# Patient Record
Sex: FEMALE | Race: WHITE | NOT HISPANIC OR LATINO | Employment: FULL TIME | URBAN - METROPOLITAN AREA
[De-identification: names, ages, dates, MRNs, and addresses within clinical notes are randomized per-mention and may not be internally consistent; named-entity substitution may affect disease eponyms.]

---

## 2018-01-15 NOTE — RESULT NOTES
Verified Results  MAMMO SCREENING BILATERAL W 3D & CAD [id number] 02:06PM Caitlyn Mcdaniels Order Number: [id number]     Test Name Result Flag Reference   MAMMO SCREENING BILATERAL W 3D & CAD (Report)     Patient History:   Family history of breast cancer in mother at age 71  Patient has never smoked  Patient's BMI is 38 3  Reason for exam: screening (asymptomatic)  Mammo Screening Bilateral W DBT and CAD: May 13, 2016 - Check In    #: [de-identified]   2D/3D Procedure   3D Bilateral CC and MLO view(s) were taken  2D Bilateral CC and MLO view(s) were taken  Technologist: Kayy Nielsen, RT(R)(M)   Prior study comparison: 2015, mammo diagnostic right, performed    at [facility]  2015, mammo screening bilateral, performed    at [facility]      There are scattered fibroglandular densities  A combination of mediolateral oblique 3D tomographic slices as    well as standard two-dimensional orthogonal images were obtained  The parenchymal pattern appears stable  No dominant soft tissue    mass or suspicious calcifications are noted  A radioopaque post    core biopsy clip is present in the right breast The skin and    nipple contours are within normal limits  No mammographic evidence of malignancy  No    significant changes when compared with prior studies  ASSESSMENT: BiRad:1 - Negative     Recommendation:   Routine screening mammogram in 1 year  A reminder letter will be   scheduled  8-10% of cancers will be missed on mammography  Management of a    palpable abnormality must be based on clinical grounds  Patients    will be notified of their results via letter from our facility  Accredited by [employer] of [employer] and FDA       Transcription Location: [facility] 98: [id number]     Risk Value(s):   Tyrer-Cuzick 10 Year: 5 598%, Tyrer-Cuzick Lifetime: 24 783%,    Myriad Table: 1 5%, SHANTANU 5 Year: 1 8%, NCI Lifetime: 17 3%   Signed by:   Cassandra Bates MD   5/17/16

## 2019-02-17 NOTE — PROGRESS NOTES
Assessment/Plan:     Calcium 1200-1500mg + 600-1000 IU Vit D daily  Pap with HR HPV q 3 years-done  Annual mammogram; req given for diagnostic mammo    Colonoscopy-due and scheduled for 3/11/19  Monthly BSE  Exercise 150 minutes per week minimum  Kegels 20 times twice daily  Ibuprofen 600 mg by mouth with onset of bleeding or cramping, whichever is first  Take second dose of ibuprofen  400 mg by mouth with food and repeat every 6 hours x 3 days  Negative depression screen  Diagnoses and all orders for this visit:    Encntr for gyn exam (general) (routine) w abnormal findings  -     Liquid-based pap, screening    Breast nodule  -     Mammo diagnostic bilateral w 3d & cad; Future    Vulvovaginal candidiasis  -     fluconazole (DIFLUCAN) 150 mg tablet; Take 1 tablet (150 mg total) by mouth once for 1 dose  -     nystatin-triamcinolone (MYCOLOG-II) ointment; Apply pea sized amount to affected area  Twice daily x 7 days    Other orders  -     Cancel: Mammo screening bilateral w 3d & cad; Future  -     Discontinue: Dapagliflozin-Metformin HCl ER (XIGDUO XR)  MG TB24; Take by mouth  -     Discontinue: Dulaglutide (TRULICITY) 6 08 HR/3 0ST SOPN; Inject under the skin  -     losartan (COZAAR) 100 MG tablet; Take by mouth  -     Omeprazole-Sodium Bicarbonate  MG CAPS; Take by mouth  -     Multiple Vitamins-Minerals (MULTIVITAMIN ADULT PO); Take by mouth  -     EQL EVENING PRIMROSE  MG CAPS; Take by mouth  -     BLACK COHOSH EXTRACT PO; Take by mouth  -     Liraglutide (VICTOZA SC); Inject under the skin              Subjective:      Patient ID: Conchita Aguirre is a 48 y o  female  Conchita Aguirre is a 48 y o  female who is here today for her annual visit accompanied by her mother  No health concerns  Monthly menses x 3 days with heavy flow x 1 days then light flow  Day one of menses is "horrible with headaches, cramps and back pain " Takes OTC meds and rests to relieve pain  Rates cramps 8/10  Menses is not acceptable  No menses yet this month  Jose E Wise is not sexually active and not currently dating  Normal pap with negative HR HPV 3/24/15  Normal mammo 5/16  She considers her job as work  She works at the [employer] at [employer]  She does walk for exercise in good weather months  The following portions of the patient's history were reviewed and updated as appropriate: allergies, current medications, past family history, past medical history, past social history, past surgical history and problem list     Review of Systems   Constitutional: Negative  Negative for activity change, appetite change, chills, diaphoresis, fatigue, fever and unexpected weight change  HENT: Negative for congestion, dental problem, sneezing, sore throat and trouble swallowing  Eyes: Negative for visual disturbance  Respiratory: Negative for chest tightness and shortness of breath  Cardiovascular: Negative for chest pain and leg swelling  Gastrointestinal: Negative for abdominal pain, constipation, diarrhea, nausea and vomiting  Genitourinary: Positive for menstrual problem  Negative for difficulty urinating, dyspareunia, dysuria, frequency, hematuria, pelvic pain, urgency, vaginal bleeding, vaginal discharge and vaginal pain  Musculoskeletal: Negative for back pain and neck pain  Skin: Negative  Allergic/Immunologic: Negative  Neurological: Negative for weakness and headaches  Hematological: Negative for adenopathy  Psychiatric/Behavioral: Negative  Objective:      /72 (BP Location: Left arm, Patient Position: Sitting)   Pulse 65   Ht 5' 10" (1 778 m)   Wt 120 kg (265 lb)   LMP 01/10/2019   BMI 38 02 kg/m²          Physical Exam   Constitutional: She is oriented to person, place, and time  She appears well-developed and well-nourished  HENT:   Head: Normocephalic and atraumatic  Eyes: Right eye exhibits no discharge  Left eye exhibits no discharge     Neck: Normal range of motion  Neck supple  Cardiovascular: Normal rate, regular rhythm, normal heart sounds and intact distal pulses  Pulmonary/Chest: Effort normal and breath sounds normal  Right breast exhibits no mass  Left breast exhibits mass (left breast nodule)  Left breast exhibits no nipple discharge, no skin change and no tenderness  No breast tenderness, discharge or bleeding  Breasts are symmetrical    Left breast nodule 3 cm FN at 8:00   Abdominal: Soft  Genitourinary: Uterus normal  Rectal exam shows no external hemorrhoid  No breast tenderness, discharge or bleeding  Pelvic exam was performed with patient prone  No labial fusion  There is tenderness on the right labia  There is no rash, lesion or injury on the right labia  There is tenderness on the left labia  There is no rash, lesion or injury on the left labia  Cervix exhibits no motion tenderness, no discharge and no friability  Right adnexum displays no mass, no tenderness and no fullness  Left adnexum displays no mass, no tenderness and no fullness  There is bleeding in the vagina  No erythema or tenderness in the vagina  No foreign body in the vagina  No signs of injury around the vagina  No vaginal discharge found  Genitourinary Comments: Labia major and vestibule excortication, satellite lesions noted on skin  Menses currently so unable to perform culture or wet mount   Musculoskeletal: Normal range of motion  Lymphadenopathy:     She has no cervical adenopathy  No inguinal adenopathy noted on the right or left side  Right: No inguinal adenopathy present  Left: No inguinal adenopathy present  Neurological: She is alert and oriented to person, place, and time  Skin: Skin is warm and dry  Psychiatric: She has a normal mood and affect  Nursing note and vitals reviewed

## 2019-02-18 ENCOUNTER — ANNUAL EXAM (OUTPATIENT)
Dept: GYNECOLOGY | Facility: CLINIC | Age: 54
End: 2019-02-18
Payer: COMMERCIAL

## 2019-02-18 VITALS
BODY MASS INDEX: 37.94 KG/M2 | DIASTOLIC BLOOD PRESSURE: 72 MMHG | HEIGHT: 70 IN | HEART RATE: 65 BPM | SYSTOLIC BLOOD PRESSURE: 126 MMHG | WEIGHT: 265 LBS

## 2019-02-18 DIAGNOSIS — N63.0 BREAST NODULE: ICD-10-CM

## 2019-02-18 DIAGNOSIS — B37.3 VULVOVAGINAL CANDIDIASIS: ICD-10-CM

## 2019-02-18 DIAGNOSIS — Z01.411 ENCNTR FOR GYN EXAM (GENERAL) (ROUTINE) W ABNORMAL FINDINGS: Primary | ICD-10-CM

## 2019-02-18 PROBLEM — B37.31 VULVOVAGINAL CANDIDIASIS: Status: ACTIVE | Noted: 2019-02-18

## 2019-02-18 PROCEDURE — G0124 SCREEN C/V THIN LAYER BY MD: HCPCS | Performed by: PATHOLOGY

## 2019-02-18 PROCEDURE — S0612 ANNUAL GYNECOLOGICAL EXAMINA: HCPCS | Performed by: NURSE PRACTITIONER

## 2019-02-18 PROCEDURE — 87624 HPV HI-RISK TYP POOLED RSLT: CPT | Performed by: NURSE PRACTITIONER

## 2019-02-18 PROCEDURE — G0145 SCR C/V CYTO,THINLAYER,RESCR: HCPCS | Performed by: PATHOLOGY

## 2019-02-18 RX ORDER — FLUCONAZOLE 150 MG/1
150 TABLET ORAL ONCE
Qty: 1 TABLET | Refills: 0 | Status: SHIPPED | OUTPATIENT
Start: 2019-02-18 | End: 2019-02-18

## 2019-02-18 RX ORDER — MAGNESIUM OXIDE 250 MG
TABLET ORAL
COMMUNITY

## 2019-02-18 RX ORDER — NYSTATIN AND TRIAMCINOLONE ACETONIDE 100000; 1 [USP'U]/G; MG/G
OINTMENT TOPICAL
Qty: 30 G | Refills: 0 | Status: SHIPPED | OUTPATIENT
Start: 2019-02-18 | End: 2021-07-19

## 2019-02-18 RX ORDER — OMEPRAZOLE/SODIUM BICARBONATE 20MG-1.1G
CAPSULE ORAL
COMMUNITY
End: 2020-01-28 | Stop reason: SDUPTHER

## 2019-02-18 RX ORDER — LOSARTAN POTASSIUM 100 MG/1
100 TABLET ORAL DAILY
COMMUNITY
End: 2020-10-08

## 2019-02-18 NOTE — PATIENT INSTRUCTIONS
Calcium 1200-1500mg + 600-1000 IU Vit D daily  Pap with HR HPV q 3 years-done  Annual mammogram; req given for diagnostic mammo    Colonoscopy-due and scheduled for 3/11/19  Monthly BSE  Exercise 150 minutes per week minimum  Kegels 20 times twice daily  Ibuprofen 600 mg by mouth with onset of bleeding or cramping, whichever is first  Take second dose of ibuprofen  400 mg by mouth with food and repeat every 6 hours x 3 days

## 2019-02-20 LAB
HPV HR 12 DNA CVX QL NAA+PROBE: NEGATIVE
HPV16 DNA CVX QL NAA+PROBE: NEGATIVE
HPV18 DNA CVX QL NAA+PROBE: NEGATIVE

## 2019-02-22 LAB
LAB AP GYN PRIMARY INTERPRETATION: ABNORMAL
Lab: ABNORMAL
PATH INTERP SPEC-IMP: ABNORMAL

## 2019-06-18 ENCOUNTER — HOSPITAL ENCOUNTER (EMERGENCY)
Facility: HOSPITAL | Age: 54
Discharge: HOME | End: 2019-06-18
Attending: EMERGENCY MEDICINE
Payer: COMMERCIAL

## 2019-06-18 ENCOUNTER — APPOINTMENT (EMERGENCY)
Dept: RADIOLOGY | Facility: HOSPITAL | Age: 54
End: 2019-06-18
Payer: COMMERCIAL

## 2019-06-18 VITALS
WEIGHT: 270.5 LBS | BODY MASS INDEX: 38.81 KG/M2 | DIASTOLIC BLOOD PRESSURE: 79 MMHG | TEMPERATURE: 98.7 F | HEART RATE: 100 BPM | OXYGEN SATURATION: 99 % | SYSTOLIC BLOOD PRESSURE: 123 MMHG | RESPIRATION RATE: 18 BRPM

## 2019-06-18 DIAGNOSIS — M25.462 KNEE EFFUSION, LEFT: ICD-10-CM

## 2019-06-18 DIAGNOSIS — M70.42 PREPATELLAR BURSITIS OF LEFT KNEE: Primary | ICD-10-CM

## 2019-06-18 PROCEDURE — 73564 X-RAY EXAM KNEE 4 OR MORE: CPT

## 2019-06-18 PROCEDURE — 99283 EMERGENCY DEPT VISIT LOW MDM: CPT | Performed by: PHYSICIAN ASSISTANT

## 2019-06-18 PROCEDURE — 99283 EMERGENCY DEPT VISIT LOW MDM: CPT

## 2019-06-18 RX ORDER — NAPROXEN 250 MG/1
500 TABLET ORAL ONCE
Status: COMPLETED | OUTPATIENT
Start: 2019-06-18 | End: 2019-06-18

## 2019-06-18 RX ORDER — CEPHALEXIN 500 MG/1
500 CAPSULE ORAL 4 TIMES DAILY
Qty: 28 CAPSULE | Refills: 0 | Status: SHIPPED | OUTPATIENT
Start: 2019-06-18 | End: 2019-06-25

## 2019-06-18 RX ORDER — CEPHALEXIN 250 MG/1
500 CAPSULE ORAL ONCE
Status: COMPLETED | OUTPATIENT
Start: 2019-06-18 | End: 2019-06-18

## 2019-06-18 RX ORDER — NAPROXEN 500 MG/1
500 TABLET ORAL 2 TIMES DAILY WITH MEALS
Qty: 14 TABLET | Refills: 0 | Status: SHIPPED | OUTPATIENT
Start: 2019-06-18 | End: 2020-02-24

## 2019-06-18 RX ORDER — SULFAMETHOXAZOLE AND TRIMETHOPRIM 800; 160 MG/1; MG/1
1 TABLET ORAL 2 TIMES DAILY
Qty: 14 TABLET | Refills: 0 | Status: SHIPPED | OUTPATIENT
Start: 2019-06-18 | End: 2019-06-25

## 2019-06-18 RX ORDER — SULFAMETHOXAZOLE AND TRIMETHOPRIM 800; 160 MG/1; MG/1
1 TABLET ORAL ONCE
Status: COMPLETED | OUTPATIENT
Start: 2019-06-18 | End: 2019-06-18

## 2019-06-18 RX ADMIN — NAPROXEN 500 MG: 250 TABLET ORAL at 16:51

## 2019-06-18 RX ADMIN — CEPHALEXIN 500 MG: 250 CAPSULE ORAL at 16:50

## 2019-06-18 RX ADMIN — SULFAMETHOXAZOLE AND TRIMETHOPRIM 1 TABLET: 800; 160 TABLET ORAL at 16:49

## 2020-01-27 DIAGNOSIS — R30.0 DYSURIA: Primary | ICD-10-CM

## 2020-01-28 ENCOUNTER — OFFICE VISIT (OUTPATIENT)
Dept: OBGYN CLINIC | Facility: CLINIC | Age: 55
End: 2020-01-28
Payer: COMMERCIAL

## 2020-01-28 ENCOUNTER — APPOINTMENT (OUTPATIENT)
Dept: LAB | Facility: CLINIC | Age: 55
End: 2020-01-28
Payer: COMMERCIAL

## 2020-01-28 VITALS
SYSTOLIC BLOOD PRESSURE: 130 MMHG | HEART RATE: 92 BPM | DIASTOLIC BLOOD PRESSURE: 82 MMHG | WEIGHT: 262.2 LBS | BODY MASS INDEX: 37.62 KG/M2

## 2020-01-28 DIAGNOSIS — R30.0 DYSURIA: ICD-10-CM

## 2020-01-28 DIAGNOSIS — B37.3 VULVOVAGINAL CANDIDIASIS: Primary | ICD-10-CM

## 2020-01-28 LAB
BACTERIA UR QL AUTO: ABNORMAL /HPF
BILIRUB UR QL STRIP: NEGATIVE
BV WHIFF TEST VAG QL: ABNORMAL
CLARITY UR: ABNORMAL
CLUE CELLS SPEC QL WET PREP: ABNORMAL
COLOR UR: YELLOW
GLUCOSE UR STRIP-MCNC: ABNORMAL MG/DL
HGB UR QL STRIP.AUTO: ABNORMAL
KETONES UR STRIP-MCNC: NEGATIVE MG/DL
LEUKOCYTE ESTERASE UR QL STRIP: ABNORMAL
NITRITE UR QL STRIP: NEGATIVE
NON-SQ EPI CELLS URNS QL MICRO: ABNORMAL /HPF
PH SMN: NORMAL [PH]
PH UR STRIP.AUTO: 6 [PH]
PROT UR STRIP-MCNC: NEGATIVE MG/DL
RBC #/AREA URNS AUTO: ABNORMAL /HPF
SL AMB POCT WET MOUNT: ABNORMAL
SP GR UR STRIP.AUTO: 1.02 (ref 1–1.03)
T VAGINALIS VAG QL WET PREP: ABNORMAL
UROBILINOGEN UR QL STRIP.AUTO: 0.2 E.U./DL
WBC #/AREA URNS AUTO: ABNORMAL /HPF
YEAST VAG QL WET PREP: ABNORMAL

## 2020-01-28 PROCEDURE — 87086 URINE CULTURE/COLONY COUNT: CPT

## 2020-01-28 PROCEDURE — 99214 OFFICE O/P EST MOD 30 MIN: CPT | Performed by: NURSE PRACTITIONER

## 2020-01-28 PROCEDURE — 87210 SMEAR WET MOUNT SALINE/INK: CPT | Performed by: NURSE PRACTITIONER

## 2020-01-28 PROCEDURE — 81001 URINALYSIS AUTO W/SCOPE: CPT

## 2020-01-28 RX ORDER — EMPAGLIFLOZIN AND METFORMIN HYDROCHLORIDE 5; 1000 MG/1; MG/1
1 TABLET ORAL 2 TIMES DAILY
COMMUNITY
Start: 2020-01-06 | End: 2021-05-12

## 2020-01-28 RX ORDER — FLUCONAZOLE 150 MG/1
TABLET ORAL
Qty: 2 TABLET | Refills: 0 | Status: SHIPPED | OUTPATIENT
Start: 2020-01-28 | End: 2020-01-31

## 2020-01-28 RX ORDER — PROPRANOLOL/HYDROCHLOROTHIAZID 40 MG-25MG
TABLET ORAL
COMMUNITY
End: 2021-07-19

## 2020-01-28 RX ORDER — OMEPRAZOLE 20 MG/1
20 CAPSULE, DELAYED RELEASE ORAL DAILY
COMMUNITY
Start: 2020-01-03 | End: 2020-04-05

## 2020-01-28 NOTE — PATIENT INSTRUCTIONS
Take diflucan as directed    Use triamcinolone +nystatin ointment (has already) twice daily x 7 days as directed  Normalized blood sugars  Abnormal urinalysis; will call urine culture results

## 2020-01-28 NOTE — PROGRESS NOTES
Assessment/Plan:     Take diflucan as directed  Use triamcinolone +nystatin ointment (has already) twice daily x 7 days as directed  Normalized blood sugars  Abnormal urinalysis; will call urine culture results      Diagnoses and all orders for this visit:    Vulvovaginal candidiasis  -     fluconazole (DIFLUCAN) 150 mg tablet; Take one tablet po today and repeat in 3 days  -     POCT wet mount    Other orders  -     SYNJARDY 5-1000 MG TABS; Take 1 tablet by mouth 2 (two) times a day  -     omeprazole (PriLOSEC) 20 mg delayed release capsule; Take 20 mg by mouth daily  -     CINNAMON PO; Take 2,000 mg by mouth  -     Turmeric 500 MG CAPS; Take by mouth              Subjective:      Patient ID: Ceil Riding is a 48 y o  female  Ceil Riding is a 48 y o  female who is here today for a problem visit  C/o vaginal irritation x one week "described as poking " Denies pelvic pain, fever  Slight yellow vaginal discharge  Vaginal irritation has lessened as she could barely sit last week  She was treated with an antibiotic for UTI with urgent care around 12/21/19 with complete resolution of symptoms  Monthly menses x 3 days with heavy flow x 1 day then light flow  Bloods sugars have been slightly elevated each AM   Ceil Riding is not currently sexually active  The following portions of the patient's history were reviewed and updated as appropriate: allergies, current medications, past family history, past medical history, past social history, past surgical history and problem list     Review of Systems   Constitutional: Negative  Gastrointestinal: Negative for abdominal pain, constipation, diarrhea, nausea and vomiting  Genitourinary: Positive for vaginal discharge  Negative for decreased urine volume, dyspareunia, dysuria, genital sores, menstrual problem, pelvic pain, urgency, vaginal bleeding and vaginal pain  Musculoskeletal: Negative for arthralgias and myalgias  Skin: Negative  Hematological: Negative for adenopathy  Psychiatric/Behavioral: Negative  All other systems reviewed and are negative  Objective:      /82 (BP Location: Left arm, Patient Position: Sitting, Cuff Size: Standard)   Pulse 92   Wt 119 kg (262 lb 3 2 oz)   LMP 01/04/2020   BMI 37 62 kg/m²          Physical Exam   Constitutional: She is oriented to person, place, and time  She appears well-developed and well-nourished  Genitourinary: Uterus normal  Rectal exam shows no external hemorrhoid  Pelvic exam was performed with patient supine  No labial fusion  There is rash and tenderness on the right labia  There is no lesion or injury on the right labia  There is rash and tenderness on the left labia  There is no lesion or injury on the left labia  Cervix exhibits no motion tenderness, no discharge and no friability  Right adnexum displays no mass, no tenderness and no fullness  Left adnexum displays no mass, no tenderness and no fullness  No erythema, tenderness or bleeding in the vagina  No foreign body in the vagina  No signs of injury around the vagina  Vaginal discharge found  Genitourinary Comments: Vulvar erythema with satellite lesions  White caking discharge noted on vulva  Thick white vaginal discharge   Musculoskeletal: Normal range of motion  Lymphadenopathy: No inguinal adenopathy noted on the right or left side  Right: No inguinal adenopathy present  Left: No inguinal adenopathy present  Neurological: She is alert and oriented to person, place, and time  Skin: Skin is warm and dry  Psychiatric: She has a normal mood and affect  Nursing note and vitals reviewed

## 2020-01-29 LAB — BACTERIA UR CULT: NORMAL

## 2020-01-30 ENCOUNTER — TRANSCRIBE ORDERS (OUTPATIENT)
Dept: ADMINISTRATIVE | Facility: HOSPITAL | Age: 55
End: 2020-01-30

## 2020-01-30 DIAGNOSIS — Z12.31 ENCOUNTER FOR SCREENING MAMMOGRAM FOR MALIGNANT NEOPLASM OF BREAST: ICD-10-CM

## 2020-01-30 DIAGNOSIS — E11.9 DIABETES MELLITUS WITHOUT COMPLICATION (HCC): Primary | ICD-10-CM

## 2020-02-10 ENCOUNTER — APPOINTMENT (OUTPATIENT)
Dept: LAB | Facility: CLINIC | Age: 55
End: 2020-02-10
Payer: COMMERCIAL

## 2020-02-10 DIAGNOSIS — E11.9 DIABETES MELLITUS WITHOUT COMPLICATION (HCC): ICD-10-CM

## 2020-02-10 LAB
ALBUMIN SERPL BCP-MCNC: 3.6 G/DL (ref 3.5–5)
ALP SERPL-CCNC: 85 U/L (ref 46–116)
ALT SERPL W P-5'-P-CCNC: 18 U/L (ref 12–78)
ANION GAP SERPL CALCULATED.3IONS-SCNC: 10 MMOL/L (ref 4–13)
AST SERPL W P-5'-P-CCNC: 13 U/L (ref 5–45)
BILIRUB SERPL-MCNC: 0.3 MG/DL (ref 0.2–1)
BUN SERPL-MCNC: 9 MG/DL (ref 5–25)
CALCIUM SERPL-MCNC: 8.4 MG/DL (ref 8.3–10.1)
CHLORIDE SERPL-SCNC: 103 MMOL/L (ref 100–108)
CHOLEST SERPL-MCNC: 139 MG/DL (ref 50–200)
CO2 SERPL-SCNC: 25 MMOL/L (ref 21–32)
CREAT SERPL-MCNC: 0.58 MG/DL (ref 0.6–1.3)
CREAT UR-MCNC: 46.7 MG/DL
EST. AVERAGE GLUCOSE BLD GHB EST-MCNC: 177 MG/DL
GFR SERPL CREATININE-BSD FRML MDRD: 106 ML/MIN/1.73SQ M
GLUCOSE P FAST SERPL-MCNC: 158 MG/DL (ref 65–99)
HBA1C MFR BLD: 7.8 % (ref 4.2–6.3)
HDLC SERPL-MCNC: 51 MG/DL
LDLC SERPL CALC-MCNC: 72 MG/DL (ref 0–100)
MICROALBUMIN UR-MCNC: 18.4 MG/L (ref 0–20)
MICROALBUMIN/CREAT 24H UR: 39 MG/G CREATININE (ref 0–30)
NONHDLC SERPL-MCNC: 88 MG/DL
POTASSIUM SERPL-SCNC: 4 MMOL/L (ref 3.5–5.3)
PROT SERPL-MCNC: 7.5 G/DL (ref 6.4–8.2)
SODIUM SERPL-SCNC: 138 MMOL/L (ref 136–145)
TRIGL SERPL-MCNC: 78 MG/DL

## 2020-02-10 PROCEDURE — 36415 COLL VENOUS BLD VENIPUNCTURE: CPT

## 2020-02-10 PROCEDURE — 80061 LIPID PANEL: CPT

## 2020-02-10 PROCEDURE — 83036 HEMOGLOBIN GLYCOSYLATED A1C: CPT

## 2020-02-10 PROCEDURE — 82043 UR ALBUMIN QUANTITATIVE: CPT | Performed by: FAMILY MEDICINE

## 2020-02-10 PROCEDURE — 82570 ASSAY OF URINE CREATININE: CPT | Performed by: FAMILY MEDICINE

## 2020-02-10 PROCEDURE — 80053 COMPREHEN METABOLIC PANEL: CPT

## 2020-02-24 ENCOUNTER — ANNUAL EXAM (OUTPATIENT)
Dept: GYNECOLOGY | Facility: CLINIC | Age: 55
End: 2020-02-24
Payer: COMMERCIAL

## 2020-02-24 VITALS
HEIGHT: 70 IN | BODY MASS INDEX: 38.11 KG/M2 | DIASTOLIC BLOOD PRESSURE: 78 MMHG | SYSTOLIC BLOOD PRESSURE: 124 MMHG | WEIGHT: 266.2 LBS

## 2020-02-24 DIAGNOSIS — R87.610 ATYPICAL SQUAMOUS CELL CHANGES OF UNDETERMINED SIGNIFICANCE (ASCUS) ON CERVICAL CYTOLOGY WITH NEGATIVE HIGH RISK HUMAN PAPILLOMA VIRUS (HPV) TEST RESULT: ICD-10-CM

## 2020-02-24 DIAGNOSIS — Z01.419 ENCNTR FOR GYN EXAM (GENERAL) (ROUTINE) W/O ABN FINDINGS: Primary | ICD-10-CM

## 2020-02-24 DIAGNOSIS — Z12.31 ENCOUNTER FOR SCREENING MAMMOGRAM FOR BREAST CANCER: ICD-10-CM

## 2020-02-24 PROCEDURE — S0612 ANNUAL GYNECOLOGICAL EXAMINA: HCPCS | Performed by: NURSE PRACTITIONER

## 2020-02-24 PROCEDURE — 87624 HPV HI-RISK TYP POOLED RSLT: CPT | Performed by: NURSE PRACTITIONER

## 2020-02-24 PROCEDURE — G0145 SCR C/V CYTO,THINLAYER,RESCR: HCPCS | Performed by: NURSE PRACTITIONER

## 2020-02-24 NOTE — PATIENT INSTRUCTIONS
Calcium 1200-1500mg + 600-1000 IU Vit D daily  Exercise 150 minutes per week minimum including weight bearing exercises  Pap with high risk HPV Q 5 years-done  Annual mammogram and monthly breast self exam recommended  Colonoscopy-Up to date  Kegels 20 times twice daily  Silicone based lubricant with sex  Vaginal moisturizers twice weekly as needed

## 2020-02-24 NOTE — PROGRESS NOTES
Assessment/Plan:     Calcium 1200-1500mg + 600-1000 IU Vit D daily  Exercise 150 minutes per week minimum including weight bearing exercises  Pap with high risk HPV Q 5 years-done  Annual mammogram and monthly breast self exam recommended  Colonoscopy-Up to date  Kegels 20 times twice daily  Silicone based lubricant with sex  Vaginal moisturizers twice weekly as needed  Negative depression screen  Diagnoses and all orders for this visit:    Encntr for gyn exam (general) (routine) w/o abn findings  -     Liquid-based pap, screening    Encounter for screening mammogram for breast cancer  -     Mammo diagnostic bilateral w 3d & cad; Future    Atypical squamous cell changes of undetermined significance (ASCUS) on cervical cytology with negative high risk human papilloma virus (HPV) test result  -     Liquid-based pap, screening    BMI 38 0-38 9,adult              Subjective:      Patient ID: Ton Bunn is a 48 y o  female  Ton Bunn is a 48 y o  female who is here today for her annual visit  No health concerns  Symptoms of vaginal infection have resolved  LMP 2/18/2020  Monthly menses x 3-4 days with x 1 heavy flow and then light  Menses is acceptable  Ton Bunn is not sexually active  Last coitus was 5 years ago  ASCUS pap with negative HR HPV 2/19  Works FT in the [employer] department at [employer]  Kyle Deleon 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, walking and unloading deliveries  When it is warmer she also tries to walk 2 miles 2-3 times a week but has not done this lately  The following portions of the patient's history were reviewed and updated as appropriate: allergies, current medications, past family history, past medical history, past social history, past surgical history and problem list     Review of Systems   Constitutional: Negative  Negative for activity change, appetite change, chills, diaphoresis, fatigue, fever and unexpected weight change     HENT: Negative for congestion, dental problem, sneezing, sore throat and trouble swallowing  Eyes: Negative for visual disturbance  Respiratory: Negative for chest tightness and shortness of breath  Cardiovascular: Negative for chest pain and leg swelling  Gastrointestinal: Negative for abdominal pain, constipation, diarrhea, nausea and vomiting  Genitourinary: Negative for difficulty urinating, dysuria, frequency, hematuria, menstrual problem, pelvic pain, urgency, vaginal bleeding, vaginal discharge and vaginal pain  Musculoskeletal: Negative for back pain and neck pain  Skin: Negative  Allergic/Immunologic: Negative  Neurological: Negative for weakness and headaches  Hematological: Negative for adenopathy  Psychiatric/Behavioral: Negative  Objective:      /78 (BP Location: Left arm, Patient Position: Sitting, Cuff Size: Large)   Ht 5' 10" (1 778 m)   Wt 121 kg (266 lb 3 2 oz)   LMP 02/18/2020   BMI 38 20 kg/m²          Physical Exam   Constitutional: She is oriented to person, place, and time  Vital signs are normal  She appears well-developed and well-nourished  Physical exam limited by habitus   HENT:   Head: Normocephalic and atraumatic  Eyes: Right eye exhibits no discharge  Left eye exhibits no discharge  Neck: Trachea normal and normal range of motion  Neck supple  No thyromegaly present  Cardiovascular: Normal rate, regular rhythm, normal heart sounds and intact distal pulses  Pulmonary/Chest: Effort normal and breath sounds normal  Right breast exhibits no inverted nipple, no mass, no nipple discharge, no skin change and no tenderness  Left breast exhibits no inverted nipple, no mass, no nipple discharge, no skin change and no tenderness  No breast tenderness, discharge or bleeding  Breasts are symmetrical    Abdominal: Soft  Normal appearance  Genitourinary: Vagina normal and uterus normal  Rectal exam shows no external hemorrhoid  No breast tenderness, discharge or bleeding  Pelvic exam was performed with patient supine  No labial fusion  There is no rash, tenderness, lesion or injury on the right labia  There is no rash, tenderness, lesion or injury on the left labia  Cervix exhibits no motion tenderness, no discharge and no friability  Right adnexum displays no mass, no tenderness and no fullness  Left adnexum displays no mass, no tenderness and no fullness  No erythema, tenderness or bleeding in the vagina  No foreign body in the vagina  No signs of injury around the vagina  No vaginal discharge found  Musculoskeletal: Normal range of motion  Lymphadenopathy:        Head (right side): No submental, no submandibular and no tonsillar adenopathy present  Head (left side): No submental, no submandibular and no tonsillar adenopathy present  She has no cervical adenopathy  She has no axillary adenopathy  No inguinal adenopathy noted on the right or left side  Right: No inguinal adenopathy present  Left: No inguinal adenopathy present  Neurological: She is alert and oriented to person, place, and time  Skin: Skin is warm and dry  Psychiatric: She has a normal mood and affect  Nursing note and vitals reviewed

## 2020-02-27 LAB
LAB AP GYN PRIMARY INTERPRETATION: NORMAL
Lab: NORMAL

## 2020-04-04 DIAGNOSIS — K21.9 GASTROESOPHAGEAL REFLUX DISEASE WITHOUT ESOPHAGITIS: Primary | ICD-10-CM

## 2020-04-05 RX ORDER — OMEPRAZOLE 20 MG/1
CAPSULE, DELAYED RELEASE ORAL
Qty: 90 CAPSULE | Refills: 1 | Status: SHIPPED | OUTPATIENT
Start: 2020-04-05 | End: 2020-10-06

## 2020-06-11 ENCOUNTER — TELEPHONE (OUTPATIENT)
Dept: FAMILY MEDICINE CLINIC | Facility: CLINIC | Age: 55
End: 2020-06-11

## 2020-06-11 DIAGNOSIS — E11.9 TYPE 2 DIABETES MELLITUS WITHOUT COMPLICATION, WITHOUT LONG-TERM CURRENT USE OF INSULIN (HCC): Primary | ICD-10-CM

## 2020-06-11 NOTE — TELEPHONE ENCOUNTER
Patient is schedule for next week is aware all appointments will be rescheduled, wants to know if rutine lab order may be completed for next OV if completed call patient to make aware, thanks   ND

## 2020-06-11 NOTE — TELEPHONE ENCOUNTER
ADOLFO telling patient that her lab slips are up front for her  Told patient she can call us if she wants the lab slips emailed or faxed to her

## 2020-07-09 ENCOUNTER — APPOINTMENT (OUTPATIENT)
Dept: LAB | Facility: CLINIC | Age: 55
End: 2020-07-09
Payer: COMMERCIAL

## 2020-07-09 LAB
ALBUMIN SERPL BCP-MCNC: 4.1 G/DL (ref 3.5–5)
ALP SERPL-CCNC: 97 U/L (ref 46–116)
ALT SERPL W P-5'-P-CCNC: 16 U/L (ref 12–78)
ANION GAP SERPL CALCULATED.3IONS-SCNC: 9 MMOL/L (ref 4–13)
AST SERPL W P-5'-P-CCNC: 20 U/L (ref 5–45)
BILIRUB SERPL-MCNC: 0.44 MG/DL (ref 0.2–1)
BUN SERPL-MCNC: 15 MG/DL (ref 5–25)
CALCIUM SERPL-MCNC: 9 MG/DL (ref 8.3–10.1)
CHLORIDE SERPL-SCNC: 101 MMOL/L (ref 100–108)
CHOLEST SERPL-MCNC: 138 MG/DL (ref 50–200)
CO2 SERPL-SCNC: 26 MMOL/L (ref 21–32)
CREAT SERPL-MCNC: 0.54 MG/DL (ref 0.6–1.3)
CREAT UR-MCNC: 137 MG/DL
EST. AVERAGE GLUCOSE BLD GHB EST-MCNC: 180 MG/DL
GFR SERPL CREATININE-BSD FRML MDRD: 109 ML/MIN/1.73SQ M
GLUCOSE P FAST SERPL-MCNC: 181 MG/DL (ref 65–99)
HBA1C MFR BLD: 7.9 %
HDLC SERPL-MCNC: 56 MG/DL
LDLC SERPL CALC-MCNC: 70 MG/DL (ref 0–100)
MICROALBUMIN UR-MCNC: 59.7 MG/L (ref 0–20)
MICROALBUMIN/CREAT 24H UR: 44 MG/G CREATININE (ref 0–30)
NONHDLC SERPL-MCNC: 82 MG/DL
POTASSIUM SERPL-SCNC: 4.3 MMOL/L (ref 3.5–5.3)
PROT SERPL-MCNC: 8.2 G/DL (ref 6.4–8.2)
SODIUM SERPL-SCNC: 136 MMOL/L (ref 136–145)
TRIGL SERPL-MCNC: 58 MG/DL

## 2020-07-09 PROCEDURE — 80061 LIPID PANEL: CPT

## 2020-07-09 PROCEDURE — 36415 COLL VENOUS BLD VENIPUNCTURE: CPT

## 2020-07-09 PROCEDURE — 3051F HG A1C>EQUAL 7.0%<8.0%: CPT | Performed by: FAMILY MEDICINE

## 2020-07-09 PROCEDURE — 83036 HEMOGLOBIN GLYCOSYLATED A1C: CPT

## 2020-07-09 PROCEDURE — 3060F POS MICROALBUMINURIA REV: CPT | Performed by: FAMILY MEDICINE

## 2020-07-09 PROCEDURE — 82043 UR ALBUMIN QUANTITATIVE: CPT

## 2020-07-09 PROCEDURE — 80053 COMPREHEN METABOLIC PANEL: CPT

## 2020-07-09 PROCEDURE — 82570 ASSAY OF URINE CREATININE: CPT

## 2020-07-13 ENCOUNTER — OFFICE VISIT (OUTPATIENT)
Dept: FAMILY MEDICINE CLINIC | Facility: CLINIC | Age: 55
End: 2020-07-13
Payer: COMMERCIAL

## 2020-07-13 VITALS
OXYGEN SATURATION: 97 % | DIASTOLIC BLOOD PRESSURE: 80 MMHG | HEIGHT: 70 IN | HEART RATE: 80 BPM | SYSTOLIC BLOOD PRESSURE: 138 MMHG | RESPIRATION RATE: 16 BRPM | BODY MASS INDEX: 37.65 KG/M2 | TEMPERATURE: 97 F | WEIGHT: 263 LBS

## 2020-07-13 DIAGNOSIS — K21.9 GASTROESOPHAGEAL REFLUX DISEASE WITHOUT ESOPHAGITIS: ICD-10-CM

## 2020-07-13 DIAGNOSIS — E11.9 TYPE 2 DIABETES MELLITUS WITHOUT COMPLICATION, WITHOUT LONG-TERM CURRENT USE OF INSULIN (HCC): ICD-10-CM

## 2020-07-13 DIAGNOSIS — I10 ESSENTIAL HYPERTENSION: ICD-10-CM

## 2020-07-13 DIAGNOSIS — E11.9 TYPE 2 DIABETES MELLITUS NOT AT GOAL (HCC): Primary | ICD-10-CM

## 2020-07-13 PROBLEM — Z86.19 HISTORY OF HEPATITIS C: Status: ACTIVE | Noted: 2020-07-13

## 2020-07-13 PROCEDURE — 3079F DIAST BP 80-89 MM HG: CPT | Performed by: FAMILY MEDICINE

## 2020-07-13 PROCEDURE — 1036F TOBACCO NON-USER: CPT | Performed by: FAMILY MEDICINE

## 2020-07-13 PROCEDURE — 99214 OFFICE O/P EST MOD 30 MIN: CPT | Performed by: FAMILY MEDICINE

## 2020-07-13 PROCEDURE — 3060F POS MICROALBUMINURIA REV: CPT | Performed by: FAMILY MEDICINE

## 2020-07-13 PROCEDURE — 3051F HG A1C>EQUAL 7.0%<8.0%: CPT | Performed by: FAMILY MEDICINE

## 2020-07-13 PROCEDURE — 3075F SYST BP GE 130 - 139MM HG: CPT | Performed by: FAMILY MEDICINE

## 2020-07-13 PROCEDURE — 3008F BODY MASS INDEX DOCD: CPT | Performed by: FAMILY MEDICINE

## 2020-07-13 NOTE — ASSESSMENT & PLAN NOTE
Lab Results   Component Value Date    HGBA1C 7 9 (H) 07/09/2020   pt has been skipping her meds  Now will be compliant

## 2020-07-13 NOTE — PROGRESS NOTES
Assessment/Plan:         Problem List Items Addressed This Visit        Digestive    Gastroesophageal reflux disease without esophagitis     Omeprazole              Endocrine    Type 2 diabetes mellitus without complication, without long-term current use of insulin ([facility])       Lab Results   Component Value Date    HGBA1C 7 9 (H) 07/09/2020   pt has been skipping her meds  Now will be compliant            Cardiovascular and Mediastinum    Essential hypertension     Well controlled           Other Visit Diagnoses     Type 2 diabetes mellitus not at goal [facility])    -  Primary    Relevant Orders    Comprehensive metabolic panel    Hemoglobin A1C    Lipid panel    Microalbumin / creatinine urine ratio            Subjective:  Pt here for interval visit HTn DM GERD  Med review lab review and HM  review     Patient ID: Harjindermaxine Bingham is a 48 y o  female  HPI    The following portions of the patient's history were reviewed and updated as appropriate:   She has a past medical history of Diabetes ([address] ), Hepatitis C, and Hypertension  ,  does not have any pertinent problems on file  ,   has a past surgical history that includes Tubal ligation; Ankle surgery; Mammo (historical) (02/2019); and Colonoscopy (03/2019)  ,  family history includes Arthritis in her paternal grandmother; Breast cancer in her mother; Dementia in her father; Diabetes in her father, maternal grandmother, and mother; Heart attack in her maternal grandfather; Heart disease in her father; Heart failure in her father; Hyperlipidemia in her maternal grandmother; Hypertension in her father and mother; Liver cancer in her paternal grandfather; No Known Problems in her brother, brother, brother, daughter, sister, and son; Stroke in her maternal grandfather  ,   reports that she has never smoked  She has never used smokeless tobacco  She reports that she drinks about 1 0 standard drinks of alcohol per week  She reports that she does not use drugs  ,  is allergic to lisinopril     Current Outpatient Medications   Medication Sig Dispense Refill    BLACK COHOSH EXTRACT PO Take by mouth      EQL EVENING PRIMROSE  MG CAPS Take by mouth      Liraglutide (VICTOZA SC) Inject under the skin      losartan (COZAAR) 100 MG tablet Take 100 mg by mouth daily       Multiple Vitamins-Minerals (MULTIVITAMIN ADULT PO) Take by mouth      nystatin-triamcinolone (MYCOLOG-II) ointment Apply pea sized amount to affected area  Twice daily x 7 days 30 g 0    omeprazole (PriLOSEC) 20 mg delayed release capsule TAKE ONE CAPSULE BY MOUTH EVERY DAY 90 capsule 1    SYNJARDY 5-1000 MG TABS Take 1 tablet by mouth 2 (two) times a day      Turmeric 500 MG CAPS Take by mouth       No current facility-administered medications for this visit  Review of Systems   Constitutional: Negative for appetite change, chills, fatigue and fever  Respiratory: Negative for cough, chest tightness and shortness of breath  Cardiovascular: Negative for chest pain, palpitations and leg swelling  Gastrointestinal: Negative for abdominal pain, constipation, diarrhea, nausea and vomiting  Genitourinary: Negative for difficulty urinating and frequency  Musculoskeletal: Negative for arthralgias, back pain and neck pain  Skin: Negative for rash  Neurological: Negative for dizziness, weakness, light-headedness, numbness and headaches  Hematological: Does not bruise/bleed easily  Psychiatric/Behavioral: Negative for dysphoric mood and sleep disturbance  The patient is not nervous/anxious  Objective:  Vitals:    07/13/20 0943   BP: 138/80   BP Location: Left arm   Patient Position: Sitting   Pulse: 80   Resp: 16   Temp: (!) 97 °F (36 1 °C)   SpO2: 97%   Weight: 119 kg (263 lb)   Height: 5' 10" (1 778 m)     Body mass index is 37 74 kg/m²  Physical Exam   Constitutional: She is oriented to person, place, and time  She appears well-developed  No distress     HENT:   Mouth/Throat: Oropharynx is clear and moist    Eyes: Pupils are equal, round, and reactive to light  Conjunctivae and EOM are normal    Neck: Normal range of motion  Neck supple  Carotid bruit is not present  No thyromegaly present  Cardiovascular: Normal rate, regular rhythm, normal heart sounds and intact distal pulses  No murmur heard  Pulses:       Dorsalis pedis pulses are 1+ on the right side, and 1+ on the left side  Posterior tibial pulses are 1+ on the right side, and 1+ on the left side  Pulmonary/Chest: Effort normal and breath sounds normal  No respiratory distress  She exhibits no tenderness  Abdominal: Soft  Bowel sounds are normal  She exhibits no distension  There is no tenderness  Feet:   Right Foot:   Skin Integrity: Positive for dry skin  Negative for ulcer, skin breakdown, erythema, warmth or callus  Left Foot:   Skin Integrity: Positive for dry skin  Negative for ulcer, skin breakdown, erythema, warmth or callus  Lymphadenopathy:     She has no cervical adenopathy  Neurological: She is alert and oriented to person, place, and time  She displays normal reflexes  No cranial nerve deficit  Skin: Skin is warm and dry  Psychiatric: She has a normal mood and affect  Her behavior is normal  Thought content normal    Vitals reviewed  Patient's shoes and socks removed  Right Foot/Ankle   Right Foot Inspection  Skin Exam: skin normal, skin intact and dry skin no warmth, no callus, no erythema, no maceration, no abnormal color, no pre-ulcer, no ulcer and no callus                          Toe Exam: no swelling, no tenderness, erythema and  no right toe deformity  Sensory   Vibration: diminished and intact  Proprioception: intact   Monofilament testing: intact  Vascular    The right DP pulse is 1+  The right PT pulse is 1+     Right Toe  - Comprehensive Exam  Ecchymosis: none  Swelling: none   Tenderness: none         Left Foot/Ankle  Left Foot Inspection  Skin Exam: skin normal, skin intact and dry skinno warmth, no erythema, no maceration, normal color, no pre-ulcer, no ulcer and no callus                                         Sensory   Vibration: intact  Proprioception: intact  Monofilament: intact  Vascular    The left DP pulse is 1+  The left PT pulse is 1+  Left Toe  - Comprehensive Exam  Ecchymosis: none  Swelling: none   Tenderness: none       Assign Risk Category:  No deformity present; No loss of protective sensation;        Risk: 0  BMI Counseling: Body mass index is 37 74 kg/m²  The BMI is above normal  Nutrition recommendations include reducing portion sizes, consuming healthier snacks and decreasing soda and/or juice intake  Exercise recommendations include exercising 3-5 times per week

## 2020-09-10 ENCOUNTER — OFFICE VISIT (OUTPATIENT)
Dept: GYNECOLOGY | Facility: CLINIC | Age: 55
End: 2020-09-10
Payer: COMMERCIAL

## 2020-09-10 VITALS
SYSTOLIC BLOOD PRESSURE: 120 MMHG | BODY MASS INDEX: 36.57 KG/M2 | DIASTOLIC BLOOD PRESSURE: 70 MMHG | HEIGHT: 71 IN | TEMPERATURE: 98.1 F | WEIGHT: 261.2 LBS

## 2020-09-10 DIAGNOSIS — N76.6 VULVAR ULCERATION: Primary | ICD-10-CM

## 2020-09-10 DIAGNOSIS — R10.2 VULVAR PAIN: ICD-10-CM

## 2020-09-10 PROCEDURE — 1036F TOBACCO NON-USER: CPT | Performed by: OBSTETRICS & GYNECOLOGY

## 2020-09-10 PROCEDURE — 99213 OFFICE O/P EST LOW 20 MIN: CPT | Performed by: OBSTETRICS & GYNECOLOGY

## 2020-09-10 PROCEDURE — 3074F SYST BP LT 130 MM HG: CPT | Performed by: OBSTETRICS & GYNECOLOGY

## 2020-09-10 PROCEDURE — 3078F DIAST BP <80 MM HG: CPT | Performed by: OBSTETRICS & GYNECOLOGY

## 2020-09-10 PROCEDURE — 87255 GENET VIRUS ISOLATE HSV: CPT | Performed by: OBSTETRICS & GYNECOLOGY

## 2020-09-10 RX ORDER — LIDOCAINE HYDROCHLORIDE 20 MG/ML
JELLY TOPICAL
Qty: 30 ML | Refills: 0 | Status: SHIPPED | OUTPATIENT
Start: 2020-09-10 | End: 2022-01-17 | Stop reason: SDUPTHER

## 2020-09-10 NOTE — PROGRESS NOTES
Assessment/Plan:  The vulvar ulceration compatible with herpes  Bilateral but 1st episode  HSV was discussed  Valtrex  com was suggested for further information  Fact that she has never experienced this before and she was not sexually active within the last 5 years suggests that if this is herpes she has been asymptomatic carrier until now  Having said this she is less likely to have frequent recurrences  If the culture comes back negative we will perform serologic testing  A false negative may be due to Vaseline which she used this morning or old lesions  Prescription was placed for 2% lidocaine gel  Instructions were given for Valtrex use if the culture or blood work comes back positive  Today's visit lasted 17 minutes with more than 50% spent on counseling  Diagnoses and all orders for this visit:    Vulvar ulceration  -     lidocaine (XYLOCAINE) 2 % topical gel; Apply to affected area up to 4 times daily p r n  pain    Vulvar pain  -     lidocaine (XYLOCAINE) 2 % topical gel; Apply to affected area up to 4 times daily p r n  pain              Subjective:        Patient ID: Destiny Barton is a 48 y o  female  Velna Hodgkins called this morning for an urgent visit  She noticed pimples on the outside of the vagina  These began approximately 2 weeks ago but have become increasingly uncomfortable and burn when urine hits the area  She denies any fever, chills, frequency, back pain, or abdominal pain  She began having an unexpected menses 4 days ago which ended yesterday  First day was heavy  She is wearing pads and tampons, the usual brands  Her last normal period was in April  She has been having hot flashes and night sweats for some time and has been using black cohosh and oil of Primrose  She denies any change in soaps or detergents  She was last sexually active 5 years ago  That was a 5 year relationship    She has never had an episode like this before      The following portions of the patient's history were reviewed and updated as appropriate: She  has a past medical history of Diabetes ([facility] [address] ), Hepatitis C, and Hypertension  Patient Active Problem List    Diagnosis Date Noted    Type 2 diabetes mellitus without complication, without long-term current use of insulin ([facility] ) 07/13/2020    Essential hypertension 07/13/2020    Gastroesophageal reflux disease without esophagitis 07/13/2020    History of hepatitis C 07/13/2020    Atypical squamous cell changes of undetermined significance (ASCUS) on cervical cytology with negative high risk human papilloma virus (HPV) test result 02/24/2020    Encounter for screening mammogram for breast cancer 02/24/2020    Encntr for gyn exam (general) (routine) w/o abn findings 02/24/2020    BMI 38 0-38 9,adult 02/24/2020    Encntr for gyn exam (general) (routine) w abnormal findings 02/18/2019    Breast nodule 02/18/2019    Vulvovaginal candidiasis 02/18/2019     She  has a past surgical history that includes Tubal ligation; Ankle surgery; Mammo (historical) (02/2019); and Colonoscopy (03/2019)  Her family history includes Arthritis in her paternal grandmother; Breast cancer in her mother; Dementia in her father; Diabetes in her father, maternal grandmother, and mother; Heart attack in her maternal grandfather; Heart disease in her father; Heart failure in her father; Hyperlipidemia in her maternal grandmother; Hypertension in her father and mother; Liver cancer in her paternal grandfather; No Known Problems in her brother, brother, brother, daughter, sister, and son; Stroke in her maternal grandfather  She  reports that she has never smoked  She has never used smokeless tobacco  She reports current alcohol use of about 1 0 standard drinks of alcohol per week  She reports that she does not use drugs    Current Outpatient Medications   Medication Sig Dispense Refill    BLACK COHOSH EXTRACT PO Take by mouth      EQL EVENING PRIMROSE  MG CAPS Take by mouth      Liraglutide (VICTOZA SC) Inject under the skin      losartan (COZAAR) 100 MG tablet Take 100 mg by mouth daily       Multiple Vitamins-Minerals (MULTIVITAMIN ADULT PO) Take by mouth      nystatin-triamcinolone (MYCOLOG-II) ointment Apply pea sized amount to affected area  Twice daily x 7 days 30 g 0    omeprazole (PriLOSEC) 20 mg delayed release capsule TAKE ONE CAPSULE BY MOUTH EVERY DAY 90 capsule 1    SYNJARDY 5-1000 MG TABS Take 1 tablet by mouth 2 (two) times a day      Turmeric 500 MG CAPS Take by mouth      lidocaine (XYLOCAINE) 2 % topical gel Apply to affected area up to 4 times daily p r n  pain 30 mL 0     No current facility-administered medications for this visit  Current Outpatient Medications on File Prior to Visit   Medication Sig    BLACK COHOSH EXTRACT PO Take by mouth    EQL EVENING PRIMROSE  MG CAPS Take by mouth    Liraglutide (VICTOZA SC) Inject under the skin    losartan (COZAAR) 100 MG tablet Take 100 mg by mouth daily     Multiple Vitamins-Minerals (MULTIVITAMIN ADULT PO) Take by mouth    nystatin-triamcinolone (MYCOLOG-II) ointment Apply pea sized amount to affected area  Twice daily x 7 days    omeprazole (PriLOSEC) 20 mg delayed release capsule TAKE ONE CAPSULE BY MOUTH EVERY DAY    SYNJARDY 5-1000 MG TABS Take 1 tablet by mouth 2 (two) times a day    Turmeric 500 MG CAPS Take by mouth     No current facility-administered medications on file prior to visit  She is allergic to lisinopril       Review of Systems   Constitutional: Negative for activity change, appetite change, chills, fatigue, fever and unexpected weight change  Respiratory: Negative for shortness of breath  Cardiovascular: Negative for chest pain  Gastrointestinal: Negative for abdominal pain, nausea and vomiting  Genitourinary: Positive for genital sores, vaginal bleeding and vaginal pain   Negative for decreased urine volume, difficulty urinating, dysuria, flank pain, frequency, hematuria, menstrual problem, pelvic pain, urgency and vaginal discharge  Skin: Negative for rash  Hematological: Negative for adenopathy  Does not bruise/bleed easily  Objective:    Vitals:    09/10/20 0907   BP: 120/70   BP Location: Left arm   Patient Position: Sitting   Cuff Size: Standard   Temp: 98 1 °F (36 7 °C)   Weight: 118 kg (261 lb 3 2 oz)   Height: 5' 11" (1 803 m)            Physical Exam  Vitals signs and nursing note reviewed  Exam conducted with a chaperone present  Constitutional:       Appearance: Normal appearance  She is obese  HENT:      Head: Normocephalic and atraumatic  Eyes:      General: No scleral icterus  Right eye: No discharge  Left eye: No discharge  Extraocular Movements: Extraocular movements intact  Neck:      Musculoskeletal: Normal range of motion  Pulmonary:      Effort: Pulmonary effort is normal  No respiratory distress  Abdominal:      General: There is no distension  Palpations: There is no mass  Tenderness: There is no abdominal tenderness  There is no right CVA tenderness, left CVA tenderness, guarding or rebound  Hernia: No hernia is present  Genitourinary:     Labia:         Right: Tenderness and lesion present  No rash  Left: Tenderness and lesion present  No rash  Urethra: No urethral lesion  Vagina: Normal       Cervix: Normal       Comments: Bilateral shallow ulcers involving the labia minora medially and a few areas in the labia majora  Skin:     General: Skin is warm and dry  Neurological:      Mental Status: She is alert and oriented to person, place, and time  Psychiatric:         Mood and Affect: Mood normal          Behavior: Behavior normal          Thought Content:  Thought content normal          Judgment: Judgment normal

## 2020-09-15 LAB — HSV SPEC CULT: NORMAL

## 2020-09-18 DIAGNOSIS — N76.6 VULVAR ULCERATION: Primary | ICD-10-CM

## 2020-10-06 DIAGNOSIS — K21.9 GASTROESOPHAGEAL REFLUX DISEASE WITHOUT ESOPHAGITIS: ICD-10-CM

## 2020-10-06 RX ORDER — OMEPRAZOLE 20 MG/1
CAPSULE, DELAYED RELEASE ORAL
Qty: 90 CAPSULE | Refills: 1 | Status: SHIPPED | OUTPATIENT
Start: 2020-10-06 | End: 2021-04-01

## 2020-10-08 DIAGNOSIS — I10 ESSENTIAL HYPERTENSION: Primary | ICD-10-CM

## 2020-10-08 PROCEDURE — 4010F ACE/ARB THERAPY RXD/TAKEN: CPT | Performed by: OBSTETRICS & GYNECOLOGY

## 2020-10-08 RX ORDER — LOSARTAN POTASSIUM 100 MG/1
TABLET ORAL
Qty: 30 TABLET | Refills: 6 | Status: SHIPPED | OUTPATIENT
Start: 2020-10-08 | End: 2021-05-12

## 2020-11-12 ENCOUNTER — LAB (OUTPATIENT)
Dept: LAB | Facility: CLINIC | Age: 55
End: 2020-11-12
Payer: COMMERCIAL

## 2020-11-12 DIAGNOSIS — E11.9 TYPE 2 DIABETES MELLITUS NOT AT GOAL (HCC): ICD-10-CM

## 2020-11-12 LAB
ALBUMIN SERPL BCP-MCNC: 3.6 G/DL (ref 3.5–5)
ALP SERPL-CCNC: 93 U/L (ref 46–116)
ALT SERPL W P-5'-P-CCNC: 14 U/L (ref 12–78)
ANION GAP SERPL CALCULATED.3IONS-SCNC: 12 MMOL/L (ref 4–13)
AST SERPL W P-5'-P-CCNC: 20 U/L (ref 5–45)
BILIRUB SERPL-MCNC: 0.34 MG/DL (ref 0.2–1)
BUN SERPL-MCNC: 12 MG/DL (ref 5–25)
CALCIUM SERPL-MCNC: 8.4 MG/DL (ref 8.3–10.1)
CHLORIDE SERPL-SCNC: 102 MMOL/L (ref 100–108)
CHOLEST SERPL-MCNC: 148 MG/DL (ref 50–200)
CO2 SERPL-SCNC: 24 MMOL/L (ref 21–32)
CREAT SERPL-MCNC: 0.55 MG/DL (ref 0.6–1.3)
CREAT UR-MCNC: 40.4 MG/DL
EST. AVERAGE GLUCOSE BLD GHB EST-MCNC: 169 MG/DL
GFR SERPL CREATININE-BSD FRML MDRD: 107 ML/MIN/1.73SQ M
GLUCOSE P FAST SERPL-MCNC: 154 MG/DL (ref 65–99)
HBA1C MFR BLD: 7.5 %
HDLC SERPL-MCNC: 57 MG/DL
LDLC SERPL CALC-MCNC: 80 MG/DL (ref 0–100)
MICROALBUMIN UR-MCNC: 19.1 MG/L (ref 0–20)
MICROALBUMIN/CREAT 24H UR: 47 MG/G CREATININE (ref 0–30)
NONHDLC SERPL-MCNC: 91 MG/DL
POTASSIUM SERPL-SCNC: 3.9 MMOL/L (ref 3.5–5.3)
PROT SERPL-MCNC: 7.6 G/DL (ref 6.4–8.2)
SODIUM SERPL-SCNC: 138 MMOL/L (ref 136–145)
TRIGL SERPL-MCNC: 56 MG/DL

## 2020-11-12 PROCEDURE — 80053 COMPREHEN METABOLIC PANEL: CPT

## 2020-11-12 PROCEDURE — 3051F HG A1C>EQUAL 7.0%<8.0%: CPT | Performed by: FAMILY MEDICINE

## 2020-11-12 PROCEDURE — 82570 ASSAY OF URINE CREATININE: CPT

## 2020-11-12 PROCEDURE — 3061F NEG MICROALBUMINURIA REV: CPT | Performed by: FAMILY MEDICINE

## 2020-11-12 PROCEDURE — 82043 UR ALBUMIN QUANTITATIVE: CPT

## 2020-11-12 PROCEDURE — 83036 HEMOGLOBIN GLYCOSYLATED A1C: CPT

## 2020-11-12 PROCEDURE — 36415 COLL VENOUS BLD VENIPUNCTURE: CPT

## 2020-11-12 PROCEDURE — 80061 LIPID PANEL: CPT

## 2020-11-16 ENCOUNTER — OFFICE VISIT (OUTPATIENT)
Dept: FAMILY MEDICINE CLINIC | Facility: CLINIC | Age: 55
End: 2020-11-16
Payer: COMMERCIAL

## 2020-11-16 VITALS
BODY MASS INDEX: 36.96 KG/M2 | HEART RATE: 72 BPM | SYSTOLIC BLOOD PRESSURE: 130 MMHG | OXYGEN SATURATION: 96 % | TEMPERATURE: 97.6 F | WEIGHT: 264 LBS | HEIGHT: 71 IN | DIASTOLIC BLOOD PRESSURE: 70 MMHG | RESPIRATION RATE: 16 BRPM

## 2020-11-16 DIAGNOSIS — I10 ESSENTIAL HYPERTENSION: ICD-10-CM

## 2020-11-16 DIAGNOSIS — K21.9 GASTROESOPHAGEAL REFLUX DISEASE WITHOUT ESOPHAGITIS: ICD-10-CM

## 2020-11-16 DIAGNOSIS — E11.9 TYPE 2 DIABETES MELLITUS WITHOUT COMPLICATION, WITHOUT LONG-TERM CURRENT USE OF INSULIN (HCC): Primary | ICD-10-CM

## 2020-11-16 DIAGNOSIS — M79.671 FOOT PAIN, RIGHT: ICD-10-CM

## 2020-11-16 PROCEDURE — 3075F SYST BP GE 130 - 139MM HG: CPT | Performed by: FAMILY MEDICINE

## 2020-11-16 PROCEDURE — 3078F DIAST BP <80 MM HG: CPT | Performed by: FAMILY MEDICINE

## 2020-11-16 PROCEDURE — 99214 OFFICE O/P EST MOD 30 MIN: CPT | Performed by: FAMILY MEDICINE

## 2020-11-16 PROCEDURE — 1036F TOBACCO NON-USER: CPT | Performed by: FAMILY MEDICINE

## 2020-11-16 PROCEDURE — 3725F SCREEN DEPRESSION PERFORMED: CPT | Performed by: FAMILY MEDICINE

## 2020-11-16 PROCEDURE — 3008F BODY MASS INDEX DOCD: CPT | Performed by: FAMILY MEDICINE

## 2020-11-19 DIAGNOSIS — E11.9 TYPE 2 DIABETES MELLITUS WITHOUT COMPLICATION, WITHOUT LONG-TERM CURRENT USE OF INSULIN (HCC): Primary | ICD-10-CM

## 2020-11-19 RX ORDER — LIRAGLUTIDE 6 MG/ML
INJECTION SUBCUTANEOUS
Qty: 5 PEN | Refills: 3 | Status: SHIPPED | OUTPATIENT
Start: 2020-11-19 | End: 2021-11-15

## 2020-11-25 DIAGNOSIS — E11.9 TYPE 2 DIABETES MELLITUS WITHOUT COMPLICATION, WITHOUT LONG-TERM CURRENT USE OF INSULIN (HCC): Primary | ICD-10-CM

## 2021-02-09 ENCOUNTER — TELEPHONE (OUTPATIENT)
Dept: FAMILY MEDICINE CLINIC | Facility: CLINIC | Age: 56
End: 2021-02-09

## 2021-02-09 NOTE — TELEPHONE ENCOUNTER
Franklyn Mccord called and said that her visit on 7/13/20 was not covered for $130    Surjit Bermudez yet when I looked, her next 4 month visit in November was covered  She called billing and they told her to contact the office  Her number is [phone]  Hope you can help her   TO

## 2021-03-01 ENCOUNTER — TELEPHONE (OUTPATIENT)
Dept: FAMILY MEDICINE CLINIC | Facility: CLINIC | Age: 56
End: 2021-03-01

## 2021-03-01 ENCOUNTER — APPOINTMENT (OUTPATIENT)
Dept: LAB | Facility: CLINIC | Age: 56
End: 2021-03-01
Payer: COMMERCIAL

## 2021-03-01 DIAGNOSIS — E11.9 TYPE 2 DIABETES MELLITUS WITHOUT COMPLICATION, WITHOUT LONG-TERM CURRENT USE OF INSULIN (HCC): ICD-10-CM

## 2021-03-01 LAB
ANION GAP SERPL CALCULATED.3IONS-SCNC: 11 MMOL/L (ref 4–13)
BUN SERPL-MCNC: 14 MG/DL (ref 5–25)
CALCIUM SERPL-MCNC: 8.8 MG/DL (ref 8.3–10.1)
CHLORIDE SERPL-SCNC: 100 MMOL/L (ref 100–108)
CO2 SERPL-SCNC: 26 MMOL/L (ref 21–32)
CREAT SERPL-MCNC: 0.68 MG/DL (ref 0.6–1.3)
EST. AVERAGE GLUCOSE BLD GHB EST-MCNC: 154 MG/DL
GFR SERPL CREATININE-BSD FRML MDRD: 100 ML/MIN/1.73SQ M
GLUCOSE P FAST SERPL-MCNC: 161 MG/DL (ref 65–99)
HBA1C MFR BLD: 7 %
POTASSIUM SERPL-SCNC: 4.5 MMOL/L (ref 3.5–5.3)
SODIUM SERPL-SCNC: 137 MMOL/L (ref 136–145)

## 2021-03-01 PROCEDURE — 80048 BASIC METABOLIC PNL TOTAL CA: CPT

## 2021-03-01 PROCEDURE — 36415 COLL VENOUS BLD VENIPUNCTURE: CPT

## 2021-03-01 PROCEDURE — 83036 HEMOGLOBIN GLYCOSYLATED A1C: CPT

## 2021-03-01 PROCEDURE — 3051F HG A1C>EQUAL 7.0%<8.0%: CPT | Performed by: FAMILY MEDICINE

## 2021-03-01 NOTE — TELEPHONE ENCOUNTER
Leela Gonzalez is at the lab doing bloodwork and she was wondering about the urine  She said you normally always have her do a urine specimen?     Patient ph # [phone]

## 2021-03-06 NOTE — PROGRESS NOTES
Assessment/Plan:         Problem List Items Addressed This Visit        Digestive    Gastroesophageal reflux disease without esophagitis - Primary     Pt on omeprazole              Endocrine    Type 2 diabetes mellitus without complication, without long-term current use of insulin ([facility])    Relevant Orders    Comprehensive metabolic panel    Lipid panel    Microalbumin / creatinine urine ratio    Hemoglobin A1C       Cardiovascular and Mediastinum    Essential hypertension     Well  controlled            Other    Foul smelling urine     Trace leukocytes  And glucosuria pt on jardiance  Will treat 3 days macrobiud         Relevant Medications    nitrofurantoin (MACROBID) 100 mg capsule    Other Relevant Orders    POCT urine dip (Completed)            Subjective:   Pt here for interval visit multiple medical problems review of meds labs and HM due for colonoscopy next year  Has had some foul smelling urine over past few days no back pain no abd pain  No fever no chills     Patient ID: Elaina Squires is a 50 y o  female  HPI    The following portions of the patient's history were reviewed and updated as appropriate:   Past Medical History:  She has a past medical history of Diabetes ([address] ), Hepatitis C, and Hypertension  ,  _______________________________________________________________________  Medical Problems:  does not have any pertinent problems on file ,  _______________________________________________________________________  Past Surgical History:   has a past surgical history that includes Tubal ligation; Ankle surgery; Mammo (historical) (02/2019); and Colonoscopy (03/2019)  ,  _______________________________________________________________________  Family History:  family history includes Arthritis in her paternal grandmother; Breast cancer in her mother; Dementia in her father; Diabetes in her father, maternal grandmother, and mother; Heart attack in her maternal grandfather; Heart disease in her father; Heart failure in her father; Hyperlipidemia in her maternal grandmother; Hypertension in her father and mother; Liver cancer in her paternal grandfather; No Known Problems in her brother, brother, brother, daughter, sister, and son; Stroke in her maternal grandfather ,  _______________________________________________________________________  Social History:   reports that she has never smoked  She has never used smokeless tobacco  She reports current alcohol use of about 1 0 standard drinks of alcohol per week  She reports that she does not use drugs  ,  _______________________________________________________________________  Allergies:  is allergic to lisinopril     _______________________________________________________________________  Current Outpatient Medications   Medication Sig Dispense Refill    BLACK COHOSH EXTRACT PO Take by mouth      EQL EVENING PRIMROSE  MG CAPS Take by mouth      Insulin Pen Needle 31G X 5 MM MISC Use daily 100 each 0    lidocaine (XYLOCAINE) 2 % topical gel Apply to affected area up to 4 times daily p r n  pain 30 mL 0    losartan (COZAAR) 100 MG tablet TAKE ONE TABLET BY MOUTH EVERY DAY 30 tablet 6    Multiple Vitamins-Minerals (MULTIVITAMIN ADULT PO) Take by mouth      omeprazole (PriLOSEC) 20 mg delayed release capsule TAKE ONE CAPSULE EVERY DAY 90 capsule 1    SYNJARDY 5-1000 MG TABS Take 1 tablet by mouth 2 (two) times a day      Victoza injection INJECT 1 8MG UNDER THE SKIN EVERY DAY 5 pen 3    Liraglutide (VICTOZA SC) Inject under the skin      nitrofurantoin (MACROBID) 100 mg capsule Take 1 capsule (100 mg total) by mouth 2 (two) times a day for 3 days 6 capsule 0    nystatin-triamcinolone (MYCOLOG-II) ointment Apply pea sized amount to affected area  Twice daily x 7 days (Patient not taking: Reported on 3/8/2021) 30 g 0    Turmeric 500 MG CAPS Take by mouth       No current facility-administered medications for this visit  _______________________________________________________________________  Review of Systems   Constitutional: Negative for appetite change, chills, fatigue and fever  Respiratory: Negative for cough, chest tightness and shortness of breath  Cardiovascular: Negative for chest pain, palpitations and leg swelling  Gastrointestinal: Negative for abdominal pain, constipation, diarrhea, nausea and vomiting  Genitourinary: Negative for difficulty urinating and frequency  Foul smelling urine   Musculoskeletal: Negative for arthralgias, back pain and neck pain  Skin: Negative for rash  Neurological: Negative for dizziness, weakness, light-headedness, numbness and headaches  Hematological: Does not bruise/bleed easily  Psychiatric/Behavioral: Negative for dysphoric mood and sleep disturbance  The patient is not nervous/anxious  Objective:  Vitals:    03/08/21 0936   BP: 130/80   Pulse: 80   Temp: (!) 96 8 °F (36 °C)   SpO2: 98%   Weight: 122 kg (268 lb)   Height: 5' 11" (1 803 m)     Body mass index is 37 38 kg/m²  Physical Exam  Vitals signs reviewed  Constitutional:       General: She is not in acute distress  Appearance: Normal appearance  She is well-developed  She is not ill-appearing  HENT:      Mouth/Throat:      Mouth: Mucous membranes are moist    Eyes:      Extraocular Movements: Extraocular movements intact  Conjunctiva/sclera: Conjunctivae normal       Pupils: Pupils are equal, round, and reactive to light  Neck:      Musculoskeletal: Normal range of motion and neck supple  Thyroid: No thyromegaly  Vascular: No carotid bruit  Cardiovascular:      Rate and Rhythm: Normal rate and regular rhythm  Pulses: Normal pulses  Heart sounds: Normal heart sounds  No murmur  Pulmonary:      Effort: Pulmonary effort is normal  No respiratory distress  Breath sounds: Normal breath sounds  Chest:      Chest wall: No tenderness     Abdominal: General: Bowel sounds are normal  There is no distension  Palpations: Abdomen is soft  Tenderness: There is no abdominal tenderness  Lymphadenopathy:      Cervical: No cervical adenopathy  Skin:     General: Skin is warm and dry  Neurological:      General: No focal deficit present  Mental Status: She is alert and oriented to person, place, and time  Mental status is at baseline  Cranial Nerves: No cranial nerve deficit  Deep Tendon Reflexes: Reflexes normal    Psychiatric:         Mood and Affect: Mood normal          Behavior: Behavior normal        BMI Counseling: Body mass index is 37 38 kg/m²  The BMI is above normal  Nutrition recommendations include 3-5 servings of fruits/vegetables daily, reducing fast food intake, consuming healthier snacks, decreasing soda and/or juice intake, moderation in carbohydrate intake and increasing intake of lean protein  Exercise recommendations include exercising 3-5 times per week

## 2021-03-08 ENCOUNTER — OFFICE VISIT (OUTPATIENT)
Dept: FAMILY MEDICINE CLINIC | Facility: CLINIC | Age: 56
End: 2021-03-08
Payer: COMMERCIAL

## 2021-03-08 VITALS
HEART RATE: 80 BPM | DIASTOLIC BLOOD PRESSURE: 80 MMHG | OXYGEN SATURATION: 98 % | SYSTOLIC BLOOD PRESSURE: 130 MMHG | TEMPERATURE: 96.8 F | WEIGHT: 268 LBS | BODY MASS INDEX: 37.52 KG/M2 | HEIGHT: 71 IN

## 2021-03-08 DIAGNOSIS — K21.9 GASTROESOPHAGEAL REFLUX DISEASE WITHOUT ESOPHAGITIS: Primary | ICD-10-CM

## 2021-03-08 DIAGNOSIS — R82.90 FOUL SMELLING URINE: ICD-10-CM

## 2021-03-08 DIAGNOSIS — I10 ESSENTIAL HYPERTENSION: ICD-10-CM

## 2021-03-08 DIAGNOSIS — E11.9 TYPE 2 DIABETES MELLITUS WITHOUT COMPLICATION, WITHOUT LONG-TERM CURRENT USE OF INSULIN (HCC): ICD-10-CM

## 2021-03-08 LAB
SL AMB  POCT GLUCOSE, UA: ABNORMAL
SL AMB LEUKOCYTE ESTERASE,UA: ABNORMAL
SL AMB POCT BILIRUBIN,UA: ABNORMAL
SL AMB POCT BLOOD,UA: ABNORMAL
SL AMB POCT CLARITY,UA: CLEAR
SL AMB POCT COLOR,UA: YELLOW
SL AMB POCT KETONES,UA: ABNORMAL
SL AMB POCT NITRITE,UA: ABNORMAL
SL AMB POCT PH,UA: 5
SL AMB POCT SPECIFIC GRAVITY,UA: 1.01
SL AMB POCT URINE PROTEIN: ABNORMAL
SL AMB POCT UROBILINOGEN: ABNORMAL

## 2021-03-08 PROCEDURE — 1036F TOBACCO NON-USER: CPT | Performed by: FAMILY MEDICINE

## 2021-03-08 PROCEDURE — 99214 OFFICE O/P EST MOD 30 MIN: CPT | Performed by: FAMILY MEDICINE

## 2021-03-08 PROCEDURE — 3725F SCREEN DEPRESSION PERFORMED: CPT | Performed by: FAMILY MEDICINE

## 2021-03-08 PROCEDURE — 81002 URINALYSIS NONAUTO W/O SCOPE: CPT | Performed by: FAMILY MEDICINE

## 2021-03-08 PROCEDURE — 3061F NEG MICROALBUMINURIA REV: CPT | Performed by: FAMILY MEDICINE

## 2021-03-08 RX ORDER — NITROFURANTOIN 25; 75 MG/1; MG/1
100 CAPSULE ORAL 2 TIMES DAILY
Qty: 6 CAPSULE | Refills: 0 | Status: SHIPPED | OUTPATIENT
Start: 2021-03-08 | End: 2021-03-11

## 2021-03-14 NOTE — PROGRESS NOTES
Assessment/Plan:    Calcium 1200-1500mg + 600-1000 IU Vit D daily  Exercise 150 minutes per week minimum including weight bearing exercises  Weight loss encouraged  Pap with high risk HPV Q 5 years-due 2025  Annual mammogram (ordered) and monthly breast self exam recommended  Colonoscopy-  Due next year  Kegels 20 times twice daily  Silicone based lubricant with sex  Vaginal moisturizers twice weekly as needed  Monitor blood sugars as instructed  Maintain euglycemia  Follow up with PCP for elevated blood sugars  Complete HSV serologic testing  Take diflucan and use monistat as directed  Return to office in one month  Diagnoses and all orders for this visit:    Encntr for gyn exam (general) (routine) w abnormal findings    Vulvar irritation  -     fluconazole (DIFLUCAN) 150 mg tablet; Take 1 tablet (150 mg total) by mouth once for 1 dose  -     miconazole (MONISTAT-7) 2 % vaginal cream; Insert 1 applicator into the vagina daily at bedtime for 7 days    Vulvar ulcer  -     fluconazole (DIFLUCAN) 150 mg tablet; Take 1 tablet (150 mg total) by mouth once for 1 dose  -     miconazole (MONISTAT-7) 2 % vaginal cream; Insert 1 applicator into the vagina daily at bedtime for 7 days    Encounter for screening mammogram for breast cancer  -     Mammo screening bilateral w 3d & cad; Future    Type 2 diabetes mellitus without complication, without long-term current use of insulin ([facility])    BMI 35 0-35 9,adult          Subjective:      Patient ID: Alex Menjivar is a 50 y o  female  Alex Menjivar is a 50 y o  female who is here today for her annual visit  Menses Q x 3 days with heavy flow x 1 days then light flow  Menses is acceptable  Alex Menjivar is not sexually active  Last coitus was 5 years ago  She is concerned about a persistent vulvar ulcer  It was evaluated around 9/20 with Dr Jan Ramsey  He thought is was consistent with herpes  She does not believe she's had an exposure and her HSV culture was negative  She did not complete the serologic testing  Her PCP told her it may be caused by her synjardy  She also was recently treated for a UTI with macrobid  She is not compliant with monitoring her blood sugars  Her FBS is around 130's  Last Hgb A1C stated to be around 7  ASCUS pap with negative HR HPV 2/19  Normal pap with negative HR HPV 2/20  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[employer] 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, walking and unloading deliveries  When it is warmer she also tries to walk 2 miles 2-3 times a week but has not done this lately  Normal mammogram 5/16  Order in chart by PCP  The following portions of the patient's history were reviewed and updated as appropriate: allergies, current medications, past family history, past medical history, past social history, past surgical history and problem list     Review of Systems   Constitutional: Negative  Negative for activity change, appetite change, chills, diaphoresis, fatigue, fever and unexpected weight change  HENT: Negative for congestion, dental problem, sneezing, sore throat and trouble swallowing  Eyes: Negative for visual disturbance  Respiratory: Negative for chest tightness and shortness of breath  Cardiovascular: Negative for chest pain and leg swelling  Gastrointestinal: Negative for abdominal pain, constipation, diarrhea, nausea and vomiting  Genitourinary: Positive for genital sores  Negative for difficulty urinating, dyspareunia, dysuria, frequency, hematuria, menstrual problem, pelvic pain, urgency, vaginal bleeding, vaginal discharge and vaginal pain  Musculoskeletal: Negative for back pain and neck pain  Skin: Negative  Allergic/Immunologic: Negative  Neurological: Negative for weakness and headaches  Hematological: Negative for adenopathy  Psychiatric/Behavioral: Negative            Objective:      /90 (BP Location: Left arm, Patient Position: Sitting, Cuff Size: Large)   Pulse 88 Ht 6' 0 25" (1 835 m)   Wt 120 kg (264 lb 9 6 oz)   BMI 35 64 kg/m²          Physical Exam  Vitals signs and nursing note reviewed  Constitutional:       Appearance: Normal appearance  She is well-developed  She is obese  HENT:      Head: Normocephalic and atraumatic  Eyes:      General:         Right eye: No discharge  Left eye: No discharge  Neck:      Musculoskeletal: Normal range of motion and neck supple  Thyroid: No thyromegaly  Trachea: Trachea normal    Cardiovascular:      Rate and Rhythm: Normal rate and regular rhythm  Heart sounds: Normal heart sounds  Pulmonary:      Effort: Pulmonary effort is normal       Breath sounds: Normal breath sounds  Chest:      Breasts: Breasts are symmetrical          Right: Normal  No inverted nipple, mass, nipple discharge, skin change or tenderness  Left: Normal  No inverted nipple, mass, nipple discharge, skin change or tenderness  Abdominal:      Palpations: Abdomen is soft  Genitourinary:     Exam position: Lithotomy position  Labia:         Right: Tenderness present  No rash, lesion or injury  Left: Tenderness present  No rash, lesion or injury  Urethra: No prolapse, urethral pain, urethral swelling or urethral lesion  Vagina: Normal  No signs of injury and foreign body  No vaginal discharge, erythema, tenderness or bleeding  Cervix: No cervical motion tenderness, discharge or friability  Uterus: Normal        Adnexa:         Right: No mass, tenderness or fullness  Left: No mass, tenderness or fullness  Rectum: No external hemorrhoid  Comments: Small ulcer noted in blue on diagram  Vulvar irritation noted in red on diagram; erythema with a think white patchy coating; satellite lesions noted  Physical exam limited by habitus  Musculoskeletal: Normal range of motion     Lymphadenopathy:      Head:      Right side of head: No submental, submandibular or tonsillar adenopathy  Left side of head: No submental, submandibular or tonsillar adenopathy  Cervical: No cervical adenopathy  Upper Body:      Right upper body: No supraclavicular or axillary adenopathy  Left upper body: No supraclavicular or axillary adenopathy  Lower Body: No right inguinal adenopathy  No left inguinal adenopathy  Skin:     General: Skin is warm and dry  Neurological:      Mental Status: She is alert and oriented to person, place, and time

## 2021-03-15 ENCOUNTER — ANNUAL EXAM (OUTPATIENT)
Dept: OBGYN CLINIC | Facility: CLINIC | Age: 56
End: 2021-03-15
Payer: COMMERCIAL

## 2021-03-15 VITALS
DIASTOLIC BLOOD PRESSURE: 90 MMHG | WEIGHT: 264.6 LBS | HEART RATE: 88 BPM | HEIGHT: 72 IN | SYSTOLIC BLOOD PRESSURE: 132 MMHG | BODY MASS INDEX: 35.84 KG/M2

## 2021-03-15 DIAGNOSIS — Z12.31 ENCOUNTER FOR SCREENING MAMMOGRAM FOR BREAST CANCER: ICD-10-CM

## 2021-03-15 DIAGNOSIS — N90.89 VULVAR IRRITATION: ICD-10-CM

## 2021-03-15 DIAGNOSIS — Z01.411 ENCNTR FOR GYN EXAM (GENERAL) (ROUTINE) W ABNORMAL FINDINGS: Primary | ICD-10-CM

## 2021-03-15 DIAGNOSIS — E11.9 TYPE 2 DIABETES MELLITUS WITHOUT COMPLICATION, WITHOUT LONG-TERM CURRENT USE OF INSULIN (HCC): ICD-10-CM

## 2021-03-15 DIAGNOSIS — N76.6 VULVAR ULCER: ICD-10-CM

## 2021-03-15 PROCEDURE — S0612 ANNUAL GYNECOLOGICAL EXAMINA: HCPCS | Performed by: NURSE PRACTITIONER

## 2021-03-15 PROCEDURE — 3008F BODY MASS INDEX DOCD: CPT | Performed by: FAMILY MEDICINE

## 2021-03-15 RX ORDER — FLUCONAZOLE 150 MG/1
150 TABLET ORAL ONCE
Qty: 1 TABLET | Refills: 0 | Status: SHIPPED | OUTPATIENT
Start: 2021-03-15 | End: 2021-03-15

## 2021-03-15 NOTE — PATIENT INSTRUCTIONS
Calcium 1200-1500mg + 600-1000 IU Vit D daily  Exercise 150 minutes per week minimum including weight bearing exercises  Weight loss encouraged  Pap with high risk HPV Q 5 years-due 2025  Annual mammogram (ordered) and monthly breast self exam recommended  Colonoscopy-  Due next year  Kegels 20 times twice daily  Silicone based lubricant with sex  Vaginal moisturizers twice weekly as needed  Monitor blood sugars as instructed  Maintain euglycemia  Follow up with PCP for elevated blood sugars  Complete HSV serologic testing  Take diflucan and use monistat as directed  Return to office in one month

## 2021-03-16 ENCOUNTER — LAB (OUTPATIENT)
Dept: LAB | Facility: CLINIC | Age: 56
End: 2021-03-16
Payer: COMMERCIAL

## 2021-03-16 ENCOUNTER — TRANSCRIBE ORDERS (OUTPATIENT)
Dept: FAMILY MEDICINE CLINIC | Facility: HOSPITAL | Age: 56
End: 2021-03-16

## 2021-03-16 DIAGNOSIS — N76.6 VULVAR ULCERATION: ICD-10-CM

## 2021-03-16 PROCEDURE — 86695 HERPES SIMPLEX TYPE 1 TEST: CPT

## 2021-03-16 PROCEDURE — 86696 HERPES SIMPLEX TYPE 2 TEST: CPT

## 2021-03-17 ENCOUNTER — TELEPHONE (OUTPATIENT)
Dept: OBGYN CLINIC | Facility: CLINIC | Age: 56
End: 2021-03-17

## 2021-03-17 DIAGNOSIS — A60.04 HERPES SIMPLEX VULVOVAGINITIS: Primary | ICD-10-CM

## 2021-03-17 LAB
HSV1 IGG SER IA-ACNC: 15.1 INDEX (ref 0–0.9)
HSV2 IGG SER IA-ACNC: 11.3 INDEX (ref 0–0.9)

## 2021-03-17 RX ORDER — VALACYCLOVIR HYDROCHLORIDE 500 MG/1
500 TABLET, FILM COATED ORAL 2 TIMES DAILY
Qty: 6 TABLET | Refills: 6 | Status: SHIPPED | OUTPATIENT
Start: 2021-03-17 | End: 2022-01-17 | Stop reason: SDUPTHER

## 2021-03-17 NOTE — TELEPHONE ENCOUNTER
Spoke to Adela and reviewed Dr Cydney Valero note with her  She was confused why the msg was coming from him and not [facility]  She also said on MyChart it is showing the HSV tests are negative so confused about that as well  Pt said she took the diflucan and plans on using the cream ordered also  I advised pt that she should try the valtrex that was put in for her just to see if that helps  She wanted me to msg [facility] back about all this         ----- Message from Eddie Epps MD sent at 3/17/2021  9:32 AM EDT -----  Please let the patient know that I have seen by her recent visit to [address] that she has another vulvar ulcer  The test for both herpes simplex 1 and 2 are positive so she has had a past exposure  Unfortunately recently she has experienced episodes  I placed an order for Valtrex which she should take within 48 hours of any symptoms such as itching or numbness before a lesion erupts  If she has more than 6 episodes a year we can go to suppressive therapy where she would take the medication daily  Web MD is a good so resource for information on the subject

## 2021-03-17 NOTE — RESULT ENCOUNTER NOTE
Please let the patient know that I have seen by her recent visit to [facility] that she has another vulvar ulcer  The test for both herpes simplex 1 and 2 are positive so she has had a past exposure  Unfortunately recently she has experienced episodes  I placed an order for Valtrex which she should take within 48 hours of any symptoms such as itching or numbness before a lesion erupts  If she has more than 6 episodes a year we can go to suppressive therapy where she would take the medication daily  Web MD is a good so resource for information on the subject

## 2021-03-17 NOTE — TELEPHONE ENCOUNTER
Pt's daughter is calling to see how she can help her mom understand recent call and confusion on test results  There is no communication forms on her chart - Is there a way we can get permission from pt to call daughter?

## 2021-03-17 NOTE — TELEPHONE ENCOUNTER
Lm for Angelique to cb  Old comm consent had daughter listed as ok to speak to but it is   Just wanted to ask her if ok to speak to her re: results  I TT Dr Tiffani Roy and let him know pt was very confused also  Arnold Severe called back and let me know ok to speak to Frandy Carranza  Spoke to daughter and reviewed Dr Giacomo Up again and she said her mom is a TYPE II diabetic who does not check her sugars liek she should and just recently started watching her diet  She wants to know if this medication will interfere with anything or if because of her diabetes is she going to be more prone to Kiswahili and outbreaks

## 2021-03-18 ENCOUNTER — TELEPHONE (OUTPATIENT)
Dept: OBGYN CLINIC | Facility: CLINIC | Age: 56
End: 2021-03-18

## 2021-03-18 NOTE — TELEPHONE ENCOUNTER
Spoke with Shelley Zamora this morning when I saw her test results and her mothers  They always have their visits together  See telephone call in each of there charts  All questions answered  Issues resolved

## 2021-03-18 NOTE — TELEPHONE ENCOUNTER
Dr Sophia Coburn reply "Live Bustos  For some reason I can answer you directly through the message you sent me   At any rate I tried to call the patient and received a voicemail   I left a message which stated that I saw the labs because I originally ordered them and that I supervised Remy Ray was trying to be helpful by having her contacted and I apologize if she was somewhat confused   The 2 blood tests are abnormal   She has both herpes simplex 1 and herpes simplex 2   Since she sees [facility] for her yearly evaluations it may be better that she directly communicates with [facility]            If you can copy this notation and paste it to a communication you will be working on so that it becomes part of the medical record I would appreciate that       Sincerely,      Dr Zeny Yoo "

## 2021-03-18 NOTE — TELEPHONE ENCOUNTER
Spoke with Frantz Magana to verify that she understood the plan of care between her recent diagnosis of HSV 1 and 2, the need to treat her vulvar yeast and improve her diet to stabilize her blood sugars  She is currently taking valtrex and using yeast treatment as discussed  She has altered her diet and had a glucose 2 hour pp of 107  Call with any additional questions

## 2021-03-31 DIAGNOSIS — K21.9 GASTROESOPHAGEAL REFLUX DISEASE WITHOUT ESOPHAGITIS: ICD-10-CM

## 2021-04-01 RX ORDER — OMEPRAZOLE 20 MG/1
CAPSULE, DELAYED RELEASE ORAL
Qty: 90 CAPSULE | Refills: 1 | Status: SHIPPED | OUTPATIENT
Start: 2021-04-01 | End: 2021-10-06

## 2021-05-12 DIAGNOSIS — I10 ESSENTIAL HYPERTENSION: ICD-10-CM

## 2021-05-12 DIAGNOSIS — E11.9 TYPE 2 DIABETES MELLITUS WITHOUT COMPLICATION, WITHOUT LONG-TERM CURRENT USE OF INSULIN (HCC): Primary | ICD-10-CM

## 2021-05-12 RX ORDER — LOSARTAN POTASSIUM 100 MG/1
TABLET ORAL
Qty: 30 TABLET | Refills: 6 | Status: SHIPPED | OUTPATIENT
Start: 2021-05-12 | End: 2021-12-09

## 2021-05-12 RX ORDER — EMPAGLIFLOZIN AND METFORMIN HYDROCHLORIDE 5; 1000 MG/1; MG/1
TABLET ORAL
Qty: 60 TABLET | Refills: 6 | Status: SHIPPED | OUTPATIENT
Start: 2021-05-12 | End: 2021-12-06 | Stop reason: SDUPTHER

## 2021-07-11 PROBLEM — R82.90 FOUL SMELLING URINE: Status: RESOLVED | Noted: 2021-03-08 | Resolved: 2021-07-11

## 2021-07-11 PROBLEM — E66.09 CLASS 2 OBESITY DUE TO EXCESS CALORIES WITHOUT SERIOUS COMORBIDITY WITH BODY MASS INDEX (BMI) OF 35.0 TO 35.9 IN ADULT: Status: ACTIVE | Noted: 2021-07-11

## 2021-07-11 PROBLEM — E66.812 CLASS 2 OBESITY DUE TO EXCESS CALORIES WITHOUT SERIOUS COMORBIDITY WITH BODY MASS INDEX (BMI) OF 35.0 TO 35.9 IN ADULT: Status: ACTIVE | Noted: 2021-07-11

## 2021-07-11 NOTE — PROGRESS NOTES
Assessment/Plan:         Problem List Items Addressed This Visit        Digestive    Gastroesophageal reflux disease without esophagitis - Primary     ok on meds            Endocrine    Type 2 diabetes mellitus without complication, without long-term current use of insulin (HCC)    Relevant Orders    Lipid panel       Cardiovascular and Mediastinum    Essential hypertension     Well controlled         Relevant Orders    Comprehensive metabolic panel       Other    BMI 38 0-38 9,adult    Class 2 obesity due to excess calories without serious comorbidity with body mass index (BMI) of 35 0 to 35 9 in adult     discussion diet and exercise         Annual physical exam     Order routine labs         Relevant Orders    CBC and differential    Urinalysis with microscopic    Chronic pain of both ankles     Needs xrays          Relevant Orders    XR ankle 3+ vw left    XR ankle 3+ vw right            Subjective:  Pt here for interval visit HTN DM GERD pt has had pain in both ankles left worse than right for many years now worsening     Patient ID: Megan Camejo is a 50 y o  female  HPI    The following portions of the patient's history were reviewed and updated as appropriate:   Past Medical History:  She has a past medical history of Diabetes ([address] ), Hepatitis C, and Hypertension  ,  _______________________________________________________________________  Medical Problems:  does not have any pertinent problems on file ,  _______________________________________________________________________  Past Surgical History:   has a past surgical history that includes Tubal ligation; Ankle surgery; Mammo (historical) (02/2019); and Colonoscopy (03/2019)  ,  _______________________________________________________________________  Family History:  family history includes Arthritis in her paternal grandmother; Breast cancer in her mother; Dementia in her father; Diabetes in her father, maternal grandmother, and mother; Heart attack in her maternal grandfather; Heart disease in her father; Heart failure in her father; Hyperlipidemia in her maternal grandmother; Hypertension in her father and mother; Liver cancer in her paternal grandfather; No Known Problems in her brother, brother, brother, daughter, sister, and son; Stroke in her maternal grandfather ,  _______________________________________________________________________  Social History:   reports that she has never smoked  She has never used smokeless tobacco  She reports current alcohol use of about 1 0 standard drinks of alcohol per week  She reports that she does not use drugs  ,  _______________________________________________________________________  Allergies:  is allergic to lisinopril     _______________________________________________________________________  Current Outpatient Medications   Medication Sig Dispense Refill    BLACK COHOSH EXTRACT PO Take by mouth      EQL EVENING PRIMROSE  MG CAPS Take by mouth      Insulin Pen Needle 31G X 5 MM MISC Use daily 100 each 0    lidocaine (XYLOCAINE) 2 % topical gel Apply to affected area up to 4 times daily p r n  pain 30 mL 0    Liraglutide (VICTOZA SC) Inject under the skin      losartan (COZAAR) 100 MG tablet TAKE ONE TABLET BY MOUTH EVERY DAY 30 tablet 6    omeprazole (PriLOSEC) 20 mg delayed release capsule TAKE ONE CAPSULE EVERY DAY 90 capsule 1    Synjardy 5-1000 MG TABS TAKE ONE TABLET TWICE A DAY 60 tablet 6    Victoza injection INJECT 1 8MG UNDER THE SKIN EVERY DAY 5 pen 3    valACYclovir (VALTREX) 500 mg tablet Take 1 tablet (500 mg total) by mouth 2 (two) times a day for 3 days 6 tablet 6     No current facility-administered medications for this visit      _______________________________________________________________________  Review of Systems   Constitutional: Negative for appetite change, chills, fatigue and fever  Respiratory: Negative for cough, chest tightness and shortness of breath  Cardiovascular: Negative for chest pain, palpitations and leg swelling  Gastrointestinal: Negative for abdominal pain, constipation, diarrhea, nausea and vomiting  Genitourinary: Negative for difficulty urinating and frequency  Musculoskeletal: Positive for arthralgias (both ankles)  Negative for back pain and neck pain  Skin: Negative for rash  Neurological: Negative for dizziness, weakness, light-headedness, numbness and headaches  Hematological: Does not bruise/bleed easily  Psychiatric/Behavioral: Negative for dysphoric mood and sleep disturbance  The patient is not nervous/anxious  Objective:  Vitals:    07/19/21 0922   BP: 120/80   BP Location: Left arm   Patient Position: Sitting   Cuff Size: Adult   Pulse: 84   Resp: 16   Temp: (!) 97 °F (36 1 °C)   TempSrc: Temporal   SpO2: 99%   Weight: 118 kg (261 lb)   Height: 5' 10" (1 778 m)     Body mass index is 37 45 kg/m²  Physical Exam  Vitals reviewed  Constitutional:       General: She is not in acute distress  Appearance: Normal appearance  She is well-developed  She is not ill-appearing  HENT:      Head: Normocephalic  Right Ear: Tympanic membrane, ear canal and external ear normal       Left Ear: Tympanic membrane, ear canal and external ear normal       Nose: Nose normal       Mouth/Throat:      Mouth: Mucous membranes are moist       Pharynx: No oropharyngeal exudate  Eyes:      General: Lids are normal  No scleral icterus  Extraocular Movements: Extraocular movements intact  Conjunctiva/sclera: Conjunctivae normal       Pupils: Pupils are equal, round, and reactive to light  Neck:      Thyroid: No thyromegaly  Vascular: No carotid bruit  Cardiovascular:      Rate and Rhythm: Normal rate and regular rhythm  Pulses: Normal pulses  Heart sounds: Normal heart sounds  No murmur heard  No friction rub  Pulmonary:      Effort: Pulmonary effort is normal  No respiratory distress  Breath sounds: Normal breath sounds  No wheezing, rhonchi or rales  Chest:      Chest wall: No tenderness  Abdominal:      General: Bowel sounds are normal  There is no distension  Palpations: Abdomen is soft  There is no mass  Tenderness: There is no abdominal tenderness  There is no guarding  Hernia: No hernia is present  Musculoskeletal:         General: Normal range of motion  Cervical back: Normal range of motion and neck supple  Feet:      Right foot:      Skin integrity: Dry skin present  No ulcer, skin breakdown, erythema, warmth or callus  Left foot:      Skin integrity: Dry skin present  No ulcer, skin breakdown, erythema, warmth or callus  Lymphadenopathy:      Cervical: No cervical adenopathy  Skin:     General: Skin is warm and dry  Findings: No rash  Comments: No abnormal appearing moles   Neurological:      General: No focal deficit present  Mental Status: She is alert and oriented to person, place, and time  Mental status is at baseline  Cranial Nerves: No cranial nerve deficit  Sensory: No sensory deficit  Motor: No weakness, tremor or abnormal muscle tone  Coordination: Coordination normal       Gait: Gait normal       Deep Tendon Reflexes: Reflexes normal    Psychiatric:         Mood and Affect: Mood normal          Speech: Speech normal          Behavior: Behavior normal        Patient's shoes and socks removed  Right Foot/Ankle   Right Foot Inspection  Skin Exam: skin normal, skin intact and dry skin no warmth, no callus, no erythema, no maceration, no abnormal color, no pre-ulcer, no ulcer and no callus                          Toe Exam: no swelling, no tenderness, erythema and  no right toe deformity  Sensory   Vibration: intact  Proprioception: intact   Monofilament testing: intact    Right Toe  - Comprehensive Exam  Ecchymosis: none  Swelling: none   Tenderness: none         Left Foot/Ankle  Left Foot Inspection  Skin Exam: skin normal, skin intact and dry skinno warmth, no erythema, no maceration, normal color, no pre-ulcer, no ulcer and no callus                                         Sensory   Vibration: intact  Proprioception: intact  Monofilament: intact    Left Toe  - Comprehensive Exam  Ecchymosis: none  Swelling: none   Tenderness: none       Assign Risk Category:  No deformity present;  No loss of protective sensation;        Risk: 0

## 2021-07-13 ENCOUNTER — RA CDI HCC (OUTPATIENT)
Dept: OTHER | Facility: HOSPITAL | Age: 56
End: 2021-07-13

## 2021-07-13 NOTE — PROGRESS NOTES
[facility]  coding opportunities          Chart reviewed, no opportunity found: CHART REVIEWED, NO OPPORTUNITY FOUND                     Patients insurance company: [employer] (Medicare Advantage and Commercial) Statement Selected

## 2021-07-15 ENCOUNTER — APPOINTMENT (OUTPATIENT)
Dept: LAB | Facility: CLINIC | Age: 56
End: 2021-07-15
Payer: COMMERCIAL

## 2021-07-15 LAB
ALBUMIN SERPL BCP-MCNC: 3.8 G/DL (ref 3.5–5)
ALP SERPL-CCNC: 112 U/L (ref 46–116)
ALT SERPL W P-5'-P-CCNC: 20 U/L (ref 12–78)
ANION GAP SERPL CALCULATED.3IONS-SCNC: 7 MMOL/L (ref 4–13)
AST SERPL W P-5'-P-CCNC: 18 U/L (ref 5–45)
BILIRUB SERPL-MCNC: 0.32 MG/DL (ref 0.2–1)
BUN SERPL-MCNC: 13 MG/DL (ref 5–25)
CALCIUM SERPL-MCNC: 8.8 MG/DL (ref 8.3–10.1)
CHLORIDE SERPL-SCNC: 103 MMOL/L (ref 100–108)
CHOLEST SERPL-MCNC: 156 MG/DL (ref 50–200)
CO2 SERPL-SCNC: 29 MMOL/L (ref 21–32)
CREAT SERPL-MCNC: 0.6 MG/DL (ref 0.6–1.3)
CREAT UR-MCNC: 30.9 MG/DL
EST. AVERAGE GLUCOSE BLD GHB EST-MCNC: 160 MG/DL
GFR SERPL CREATININE-BSD FRML MDRD: 104 ML/MIN/1.73SQ M
GLUCOSE P FAST SERPL-MCNC: 167 MG/DL (ref 65–99)
HBA1C MFR BLD: 7.2 %
HDLC SERPL-MCNC: 59 MG/DL
LDLC SERPL CALC-MCNC: 87 MG/DL (ref 0–100)
MICROALBUMIN UR-MCNC: 7.5 MG/L (ref 0–20)
MICROALBUMIN/CREAT 24H UR: 24 MG/G CREATININE (ref 0–30)
NONHDLC SERPL-MCNC: 97 MG/DL
POTASSIUM SERPL-SCNC: 5.1 MMOL/L (ref 3.5–5.3)
PROT SERPL-MCNC: 8 G/DL (ref 6.4–8.2)
SODIUM SERPL-SCNC: 139 MMOL/L (ref 136–145)
TRIGL SERPL-MCNC: 50 MG/DL

## 2021-07-15 PROCEDURE — 36415 COLL VENOUS BLD VENIPUNCTURE: CPT | Performed by: FAMILY MEDICINE

## 2021-07-15 PROCEDURE — 80053 COMPREHEN METABOLIC PANEL: CPT | Performed by: FAMILY MEDICINE

## 2021-07-15 PROCEDURE — 82043 UR ALBUMIN QUANTITATIVE: CPT | Performed by: FAMILY MEDICINE

## 2021-07-15 PROCEDURE — 82570 ASSAY OF URINE CREATININE: CPT | Performed by: FAMILY MEDICINE

## 2021-07-15 PROCEDURE — 83036 HEMOGLOBIN GLYCOSYLATED A1C: CPT | Performed by: FAMILY MEDICINE

## 2021-07-15 PROCEDURE — 80061 LIPID PANEL: CPT | Performed by: FAMILY MEDICINE

## 2021-07-19 ENCOUNTER — OFFICE VISIT (OUTPATIENT)
Dept: FAMILY MEDICINE CLINIC | Facility: CLINIC | Age: 56
End: 2021-07-19
Payer: COMMERCIAL

## 2021-07-19 VITALS
OXYGEN SATURATION: 99 % | WEIGHT: 261 LBS | DIASTOLIC BLOOD PRESSURE: 80 MMHG | HEIGHT: 70 IN | SYSTOLIC BLOOD PRESSURE: 120 MMHG | RESPIRATION RATE: 16 BRPM | HEART RATE: 84 BPM | BODY MASS INDEX: 37.37 KG/M2 | TEMPERATURE: 97 F

## 2021-07-19 DIAGNOSIS — Z00.00 ANNUAL PHYSICAL EXAM: ICD-10-CM

## 2021-07-19 DIAGNOSIS — K21.9 GASTROESOPHAGEAL REFLUX DISEASE WITHOUT ESOPHAGITIS: Primary | ICD-10-CM

## 2021-07-19 DIAGNOSIS — E66.09 CLASS 2 OBESITY DUE TO EXCESS CALORIES WITHOUT SERIOUS COMORBIDITY WITH BODY MASS INDEX (BMI) OF 35.0 TO 35.9 IN ADULT: ICD-10-CM

## 2021-07-19 DIAGNOSIS — M25.571 CHRONIC PAIN OF BOTH ANKLES: ICD-10-CM

## 2021-07-19 DIAGNOSIS — I10 ESSENTIAL HYPERTENSION: ICD-10-CM

## 2021-07-19 DIAGNOSIS — M25.572 CHRONIC PAIN OF BOTH ANKLES: ICD-10-CM

## 2021-07-19 DIAGNOSIS — E11.9 TYPE 2 DIABETES MELLITUS WITHOUT COMPLICATION, WITHOUT LONG-TERM CURRENT USE OF INSULIN (HCC): ICD-10-CM

## 2021-07-19 DIAGNOSIS — G89.29 CHRONIC PAIN OF BOTH ANKLES: ICD-10-CM

## 2021-07-19 PROCEDURE — 1036F TOBACCO NON-USER: CPT | Performed by: FAMILY MEDICINE

## 2021-07-19 PROCEDURE — 3725F SCREEN DEPRESSION PERFORMED: CPT | Performed by: FAMILY MEDICINE

## 2021-07-19 PROCEDURE — 3008F BODY MASS INDEX DOCD: CPT | Performed by: FAMILY MEDICINE

## 2021-07-19 PROCEDURE — 3079F DIAST BP 80-89 MM HG: CPT | Performed by: FAMILY MEDICINE

## 2021-07-19 PROCEDURE — 3074F SYST BP LT 130 MM HG: CPT | Performed by: FAMILY MEDICINE

## 2021-07-19 PROCEDURE — 99214 OFFICE O/P EST MOD 30 MIN: CPT | Performed by: FAMILY MEDICINE

## 2021-07-20 ENCOUNTER — HOSPITAL ENCOUNTER (OUTPATIENT)
Dept: RADIOLOGY | Facility: HOSPITAL | Age: 56
Discharge: HOME | End: 2021-07-20
Attending: FAMILY MEDICINE
Payer: COMMERCIAL

## 2021-07-20 DIAGNOSIS — G89.29 CHRONIC PAIN OF BOTH ANKLES: ICD-10-CM

## 2021-07-20 DIAGNOSIS — M25.572 CHRONIC PAIN OF BOTH ANKLES: ICD-10-CM

## 2021-07-20 DIAGNOSIS — M25.571 CHRONIC PAIN OF BOTH ANKLES: ICD-10-CM

## 2021-07-20 PROCEDURE — 73610 X-RAY EXAM OF ANKLE: CPT

## 2021-08-23 ENCOUNTER — TELEPHONE (OUTPATIENT)
Dept: OBGYN CLINIC | Facility: CLINIC | Age: 56
End: 2021-08-23

## 2021-08-23 DIAGNOSIS — B37.3 YEAST VAGINITIS: Primary | ICD-10-CM

## 2021-08-23 RX ORDER — FLUCONAZOLE 150 MG/1
150 TABLET ORAL ONCE
Qty: 2 TABLET | Refills: 0 | Status: CANCELLED | OUTPATIENT
Start: 2021-08-23 | End: 2021-08-23

## 2021-08-23 RX ORDER — FLUCONAZOLE 150 MG/1
TABLET ORAL
Qty: 2 TABLET | Refills: 0 | Status: SHIPPED | OUTPATIENT
Start: 2021-08-23 | End: 2021-08-26

## 2021-08-23 NOTE — TELEPHONE ENCOUNTER
Pt called saying she has a yeast infection and she would like to know if she can get a script for that infection  Please advise!

## 2021-08-23 NOTE — TELEPHONE ENCOUNTER
spoke to pt and 6mo ago was her last German    Onset of sx were 8/18  Tried monistat 1 day tx on 8/22 still itching and d/c and requesting diflucan  Will pend meds per [facility] protocol to sign

## 2021-08-23 NOTE — TELEPHONE ENCOUNTER
Deandre Gates spoke to pt and sent to [facility] - she is not here  Pt had last Y/I 6 mos ago  Began again with sx 8/18  Tried Monistat 1 on 8/22  Still has itch and dsc  Pt requests diflucan - entered with [facility] protocol   Thank you

## 2021-08-25 ENCOUNTER — VBI (OUTPATIENT)
Dept: ADMINISTRATIVE | Facility: OTHER | Age: 56
End: 2021-08-25

## 2021-09-02 ENCOUNTER — VBI (OUTPATIENT)
Dept: ADMINISTRATIVE | Facility: OTHER | Age: 56
End: 2021-09-02

## 2021-10-06 DIAGNOSIS — K21.9 GASTROESOPHAGEAL REFLUX DISEASE WITHOUT ESOPHAGITIS: ICD-10-CM

## 2021-10-06 RX ORDER — OMEPRAZOLE 20 MG/1
CAPSULE, DELAYED RELEASE ORAL
Qty: 90 CAPSULE | Refills: 1 | Status: SHIPPED | OUTPATIENT
Start: 2021-10-06 | End: 2022-04-11

## 2021-11-04 ENCOUNTER — VBI (OUTPATIENT)
Dept: ADMINISTRATIVE | Facility: OTHER | Age: 56
End: 2021-11-04

## 2021-11-08 ENCOUNTER — RA CDI HCC (OUTPATIENT)
Dept: OTHER | Facility: HOSPITAL | Age: 56
End: 2021-11-08

## 2021-11-09 ENCOUNTER — APPOINTMENT (OUTPATIENT)
Dept: LAB | Facility: CLINIC | Age: 56
End: 2021-11-09
Payer: COMMERCIAL

## 2021-11-09 DIAGNOSIS — Z00.00 ANNUAL PHYSICAL EXAM: ICD-10-CM

## 2021-11-09 DIAGNOSIS — E11.9 TYPE 2 DIABETES MELLITUS WITHOUT COMPLICATION, WITHOUT LONG-TERM CURRENT USE OF INSULIN (HCC): ICD-10-CM

## 2021-11-09 DIAGNOSIS — I10 ESSENTIAL HYPERTENSION: ICD-10-CM

## 2021-11-09 LAB
ALBUMIN SERPL BCP-MCNC: 3.9 G/DL (ref 3.5–5)
ALP SERPL-CCNC: 90 U/L (ref 46–116)
ALT SERPL W P-5'-P-CCNC: 18 U/L (ref 12–78)
ANION GAP SERPL CALCULATED.3IONS-SCNC: 10 MMOL/L (ref 4–13)
AST SERPL W P-5'-P-CCNC: 20 U/L (ref 5–45)
BACTERIA UR QL AUTO: ABNORMAL /HPF
BASOPHILS # BLD AUTO: 0.03 THOUSANDS/ΜL (ref 0–0.1)
BASOPHILS NFR BLD AUTO: 1 % (ref 0–1)
BILIRUB SERPL-MCNC: 0.36 MG/DL (ref 0.2–1)
BILIRUB UR QL STRIP: NEGATIVE
BUN SERPL-MCNC: 10 MG/DL (ref 5–25)
CALCIUM SERPL-MCNC: 8.9 MG/DL (ref 8.3–10.1)
CHLORIDE SERPL-SCNC: 102 MMOL/L (ref 100–108)
CHOLEST SERPL-MCNC: 156 MG/DL (ref 50–200)
CLARITY UR: CLEAR
CO2 SERPL-SCNC: 24 MMOL/L (ref 21–32)
COLOR UR: ABNORMAL
CREAT SERPL-MCNC: 0.59 MG/DL (ref 0.6–1.3)
EOSINOPHIL # BLD AUTO: 0.12 THOUSAND/ΜL (ref 0–0.61)
EOSINOPHIL NFR BLD AUTO: 3 % (ref 0–6)
ERYTHROCYTE [DISTWIDTH] IN BLOOD BY AUTOMATED COUNT: 18.1 % (ref 11.6–15.1)
GFR SERPL CREATININE-BSD FRML MDRD: 104 ML/MIN/1.73SQ M
GLUCOSE P FAST SERPL-MCNC: 158 MG/DL (ref 65–99)
GLUCOSE UR STRIP-MCNC: ABNORMAL MG/DL
HCT VFR BLD AUTO: 37.1 % (ref 34.8–46.1)
HDLC SERPL-MCNC: 56 MG/DL
HGB BLD-MCNC: 10.4 G/DL (ref 11.5–15.4)
HGB UR QL STRIP.AUTO: ABNORMAL
IMM GRANULOCYTES # BLD AUTO: 0.01 THOUSAND/UL (ref 0–0.2)
IMM GRANULOCYTES NFR BLD AUTO: 0 % (ref 0–2)
KETONES UR STRIP-MCNC: NEGATIVE MG/DL
LDLC SERPL CALC-MCNC: 86 MG/DL (ref 0–100)
LEUKOCYTE ESTERASE UR QL STRIP: ABNORMAL
LYMPHOCYTES # BLD AUTO: 1.12 THOUSANDS/ΜL (ref 0.6–4.47)
LYMPHOCYTES NFR BLD AUTO: 24 % (ref 14–44)
MCH RBC QN AUTO: 21.6 PG (ref 26.8–34.3)
MCHC RBC AUTO-ENTMCNC: 28 G/DL (ref 31.4–37.4)
MCV RBC AUTO: 77 FL (ref 82–98)
MONOCYTES # BLD AUTO: 0.36 THOUSAND/ΜL (ref 0.17–1.22)
MONOCYTES NFR BLD AUTO: 8 % (ref 4–12)
NEUTROPHILS # BLD AUTO: 3.08 THOUSANDS/ΜL (ref 1.85–7.62)
NEUTS SEG NFR BLD AUTO: 64 % (ref 43–75)
NITRITE UR QL STRIP: NEGATIVE
NON-SQ EPI CELLS URNS QL MICRO: ABNORMAL /HPF
NONHDLC SERPL-MCNC: 100 MG/DL
NRBC BLD AUTO-RTO: 0 /100 WBCS
PH UR STRIP.AUTO: 6.5 [PH]
PLATELET # BLD AUTO: 107 THOUSANDS/UL (ref 149–390)
POTASSIUM SERPL-SCNC: 4.1 MMOL/L (ref 3.5–5.3)
PROT SERPL-MCNC: 7.7 G/DL (ref 6.4–8.2)
PROT UR STRIP-MCNC: NEGATIVE MG/DL
RBC # BLD AUTO: 4.82 MILLION/UL (ref 3.81–5.12)
RBC #/AREA URNS AUTO: ABNORMAL /HPF
SODIUM SERPL-SCNC: 136 MMOL/L (ref 136–145)
SP GR UR STRIP.AUTO: 1.01 (ref 1–1.03)
TRIGL SERPL-MCNC: 70 MG/DL
UROBILINOGEN UR QL STRIP.AUTO: 0.2 E.U./DL
WBC # BLD AUTO: 4.72 THOUSAND/UL (ref 4.31–10.16)
WBC #/AREA URNS AUTO: ABNORMAL /HPF

## 2021-11-09 PROCEDURE — 80061 LIPID PANEL: CPT

## 2021-11-09 PROCEDURE — 36415 COLL VENOUS BLD VENIPUNCTURE: CPT

## 2021-11-09 PROCEDURE — 80053 COMPREHEN METABOLIC PANEL: CPT

## 2021-11-09 PROCEDURE — 85025 COMPLETE CBC W/AUTO DIFF WBC: CPT

## 2021-11-09 PROCEDURE — 81001 URINALYSIS AUTO W/SCOPE: CPT | Performed by: FAMILY MEDICINE

## 2021-11-12 PROBLEM — D69.6 THROMBOCYTOPENIA (HCC): Status: ACTIVE | Noted: 2021-11-12

## 2021-11-12 PROBLEM — D50.9 IRON DEFICIENCY ANEMIA: Status: ACTIVE | Noted: 2021-11-12

## 2021-11-15 ENCOUNTER — OFFICE VISIT (OUTPATIENT)
Dept: FAMILY MEDICINE CLINIC | Facility: CLINIC | Age: 56
End: 2021-11-15
Payer: COMMERCIAL

## 2021-11-15 VITALS
TEMPERATURE: 96.9 F | HEIGHT: 70 IN | OXYGEN SATURATION: 98 % | DIASTOLIC BLOOD PRESSURE: 90 MMHG | RESPIRATION RATE: 16 BRPM | BODY MASS INDEX: 38.08 KG/M2 | WEIGHT: 266 LBS | SYSTOLIC BLOOD PRESSURE: 144 MMHG | HEART RATE: 72 BPM

## 2021-11-15 DIAGNOSIS — Z11.4 SCREENING FOR HIV (HUMAN IMMUNODEFICIENCY VIRUS): ICD-10-CM

## 2021-11-15 DIAGNOSIS — E11.9 TYPE 2 DIABETES MELLITUS WITHOUT COMPLICATION, WITHOUT LONG-TERM CURRENT USE OF INSULIN (HCC): ICD-10-CM

## 2021-11-15 DIAGNOSIS — K21.9 GASTROESOPHAGEAL REFLUX DISEASE WITHOUT ESOPHAGITIS: Primary | ICD-10-CM

## 2021-11-15 DIAGNOSIS — D50.9 IRON DEFICIENCY ANEMIA, UNSPECIFIED IRON DEFICIENCY ANEMIA TYPE: ICD-10-CM

## 2021-11-15 DIAGNOSIS — D69.6 THROMBOCYTOPENIA (HCC): ICD-10-CM

## 2021-11-15 DIAGNOSIS — E66.09 CLASS 2 OBESITY DUE TO EXCESS CALORIES WITHOUT SERIOUS COMORBIDITY WITH BODY MASS INDEX (BMI) OF 38.0 TO 38.9 IN ADULT: ICD-10-CM

## 2021-11-15 DIAGNOSIS — I10 ESSENTIAL HYPERTENSION: ICD-10-CM

## 2021-11-15 LAB — SL AMB POCT HEMOGLOBIN AIC: 7.1 (ref ?–6.5)

## 2021-11-15 PROCEDURE — 83036 HEMOGLOBIN GLYCOSYLATED A1C: CPT | Performed by: FAMILY MEDICINE

## 2021-11-15 PROCEDURE — 3725F SCREEN DEPRESSION PERFORMED: CPT | Performed by: FAMILY MEDICINE

## 2021-11-15 PROCEDURE — 1036F TOBACCO NON-USER: CPT | Performed by: FAMILY MEDICINE

## 2021-11-15 PROCEDURE — 3008F BODY MASS INDEX DOCD: CPT | Performed by: FAMILY MEDICINE

## 2021-11-15 PROCEDURE — 3077F SYST BP >= 140 MM HG: CPT | Performed by: FAMILY MEDICINE

## 2021-11-15 PROCEDURE — 3051F HG A1C>EQUAL 7.0%<8.0%: CPT | Performed by: FAMILY MEDICINE

## 2021-11-15 PROCEDURE — 99214 OFFICE O/P EST MOD 30 MIN: CPT | Performed by: FAMILY MEDICINE

## 2021-11-15 PROCEDURE — 3080F DIAST BP >= 90 MM HG: CPT | Performed by: FAMILY MEDICINE

## 2021-11-15 RX ORDER — SEMAGLUTIDE 1.34 MG/ML
0.5 INJECTION, SOLUTION SUBCUTANEOUS WEEKLY
Qty: 6 ML | Refills: 6 | Status: SHIPPED | OUTPATIENT
Start: 2021-11-15

## 2021-11-16 ENCOUNTER — TELEPHONE (OUTPATIENT)
Dept: HEMATOLOGY ONCOLOGY | Facility: CLINIC | Age: 56
End: 2021-11-16

## 2021-11-17 ENCOUNTER — VBI (OUTPATIENT)
Dept: ADMINISTRATIVE | Facility: OTHER | Age: 56
End: 2021-11-17

## 2021-12-06 ENCOUNTER — TELEPHONE (OUTPATIENT)
Dept: FAMILY MEDICINE CLINIC | Facility: CLINIC | Age: 56
End: 2021-12-06

## 2021-12-06 DIAGNOSIS — E11.9 TYPE 2 DIABETES MELLITUS WITHOUT COMPLICATION, WITHOUT LONG-TERM CURRENT USE OF INSULIN (HCC): ICD-10-CM

## 2021-12-06 RX ORDER — EMPAGLIFLOZIN AND METFORMIN HYDROCHLORIDE 5; 1000 MG/1; MG/1
1 TABLET ORAL 2 TIMES DAILY
Qty: 60 TABLET | Refills: 6 | Status: SHIPPED | OUTPATIENT
Start: 2021-12-06 | End: 2022-01-31

## 2021-12-07 ENCOUNTER — TELEPHONE (OUTPATIENT)
Dept: FAMILY MEDICINE CLINIC | Facility: CLINIC | Age: 56
End: 2021-12-07

## 2021-12-08 DIAGNOSIS — I10 ESSENTIAL HYPERTENSION: ICD-10-CM

## 2021-12-09 PROCEDURE — 4010F ACE/ARB THERAPY RXD/TAKEN: CPT | Performed by: FAMILY MEDICINE

## 2021-12-09 RX ORDER — LOSARTAN POTASSIUM 100 MG/1
TABLET ORAL
Qty: 30 TABLET | Refills: 6 | Status: SHIPPED | OUTPATIENT
Start: 2021-12-09 | End: 2022-01-17

## 2022-01-04 ENCOUNTER — OFFICE VISIT (OUTPATIENT)
Dept: GASTROENTEROLOGY | Facility: CLINIC | Age: 57
End: 2022-01-04
Payer: COMMERCIAL

## 2022-01-04 ENCOUNTER — APPOINTMENT (OUTPATIENT)
Dept: LAB | Facility: CLINIC | Age: 57
End: 2022-01-04
Payer: COMMERCIAL

## 2022-01-04 VITALS
HEART RATE: 98 BPM | HEIGHT: 70 IN | SYSTOLIC BLOOD PRESSURE: 147 MMHG | WEIGHT: 266 LBS | DIASTOLIC BLOOD PRESSURE: 92 MMHG | BODY MASS INDEX: 38.08 KG/M2

## 2022-01-04 DIAGNOSIS — D50.9 IRON DEFICIENCY ANEMIA, UNSPECIFIED IRON DEFICIENCY ANEMIA TYPE: ICD-10-CM

## 2022-01-04 DIAGNOSIS — D50.9 IRON DEFICIENCY ANEMIA, UNSPECIFIED IRON DEFICIENCY ANEMIA TYPE: Primary | ICD-10-CM

## 2022-01-04 DIAGNOSIS — Z86.19 HISTORY OF HEPATITIS C: ICD-10-CM

## 2022-01-04 DIAGNOSIS — K21.9 GASTROESOPHAGEAL REFLUX DISEASE WITHOUT ESOPHAGITIS: ICD-10-CM

## 2022-01-04 DIAGNOSIS — R19.8 CHANGE IN BOWEL FUNCTION: ICD-10-CM

## 2022-01-04 DIAGNOSIS — K57.90 DIVERTICULAR DISEASE: ICD-10-CM

## 2022-01-04 DIAGNOSIS — D12.2 ADENOMA OF ASCENDING COLON: ICD-10-CM

## 2022-01-04 LAB
BASOPHILS # BLD AUTO: 0.05 THOUSANDS/ΜL (ref 0–0.1)
BASOPHILS NFR BLD AUTO: 1 % (ref 0–1)
EOSINOPHIL # BLD AUTO: 0.11 THOUSAND/ΜL (ref 0–0.61)
EOSINOPHIL NFR BLD AUTO: 2 % (ref 0–6)
ERYTHROCYTE [DISTWIDTH] IN BLOOD BY AUTOMATED COUNT: 18.7 % (ref 11.6–15.1)
HCT VFR BLD AUTO: 39 % (ref 34.8–46.1)
HGB BLD-MCNC: 11.3 G/DL (ref 11.5–15.4)
IMM GRANULOCYTES # BLD AUTO: 0.02 THOUSAND/UL (ref 0–0.2)
IMM GRANULOCYTES NFR BLD AUTO: 0 % (ref 0–2)
LYMPHOCYTES # BLD AUTO: 1.2 THOUSANDS/ΜL (ref 0.6–4.47)
LYMPHOCYTES NFR BLD AUTO: 19 % (ref 14–44)
MCH RBC QN AUTO: 22.2 PG (ref 26.8–34.3)
MCHC RBC AUTO-ENTMCNC: 29 G/DL (ref 31.4–37.4)
MCV RBC AUTO: 77 FL (ref 82–98)
MONOCYTES # BLD AUTO: 0.45 THOUSAND/ΜL (ref 0.17–1.22)
MONOCYTES NFR BLD AUTO: 7 % (ref 4–12)
NEUTROPHILS # BLD AUTO: 4.56 THOUSANDS/ΜL (ref 1.85–7.62)
NEUTS SEG NFR BLD AUTO: 71 % (ref 43–75)
NRBC BLD AUTO-RTO: 0 /100 WBCS
PLATELET # BLD AUTO: 114 THOUSANDS/UL (ref 149–390)
RBC # BLD AUTO: 5.09 MILLION/UL (ref 3.81–5.12)
WBC # BLD AUTO: 6.39 THOUSAND/UL (ref 4.31–10.16)

## 2022-01-04 PROCEDURE — 36415 COLL VENOUS BLD VENIPUNCTURE: CPT

## 2022-01-04 PROCEDURE — 3077F SYST BP >= 140 MM HG: CPT | Performed by: INTERNAL MEDICINE

## 2022-01-04 PROCEDURE — 3080F DIAST BP >= 90 MM HG: CPT | Performed by: INTERNAL MEDICINE

## 2022-01-04 PROCEDURE — 99204 OFFICE O/P NEW MOD 45 MIN: CPT | Performed by: INTERNAL MEDICINE

## 2022-01-04 PROCEDURE — 85025 COMPLETE CBC W/AUTO DIFF WBC: CPT

## 2022-01-04 RX ORDER — POLYETHYLENE GLYCOL 3350, SODIUM SULFATE ANHYDROUS, SODIUM BICARBONATE, SODIUM CHLORIDE, POTASSIUM CHLORIDE 236; 22.74; 6.74; 5.86; 2.97 G/4L; G/4L; G/4L; G/4L; G/4L
4 POWDER, FOR SOLUTION ORAL ONCE
Qty: 4000 ML | Refills: 0 | Status: SHIPPED | OUTPATIENT
Start: 2022-01-04 | End: 2022-02-10

## 2022-01-04 NOTE — PATIENT INSTRUCTIONS
Scheduled date of EGD/colonoscopy (as of today): 2/14/2022  Physician performing EGD/colonoscopy:  austen  Location of EGD/colonoscopy: [facility]  Desired bowel prep reviewed with patient:  Lulu Jack  Instructions reviewed with patient by:  [facility]  Clearances: N/A

## 2022-01-04 NOTE — PROGRESS NOTES
[address] [facility] [address] Consultation  Angelita Mixon 49 y o  female MRN: [id number]  Encounter: [id number]          ASSESSMENT AND PLAN:      1  Iron deficiency anemia, unspecified iron deficiency anemia type  -     Ambulatory referral to Gastroenterology    2  Gastroesophageal reflux disease without esophagitis    3  Adenoma of ascending colon    4  Change in bowel function    5  Diverticular disease    6  History of hepatitis C      Patient has anemia hemoglobin 10 4 low MCV microcytosis, in constipation more recently change in bowel habits, history of adenoma polyp of the ascending colon more than 5 years ago and last colonoscopy about 3 years ago  Platelets somewhat low as well  Unclear etiology of anemia, check stool for blood, repeat CBC and check iron studies as well  Will schedule EGD colonoscopy for above as well  History of GERD, anti-reflux measures reviewed diet discussed  Remote history of chronic hep C status post treatment and sustained viral response/cure  Low platelets, get an ultrasound check liver and spleen      ______________________________________________________________________    HPI:        Thank you for ask me see [facility] for evaluation of her iron deficiency anemia, she denies any apparent blood in stools melena hematochezia, she has not had her menstrual cycles for more than 6 months  Not a vegetarian eating balanced diet, has gained some weight  Denies any dysphagia coughing choking spells does have some heartburn acid reflux indigestion, denies nocturnal symptoms  Denies any chest pain shortness of breath, does have some fatigue  Appetite is fair weight stable bowel sometimes constipated change in bowel habits, taking MiraLax with relief  Denies any history of CVA seizures CAD stents pacemakers, diabetes is fairly well controlled  Diet medications more than 10 pertinent systems some of the prior records from 3 years ago reviewed      REVIEW OF SYSTEMS:    CONSTITUTIONAL: Denies any fever, chills, rigors, and weight loss  HEENT: No earache or tinnitus  CARDIOVASCULAR: No chest pain or palpitations  RESPIRATORY: Denies any cough, hemoptysis, shortness of breath or dyspnea on exertion  GASTROINTESTINAL: As noted in the History of Present Illness  GENITOURINARY: Denies any hematuria or dysuria  NEUROLOGIC: No dizziness or vertigo  MUSCULOSKELETAL: Denies any joint swellings  SKIN: Denies skin rashes or itching  ENDOCRINE: Denies excessive thirst  Denies intolerance to heat or cold  PSYCHOSOCIAL: Denies depression or anxiety  Denies any recent memory loss         Historical Information   Past Medical History:   Diagnosis Date    Diabetes ([facility] [address] )     Hepatitis C     Hypertension      Past Surgical History:   Procedure Laterality Date    ANKLE SURGERY      COLONOSCOPY  03/2019    MAMMO (HISTORICAL)  02/2019    TUBAL LIGATION       Social History   Social History     Substance and Sexual Activity   Alcohol Use Yes    Alcohol/week: 1 0 standard drink    Types: 1 Glasses of wine per week     Social History     Substance and Sexual Activity   Drug Use Never     Social History     Tobacco Use   Smoking Status Never Smoker   Smokeless Tobacco Never Used     Family History   Problem Relation Age of Onset    Breast cancer Mother     Hypertension Mother     Diabetes Mother     Hypertension Father     Diabetes Father     Heart failure Father     Dementia Father     Heart disease Father     No Known Problems Sister     No Known Problems Brother     No Known Problems Daughter     No Known Problems Son     Hyperlipidemia Maternal Grandmother     Diabetes Maternal Grandmother     Heart attack Maternal Grandfather     Stroke Maternal Grandfather     Arthritis Paternal Grandmother     Liver cancer Paternal Grandfather     No Known Problems Brother     No Known Problems Brother        Meds/Allergies       Current Outpatient Medications:   BLACK COHOSH EXTRACT PO    Empagliflozin-metFORMIN HCl (Synjardy) 5-1000 MG TABS    EQL EVENING PRIMROSE  MG CAPS    Insulin Pen Needle 31G X 5 MM MISC    lidocaine (XYLOCAINE) 2 % topical gel    losartan (COZAAR) 100 MG tablet    omeprazole (PriLOSEC) 20 mg delayed release capsule    Semaglutide,0 25 or 0 5MG/DOS, (Ozempic, 0 25 or 0 5 MG/DOSE,) 2 MG/1 5ML SOPN    valACYclovir (VALTREX) 500 mg tablet    Allergies   Allergen Reactions    Lisinopril      Coughing            Objective     Blood pressure 147/92, pulse 98, height 5' 10" (1 778 m), weight 121 kg (266 lb)  Body mass index is 38 17 kg/m²  PHYSICAL EXAM:      General Appearance:   Alert, cooperative, no distress   HEENT:   Normocephalic, atraumatic, anicteric  Neck:  Supple, symmetrical, trachea midline   Lungs:   Clear to auscultation bilaterally; no rales, rhonchi or wheezing; respirations unlabored    Heart[de-identified]   Regular rate and rhythm; no murmur  Abdomen:   Soft, non-tender, non-distended; normal bowel sounds; no masses, no organomegaly    Genitalia:   Deferred    Rectal:   Deferred    Extremities:  No cyanosis, clubbing or edema    Skin:  No jaundice, rashes, or lesions    Lymph nodes:  No palpable cervical lymphadenopathy        Lab Results:   No visits with results within 1 Day(s) from this visit  Latest known visit with results is:   Orders Only on 12/21/2021   Component Date Value    Right Eye Diabetic Retin* 12/21/2021 None     Left Eye Diabetic Retino* 12/21/2021 None          Radiology Results:   No results found

## 2022-01-17 ENCOUNTER — OFFICE VISIT (OUTPATIENT)
Dept: FAMILY MEDICINE CLINIC | Facility: CLINIC | Age: 57
End: 2022-01-17
Payer: COMMERCIAL

## 2022-01-17 VITALS
WEIGHT: 268 LBS | OXYGEN SATURATION: 98 % | HEIGHT: 70 IN | SYSTOLIC BLOOD PRESSURE: 148 MMHG | BODY MASS INDEX: 38.37 KG/M2 | HEART RATE: 74 BPM | RESPIRATION RATE: 16 BRPM | DIASTOLIC BLOOD PRESSURE: 92 MMHG | TEMPERATURE: 97.4 F

## 2022-01-17 DIAGNOSIS — E11.9 TYPE 2 DIABETES MELLITUS WITHOUT COMPLICATION, WITHOUT LONG-TERM CURRENT USE OF INSULIN (HCC): ICD-10-CM

## 2022-01-17 DIAGNOSIS — D69.6 THROMBOCYTOPENIA (HCC): ICD-10-CM

## 2022-01-17 DIAGNOSIS — Z86.19 HISTORY OF HEPATITIS C: ICD-10-CM

## 2022-01-17 DIAGNOSIS — D50.9 IRON DEFICIENCY ANEMIA, UNSPECIFIED IRON DEFICIENCY ANEMIA TYPE: ICD-10-CM

## 2022-01-17 DIAGNOSIS — I10 ESSENTIAL HYPERTENSION: Primary | ICD-10-CM

## 2022-01-17 DIAGNOSIS — K21.9 GASTROESOPHAGEAL REFLUX DISEASE WITHOUT ESOPHAGITIS: ICD-10-CM

## 2022-01-17 PROCEDURE — 3725F SCREEN DEPRESSION PERFORMED: CPT | Performed by: FAMILY MEDICINE

## 2022-01-17 PROCEDURE — 4010F ACE/ARB THERAPY RXD/TAKEN: CPT | Performed by: FAMILY MEDICINE

## 2022-01-17 PROCEDURE — 1036F TOBACCO NON-USER: CPT | Performed by: FAMILY MEDICINE

## 2022-01-17 PROCEDURE — 99214 OFFICE O/P EST MOD 30 MIN: CPT | Performed by: FAMILY MEDICINE

## 2022-01-17 PROCEDURE — 3008F BODY MASS INDEX DOCD: CPT | Performed by: FAMILY MEDICINE

## 2022-01-17 RX ORDER — VALSARTAN 160 MG/1
160 TABLET ORAL DAILY
Qty: 30 TABLET | Refills: 6 | Status: SHIPPED | OUTPATIENT
Start: 2022-01-17 | End: 2022-06-20

## 2022-01-17 NOTE — PROGRESS NOTES
Assessment/Plan:         Problem List Items Addressed This Visit        Digestive    Gastroesophageal reflux disease without esophagitis     Endoscopy scheduled with colonoscopy 2/14         History of hepatitis C     GI ordered US scheduled for tomorrow            Endocrine    Type 2 diabetes mellitus without complication, without long-term current use of insulin (HCC)       Lab Results   Component Value Date    HGBA1C 7 1 (A) 11/15/2021   will order labs for 3/22         Relevant Orders    Comprehensive metabolic panel    Hemoglobin A1c (w/out EAG) ([facility] ONLY)    Microalbumin, Random Urine (W/Creatinine) ([facility] ONLY)       Cardiovascular and Mediastinum    Essential hypertension - Primary     Still high will switch to valsartan 160mg po qd and follow up 1 mo if still high increase dose         Relevant Medications    valsartan (DIOVAN) 160 mg tablet       Other    Iron deficiency anemia     Pt had lab draw but no results ever posted will get iron studies         Relevant Orders    Iron Panel (Includes Ferritin, Iron Sat%, Iron, and TIBC)    Thrombocytopenia (HCC)     Chronic stable condition  For this pt father had similar low chronic platelets                 Subjective: pt  Here for interval visit  HTN anemia pt just saw GI specialist will have endoscopy/colonoscopy 2/14/22 reviewed labs pt still need iron test and her platelets are chronically low       Patient ID: Catalina Grider is a 49 y o  female  HPI    The following portions of the patient's history were reviewed and updated as appropriate:   Past Medical History:  She has a past medical history of Diabetes ([address] ), Hepatitis C, and Hypertension  ,  _______________________________________________________________________  Medical Problems:  does not have any pertinent problems on file ,  _______________________________________________________________________  Past Surgical History:   has a past surgical history that includes Tubal ligation;  Ankle surgery; Mammo (historical) (02/2019); and Colonoscopy (03/2019)  ,  _______________________________________________________________________  Family History:  family history includes Arthritis in her paternal grandmother; Breast cancer in her mother; Dementia in her father; Diabetes in her father, maternal grandmother, and mother; Heart attack in her maternal grandfather; Heart disease in her father; Heart failure in her father; Hyperlipidemia in her maternal grandmother; Hypertension in her father and mother; Liver cancer in her paternal grandfather; No Known Problems in her brother, brother, brother, daughter, sister, and son; Stroke in her maternal grandfather ,  _______________________________________________________________________  Social History:   reports that she has never smoked  She has never used smokeless tobacco  She reports current alcohol use of about 1 0 standard drink of alcohol per week  She reports that she does not use drugs  ,  _______________________________________________________________________  Allergies:  is allergic to lisinopril     _______________________________________________________________________  Current Outpatient Medications   Medication Sig Dispense Refill    BLACK COHOSH EXTRACT PO Take by mouth      Empagliflozin-metFORMIN HCl (Synjardy) 5-1000 MG TABS Take 1 tablet by mouth 2 (two) times a day 60 tablet 6    EQL EVENING PRIMROSE  MG CAPS Take by mouth      Insulin Pen Needle 31G X 5 MM MISC Use daily 100 each 0    omeprazole (PriLOSEC) 20 mg delayed release capsule TAKE ONE CAPSULE BY MOUTH EVERY DAY 90 capsule 1    Semaglutide,0 25 or 0 5MG/DOS, (Ozempic, 0 25 or 0 5 MG/DOSE,) 2 MG/1 5ML SOPN Inject 0 5 mg under the skin once a week 6 mL 6    polyethylene glycol (Golytely) 4000 mL solution Take 4,000 mL by mouth once for 1 dose Take according to instructions given by the office for colonoscopy bowel prep   (Patient not taking: Reported on 1/17/2022 ) 4000 mL 0  valsartan (DIOVAN) 160 mg tablet Take 1 tablet (160 mg total) by mouth daily 30 tablet 6     No current facility-administered medications for this visit      _______________________________________________________________________  Review of Systems   Respiratory: Negative for cough, chest tightness and shortness of breath  Cardiovascular: Negative for chest pain, palpitations and leg swelling  Neurological: Negative for dizziness, weakness, light-headedness and headaches  Psychiatric/Behavioral: Negative for dysphoric mood  The patient is not nervous/anxious  Objective:  Vitals:    01/17/22 0848 01/17/22 0903   BP: 160/100 148/92   BP Location: Left arm    Patient Position: Sitting    Cuff Size: Large    Pulse: 74    Resp: 16    Temp: (!) 97 4 °F (36 3 °C)    TempSrc: Temporal    SpO2: 98%    Weight: 122 kg (268 lb)    Height: 5' 10" (1 778 m)      Body mass index is 38 45 kg/m²  Physical Exam  Constitutional:       General: She is not in acute distress  Appearance: Normal appearance  She is well-developed  She is obese  She is not ill-appearing  Eyes:      Extraocular Movements: Extraocular movements intact  Pupils: Pupils are equal, round, and reactive to light  Cardiovascular:      Rate and Rhythm: Normal rate and regular rhythm  Pulses: Normal pulses  Heart sounds: Normal heart sounds  No murmur heard  Pulmonary:      Effort: Pulmonary effort is normal       Breath sounds: Normal breath sounds  Musculoskeletal:      Right lower leg: No edema  Left lower leg: No edema  Neurological:      General: No focal deficit present  Mental Status: She is alert and oriented to person, place, and time  Mental status is at baseline  Psychiatric:         Mood and Affect: Mood normal          Behavior: Behavior normal          Thought Content:  Thought content normal

## 2022-01-18 ENCOUNTER — APPOINTMENT (OUTPATIENT)
Dept: LAB | Facility: CLINIC | Age: 57
End: 2022-01-18
Payer: COMMERCIAL

## 2022-01-18 ENCOUNTER — HOSPITAL ENCOUNTER (OUTPATIENT)
Dept: ULTRASOUND IMAGING | Facility: HOSPITAL | Age: 57
Discharge: HOME | End: 2022-01-18
Attending: INTERNAL MEDICINE
Payer: COMMERCIAL

## 2022-01-18 DIAGNOSIS — Z86.19 HISTORY OF HEPATITIS C: ICD-10-CM

## 2022-01-18 DIAGNOSIS — D50.9 IRON DEFICIENCY ANEMIA, UNSPECIFIED IRON DEFICIENCY ANEMIA TYPE: ICD-10-CM

## 2022-01-18 LAB
FERRITIN SERPL-MCNC: 3 NG/ML (ref 8–388)
IRON SATN MFR SERPL: 5 % (ref 15–50)
IRON SERPL-MCNC: 24 UG/DL (ref 50–170)
TIBC SERPL-MCNC: 493 UG/DL (ref 250–450)

## 2022-01-18 PROCEDURE — 82728 ASSAY OF FERRITIN: CPT

## 2022-01-18 PROCEDURE — 36415 COLL VENOUS BLD VENIPUNCTURE: CPT

## 2022-01-18 PROCEDURE — 76700 US EXAM ABDOM COMPLETE: CPT

## 2022-01-18 PROCEDURE — 83540 ASSAY OF IRON: CPT

## 2022-01-18 PROCEDURE — 83550 IRON BINDING TEST: CPT

## 2022-01-19 DIAGNOSIS — E61.1 LOW IRON: Primary | ICD-10-CM

## 2022-01-21 NOTE — RESULT ENCOUNTER NOTE
Please call the patient regarding her abnormal result  Follow up in my office as needed, ultrasound consistent with fatty liver    Will discuss in more details during her visit for procedures

## 2022-01-30 DIAGNOSIS — E11.9 TYPE 2 DIABETES MELLITUS WITHOUT COMPLICATION, WITHOUT LONG-TERM CURRENT USE OF INSULIN (HCC): ICD-10-CM

## 2022-01-31 RX ORDER — EMPAGLIFLOZIN AND METFORMIN HYDROCHLORIDE 5; 1000 MG/1; MG/1
TABLET ORAL
Qty: 60 TABLET | Refills: 6 | Status: SHIPPED | OUTPATIENT
Start: 2022-01-31 | End: 2022-03-21 | Stop reason: SDUPTHER

## 2022-02-02 ENCOUNTER — TELEPHONE (OUTPATIENT)
Dept: GASTROENTEROLOGY | Facility: HOSPITAL | Age: 57
End: 2022-02-02

## 2022-02-10 ENCOUNTER — OFFICE VISIT (OUTPATIENT)
Dept: FAMILY MEDICINE CLINIC | Facility: CLINIC | Age: 57
End: 2022-02-10
Payer: COMMERCIAL

## 2022-02-10 VITALS
TEMPERATURE: 97.7 F | HEART RATE: 90 BPM | HEIGHT: 70 IN | WEIGHT: 259 LBS | OXYGEN SATURATION: 98 % | RESPIRATION RATE: 18 BRPM | BODY MASS INDEX: 37.08 KG/M2 | SYSTOLIC BLOOD PRESSURE: 118 MMHG | DIASTOLIC BLOOD PRESSURE: 78 MMHG

## 2022-02-10 DIAGNOSIS — E11.621 TYPE 2 DIABETES MELLITUS WITH RIGHT DIABETIC FOOT ULCER (HCC): Primary | ICD-10-CM

## 2022-02-10 DIAGNOSIS — E11.9 TYPE 2 DIABETES MELLITUS WITHOUT COMPLICATION, WITHOUT LONG-TERM CURRENT USE OF INSULIN (HCC): ICD-10-CM

## 2022-02-10 DIAGNOSIS — L97.519 TYPE 2 DIABETES MELLITUS WITH RIGHT DIABETIC FOOT ULCER (HCC): Primary | ICD-10-CM

## 2022-02-10 PROCEDURE — 99214 OFFICE O/P EST MOD 30 MIN: CPT | Performed by: FAMILY MEDICINE

## 2022-02-10 PROCEDURE — 1036F TOBACCO NON-USER: CPT | Performed by: FAMILY MEDICINE

## 2022-02-10 PROCEDURE — 3078F DIAST BP <80 MM HG: CPT | Performed by: FAMILY MEDICINE

## 2022-02-10 PROCEDURE — 3008F BODY MASS INDEX DOCD: CPT | Performed by: FAMILY MEDICINE

## 2022-02-10 PROCEDURE — 3074F SYST BP LT 130 MM HG: CPT | Performed by: FAMILY MEDICINE

## 2022-02-10 RX ORDER — CEPHALEXIN 500 MG/1
500 CAPSULE ORAL EVERY 8 HOURS SCHEDULED
Qty: 21 CAPSULE | Refills: 0 | Status: SHIPPED | OUTPATIENT
Start: 2022-02-10 | End: 2022-02-17

## 2022-02-10 NOTE — PROGRESS NOTES
Subjective:      Patient ID: Jesse Gonzalez is a 49 y o  female  Went for a pedicure, had a blister on right foot on plantar surface of foot, she soaked the foot in epsom salt+water, applied neosporin and bandaid  diaebetes is with fair control, compliant with medications  Blister popped and it has been painful  Past Medical History:   Diagnosis Date    Diabetes ([address] )     Hepatitis C     Hypertension        Family History   Problem Relation Age of Onset   Rickie Shaun Breast cancer Mother     Hypertension Mother     Diabetes Mother     Hypertension Father     Diabetes Father     Heart failure Father     Dementia Father     Heart disease Father     No Known Problems Sister     No Known Problems Brother     No Known Problems Daughter     No Known Problems Son     Hyperlipidemia Maternal Grandmother     Diabetes Maternal Grandmother     Heart attack Maternal Grandfather     Stroke Maternal Grandfather     Arthritis Paternal Grandmother     Liver cancer Paternal Grandfather     No Known Problems Brother     No Known Problems Brother        Past Surgical History:   Procedure Laterality Date    ANKLE SURGERY      COLONOSCOPY  03/2019    MAMMO (HISTORICAL)  02/2019    TUBAL LIGATION          reports that she has never smoked  She has never used smokeless tobacco  She reports current alcohol use of about 1 0 standard drink of alcohol per week  She reports that she does not use drugs        Current Outpatient Medications:     BLACK COHOSH EXTRACT PO, Take by mouth, Disp: , Rfl:     EQL EVENING PRIMROSE  MG CAPS, Take by mouth, Disp: , Rfl:     Insulin Pen Needle 31G X 5 MM MISC, Use daily, Disp: 100 each, Rfl: 0    omeprazole (PriLOSEC) 20 mg delayed release capsule, TAKE ONE CAPSULE BY MOUTH EVERY DAY, Disp: 90 capsule, Rfl: 1    Semaglutide,0 25 or 0 5MG/DOS, (Ozempic, 0 25 or 0 5 MG/DOSE,) 2 MG/1 5ML SOPN, Inject 0 5 mg under the skin once a week, Disp: 6 mL, Rfl: 6    Synjardy 5-1000 MG TABS, TAKE ONE TABLET TWICE A DAY, Disp: 60 tablet, Rfl: 6    valsartan (DIOVAN) 160 mg tablet, Take 1 tablet (160 mg total) by mouth daily, Disp: 30 tablet, Rfl: 6    cephalexin (KEFLEX) 500 mg capsule, Take 1 capsule (500 mg total) by mouth every 8 (eight) hours for 7 days, Disp: 21 capsule, Rfl: 0    mupirocin (BACTROBAN) 2 % ointment, Apply topically 3 (three) times a day for 7 days, Disp: 22 g, Rfl: 0    The following portions of the patient's history were reviewed and updated as appropriate: allergies, current medications, past family history, past medical history, past social history, past surgical history and problem list     Review of Systems   Skin: Positive for wound  PHQ-2/9 Depression Screening               Objective:    /78 (BP Location: Left arm, Patient Position: Sitting, Cuff Size: Adult)   Pulse 90   Temp 97 7 °F (36 5 °C) (Temporal)   Resp 18   Ht 5' 10" (1 778 m)   Wt 117 kg (259 lb)   SpO2 98%   BMI 37 16 kg/m²      Physical Exam  Vitals and nursing note reviewed  Constitutional:       Appearance: Normal appearance  She is well-developed  She is obese  She is not ill-appearing  HENT:      Head: Normocephalic and atraumatic  Right Ear: External ear normal       Left Ear: External ear normal       Nose: Nose normal       Mouth/Throat:      Mouth: Mucous membranes are moist       Pharynx: No oropharyngeal exudate or posterior oropharyngeal erythema  Eyes:      General: No scleral icterus  Right eye: No discharge  Left eye: No discharge  Conjunctiva/sclera: Conjunctivae normal    Cardiovascular:      Rate and Rhythm: Normal rate  Heart sounds: No murmur heard  No gallop  Pulmonary:      Effort: Pulmonary effort is normal  No respiratory distress  Breath sounds: Normal breath sounds  No stridor  No wheezing, rhonchi or rales  Musculoskeletal:      Right lower leg: No edema  Left lower leg: No edema     Skin: Findings: No erythema or rash  Comments: Grade 2 ulcer- right foot between clear base and margins, mild erythema of the base of the ulcer   Neurological:      Mental Status: She is alert  Mental status is at baseline  Psychiatric:         Behavior: Behavior normal          Judgment: Judgment normal            Recent Results (from the past 2520 hour(s))   Urinalysis with microscopic    Collection Time: 11/09/21  9:44 AM   Result Value Ref Range    Clarity, UA Clear     Color, UA Light Yellow     Specific [place], UA 1 015 1 003 - 1 030    pH, UA 6 5 4 5, 5 0, 5 5, 6 0, 6 5, 7 0, 7 5, 8 0    Glucose, UA >=1000 (1%) (A) Negative mg/dl    Ketones, UA Negative Negative mg/dl    Blood, UA Trace-Intact (A) Negative    Protein, UA Negative Negative mg/dl    Nitrite, UA Negative Negative    Bilirubin, UA Negative Negative    Urobilinogen, UA 0 2 0 2, 1 0 E U /dl E U /dl    Leukocytes, UA (A) Negative     Elevated glucose may cause decreased leukocyte values   See urine microscopic for [facility] result/    WBC, UA None Seen None Seen, 2-4, 5-60 /hpf    RBC, UA None Seen None Seen, 2-4 /hpf    Bacteria, UA Occasional None Seen, Occasional /hpf    Epithelial Cells Occasional None Seen, Occasional /hpf   CBC and differential    Collection Time: 11/09/21  9:44 AM   Result Value Ref Range    WBC 4 72 4 31 - 10 16 Thousand/uL    RBC 4 82 3 81 - 5 12 Million/uL    Hemoglobin 10 4 (L) 11 5 - 15 4 g/dL    Hematocrit 37 1 34 8 - 46 1 %    MCV 77 (L) 82 - 98 fL    MCH 21 6 (L) 26 8 - 34 3 pg    MCHC 28 0 (L) 31 4 - 37 4 g/dL    RDW 18 1 (H) 11 6 - 15 1 %    Platelets 007 (L) 437 - 390 Thousands/uL    nRBC 0 /100 WBCs    Neutrophils Relative 64 43 - 75 %    Immat GRANS % 0 0 - 2 %    Lymphocytes Relative 24 14 - 44 %    Monocytes Relative 8 4 - 12 %    Eosinophils Relative 3 0 - 6 %    Basophils Relative 1 0 - 1 %    Neutrophils Absolute 3 08 1 85 - 7 62 Thousands/µL    Immature Grans Absolute 0 01 0 00 - 0 20 Thousand/uL    Lymphocytes Absolute 1 12 0 60 - 4 47 Thousands/µL    Monocytes Absolute 0 36 0 17 - 1 22 Thousand/µL    Eosinophils Absolute 0 12 0 00 - 0 61 Thousand/µL    Basophils Absolute 0 03 0 00 - 0 10 Thousands/µL   Comprehensive metabolic panel    Collection Time: 11/09/21  9:44 AM   Result Value Ref Range    Sodium 136 136 - 145 mmol/L    Potassium 4 1 3 5 - 5 3 mmol/L    Chloride 102 100 - 108 mmol/L    CO2 24 21 - 32 mmol/L    ANION GAP 10 4 - 13 mmol/L    BUN 10 5 - 25 mg/dL    Creatinine 0 59 (L) 0 60 - 1 30 mg/dL    Glucose, Fasting 158 (H) 65 - 99 mg/dL    Calcium 8 9 8 3 - 10 1 mg/dL    AST 20 5 - 45 U/L    ALT 18 12 - 78 U/L    Alkaline Phosphatase 90 46 - 116 U/L    Total Protein 7 7 6 4 - 8 2 g/dL    Albumin 3 9 3 5 - 5 0 g/dL    Total Bilirubin 0 36 0 20 - 1 00 mg/dL    eGFR 104 ml/min/1 73sq m   Lipid panel    Collection Time: 11/09/21  9:44 AM   Result Value Ref Range    Cholesterol 156 50 - 200 mg/dL    Triglycerides 70 <=150 mg/dL    HDL, Direct 56 >=40 mg/dL    LDL Calculated 86 0 - 100 mg/dL    Non-HDL-Chol (CHOL-HDL) 100 mg/dl   POCT hemoglobin A1c    Collection Time: 11/15/21 10:33 AM   Result Value Ref Range    Hemoglobin A1C 7 1 (A) 6 5   Hm Diabetes Eye Exam    Collection Time: 12/21/21  2:01 PM   Result Value Ref Range    Right Eye Diabetic Retinopathy None     Left Eye Diabetic Retinopathy None    CBC and differential    Collection Time: 01/04/22 11:31 AM   Result Value Ref Range    WBC 6 39 4 31 - 10 16 Thousand/uL    RBC 5 09 3 81 - 5 12 Million/uL    Hemoglobin 11 3 (L) 11 5 - 15 4 g/dL    Hematocrit 39 0 34 8 - 46 1 %    MCV 77 (L) 82 - 98 fL    MCH 22 2 (L) 26 8 - 34 3 pg    MCHC 29 0 (L) 31 4 - 37 4 g/dL    RDW 18 7 (H) 11 6 - 15 1 %    Platelets 958 (L) 295 - 390 Thousands/uL    nRBC 0 /100 WBCs    Neutrophils Relative 71 43 - 75 %    Immat GRANS % 0 0 - 2 %    Lymphocytes Relative 19 14 - 44 %    Monocytes Relative 7 4 - 12 %    Eosinophils Relative 2 0 - 6 %    Basophils Relative 1 0 - 1 % Neutrophils Absolute 4 56 1 85 - 7 62 Thousands/µL    Immature Grans Absolute 0 02 0 00 - 0 20 Thousand/uL    Lymphocytes Absolute 1 20 0 60 - 4 47 Thousands/µL    Monocytes Absolute 0 45 0 17 - 1 22 Thousand/µL    Eosinophils Absolute 0 11 0 00 - 0 61 Thousand/µL    Basophils Absolute 0 05 0 00 - 0 10 Thousands/µL   Iron Saturation %    Collection Time: 01/18/22  9:37 AM   Result Value Ref Range    Iron Saturation 5 (L) 15 - 50 %    TIBC 493 (H) 250 - 450 ug/dL    Iron 24 (L) 50 - 170 ug/dL   Ferritin    Collection Time: 01/18/22  9:37 AM   Result Value Ref Range    Ferritin 3 (L) 8 - 388 ng/mL       Laboratory Results: I have personally reviewed the pertinent laboratory results/reports     Radiology/Other Diagnostic Testing Results: I have personally reviewed pertinent reports  US abdomen complete    Result Date: 1/18/2022  ABDOMEN ULTRASOUND, COMPLETE INDICATION:   D50 9: Iron deficiency anemia, unspecified Z86 19: Personal history of other infectious and parasitic diseases  COMPARISON: None TECHNIQUE:   Real-time ultrasound of the abdomen was performed with a curvilinear transducer with both volumetric sweeps and still imaging techniques  FINDINGS: PANCREAS: Partial obscuration by bowel gas  Visualized portions of the pancreas are within normal limits  AORTA AND IVC: Partial obscuration  Visualized portions are normal for patient age  LIVER: Evaluation limited by bowel gas and body habitus  Size:  Borderline enlarged  The liver measures 16 1 cm in the midclavicular line  Contour:  Surface contour is smooth  Parenchyma:  Increased echogenicity, posterior acoustic attenuation, decreased periportal echogenicity  No evidence of suspicious mass  Limited imaging of the main portal vein shows it to be patent and hepatopetal  BILIARY: No gallbladder findings  No intrahepatic biliary dilatation  CBD measures 3 mm  No choledocholithiasis   Negative Tobin's sign KIDNEY: Right kidney measures 13 0 x 6 1 cm  0 9 cm parapelvic cyst Left kidney measures 13 3 x 5 8 cm  Within normal limits  SPLEEN: Measures 12 8 x 15 8 x 5 5 cm  Within normal limits  ASCITES:  None  Hepatosplenomegaly Hepatic moderate steatosis Workstation performed: [id number]        Assessment/Plan:       Diagnoses and all orders for this visit:    Type 2 diabetes mellitus with right diabetic foot ulcer ([address] )  -     cephalexin (KEFLEX) 500 mg capsule; Take 1 capsule (500 mg total) by mouth every 8 (eight) hours for 7 days  -     mupirocin (BACTROBAN) 2 % ointment; Apply topically 3 (three) times a day for 7 days    Type 2 diabetes mellitus without complication, without long-term current use of insulin ([address] )      Advised diabetic foot care, avoid pedicures and follow up with podiatry  Start Keflex for 7 days, topical mupirocin and apply dressing as directed  A1C-7 0 TO 7 2 range, tighter control will likely improve wound healing  F/U if it worsens  Read package inserts for all medications before starting a new medications, call me if you have any questions  Patient was given opportunity to ask questions and all questions were answered  Portions of the record may have been created with voice recognition software  Occasional wrong word or "sound a like" substitutions may have occurred due to the inherent limitations of voice recognition software  Read the chart carefully and recognize, using context, where substitutions have occurred

## 2022-02-11 ENCOUNTER — TELEPHONE (OUTPATIENT)
Dept: GASTROENTEROLOGY | Facility: HOSPITAL | Age: 57
End: 2022-02-11

## 2022-02-14 ENCOUNTER — ANESTHESIA EVENT (OUTPATIENT)
Dept: GASTROENTEROLOGY | Facility: HOSPITAL | Age: 57
End: 2022-02-14

## 2022-02-14 ENCOUNTER — ANESTHESIA (OUTPATIENT)
Dept: GASTROENTEROLOGY | Facility: HOSPITAL | Age: 57
End: 2022-02-14

## 2022-02-14 ENCOUNTER — HOSPITAL ENCOUNTER (OUTPATIENT)
Dept: GASTROENTEROLOGY | Facility: HOSPITAL | Age: 57
Setting detail: OUTPATIENT SURGERY
Discharge: HOME | End: 2022-02-14
Attending: INTERNAL MEDICINE
Payer: COMMERCIAL

## 2022-02-14 VITALS
OXYGEN SATURATION: 100 % | RESPIRATION RATE: 13 BRPM | DIASTOLIC BLOOD PRESSURE: 66 MMHG | TEMPERATURE: 96.9 F | BODY MASS INDEX: 36.69 KG/M2 | HEART RATE: 62 BPM | SYSTOLIC BLOOD PRESSURE: 128 MMHG | WEIGHT: 256.3 LBS | HEIGHT: 70 IN

## 2022-02-14 DIAGNOSIS — R19.8 CHANGE IN BOWEL FUNCTION: ICD-10-CM

## 2022-02-14 DIAGNOSIS — D12.2 ADENOMA OF ASCENDING COLON: ICD-10-CM

## 2022-02-14 DIAGNOSIS — D50.9 IRON DEFICIENCY ANEMIA, UNSPECIFIED IRON DEFICIENCY ANEMIA TYPE: ICD-10-CM

## 2022-02-14 DIAGNOSIS — K21.9 GASTROESOPHAGEAL REFLUX DISEASE WITHOUT ESOPHAGITIS: ICD-10-CM

## 2022-02-14 LAB
EXT PREGNANCY TEST URINE: NEGATIVE
EXT. CONTROL: NORMAL
GLUCOSE SERPL-MCNC: 138 MG/DL (ref 65–140)
GLUCOSE SERPL-MCNC: 181 MG/DL (ref 65–140)

## 2022-02-14 PROCEDURE — 81025 URINE PREGNANCY TEST: CPT | Performed by: INTERNAL MEDICINE

## 2022-02-14 PROCEDURE — 43239 EGD BIOPSY SINGLE/MULTIPLE: CPT | Performed by: INTERNAL MEDICINE

## 2022-02-14 PROCEDURE — 82948 REAGENT STRIP/BLOOD GLUCOSE: CPT

## 2022-02-14 PROCEDURE — 45378 DIAGNOSTIC COLONOSCOPY: CPT | Performed by: INTERNAL MEDICINE

## 2022-02-14 PROCEDURE — 43251 EGD REMOVE LESION SNARE: CPT | Performed by: INTERNAL MEDICINE

## 2022-02-14 PROCEDURE — 88305 TISSUE EXAM BY PATHOLOGIST: CPT | Performed by: PATHOLOGY

## 2022-02-14 RX ORDER — LIDOCAINE HYDROCHLORIDE 20 MG/ML
INJECTION, SOLUTION EPIDURAL; INFILTRATION; INTRACAUDAL; PERINEURAL AS NEEDED
Status: DISCONTINUED | OUTPATIENT
Start: 2022-02-14 | End: 2022-02-14

## 2022-02-14 RX ORDER — SODIUM CHLORIDE, SODIUM LACTATE, POTASSIUM CHLORIDE, CALCIUM CHLORIDE 600; 310; 30; 20 MG/100ML; MG/100ML; MG/100ML; MG/100ML
INJECTION, SOLUTION INTRAVENOUS CONTINUOUS PRN
Status: DISCONTINUED | OUTPATIENT
Start: 2022-02-14 | End: 2022-02-14

## 2022-02-14 RX ORDER — PROPOFOL 10 MG/ML
INJECTION, EMULSION INTRAVENOUS CONTINUOUS PRN
Status: DISCONTINUED | OUTPATIENT
Start: 2022-02-14 | End: 2022-02-14

## 2022-02-14 RX ORDER — PROPOFOL 10 MG/ML
INJECTION, EMULSION INTRAVENOUS AS NEEDED
Status: DISCONTINUED | OUTPATIENT
Start: 2022-02-14 | End: 2022-02-14

## 2022-02-14 RX ADMIN — PROPOFOL 120 MCG/KG/MIN: 10 INJECTION, EMULSION INTRAVENOUS at 09:52

## 2022-02-14 RX ADMIN — LIDOCAINE HYDROCHLORIDE 100 MG: 20 INJECTION, SOLUTION EPIDURAL; INFILTRATION; INTRACAUDAL; PERINEURAL at 09:38

## 2022-02-14 RX ADMIN — PROPOFOL 20 MG: 10 INJECTION, EMULSION INTRAVENOUS at 09:42

## 2022-02-14 RX ADMIN — PROPOFOL 20 MG: 10 INJECTION, EMULSION INTRAVENOUS at 10:01

## 2022-02-14 RX ADMIN — Medication 80 MG: at 09:55

## 2022-02-14 RX ADMIN — PROPOFOL 70 MG: 10 INJECTION, EMULSION INTRAVENOUS at 09:52

## 2022-02-14 RX ADMIN — PROPOFOL 100 MG: 10 INJECTION, EMULSION INTRAVENOUS at 09:38

## 2022-02-14 RX ADMIN — SODIUM CHLORIDE, SODIUM LACTATE, POTASSIUM CHLORIDE, AND CALCIUM CHLORIDE: .6; .31; .03; .02 INJECTION, SOLUTION INTRAVENOUS at 09:30

## 2022-02-14 NOTE — ANESTHESIA PREPROCEDURE EVALUATION
Procedure:  COLONOSCOPY  EGD    Relevant Problems   CARDIO   (+) Essential hypertension      ENDO   (+) Type 2 diabetes mellitus without complication, without long-term current use of insulin (HCC)      GI/HEPATIC   (+) Gastroesophageal reflux disease without esophagitis      HEMATOLOGY   (+) Iron deficiency anemia   (+) Thrombocytopenia (HCC)      NEURO/PSYCH   (+) History of hepatitis C        Physical Exam    Airway    Mallampati score: II  TM Distance: >3 FB  Neck ROM: full     Dental   No notable dental hx     Cardiovascular  Cardiovascular exam normal    Pulmonary  Pulmonary exam normal     Other Findings        Anesthesia Plan  ASA Score- 2     Anesthesia Type- IV sedation with anesthesia with ASA Monitors  Additional Monitors:   Airway Plan:           Plan Factors-Exercise tolerance (METS): >4 METS  Chart reviewed  Imaging results reviewed  Existing labs reviewed  Patient summary reviewed  Patient is not a current smoker  Obstructive sleep apnea risk education given perioperatively  Induction-     Postoperative Plan-     Informed Consent- Anesthetic plan and risks discussed with patient  I personally reviewed this patient with the CRNA  Discussed and agreed on the Anesthesia Plan with the CRNA  Shay Serrato

## 2022-02-14 NOTE — ANESTHESIA POSTPROCEDURE EVALUATION
Post-Op Assessment Note    CV Status:  Stable  Pain Score: 0    Pain management: adequate     Mental Status:  Alert and awake   Hydration Status:  Euvolemic and stable   PONV Controlled:  None   Airway Patency:  Patent      Post Op Vitals Reviewed: Yes      Staff: CRNA         No complications documented      /55 (02/14/22 1006)    Temp  97 3   Pulse (!) 54 (02/14/22 1006)   Resp 19 (02/14/22 1006)    SpO2 100 % (02/14/22 1006)

## 2022-02-14 NOTE — H&P
History and Physical -  [facility]  Jesse Gonzalez 49 y o  female MRN: [id number]                  HPI: Jesse Gonzalez is a 49y o  year old female who presents for history of GERD iron deficiency anemia      REVIEW OF SYSTEMS: Per the HPI, and otherwise unremarkable      Historical Information   Past Medical History:   Diagnosis Date    Diabetes ([address] )     Hepatitis C     Hypertension      Past Surgical History:   Procedure Laterality Date    ANKLE SURGERY      COLONOSCOPY  03/2019    MAMMO (HISTORICAL)  02/2019    TUBAL LIGATION       Social History   Social History     Substance and Sexual Activity   Alcohol Use Yes    Alcohol/week: 1 0 standard drink    Types: 1 Glasses of wine per week    Comment: socially     Social History     Substance and Sexual Activity   Drug Use Never     Social History     Tobacco Use   Smoking Status Never Smoker   Smokeless Tobacco Never Used     Family History   Problem Relation Age of Onset    Breast cancer Mother     Hypertension Mother     Diabetes Mother     Hypertension Father     Diabetes Father     Heart failure Father     Dementia Father     Heart disease Father     No Known Problems Sister     No Known Problems Brother     No Known Problems Daughter     No Known Problems Son     Hyperlipidemia Maternal Grandmother     Diabetes Maternal Grandmother     Heart attack Maternal Grandfather     Stroke Maternal Grandfather     Arthritis Paternal Grandmother     Liver cancer Paternal Grandfather     No Known Problems Brother     No Known Problems Brother        Meds/Allergies       Current Outpatient Medications:     BLACK COHOSH EXTRACT PO    cephalexin (KEFLEX) 500 mg capsule    Diclofenac Sodium (VOLTAREN) 1 %    EQL EVENING PRIMROSE  MG CAPS    Insulin Pen Needle 31G X 5 MM MISC    omeprazole (PriLOSEC) 20 mg delayed release capsule    Semaglutide,0 25 or 0 5MG/DOS, (Ozempic, 0 25 or 0 5 MG/DOSE,) 2 MG/1 5ML SOPN    Synjardy 5-1000 MG TABS    valsartan (DIOVAN) 160 mg tablet    mupirocin (BACTROBAN) 2 % ointment    Allergies   Allergen Reactions    Lisinopril      Coughing        Objective     /76   Pulse 75   Temp (!) 97 2 °F (36 2 °C) (Temporal)   Resp 17   Ht 5' 10" (1 778 m)   Wt 116 kg (256 lb 4 8 oz)   SpO2 99%   BMI 36 78 kg/m²       PHYSICAL EXAM    Gen: NAD  Head: NCAT  CV: RRR  CHEST: Clear  ABD: soft, NT/ND  EXT: no edema      ASSESSMENT/PLAN:  This is a 49y o  year old female here for EGD colonoscopy, and she is stable and optimized for her procedure

## 2022-02-14 NOTE — DISCHARGE INSTRUCTIONS
Upper Endoscopy   WHAT YOU NEED TO KNOW:   An upper endoscopy is also called an upper gastrointestinal (GI) endoscopy, or an esophagogastroduodenoscopy (EGD)  You may feel bloated, gassy, or have some abdominal discomfort after your procedure  Your throat may be sore for 24 to 36 hours  You may burp or pass gas from air that is still inside your body  DISCHARGE INSTRUCTIONS:   Call 911 for any of the following:   · You have sudden chest pain or trouble breathing  Seek care immediately if:   · You feel dizzy or faint  · You have trouble swallowing  · Your bowel movements are very dark or black  · Your abdomen is hard and firm and you have severe pain  · You vomit blood  Contact your healthcare provider if:   · You feel full or bloated and cannot burp or pass gas  · You have not had a bowel movement for 3 days after your procedure  · You have neck pain  · You have a fever or chills  · You have nausea or are vomiting  · You have a rash or hives  · You have questions or concerns about your endoscopy  Relieve a sore throat:  Suck on throat lozenges or crushed ice  Gargle with a small amount of warm salt water  Mix 1 teaspoon of salt and 1 cup of warm water to make salt water  Relieve gas and discomfort from bloating:  Lie on your right side with a heating pad on your abdomen  Take short walks to help pass gas  Eat small meals until bloating is relieved  Rest after your procedure: You have been given medicine to relax you  Do not  drive or make important decisions until the day after your procedure  Return to your normal activity as directed  You can usually return to work the day after your procedure  Follow up with your healthcare provider as directed:  Write down your questions so you remember to ask them during your visits  © 2017 [address] [employer] is for End User's use only and may not be sold, redistributed or otherwise used for commercial purposes  All illustrations and images included in CareNotes® are the copyrighted property of [employer]  or Malcolm Marie  The above information is an  only  It is not intended as medical advice for individual conditions or treatments  Talk to your doctor, nurse or pharmacist before following any medical regimen to see if it is safe and effective for you  Colonoscopy   WHAT YOU NEED TO KNOW:   A colonoscopy is a procedure to examine the inside of your colon (intestine) with a scope  Polyps or tissue growths may have been removed during your colonoscopy  It is normal to feel bloated and to have some abdominal discomfort  You should be passing gas  If you have hemorrhoids or you had polyps removed, you may have a small amount of bleeding  DISCHARGE INSTRUCTIONS:   Seek care immediately if:   · You have a large amount of bright red blood in your bowel movements  · Your abdomen is hard and firm and you have severe pain  · You have sudden trouble breathing  Contact your healthcare provider if:   · You develop a rash or hives  · You have a fever within 24 hours of your procedure       · You have not had a bowel movement for 3 days after your procedure  · You have questions or concerns about your condition or care  Activity:   · Do not lift, strain, or run  for 3 days after your procedure  · Rest after your procedure  You have been given medicine to relax you  Do not  drive or make important decisions until the day after your procedure  Return to your normal activity as directed  · Relieve gas and discomfort from bloating  by lying on your right side with a heating pad on your abdomen  You may need to take short walks to help the gas move out  Eat small meals until bloating is relieved  If you had polyps removed: For 7 days after your procedure:  · Do not  take aspirin  · Do not  go on long car rides    Follow up with your healthcare provider as directed:  Write down your questions so you remember to ask them during your visits  © 2017 [employer] is for End User's use only and may not be sold, redistributed or otherwise used for commercial purposes  All illustrations and images included in CareNotes® are the copyrighted property of A D [employer] M [employer]  or Malcolm Marie  The above information is an  only  It is not intended as medical advice for individual conditions or treatments  Talk to your doctor, nurse or pharmacist before following any medical regimen to see if it is safe and effective for you  Diverticulosis   WHAT YOU NEED TO KNOW:   Diverticulosis is a condition that causes small pockets called diverticula to form in your intestine  These pockets make it difficult for bowel movements to pass through your digestive system  DISCHARGE INSTRUCTIONS:   Seek care immediately if:   · You have severe pain on the left side of your lower abdomen  · Your bowel movements are bright or dark red  Call your doctor if:   · You have a fever and chills  · You feel dizzy or lightheaded  · You have nausea, or you are vomiting  · You have a change in your bowel movements  · You have questions or concerns about your condition or care  Medicines:   · Medicines  to soften your bowel movements may be given  You may also need medicines to treat symptoms such as bloating and pain  · Take your medicine as directed  Contact your healthcare provider if you think your medicine is not helping or if you have side effects  Tell him or her if you are allergic to any medicine  Keep a list of the medicines, vitamins, and herbs you take  Include the amounts, and when and why you take them  Bring the list or the pill bottles to follow-up visits  Carry your medicine list with you in case of an emergency  Self-care:   The goal of treatment is to manage any symptoms you have and prevent other problems such as diverticulitis  Diverticulitis is swelling or infection of the diverticula  Your healthcare provider may recommend any of the following:  · Eat a variety of high-fiber foods  High-fiber foods help you have regular bowel movements  High-fiber foods include cooked beans, fruits, vegetables, and some cereals  Most adults need 25 to 35 grams of fiber each day  Your healthcare provider may recommend that you have more  Ask your healthcare provider how much fiber you need  Increase fiber slowly  You may have abdominal discomfort, bloating, and gas if you add fiber to your diet too quickly  You may need to take a fiber supplement if you are not getting enough fiber from food  · Drink liquids as directed  You may need to drink 2 to 3 liters (8 to 12 cups) of liquids every day  Ask your healthcare provider how much liquid to drink each day and which liquids are best for you  · Apply heat  on your abdomen for 20 to 30 minutes every 2 hours for as many days as directed  Heat helps decrease pain and muscle spasms  Help prevent diverticulitis or other symptoms: The following may help decrease your risk for diverticulitis or symptoms, such as bleeding  Talk to your provider about these or other things you can do to prevent problems that may occur with diverticulosis  · Exercise regularly  Ask your healthcare provider about the best exercise plan for you  Exercise can help you have regular bowel movements  Get 30 minutes of exercise on most days of the week  · Maintain a healthy weight  Ask your healthcare provider what a healthy weight is for you  Ask him or her to help you create a weight loss plan if you are overweight  · Do not smoke  Nicotine and other chemicals in cigarettes increase your risk for diverticulitis  Ask your healthcare provider for information if you currently smoke and need help to quit  E-cigarettes or smokeless tobacco still contain nicotine   Talk to your healthcare provider before you use these products  · Ask your healthcare provider if it is safe to take NSAIDs  NSAIDs may increase your risk of diverticulitis  Follow up with your doctor as directed:  Write down your questions so you remember to ask them during your visits  © Copyright [employer] 2021 Information is for End User's use only and may not be sold, redistributed or otherwise used for commercial purposes  All illustrations and images included in CareNotes® are the copyrighted property of A D A M , Inc  or Cristopher Van  The above information is an  only  It is not intended as medical advice for individual conditions or treatments  Talk to your doctor, nurse or pharmacist before following any medical regimen to see if it is safe and effective for you  Hiatal Hernia   WHAT YOU NEED TO KNOW:   A hiatal hernia is a condition that causes part of your stomach to bulge through the hiatus (small opening) in your diaphragm  The part of the stomach may move up and down, or it may get trapped above the diaphragm  DISCHARGE INSTRUCTIONS:   Call your local emergency number (911 in the [address] [place] [address],[address]) if:   · You have severe chest pain and sudden trouble breathing  Seek care immediately if:   · You have severe abdominal pain  · You try to vomit but nothing comes out (retching)  · Your bowel movements are black or bloody  · Your vomit looks like coffee grounds or has blood in it  Call your doctor if:   · Your symptoms are getting worse  · You have nausea, and you are vomiting  · You are losing weight without trying  · You have questions or concerns about your condition or care  Medicines:   · Medicines  may be given to relieve heartburn symptoms  These medicines help to decrease or block stomach acid  You may also be given medicines that help to tighten the esophageal sphincter  · Take your medicine as directed    Contact your healthcare provider if you think your medicine is not helping or if you have side effects  Tell him or her if you are allergic to any medicine  Keep a list of the medicines, vitamins, and herbs you take  Include the amounts, and when and why you take them  Bring the list or the pill bottles to follow-up visits  Carry your medicine list with you in case of an emergency  Self care: The following nutrition and lifestyle changes may be recommended to relieve symptoms of heartburn:     · Avoid foods that make your symptoms worse  These may include spicy foods, fruit juices, alcohol, caffeine, chocolate, and mint  · Eat several small meals during the day  Small meals give your stomach less food to digest     · Avoid lying down and bending forward after you eat  Do not eat meals 2 to 3 hours before bedtime  This decreases your risk for reflux  · Maintain a healthy weight  If you are overweight, weight loss may help relieve your symptoms  · Sleep with your head elevated  at least 6 inches  · Do not smoke  Smoking can increase your symptoms of heartburn  Follow up with your doctor as directed:  Write down your questions so you remember to ask them during your visits  © Copyright [employer] 2021 Information is for End User's use only and may not be sold, redistributed or otherwise used for commercial purposes  All illustrations and images included in CareNotes® are the copyrighted property of A D A M , Inc  or [address] [place], [place] [address]   The above information is an  only  It is not intended as medical advice for individual conditions or treatments  Talk to your doctor, nurse or pharmacist before following any medical regimen to see if it is safe and effective for you  Gastric Polyps   WHAT YOU NEED TO KNOW:   Gastric polyps are growths that form in the lining of your stomach  They are not cancerous, but certain types of polyps can change into cancer  DISCHARGE INSTRUCTIONS:   Follow up with your healthcare provider as directed:   You may need more tests or procedures  Write down any questions so you remember to ask them at your visits  Medicines:   · Medicines may  be given if you have an infection caused by H  pylori bacteria  · Take your medicine as directed  Contact your healthcare provider if you think your medicine is not helping or if you have side effects  Tell him or her if you are allergic to any medicine  Keep a list of the medicines, vitamins, and herbs you take  Include the amounts, and when and why you take them  Bring the list or the pill bottles to follow-up visits  Carry your medicine list with you in case of an emergency  Seek care immediately if:   · You have blood in your vomit  · You have dark bowel movements  · You have severe pain in your abdomen that does not go away after you take medicine  Contact your healthcare provider if:   · You have indigestion that does not go away with treatment  · You vomit after meals  · You have questions or concerns about your condition or care  © Copyright [employer] 2021 Information is for End User's use only and may not be sold, redistributed or otherwise used for commercial purposes  All illustrations and images included in CareNotes® are the copyrighted property of A D A M , Inc  or [facility] Alexandra Tran   The above information is an  only  It is not intended as medical advice for individual conditions or treatments  Talk to your doctor, nurse or pharmacist before following any medical regimen to see if it is safe and effective for you

## 2022-02-21 NOTE — RESULT ENCOUNTER NOTE
Please call the patient regarding her abnormal result  Follow up in my office in 3-4 months  As far as follow-up colonoscopy is concerned, she is good for screening colonoscopy in 10 years

## 2022-02-28 ENCOUNTER — TELEPHONE (OUTPATIENT)
Dept: GASTROENTEROLOGY | Facility: CLINIC | Age: 57
End: 2022-02-28

## 2022-03-14 ENCOUNTER — APPOINTMENT (OUTPATIENT)
Dept: LAB | Facility: CLINIC | Age: 57
End: 2022-03-14
Payer: COMMERCIAL

## 2022-03-14 DIAGNOSIS — E61.1 LOW IRON: ICD-10-CM

## 2022-03-14 DIAGNOSIS — E11.9 TYPE 2 DIABETES MELLITUS WITHOUT COMPLICATION, WITHOUT LONG-TERM CURRENT USE OF INSULIN (HCC): ICD-10-CM

## 2022-03-14 LAB
ALBUMIN SERPL BCP-MCNC: 3.9 G/DL (ref 3.5–5)
ALP SERPL-CCNC: 112 U/L (ref 46–116)
ALT SERPL W P-5'-P-CCNC: 20 U/L (ref 12–78)
ANION GAP SERPL CALCULATED.3IONS-SCNC: 9 MMOL/L (ref 4–13)
AST SERPL W P-5'-P-CCNC: 20 U/L (ref 5–45)
BILIRUB SERPL-MCNC: 0.23 MG/DL (ref 0.2–1)
BUN SERPL-MCNC: 15 MG/DL (ref 5–25)
CALCIUM SERPL-MCNC: 9.4 MG/DL (ref 8.3–10.1)
CHLORIDE SERPL-SCNC: 101 MMOL/L (ref 100–108)
CO2 SERPL-SCNC: 28 MMOL/L (ref 21–32)
CREAT SERPL-MCNC: 0.55 MG/DL (ref 0.6–1.3)
CREAT UR-MCNC: 33.1 MG/DL
EST. AVERAGE GLUCOSE BLD GHB EST-MCNC: 157 MG/DL
FERRITIN SERPL-MCNC: 4 NG/ML (ref 8–388)
GFR SERPL CREATININE-BSD FRML MDRD: 106 ML/MIN/1.73SQ M
GLUCOSE P FAST SERPL-MCNC: 148 MG/DL (ref 65–99)
HBA1C MFR BLD: 7.1 %
IRON SATN MFR SERPL: 4 % (ref 15–50)
IRON SERPL-MCNC: 22 UG/DL (ref 50–170)
MICROALBUMIN UR-MCNC: 11.4 MG/L (ref 0–20)
MICROALBUMIN/CREAT 24H UR: 34 MG/G CREATININE (ref 0–30)
POTASSIUM SERPL-SCNC: 4.4 MMOL/L (ref 3.5–5.3)
PROT SERPL-MCNC: 8.2 G/DL (ref 6.4–8.2)
SODIUM SERPL-SCNC: 138 MMOL/L (ref 136–145)
TIBC SERPL-MCNC: 523 UG/DL (ref 250–450)

## 2022-03-14 PROCEDURE — 80053 COMPREHEN METABOLIC PANEL: CPT

## 2022-03-14 PROCEDURE — 83540 ASSAY OF IRON: CPT

## 2022-03-14 PROCEDURE — 82570 ASSAY OF URINE CREATININE: CPT

## 2022-03-14 PROCEDURE — 36415 COLL VENOUS BLD VENIPUNCTURE: CPT

## 2022-03-14 PROCEDURE — 83550 IRON BINDING TEST: CPT

## 2022-03-14 PROCEDURE — 82043 UR ALBUMIN QUANTITATIVE: CPT

## 2022-03-14 PROCEDURE — 83036 HEMOGLOBIN GLYCOSYLATED A1C: CPT

## 2022-03-14 PROCEDURE — 3061F NEG MICROALBUMINURIA REV: CPT | Performed by: FAMILY MEDICINE

## 2022-03-14 PROCEDURE — 3051F HG A1C>EQUAL 7.0%<8.0%: CPT | Performed by: FAMILY MEDICINE

## 2022-03-14 PROCEDURE — 82728 ASSAY OF FERRITIN: CPT

## 2022-03-16 DIAGNOSIS — E61.1 LOW IRON: Primary | ICD-10-CM

## 2022-03-21 ENCOUNTER — OFFICE VISIT (OUTPATIENT)
Dept: FAMILY MEDICINE CLINIC | Facility: CLINIC | Age: 57
End: 2022-03-21
Payer: COMMERCIAL

## 2022-03-21 ENCOUNTER — TELEPHONE (OUTPATIENT)
Dept: FAMILY MEDICINE CLINIC | Facility: CLINIC | Age: 57
End: 2022-03-21

## 2022-03-21 VITALS
WEIGHT: 265 LBS | SYSTOLIC BLOOD PRESSURE: 130 MMHG | HEIGHT: 70 IN | OXYGEN SATURATION: 98 % | BODY MASS INDEX: 37.94 KG/M2 | HEART RATE: 75 BPM | DIASTOLIC BLOOD PRESSURE: 84 MMHG | RESPIRATION RATE: 16 BRPM | TEMPERATURE: 97.2 F

## 2022-03-21 DIAGNOSIS — K21.9 GASTROESOPHAGEAL REFLUX DISEASE WITHOUT ESOPHAGITIS: ICD-10-CM

## 2022-03-21 DIAGNOSIS — E66.09 CLASS 2 OBESITY DUE TO EXCESS CALORIES WITHOUT SERIOUS COMORBIDITY WITH BODY MASS INDEX (BMI) OF 38.0 TO 38.9 IN ADULT: ICD-10-CM

## 2022-03-21 DIAGNOSIS — Z12.31 ENCOUNTER FOR SCREENING MAMMOGRAM FOR MALIGNANT NEOPLASM OF BREAST: ICD-10-CM

## 2022-03-21 DIAGNOSIS — I10 ESSENTIAL HYPERTENSION: ICD-10-CM

## 2022-03-21 DIAGNOSIS — D69.6 THROMBOCYTOPENIA (HCC): ICD-10-CM

## 2022-03-21 DIAGNOSIS — E11.9 TYPE 2 DIABETES MELLITUS WITHOUT COMPLICATION, WITHOUT LONG-TERM CURRENT USE OF INSULIN (HCC): ICD-10-CM

## 2022-03-21 DIAGNOSIS — Z00.00 ANNUAL PHYSICAL EXAM: Primary | ICD-10-CM

## 2022-03-21 PROCEDURE — 3008F BODY MASS INDEX DOCD: CPT | Performed by: FAMILY MEDICINE

## 2022-03-21 PROCEDURE — 3725F SCREEN DEPRESSION PERFORMED: CPT | Performed by: FAMILY MEDICINE

## 2022-03-21 PROCEDURE — 99396 PREV VISIT EST AGE 40-64: CPT | Performed by: FAMILY MEDICINE

## 2022-03-21 PROCEDURE — 1036F TOBACCO NON-USER: CPT | Performed by: FAMILY MEDICINE

## 2022-03-21 PROCEDURE — 3079F DIAST BP 80-89 MM HG: CPT | Performed by: FAMILY MEDICINE

## 2022-03-21 PROCEDURE — 3075F SYST BP GE 130 - 139MM HG: CPT | Performed by: FAMILY MEDICINE

## 2022-03-21 PROCEDURE — 99214 OFFICE O/P EST MOD 30 MIN: CPT | Performed by: FAMILY MEDICINE

## 2022-03-21 RX ORDER — EMPAGLIFLOZIN AND METFORMIN HYDROCHLORIDE 5; 1000 MG/1; MG/1
1 TABLET ORAL 2 TIMES DAILY
Qty: 60 TABLET | Refills: 6 | Status: SHIPPED | OUTPATIENT
Start: 2022-03-21 | End: 2022-03-22

## 2022-03-21 RX ORDER — DOXYCYCLINE HYCLATE 50 MG/1
324 CAPSULE, GELATIN COATED ORAL
COMMUNITY
End: 2022-06-20

## 2022-03-21 NOTE — PROGRESS NOTES
Assessment/Plan:         Problem List Items Addressed This Visit        Digestive    Gastroesophageal reflux disease without esophagitis     Ok on meds              Endocrine    Type 2 diabetes mellitus without complication, without long-term current use of insulin (HCC)       Lab Results   Component Value Date    HGBA1C 7 1 (H) 03/14/2022   labs ok           Relevant Medications    Empagliflozin-metFORMIN HCl (Synjardy) 5-1000 MG TABS    Other Relevant Orders    Hemoglobin A1C       Cardiovascular and Mediastinum    Essential hypertension     Well controlled on current therapy continue with current medications and will reassess next visit              Other    Class 2 obesity due to excess calories without serious comorbidity with body mass index (BMI) of 38 0 to 38 9 in adult     Discussion on diet and exercise guidelines for weight loss and  health reviewed with pt            Annual physical exam - Primary     Check routine labs           Relevant Orders    Lipid panel    Urinalysis with microscopic    CBC and differential    Basic metabolic panel    Thrombocytopenia (HCC)     chronic           Other Visit Diagnoses     Encounter for screening mammogram for malignant neoplasm of breast        Relevant Orders    Mammo screening bilateral w 3d & cad            Subjective: pt here for annual physical and interval visit     Patient ID: Lebron Nuñez is a 49 y o  female  Diabetes  Pertinent negatives for hypoglycemia include no dizziness, headaches or nervousness/anxiousness  Pertinent negatives for diabetes include no chest pain, no fatigue and no weakness  Hypertension  Pertinent negatives include no chest pain, headaches, neck pain, palpitations or shortness of breath         The following portions of the patient's history were reviewed and updated as appropriate:   Past Medical History:  She has a past medical history of Diabetes ([address] ), Hepatitis C, and Hypertension  ,  _______________________________________________________________________  Medical Problems:  does not have any pertinent problems on file ,  _______________________________________________________________________  Past Surgical History:   has a past surgical history that includes Tubal ligation; Ankle surgery; Mammo (historical) (02/2019); and Colonoscopy (03/2019)  ,  _______________________________________________________________________  Family History:  family history includes Arthritis in her paternal grandmother; Breast cancer in her mother; Dementia in her father; Diabetes in her father, maternal grandmother, and mother; Heart attack in her maternal grandfather; Heart disease in her father; Heart failure in her father; Hyperlipidemia in her maternal grandmother; Hypertension in her father and mother; Liver cancer in her paternal grandfather; No Known Problems in her brother, brother, brother, daughter, sister, and son; Stroke in her maternal grandfather ,  _______________________________________________________________________  Social History:   reports that she has never smoked  She has never used smokeless tobacco  She reports current alcohol use of about 1 0 standard drink of alcohol per week  She reports that she does not use drugs  ,  _______________________________________________________________________  Allergies:  is allergic to lisinopril     _______________________________________________________________________  Current Outpatient Medications   Medication Sig Dispense Refill    BLACK COHOSH EXTRACT PO Take by mouth      Diclofenac Sodium (VOLTAREN) 1 % Apply 2 g topically 4 (four) times a day as needed      Empagliflozin-metFORMIN HCl (Synjardy) 5-1000 MG TABS Take 1 tablet by mouth 2 (two) times a day 60 tablet 6    EQL EVENING PRIMROSE  MG CAPS Take by mouth      ferrous gluconate (FERGON) 324 mg tablet Take 324 mg by mouth daily with breakfast      omeprazole (PriLOSEC) 20 mg delayed release capsule TAKE ONE CAPSULE BY MOUTH EVERY DAY 90 capsule 1    Semaglutide,0 25 or 0 5MG/DOS, (Ozempic, 0 25 or 0 5 MG/DOSE,) 2 MG/1 5ML SOPN Inject 0 5 mg under the skin once a week 6 mL 6    valsartan (DIOVAN) 160 mg tablet Take 1 tablet (160 mg total) by mouth daily 30 tablet 6     No current facility-administered medications for this visit      _______________________________________________________________________  Review of Systems   Constitutional: Negative for appetite change, chills, fatigue and fever  Respiratory: Negative for cough, chest tightness and shortness of breath  Cardiovascular: Negative for chest pain, palpitations and leg swelling  Gastrointestinal: Negative for abdominal pain, constipation, diarrhea, nausea and vomiting  Genitourinary: Negative for difficulty urinating and frequency  Musculoskeletal: Negative for arthralgias, back pain and neck pain  Skin: Negative for rash  Neurological: Negative for dizziness, weakness, light-headedness, numbness and headaches  Hematological: Does not bruise/bleed easily  Psychiatric/Behavioral: Negative for dysphoric mood and sleep disturbance  The patient is not nervous/anxious  Objective:  Vitals:    03/21/22 0955   BP: 130/84   BP Location: Left arm   Patient Position: Sitting   Cuff Size: Large   Pulse: 75   Resp: 16   Temp: (!) 97 2 °F (36 2 °C)   TempSrc: Temporal   SpO2: 98%   Weight: 120 kg (265 lb)   Height: 5' 10" (1 778 m)     Body mass index is 38 02 kg/m²  Physical Exam  Vitals reviewed  Constitutional:       General: She is not in acute distress  Appearance: Normal appearance  She is well-developed  She is obese  HENT:      Head: Normocephalic  Right Ear: Tympanic membrane, ear canal and external ear normal       Left Ear: Tympanic membrane, ear canal and external ear normal       Nose: Nose normal       Mouth/Throat:      Pharynx: No oropharyngeal exudate     Eyes: General: Lids are normal       Extraocular Movements: Extraocular movements intact  Conjunctiva/sclera: Conjunctivae normal       Pupils: Pupils are equal, round, and reactive to light  Neck:      Thyroid: No thyromegaly  Vascular: No carotid bruit  Cardiovascular:      Rate and Rhythm: Normal rate and regular rhythm  Pulses: Normal pulses  Heart sounds: Normal heart sounds  No murmur heard  No friction rub  Pulmonary:      Effort: Pulmonary effort is normal  No respiratory distress  Breath sounds: Normal breath sounds  No stridor  No wheezing or rales  Chest:   Breasts: Breasts are symmetrical       Right: Normal  No swelling, bleeding, inverted nipple, mass, nipple discharge, skin change or tenderness  Left: Normal  No swelling, bleeding, inverted nipple, mass, nipple discharge, skin change or tenderness  Abdominal:      General: Bowel sounds are normal  There is no distension  Palpations: Abdomen is soft  There is no mass  Tenderness: There is no abdominal tenderness  There is no guarding  Hernia: No hernia is present  Musculoskeletal:         General: Normal range of motion  Cervical back: Full passive range of motion without pain, normal range of motion and neck supple  Lymphadenopathy:      Cervical: No cervical adenopathy  Skin:     General: Skin is warm and dry  Findings: No rash  Comments: Nl appearing moles   Neurological:      General: No focal deficit present  Mental Status: She is alert and oriented to person, place, and time  Mental status is at baseline  Cranial Nerves: No cranial nerve deficit  Sensory: No sensory deficit  Motor: No abnormal muscle tone  Coordination: Coordination normal       Gait: Gait normal       Deep Tendon Reflexes: Reflexes normal  Babinski sign absent on the right side     Psychiatric:         Mood and Affect: Mood normal          Speech: Speech normal          Behavior: Behavior normal          Thought Content: Thought content normal          Judgment: Judgment normal        BMI Counseling: Body mass index is 38 02 kg/m²  The BMI is above normal  Nutrition recommendations include 3-5 servings of fruits/vegetables daily, reducing fast food intake, consuming healthier snacks, decreasing soda and/or juice intake, moderation in carbohydrate intake and increasing intake of lean protein  Exercise recommendations include exercising 3-5 times per week and strength training exercises

## 2022-03-22 ENCOUNTER — HOSPITAL ENCOUNTER (OUTPATIENT)
Dept: MAMMOGRAPHY | Facility: HOSPITAL | Age: 57
Discharge: HOME | End: 2022-03-22
Attending: FAMILY MEDICINE
Payer: COMMERCIAL

## 2022-03-22 ENCOUNTER — HOSPITAL ENCOUNTER (OUTPATIENT)
Dept: RADIOLOGY | Facility: HOSPITAL | Age: 57
Discharge: HOME | End: 2022-03-22
Attending: INTERNAL MEDICINE
Payer: COMMERCIAL

## 2022-03-22 VITALS — WEIGHT: 264.55 LBS | BODY MASS INDEX: 37.87 KG/M2 | HEIGHT: 70 IN

## 2022-03-22 DIAGNOSIS — Z12.31 ENCOUNTER FOR SCREENING MAMMOGRAM FOR MALIGNANT NEOPLASM OF BREAST: ICD-10-CM

## 2022-03-22 DIAGNOSIS — D50.9 IRON DEFICIENCY ANEMIA, UNSPECIFIED IRON DEFICIENCY ANEMIA TYPE: ICD-10-CM

## 2022-03-22 DIAGNOSIS — E11.9 TYPE 2 DIABETES MELLITUS WITHOUT COMPLICATION, WITHOUT LONG-TERM CURRENT USE OF INSULIN (HCC): Primary | ICD-10-CM

## 2022-03-22 PROCEDURE — 77067 SCR MAMMO BI INCL CAD: CPT

## 2022-03-22 PROCEDURE — 74248 X-RAY SM INT F-THRU STD: CPT

## 2022-03-22 PROCEDURE — 77063 BREAST TOMOSYNTHESIS BI: CPT

## 2022-03-22 PROCEDURE — 74246 X-RAY XM UPR GI TRC 2CNTRST: CPT

## 2022-03-28 ENCOUNTER — TELEPHONE (OUTPATIENT)
Dept: OBGYN CLINIC | Facility: CLINIC | Age: 57
End: 2022-03-28

## 2022-03-28 DIAGNOSIS — R92.8 ABNORMAL MAMMOGRAM: Primary | ICD-10-CM

## 2022-03-30 ENCOUNTER — TELEPHONE (OUTPATIENT)
Dept: GASTROENTEROLOGY | Facility: CLINIC | Age: 57
End: 2022-03-30

## 2022-03-30 NOTE — TELEPHONE ENCOUNTER
----- Message from Anya Urena LPN sent at 8/10/8459  9:30 AM EDT -----  Patient aware  Educated  Patient will get stool study done  All bw is done  Please call patient to schedule f/u appt   Thank you

## 2022-03-31 ENCOUNTER — HOSPITAL ENCOUNTER (OUTPATIENT)
Dept: MAMMOGRAPHY | Facility: CLINIC | Age: 57
Discharge: HOME | End: 2022-03-31
Payer: COMMERCIAL

## 2022-03-31 VITALS — WEIGHT: 264 LBS | BODY MASS INDEX: 37.8 KG/M2 | HEIGHT: 70 IN

## 2022-03-31 DIAGNOSIS — R92.8 ABNORMAL MAMMOGRAM: ICD-10-CM

## 2022-03-31 PROCEDURE — 77065 DX MAMMO INCL CAD UNI: CPT

## 2022-03-31 NOTE — PROGRESS NOTES
Met with patient and Dr Fatmata Champion regarding recommendation for;      __x___ RIGHT ______LEFT      _____Ultrasound guided  __x____Stereotactic  Breast biopsy  __x___Verbalized understanding        Blood thinners:  _____yes __x___no    Date stopped: ___n/a________    Biopsy teaching sheet given and reviewed with patient verbalizing understanding and questions/concerns addressed at this time:  ___x____yes ______no         Med Hx: HTN; NIDDM; GERD

## 2022-04-05 ENCOUNTER — TELEPHONE (OUTPATIENT)
Dept: OBGYN CLINIC | Facility: CLINIC | Age: 57
End: 2022-04-05

## 2022-04-10 DIAGNOSIS — K21.9 GASTROESOPHAGEAL REFLUX DISEASE WITHOUT ESOPHAGITIS: ICD-10-CM

## 2022-04-11 ENCOUNTER — HOSPITAL ENCOUNTER (OUTPATIENT)
Dept: RADIOLOGY | Facility: HOSPITAL | Age: 57
Discharge: HOME | End: 2022-04-11

## 2022-04-11 ENCOUNTER — HOSPITAL ENCOUNTER (OUTPATIENT)
Dept: RADIOLOGY | Facility: HOSPITAL | Age: 57
Discharge: HOME | End: 2022-04-11
Admitting: RADIOLOGY
Payer: COMMERCIAL

## 2022-04-11 VITALS — DIASTOLIC BLOOD PRESSURE: 78 MMHG | SYSTOLIC BLOOD PRESSURE: 122 MMHG

## 2022-04-11 DIAGNOSIS — R92.8 ABNORMAL MAMMOGRAM: ICD-10-CM

## 2022-04-11 PROCEDURE — 88342 IMHCHEM/IMCYTCHM 1ST ANTB: CPT | Performed by: PATHOLOGY

## 2022-04-11 PROCEDURE — 88341 IMHCHEM/IMCYTCHM EA ADD ANTB: CPT | Performed by: PATHOLOGY

## 2022-04-11 PROCEDURE — 88305 TISSUE EXAM BY PATHOLOGIST: CPT | Performed by: PATHOLOGY

## 2022-04-11 PROCEDURE — 19081 BX BREAST 1ST LESION STRTCTC: CPT

## 2022-04-11 PROCEDURE — A4648 IMPLANTABLE TISSUE MARKER: HCPCS

## 2022-04-11 RX ORDER — LIDOCAINE HYDROCHLORIDE 10 MG/ML
5 INJECTION, SOLUTION EPIDURAL; INFILTRATION; INTRACAUDAL; PERINEURAL ONCE
Status: COMPLETED | OUTPATIENT
Start: 2022-04-11 | End: 2022-04-11

## 2022-04-11 RX ORDER — LIDOCAINE HYDROCHLORIDE AND EPINEPHRINE 10; 10 MG/ML; UG/ML
10 INJECTION, SOLUTION INFILTRATION; PERINEURAL ONCE
Status: COMPLETED | OUTPATIENT
Start: 2022-04-11 | End: 2022-04-11

## 2022-04-11 RX ORDER — OMEPRAZOLE 20 MG/1
CAPSULE, DELAYED RELEASE ORAL
Qty: 90 CAPSULE | Refills: 1 | Status: SHIPPED | OUTPATIENT
Start: 2022-04-11 | End: 2022-06-20 | Stop reason: SDUPTHER

## 2022-04-11 RX ADMIN — LIDOCAINE HYDROCHLORIDE 5 ML: 10 INJECTION, SOLUTION EPIDURAL; INFILTRATION; INTRACAUDAL; PERINEURAL at 11:48

## 2022-04-11 RX ADMIN — LIDOCAINE HYDROCHLORIDE AND EPINEPHRINE 10 ML: 10; 10 INJECTION, SOLUTION INFILTRATION; PERINEURAL at 11:50

## 2022-04-11 NOTE — PROGRESS NOTES
Patient arrived via:    __x___ ambulatory    _____ wheelchair    _____ stretcher      Domestic violence screen    ___x___ negative _____ positive    Breast Implants:    _____ yes ___x___ no

## 2022-04-11 NOTE — DISCHARGE INSTR - OTHER ORDERS
POST LARGE CORE BREAST BIOPSY PATIENT INFORMATION      Place an ice pack inside your bra over the top of the dressing every hour for 20 minutes (20 minutes on, 60 minutes off)  Do this until bedtime  Do not shower or bathe until the following morning  You may bathe your breast carefully with the steri-strips in place  Be careful    Not to loosen them  The steri-strips will fall off in 3-5 days  You may have mild discomfort, and you may have some bruising where the   Needle entered the skin  This should clear within 5-7 days  If you need medicine for discomfort, take acetaminophen products such as   Tylenol  You may also take Advil or Motrin products  Do not participate in strenuous activities such as-tennis, aerobics, skiing,  Weight lifting, etc  for 24 hours  Refrain from swimming/soaking for 72 hours  Wearing a bra for sleeping may be more comfortable for the first 24-48 hours  Watch for continued bleeding, pain or fever over 101  If any of these symptoms occur, please contact our    breast nurse navigator at the location where your biopsy was performed  During normal business hours (7:30 am-4:00 pm) please call the nurse navigator at the site where your   procedure was performed:    [facility] [address]: [phone] or   [address]: [phone] or [phone]  [address]: [address] [facility]: [address] [place] [address] Street: [phone]              After 4 PM - please call your physician or go to the nearest Emergency Department location  9          The final results of your biopsy are usually available within one week

## 2022-04-11 NOTE — PROGRESS NOTES
Procedure type:    _____ Ultrasound guided __x___ Stereotactic    Breast:    _____ Left breast biopsy __x___ Right breast biopsy    Time-out called @ 11:45am and procedure confirmed with patient Ethel Console, myself Rodney Amin RN, Jeanne Urena MD, and Opal Monsivais RT(R)(M)(DS)     Location: Right breast 10 o'clock position    Needle: 8G mammotome    # of chambers filled: 2    Calcifications present in: both chambers 1 & 2    Clip: U mammotome marker    Performed by: Jeanne Urena MD    Pressure held for 5 minutes by: Rodney Amin RN    Steri Strips:    __x___ yes _____ no    Band aid:    _____ yes___x__ no  Gauze and tape in place    Tolerated procedure:    __x___ yes _____ no

## 2022-04-11 NOTE — PROGRESS NOTES
Ice pack given:    __x___ yes _____ no    Discharge instructions signed by patient:    __x___ yes _____ no    Discharge instructions given to patient:    __x___ yes _____ no    Discharged via:    __x___ ambulatory    _____ wheelchair    _____ stretcher    Stable on discharge:    __x___ yes ____ no

## 2022-04-12 ENCOUNTER — TELEPHONE (OUTPATIENT)
Dept: RADIOLOGY | Facility: HOSPITAL | Age: 57
End: 2022-04-12

## 2022-04-12 NOTE — PROGRESS NOTES
Post procedure call completed: Tuesday 04/12/2022    Bleeding: _____yes __x__ no    Pain: _____yes ___x___ no    *Patient reports minimal to no pain  She reports using the ice pack Monday afternoon & evening on 04/11/2022 and it did help with pain control  Manus Profit is aware she may continue to use the ice pack as needed, and she can do so in combination with oral OTC pain control medications if needed (acetaminophen, motrin, ibuprofen all ok to use, avoid aspirin based products )     Redness/Swelling: ______ yes ___x___ no    Band aid removed: __x___ yes _____ no    *Patient reports she has removed the outer gauze dressing and tape this morning 04/12/2022  She reports no redness or concerns for infection at the biopsy site  Steri-Strips intact: ___x___ yes _____ no    *Patient instructed to leave steri-strips in place until at least Friday 04/15/2022 or Saturday 04/16/2022  If steri-strips do not fall off on their own at that time, it would be appropriate to remove  Patient with no additional questions at this time  I relayed to her that either myself or Dr Hortensia Catherine will call when her breast biopsy pathology results are available  Patient does have my name & office phone number if she has any questions or concerns in the interim of time until her pathology results are available

## 2022-04-14 ENCOUNTER — TELEPHONE (OUTPATIENT)
Dept: MAMMOGRAPHY | Facility: CLINIC | Age: 57
End: 2022-04-14

## 2022-04-14 ENCOUNTER — TELEPHONE (OUTPATIENT)
Dept: HEMATOLOGY ONCOLOGY | Facility: CLINIC | Age: 57
End: 2022-04-14

## 2022-04-14 NOTE — TELEPHONE ENCOUNTER
Call placed to patient, given biopsy results, questions answered, adv next step is to set patient up with surgeon, options discussed Dr Celia Gonzalez, Dr Americo Walker and Dr Irwin Holstein, pt would like to have appt made with first available surgeon but wants to stay at [facility] only, adv patient that  would call her back by tomorrow with appt, pt states understanding, pt with no questions at this time, has name/# for any further needs

## 2022-04-14 NOTE — TELEPHONE ENCOUNTER
Made attempt to schedule new pt appt, I spoke with the patient who stated that she was busy unable to schedule an appointment at this time

## 2022-04-14 NOTE — TELEPHONE ENCOUNTER
[facility] Ref[facility]    Diagnosis: Right Atypical Ductal Hyperplasia    Is this diagnosis cancer (Y/N): No    Biopsy Date: 04/11/22    Does the patient have another biopsy pending: No  If so, when:    Preferred provider: Dr Kishan Ibarra or Dr Viraj Pa    Preferred location: [facility]    Any requests for dates/times: first available    Any additional information: Does not want to see Nieves Szymanski at [facility] as she does not want to travel to [facility] for surgery    Please advise when appointment is made

## 2022-04-15 ENCOUNTER — TELEPHONE (OUTPATIENT)
Dept: HEMATOLOGY ONCOLOGY | Facility: CLINIC | Age: 57
End: 2022-04-15

## 2022-04-25 ENCOUNTER — HOSPITAL ENCOUNTER (OUTPATIENT)
Dept: RADIOLOGY | Facility: HOSPITAL | Age: 57
Discharge: HOME | End: 2022-04-25

## 2022-04-26 ENCOUNTER — TELEPHONE (OUTPATIENT)
Dept: HEMATOLOGY ONCOLOGY | Facility: CLINIC | Age: 57
End: 2022-04-26

## 2022-04-26 PROBLEM — Z01.419 ENCNTR FOR GYN EXAM (GENERAL) (ROUTINE) W/O ABN FINDINGS: Status: RESOLVED | Noted: 2020-02-24 | Resolved: 2022-04-26

## 2022-04-26 PROBLEM — N60.91 ATYPICAL DUCTAL HYPERPLASIA OF RIGHT BREAST: Status: ACTIVE | Noted: 2022-04-26

## 2022-04-26 PROBLEM — Z00.00 ANNUAL PHYSICAL EXAM: Status: RESOLVED | Noted: 2021-07-19 | Resolved: 2022-04-26

## 2022-04-26 PROBLEM — Z01.411 ENCNTR FOR GYN EXAM (GENERAL) (ROUTINE) W ABNORMAL FINDINGS: Status: RESOLVED | Noted: 2019-02-18 | Resolved: 2022-04-26

## 2022-04-26 PROBLEM — Z12.31 ENCOUNTER FOR SCREENING MAMMOGRAM FOR BREAST CANCER: Status: RESOLVED | Noted: 2020-02-24 | Resolved: 2022-04-26

## 2022-04-26 NOTE — TELEPHONE ENCOUNTER
Appointment Confirmation (to confirm pre existing appointments - ONLY)     Appointment with Dr Kenzie Gentile   Appointment date & time 8/1 1030am   Location [facility]   Patient verbilized Understanding Yes, patient would also like to be put on a cancellation list if possible, to be seen sooner than August

## 2022-04-27 ENCOUNTER — CONSULT (OUTPATIENT)
Dept: SURGICAL ONCOLOGY | Facility: CLINIC | Age: 57
End: 2022-04-27
Payer: COMMERCIAL

## 2022-04-27 VITALS
HEIGHT: 70 IN | BODY MASS INDEX: 38.37 KG/M2 | HEART RATE: 88 BPM | WEIGHT: 268 LBS | TEMPERATURE: 97 F | DIASTOLIC BLOOD PRESSURE: 90 MMHG | RESPIRATION RATE: 16 BRPM | OXYGEN SATURATION: 98 % | SYSTOLIC BLOOD PRESSURE: 136 MMHG

## 2022-04-27 DIAGNOSIS — Z80.3 FAMILY HISTORY OF BREAST CANCER: ICD-10-CM

## 2022-04-27 DIAGNOSIS — N60.91 ATYPICAL DUCTAL HYPERPLASIA OF RIGHT BREAST: Primary | ICD-10-CM

## 2022-04-27 DIAGNOSIS — Z01.818 PREOPERATIVE TESTING: ICD-10-CM

## 2022-04-27 PROCEDURE — 1036F TOBACCO NON-USER: CPT | Performed by: SURGERY

## 2022-04-27 PROCEDURE — 3008F BODY MASS INDEX DOCD: CPT | Performed by: SURGERY

## 2022-04-27 PROCEDURE — 3075F SYST BP GE 130 - 139MM HG: CPT | Performed by: SURGERY

## 2022-04-27 PROCEDURE — 99205 OFFICE O/P NEW HI 60 MIN: CPT | Performed by: SURGERY

## 2022-04-27 PROCEDURE — 3080F DIAST BP >= 90 MM HG: CPT | Performed by: SURGERY

## 2022-04-27 RX ORDER — OXYCODONE HYDROCHLORIDE AND ACETAMINOPHEN 5; 325 MG/1; MG/1
1 TABLET ORAL EVERY 6 HOURS PRN
Qty: 6 TABLET | Refills: 0 | Status: SHIPPED | OUTPATIENT
Start: 2022-04-27 | End: 2022-06-13

## 2022-04-27 NOTE — PROGRESS NOTES
Surgical Oncology Consult Note       [address]  [facility]  [address]   [facility] [place] [address]    Viviana Aldridge  1965  [id number]  [address] [facility]  [facility]  [address] [address]      Chief Complaint:     Chief Complaint   Patient presents with    Consult       Assessment and Plan:   Assessment/Plan   Patient presents with a new diagnosis of right breast atypical ductal hyperplasia  She has calcifications cover an area of approximately 4 cm  I have recommended a needle/PAYTON bracketed right breast lumpectomy  I would recommend the wire be placed at the clip and a PAYTON  clip be placed at the breast center at the more distal area of calcifications  Patient I went through the process of informed consent for this  Her insurance will  in the summer  We will try to move this up as soon as possible  Oncology History:     Oncology History    No history exists  History of Present Illness: This is a 30-year-old woman who has had a biopsy of the right breast approximately 5 years ago  Her pathology is not available in epic though she says it was a benign biopsy  She has a top-hat clip in place in the same region of her recent biopsy  Patient went for screening mammogram   She had not had 1 for at least 2 years  They saw calcifications that cover an area of approximately 4 cm  This is in the region but different from her prior biopsy (upper outer quadrant, 10:00 a m )  She had a biopsy which demonstrated atypical ductal hyperplasia  The radiologist have suggested a bracketed localization removed the calcifications  Patient presents with her mother who was diagnosed with breast cancer at the age of 79  Review of Systems:   Review of Systems   All other systems reviewed and are negative        Past Medical History:      Patient Active Problem List   Diagnosis    Breast nodule    Vulvovaginal candidiasis    Atypical squamous cell changes of undetermined significance (ASCUS) on cervical cytology with negative high risk human papilloma virus (HPV) test result    BMI 38 0-38 9,adult    Type 2 diabetes mellitus without complication, without long-term current use of insulin (HCC)    Essential hypertension    Gastroesophageal reflux disease without esophagitis    History of hepatitis C    Foot pain, right    Class 2 obesity due to excess calories without serious comorbidity with body mass index (BMI) of 38 0 to 38 9 in adult    Chronic pain of both ankles    Iron deficiency anemia    Thrombocytopenia (HCC)    Atypical ductal hyperplasia of right breast        Past Medical History:   Diagnosis Date    Diabetes ([facility] [address] )     Hepatitis C     Hypertension         Past Surgical History:   Procedure Laterality Date    ANKLE SURGERY      BREAST BIOPSY Right     core bx-benign    BREAST BIOPSY Right 04/11/2022    COLONOSCOPY  03/2019    MAMMO (HISTORICAL)  02/2019    MAMMO STEREOTACTIC BREAST BIOPSY RIGHT (ALL INC) Right 4/11/2022    TUBAL LIGATION          Family History   Problem Relation Age of Onset    Breast cancer Mother 79    Hypertension Mother     Diabetes Mother     Hypertension Father     Diabetes Father     Heart failure Father     Dementia Father     Heart disease Father     No Known Problems Sister     No Known Problems Brother     No Known Problems Daughter     No Known Problems Son     Hyperlipidemia Maternal Grandmother     Diabetes Maternal Grandmother     Heart attack Maternal Grandfather     Stroke Maternal Grandfather     Arthritis Paternal Grandmother     Liver cancer Paternal Grandfather         unknown age   Nery Benjamin No Known Problems Brother     No Known Problems Brother         Social History     Socioeconomic History    Marital status: Single     Spouse name: Not on file    Number of children: Not on file    Years of education: Not on file    Highest education level: Not on file   Occupational History    Not on file   Tobacco Use    Smoking status: Never Smoker    Smokeless tobacco: Never Used   Vaping Use    Vaping Use: Never used   Substance and Sexual Activity    Alcohol use:  Yes     Alcohol/week: 1 0 standard drink     Types: 1 Glasses of wine per week     Comment: socially    Drug use: Never    Sexual activity: Not Currently   Other Topics Concern    Not on file   Social History Narrative    [place]:    Most recent tobacco use screenin-    Alcohol intake: None     Social Determinants of Health     Financial Resource Strain: Not on file   Food Insecurity: Not on file   Transportation Needs: Not on file   Physical Activity: Not on file   Stress: Not on file   Social Connections: Not on file   Intimate Partner Violence: Not on file   Housing Stability: Not on file        Current Outpatient Medications:     BLACK COHOSH EXTRACT PO, Take by mouth, Disp: , Rfl:     Diclofenac Sodium (VOLTAREN) 1 %, Apply 2 g topically 4 (four) times a day as needed, Disp: , Rfl:     Empagliflozin (Jardiance) 10 MG TABS, Take 1 tablet (10 mg total) by mouth every morning, Disp: 30 tablet, Rfl: 2    EQL EVENING PRIMROSE  MG CAPS, Take by mouth, Disp: , Rfl:     ferrous gluconate (FERGON) 324 mg tablet, Take 324 mg by mouth daily with breakfast, Disp: , Rfl:     metFORMIN (GLUCOPHAGE) 1000 MG tablet, Take 1 tablet (1,000 mg total) by mouth 2 (two) times a day with meals, Disp: 60 tablet, Rfl: 2    omeprazole (PriLOSEC) 20 mg delayed release capsule, TAKE ONE CAPSULE BY MOUTH EVERY DAY, Disp: 90 capsule, Rfl: 1    Semaglutide,0 25 or 0 5MG/DOS, (Ozempic, 0 25 or 0 5 MG/DOSE,) 2 MG/1 5ML SOPN, Inject 0 5 mg under the skin once a week, Disp: 6 mL, Rfl: 6    valsartan (DIOVAN) 160 mg tablet, Take 1 tablet (160 mg total) by mouth daily, Disp: 30 tablet, Rfl: 6     Allergies   Allergen Reactions    Lisinopril Coughing        Physical Exam:     Vitals:    04/27/22 1414   BP: 136/90   Pulse: 88   Resp: 16   Temp: (!) 97 °F (36 1 °C)   SpO2: 98%     Physical Exam  Vitals reviewed  Constitutional:       Appearance: She is well-developed  HENT:      Head: Normocephalic and atraumatic  Eyes:      Pupils: Pupils are equal, round, and reactive to light  Neck:      Thyroid: No thyromegaly  Vascular: No JVD  Trachea: No tracheal deviation  Cardiovascular:      Rate and Rhythm: Normal rate and regular rhythm  Heart sounds: Normal heart sounds  No murmur heard  No friction rub  No gallop  Pulmonary:      Effort: Pulmonary effort is normal  No respiratory distress  Breath sounds: Normal breath sounds  No wheezing or rales  Chest:      Comments: Both breasts were examined in the sitting and supine position  There are no worrisome skin lesions, no nipple retraction and no nipple discharge  There are no dominant masses, axillary adenopathy or supraclavicular adenopathy on either side  Abdominal:      General: There is no distension  Palpations: Abdomen is soft  There is no hepatomegaly or mass  Tenderness: There is no abdominal tenderness  There is no guarding or rebound  Musculoskeletal:         General: No tenderness  Normal range of motion  Cervical back: Normal range of motion and neck supple  Lymphadenopathy:      Cervical: No cervical adenopathy  Skin:     General: Skin is warm and dry  Findings: No erythema or rash  Neurological:      Mental Status: She is alert and oriented to person, place, and time  Cranial Nerves: No cranial nerve deficit  Psychiatric:         Behavior: Behavior normal          Results:   Reviewed her mammogram   She has her current clip as well as the top-hat clip in the upper outer quadrant  The calcifications extend from this area      Discussion/Summary:   I recommended a bracketed approach to remove this area consisting of a PAYTON clip as well as a wire the day of surgery  To facilitate the wire placement I think the PAYTON would be placed at the most distal area of calcifications and reserve the wire to be placed around the clip  Patient is in agreement with this approach  I explained atypical ductal hyperplasia of the patient  I explained that also could potentially increase her overall risk of developing a malignancy in the future and we would review this at her postoperative visit but clarify that the goal of this operation was to remove this area and make sure there was no evidence of cancer at the current time  The patient and I underwent the process of consent for right breast PAYTON  directed lumpectomy with bracketed wire placement  The complications outlined on the consent including relatively minor problems (wound infection, wound healing problems, hematomas, scarring, chronic discomfort/pain), moderate problems (injury to nerves or blood vessels, allergic reactions) and major complications (extensive blood loss requiring transfusions or possible addtional surgeries, cardiac arrest, stroke and possible death)  We also reviewed specific complications as outlined on the consent form including possible need for additional treatments  All questions were answered to the patient's satisfaction  We will coordinate the surgery at our next mutual convience  Advance Care Planning/Advance Directives:  I discussed the disease status, treatment plans and follow-up with the patient

## 2022-04-29 NOTE — PROGRESS NOTES
Call placed to patient regarding recommendation for;    __X___ RIGHT ______LEFT      __X___SAVI  placement  Procedure explained to patient, additional questions answered at this time    __X___Verbalized understanding        Blood thinners:  _____yes __X___no    Date stopped: ___N/A____    All teaching points discussed during call, pt with no questions at this time, pt adv to arrive at 1030 for 1100 insertion    Pt given name/# for any further questions/needs

## 2022-06-09 ENCOUNTER — OFFICE VISIT (OUTPATIENT)
Dept: SURGICAL ONCOLOGY | Facility: CLINIC | Age: 57
End: 2022-06-09
Payer: COMMERCIAL

## 2022-06-09 ENCOUNTER — OFFICE VISIT (OUTPATIENT)
Dept: LAB | Facility: CLINIC | Age: 57
End: 2022-06-09
Payer: COMMERCIAL

## 2022-06-09 ENCOUNTER — HOSPITAL ENCOUNTER (OUTPATIENT)
Dept: MAMMOGRAPHY | Facility: CLINIC | Age: 57
Discharge: HOME | End: 2022-06-09
Admitting: RADIOLOGY
Payer: COMMERCIAL

## 2022-06-09 ENCOUNTER — APPOINTMENT (OUTPATIENT)
Dept: LAB | Facility: CLINIC | Age: 57
End: 2022-06-09
Payer: COMMERCIAL

## 2022-06-09 VITALS
DIASTOLIC BLOOD PRESSURE: 78 MMHG | WEIGHT: 268.96 LBS | HEIGHT: 70 IN | HEART RATE: 72 BPM | BODY MASS INDEX: 38.51 KG/M2 | SYSTOLIC BLOOD PRESSURE: 148 MMHG

## 2022-06-09 VITALS
HEART RATE: 84 BPM | WEIGHT: 269 LBS | SYSTOLIC BLOOD PRESSURE: 132 MMHG | RESPIRATION RATE: 16 BRPM | OXYGEN SATURATION: 98 % | HEIGHT: 70 IN | DIASTOLIC BLOOD PRESSURE: 90 MMHG | BODY MASS INDEX: 38.51 KG/M2 | TEMPERATURE: 97 F

## 2022-06-09 DIAGNOSIS — Z01.818 PREOP EXAMINATION: Primary | ICD-10-CM

## 2022-06-09 DIAGNOSIS — N60.91 ATYPICAL DUCTAL HYPERPLASIA OF RIGHT BREAST: ICD-10-CM

## 2022-06-09 DIAGNOSIS — Z01.818 PREOPERATIVE TESTING: ICD-10-CM

## 2022-06-09 LAB
ALBUMIN SERPL BCP-MCNC: 4.5 G/DL (ref 3.5–5)
ALP SERPL-CCNC: 89 U/L (ref 34–104)
ALT SERPL W P-5'-P-CCNC: 18 U/L (ref 7–52)
ANION GAP SERPL CALCULATED.3IONS-SCNC: 9 MMOL/L (ref 4–13)
AST SERPL W P-5'-P-CCNC: 27 U/L (ref 13–39)
ATRIAL RATE: 78 BPM
BASOPHILS # BLD AUTO: 0.05 THOUSANDS/ΜL (ref 0–0.1)
BASOPHILS NFR BLD AUTO: 1 % (ref 0–1)
BILIRUB SERPL-MCNC: 0.45 MG/DL (ref 0.2–1)
BUN SERPL-MCNC: 13 MG/DL (ref 5–25)
CALCIUM SERPL-MCNC: 9.3 MG/DL (ref 8.4–10.2)
CHLORIDE SERPL-SCNC: 101 MMOL/L (ref 96–108)
CO2 SERPL-SCNC: 26 MMOL/L (ref 21–32)
CREAT SERPL-MCNC: 0.48 MG/DL (ref 0.6–1.3)
EOSINOPHIL # BLD AUTO: 0.15 THOUSAND/ΜL (ref 0–0.61)
EOSINOPHIL NFR BLD AUTO: 3 % (ref 0–6)
ERYTHROCYTE [DISTWIDTH] IN BLOOD BY AUTOMATED COUNT: 16.3 % (ref 11.6–15.1)
GFR SERPL CREATININE-BSD FRML MDRD: 111 ML/MIN/1.73SQ M
GLUCOSE P FAST SERPL-MCNC: 156 MG/DL (ref 65–99)
HCT VFR BLD AUTO: 44.5 % (ref 34.8–46.1)
HGB BLD-MCNC: 14.2 G/DL (ref 11.5–15.4)
IMM GRANULOCYTES # BLD AUTO: 0.03 THOUSAND/UL (ref 0–0.2)
IMM GRANULOCYTES NFR BLD AUTO: 1 % (ref 0–2)
LYMPHOCYTES # BLD AUTO: 1.45 THOUSANDS/ΜL (ref 0.6–4.47)
LYMPHOCYTES NFR BLD AUTO: 24 % (ref 14–44)
MCH RBC QN AUTO: 27 PG (ref 26.8–34.3)
MCHC RBC AUTO-ENTMCNC: 31.9 G/DL (ref 31.4–37.4)
MCV RBC AUTO: 85 FL (ref 82–98)
MONOCYTES # BLD AUTO: 0.46 THOUSAND/ΜL (ref 0.17–1.22)
MONOCYTES NFR BLD AUTO: 8 % (ref 4–12)
NEUTROPHILS # BLD AUTO: 3.83 THOUSANDS/ΜL (ref 1.85–7.62)
NEUTS SEG NFR BLD AUTO: 63 % (ref 43–75)
NRBC BLD AUTO-RTO: 0 /100 WBCS
P AXIS: 49 DEGREES
PLATELET # BLD AUTO: 100 THOUSANDS/UL (ref 149–390)
POTASSIUM SERPL-SCNC: 4.1 MMOL/L (ref 3.5–5.3)
PR INTERVAL: 140 MS
PROT SERPL-MCNC: 7.6 G/DL (ref 6.4–8.4)
QRS AXIS: 12 DEGREES
QRSD INTERVAL: 80 MS
QT INTERVAL: 400 MS
QTC INTERVAL: 456 MS
RBC # BLD AUTO: 5.25 MILLION/UL (ref 3.81–5.12)
SODIUM SERPL-SCNC: 136 MMOL/L (ref 135–147)
T WAVE AXIS: 9 DEGREES
VENTRICULAR RATE: 78 BPM
WBC # BLD AUTO: 5.97 THOUSAND/UL (ref 4.31–10.16)

## 2022-06-09 PROCEDURE — A4648 IMPLANTABLE TISSUE MARKER: HCPCS

## 2022-06-09 PROCEDURE — 80053 COMPREHEN METABOLIC PANEL: CPT

## 2022-06-09 PROCEDURE — 99213 OFFICE O/P EST LOW 20 MIN: CPT | Performed by: SURGERY

## 2022-06-09 PROCEDURE — 36415 COLL VENOUS BLD VENIPUNCTURE: CPT

## 2022-06-09 PROCEDURE — 85025 COMPLETE CBC W/AUTO DIFF WBC: CPT

## 2022-06-09 PROCEDURE — 19281 PERQ DEVICE BREAST 1ST IMAG: CPT

## 2022-06-09 PROCEDURE — 93005 ELECTROCARDIOGRAM TRACING: CPT

## 2022-06-09 PROCEDURE — 93010 ELECTROCARDIOGRAM REPORT: CPT | Performed by: INTERNAL MEDICINE

## 2022-06-09 RX ORDER — LIDOCAINE HYDROCHLORIDE 10 MG/ML
5 INJECTION, SOLUTION EPIDURAL; INFILTRATION; INTRACAUDAL; PERINEURAL ONCE
Status: COMPLETED | OUTPATIENT
Start: 2022-06-09 | End: 2022-06-09

## 2022-06-09 RX ADMIN — LIDOCAINE HYDROCHLORIDE 5 ML: 10 INJECTION, SOLUTION EPIDURAL; INFILTRATION; INTRACAUDAL; PERINEURAL at 10:50

## 2022-06-09 NOTE — DISCHARGE INSTR - OTHER ORDERS
POST LARGE CORE BREAST BIOPSY PATIENT INFORMATION      Place an ice pack inside your bra over the top of the dressing every hour for 20 minutes (20 minutes on, 60 minutes off)  Do this until bedtime  Do not shower or bathe until the following morning  After bathing, you may remove the bandaid  You may bathe your breast carefully with the steri-strips in place  Be careful not to loosen them  The steri-strips will fall off in 3-5 days  You may have mild discomfort, and you may have some bruising where the needle entered the skin  This should clear within 5-7 days  If you need medicine for discomfort, take acetaminophen products such as Tylenol  You may also take Advil or Motrin products  DO NOT use Aspirin for the first 24 hours  Do not participate in strenuous activities such as-tennis, aerobics, weight lifting, etc  for 24 hours  Refrain from swimming/soaking for 72 hours  Wearing a bra for sleeping may be more comfortable for the first 24-48 hours after your biopsy  Watch for continued bleeding, pain or fever over 101  If any of these symptoms occur, please contact our breast nurse navigator at the location where your biopsy was performed  During normal business hours (7:30am - 4:00pm) please call the nurse navigator at the site     where your procedure was performed:       [address] [facility]: [phone] or [phone] [address] [facility] Floor: [phone] or [phone]     [address]: [address] [facility]: [address]: [phone]        After 4pm - please call your physician or go to the nearest Emergency Department location  The final results of your biopsy are usually available within one week

## 2022-06-09 NOTE — PROGRESS NOTES
Procedure type:    _____ultrasound guided _____stereotactic__x_mammo guided    Breast:    _____Left ___x__Right    Location: Right breast 10 o'clock     Needle: N/A    # of passes: N/A    Clip: Valentina  reflector 16ga 5 cm    Performed by: Dr Quenton Cogan held for 5 minutes by:  Javier Treviño)    Steri Strips:    _____yes __X___no    Band aid:    ___X__yes_____no    Tape and guaze:    _____yes ___X__no    Tolerated procedure:    ___X__yes _____no

## 2022-06-09 NOTE — PROGRESS NOTES
Ice pack given:    _X____yes _____no    Discharge instructions signed by patient:    ___X__yes _____no    Discharge instructions given to patient:    ___X__yes _____no    Discharged via:    ___X__ambulatory    _____wheelchair    _____stretcher    Stable on discharge:    __X___yes ____no

## 2022-06-09 NOTE — PROGRESS NOTES
Patient arrived via:    __x___ambulatory    _____wheelchair    _____stretcher      Domestic violence screen    __x___negative______positive    Breast Implants:    _______yes ___x_____no

## 2022-06-09 NOTE — PROGRESS NOTES
Surgical Oncology Follow Up       [address]  [facility]  [address]  [facility] [address]    Jitednra Aldridge  1965  [id number]  [address] [facility]  [facility] [place]  [address] [facility] [id number]    Chief Complaint   Patient presents with    Pre-op Exam          Assessment & Plan:   Patient presents for a follow-up visit  She will get her PAYTON clip today and will get a needle Alyssa further bracketed PAYTON needle Alyssa the day of surgery  Patient has not appreciated any changes in her breast   She will get her preop testing done today and she has her medical clearance on the 13th of this month  Her plan is to a bracketed right PAYTON/needle lumpectomy for ADH and columnar cell lesions  The process of both wire and clip were reviewed with the patient  Her questions were answered  Cancer History:     Oncology History    No history exists  Interval History:   See above  The patient has no change on her clinical exam or issues  Review of Systems:   Review of Systems   All other systems reviewed and are negative        Past Medical History     Patient Active Problem List   Diagnosis    Breast nodule    Vulvovaginal candidiasis    Atypical squamous cell changes of undetermined significance (ASCUS) on cervical cytology with negative high risk human papilloma virus (HPV) test result    BMI 38 0-38 9,adult    Type 2 diabetes mellitus without complication, without long-term current use of insulin (HCC)    Essential hypertension    Gastroesophageal reflux disease without esophagitis    History of hepatitis C    Foot pain, right    Class 2 obesity due to excess calories without serious comorbidity with body mass index (BMI) of 38 0 to 38 9 in adult    Chronic pain of both ankles    Iron deficiency anemia    Thrombocytopenia (HCC)    Atypical ductal hyperplasia of right breast     Past Medical History:   Diagnosis Date    Diabetes ([facility] [address] )     Hepatitis C     Hypertension      Past Surgical History:   Procedure Laterality Date    ANKLE SURGERY      BREAST BIOPSY Right     core bx-benign    BREAST BIOPSY Right 2022    COLONOSCOPY  2019    MAMMO (HISTORICAL)  2019    MAMMO STEREOTACTIC BREAST BIOPSY RIGHT (ALL INC) Right 2022    TUBAL LIGATION       Family History   Problem Relation Age of Onset    Breast cancer Mother 79    Hypertension Mother     Diabetes Mother     Hypertension Father     Diabetes Father     Heart failure Father     Dementia Father     Heart disease Father     No Known Problems Sister     No Known Problems Brother     No Known Problems Daughter     No Known Problems Son     Hyperlipidemia Maternal Grandmother     Diabetes Maternal Grandmother     Heart attack Maternal Grandfather     Stroke Maternal Grandfather     Arthritis Paternal Grandmother     Liver cancer Paternal Grandfather         unknown age   Willena Rand No Known Problems Brother     No Known Problems Brother      Social History     Socioeconomic History    Marital status: Single     Spouse name: Not on file    Number of children: Not on file    Years of education: Not on file    Highest education level: Not on file   Occupational History    Not on file   Tobacco Use    Smoking status: Never Smoker    Smokeless tobacco: Never Used   Vaping Use    Vaping Use: Never used   Substance and Sexual Activity    Alcohol use:  Yes     Alcohol/week: 1 0 standard drink     Types: 1 Glasses of wine per week     Comment: socially    Drug use: Never    Sexual activity: Not Currently   Other Topics Concern    Not on file   Social History Narrative    [place]:    Most recent tobacco use screenin-    Alcohol intake: None     Social Determinants of Health     Financial Resource Strain: Not on file   Food Insecurity: Not on file   Transportation Needs: Not on file   Physical Activity: Not on file   Stress: Not on file   Social Connections: Not on file   Intimate Partner Violence: Not on file   Housing Stability: Not on file       Current Outpatient Medications:     BLACK COHOSH EXTRACT PO, Take by mouth, Disp: , Rfl:     Diclofenac Sodium (VOLTAREN) 1 %, Apply 2 g topically 4 (four) times a day as needed, Disp: , Rfl:     Empagliflozin (Jardiance) 10 MG TABS, Take 1 tablet (10 mg total) by mouth every morning, Disp: 30 tablet, Rfl: 2    EQL EVENING PRIMROSE  MG CAPS, Take by mouth, Disp: , Rfl:     ferrous gluconate (FERGON) 324 mg tablet, Take 324 mg by mouth daily with breakfast, Disp: , Rfl:     metFORMIN (GLUCOPHAGE) 1000 MG tablet, Take 1 tablet (1,000 mg total) by mouth 2 (two) times a day with meals, Disp: 60 tablet, Rfl: 2    omeprazole (PriLOSEC) 20 mg delayed release capsule, TAKE ONE CAPSULE BY MOUTH EVERY DAY, Disp: 90 capsule, Rfl: 1    Semaglutide,0 25 or 0 5MG/DOS, (Ozempic, 0 25 or 0 5 MG/DOSE,) 2 MG/1 5ML SOPN, Inject 0 5 mg under the skin once a week, Disp: 6 mL, Rfl: 6    valsartan (DIOVAN) 160 mg tablet, Take 1 tablet (160 mg total) by mouth daily, Disp: 30 tablet, Rfl: 6    oxyCODONE-acetaminophen (Percocet) 5-325 mg per tablet, Take 1 tablet by mouth every 6 (six) hours as needed for moderate pain Max Daily Amount: 4 tablets (Patient not taking: Reported on 6/9/2022), Disp: 6 tablet, Rfl: 0  No current facility-administered medications for this visit  Facility-Administered Medications Ordered in Other Visits:     lidocaine (PF) (XYLOCAINE-MPF) 1 % injection 5 mL, 5 mL, Infiltration, Once, Eda Nieves MD  Allergies   Allergen Reactions    Lisinopril      Coughing        Physical Exam:     Vitals:    06/09/22 0910   BP: 132/90   Pulse: 84   Resp: 16   Temp: (!) 97 °F (36 1 °C)   SpO2: 98%     Physical Exam  Vitals reviewed  Constitutional:       Appearance: She is well-developed  HENT:      Head: Normocephalic and atraumatic  Eyes:      Pupils: Pupils are equal, round, and reactive to light  Neck:      Thyroid: No thyromegaly  Vascular: No JVD  Trachea: No tracheal deviation  Cardiovascular:      Rate and Rhythm: Normal rate and regular rhythm  Heart sounds: Normal heart sounds  No murmur heard  No friction rub  No gallop  Pulmonary:      Effort: Pulmonary effort is normal  No respiratory distress  Breath sounds: Normal breath sounds  No wheezing or rales  Chest:      Comments: Both breasts were examined in the sitting position  There are no worrisome skin findings dominant masses nipple discharge or axillary adenopathy  Abdominal:      General: There is no distension  Palpations: Abdomen is soft  There is no hepatomegaly or mass  Tenderness: There is no abdominal tenderness  There is no guarding or rebound  Musculoskeletal:         General: No tenderness  Normal range of motion  Cervical back: Normal range of motion and neck supple  Lymphadenopathy:      Cervical: No cervical adenopathy  Skin:     General: Skin is warm and dry  Findings: No erythema or rash  Neurological:      Mental Status: She is alert and oriented to person, place, and time  Cranial Nerves: No cranial nerve deficit  Psychiatric:         Behavior: Behavior normal            Results & Discussion:   The patient of I have previously undergone the process of informed consent for the above procedure  All questions were answered the patient's satisfaction the procedure was reviewed  She will have her PAYTON clip placed today  I explained the needle local it happened the day of surgery  She will go down to preadmission testing today for blood work  Advance Care Planning/Advance Directives:  I discussed the disease status, treatment plans and follow-up with the patient

## 2022-06-09 NOTE — H&P (VIEW-ONLY)
Surgical Oncology Follow Up       [address]  [facility] [place]  [address] [id number]    Iker Aldridge  1965  [id number]  [address] [facility]  [facility] [place]  [address] [facility] [address] [id number]    Chief Complaint   Patient presents with    Pre-op Exam          Assessment & Plan:   Patient presents for a follow-up visit  She will get her PAYTON clip today and will get a needle Alyssa further bracketed PAYTON needle Alyssa the day of surgery  Patient has not appreciated any changes in her breast   She will get her preop testing done today and she has her medical clearance on the 13th of this month  Her plan is to a bracketed right PAYTON/needle lumpectomy for ADH and columnar cell lesions  The process of both wire and clip were reviewed with the patient  Her questions were answered  Cancer History:     Oncology History    No history exists  Interval History:   See above  The patient has no change on her clinical exam or issues  Review of Systems:   Review of Systems   All other systems reviewed and are negative        Past Medical History     Patient Active Problem List   Diagnosis    Breast nodule    Vulvovaginal candidiasis    Atypical squamous cell changes of undetermined significance (ASCUS) on cervical cytology with negative high risk human papilloma virus (HPV) test result    BMI 38 0-38 9,adult    Type 2 diabetes mellitus without complication, without long-term current use of insulin (HCC)    Essential hypertension    Gastroesophageal reflux disease without esophagitis    History of hepatitis C    Foot pain, right    Class 2 obesity due to excess calories without serious comorbidity with body mass index (BMI) of 38 0 to 38 9 in adult    Chronic pain of both ankles    Iron deficiency anemia    Thrombocytopenia (HCC)    Atypical ductal hyperplasia of right breast     Past Medical History:   Diagnosis Date    Diabetes ([facility] [address] )     Hepatitis C     Hypertension      Past Surgical History:   Procedure Laterality Date    ANKLE SURGERY      BREAST BIOPSY Right     core bx-benign    BREAST BIOPSY Right 2022    COLONOSCOPY  2019    MAMMO (HISTORICAL)  2019    MAMMO STEREOTACTIC BREAST BIOPSY RIGHT (ALL INC) Right 2022    TUBAL LIGATION       Family History   Problem Relation Age of Onset    Breast cancer Mother 79    Hypertension Mother     Diabetes Mother     Hypertension Father     Diabetes Father     Heart failure Father     Dementia Father     Heart disease Father     No Known Problems Sister     No Known Problems Brother     No Known Problems Daughter     No Known Problems Son     Hyperlipidemia Maternal Grandmother     Diabetes Maternal Grandmother     Heart attack Maternal Grandfather     Stroke Maternal Grandfather     Arthritis Paternal Grandmother     Liver cancer Paternal Grandfather         unknown age   Dorothy Colin No Known Problems Brother     No Known Problems Brother      Social History     Socioeconomic History    Marital status: Single     Spouse name: Not on file    Number of children: Not on file    Years of education: Not on file    Highest education level: Not on file   Occupational History    Not on file   Tobacco Use    Smoking status: Never Smoker    Smokeless tobacco: Never Used   Vaping Use    Vaping Use: Never used   Substance and Sexual Activity    Alcohol use:  Yes     Alcohol/week: 1 0 standard drink     Types: 1 Glasses of wine per week     Comment: socially    Drug use: Never    Sexual activity: Not Currently   Other Topics Concern    Not on file   Social History Narrative    Rivka:    Most recent tobacco use screenin-    Alcohol intake: None     Social Determinants of Health     Financial Resource Strain: Not on file   Food Insecurity: Not on file   Transportation Needs: Not on file   Physical Activity: Not on file   Stress: Not on file   Social Connections: Not on file   Intimate Partner Violence: Not on file   Housing Stability: Not on file       Current Outpatient Medications:     BLACK COHOSH EXTRACT PO, Take by mouth, Disp: , Rfl:     Diclofenac Sodium (VOLTAREN) 1 %, Apply 2 g topically 4 (four) times a day as needed, Disp: , Rfl:     Empagliflozin (Jardiance) 10 MG TABS, Take 1 tablet (10 mg total) by mouth every morning, Disp: 30 tablet, Rfl: 2    EQL EVENING PRIMROSE  MG CAPS, Take by mouth, Disp: , Rfl:     ferrous gluconate (FERGON) 324 mg tablet, Take 324 mg by mouth daily with breakfast, Disp: , Rfl:     metFORMIN (GLUCOPHAGE) 1000 MG tablet, Take 1 tablet (1,000 mg total) by mouth 2 (two) times a day with meals, Disp: 60 tablet, Rfl: 2    omeprazole (PriLOSEC) 20 mg delayed release capsule, TAKE ONE CAPSULE BY MOUTH EVERY DAY, Disp: 90 capsule, Rfl: 1    Semaglutide,0 25 or 0 5MG/DOS, (Ozempic, 0 25 or 0 5 MG/DOSE,) 2 MG/1 5ML SOPN, Inject 0 5 mg under the skin once a week, Disp: 6 mL, Rfl: 6    valsartan (DIOVAN) 160 mg tablet, Take 1 tablet (160 mg total) by mouth daily, Disp: 30 tablet, Rfl: 6    oxyCODONE-acetaminophen (Percocet) 5-325 mg per tablet, Take 1 tablet by mouth every 6 (six) hours as needed for moderate pain Max Daily Amount: 4 tablets (Patient not taking: Reported on 6/9/2022), Disp: 6 tablet, Rfl: 0  No current facility-administered medications for this visit  Facility-Administered Medications Ordered in Other Visits:     lidocaine (PF) (XYLOCAINE-MPF) 1 % injection 5 mL, 5 mL, Infiltration, Once, Bernadette Che MD  Allergies   Allergen Reactions    Lisinopril      Coughing        Physical Exam:     Vitals:    06/09/22 0910   BP: 132/90   Pulse: 84   Resp: 16   Temp: (!) 97 °F (36 1 °C)   SpO2: 98%     Physical Exam  Vitals reviewed  Constitutional:       Appearance: She is well-developed  HENT:      Head: Normocephalic and atraumatic  Eyes:      Pupils: Pupils are equal, round, and reactive to light  Neck:      Thyroid: No thyromegaly  Vascular: No JVD  Trachea: No tracheal deviation  Cardiovascular:      Rate and Rhythm: Normal rate and regular rhythm  Heart sounds: Normal heart sounds  No murmur heard  No friction rub  No gallop  Pulmonary:      Effort: Pulmonary effort is normal  No respiratory distress  Breath sounds: Normal breath sounds  No wheezing or rales  Chest:      Comments: Both breasts were examined in the sitting position  There are no worrisome skin findings dominant masses nipple discharge or axillary adenopathy  Abdominal:      General: There is no distension  Palpations: Abdomen is soft  There is no hepatomegaly or mass  Tenderness: There is no abdominal tenderness  There is no guarding or rebound  Musculoskeletal:         General: No tenderness  Normal range of motion  Cervical back: Normal range of motion and neck supple  Lymphadenopathy:      Cervical: No cervical adenopathy  Skin:     General: Skin is warm and dry  Findings: No erythema or rash  Neurological:      Mental Status: She is alert and oriented to person, place, and time  Cranial Nerves: No cranial nerve deficit  Psychiatric:         Behavior: Behavior normal            Results & Discussion:   The patient of I have previously undergone the process of informed consent for the above procedure  All questions were answered the patient's satisfaction the procedure was reviewed  She will have her PAYTON clip placed today  I explained the needle local it happened the day of surgery  She will go down to preadmission testing today for blood work  Advance Care Planning/Advance Directives:  I discussed the disease status, treatment plans and follow-up with the patient

## 2022-06-13 ENCOUNTER — CONSULT (OUTPATIENT)
Dept: FAMILY MEDICINE CLINIC | Facility: CLINIC | Age: 57
End: 2022-06-13
Payer: COMMERCIAL

## 2022-06-13 VITALS
HEART RATE: 75 BPM | BODY MASS INDEX: 39.22 KG/M2 | DIASTOLIC BLOOD PRESSURE: 96 MMHG | OXYGEN SATURATION: 98 % | SYSTOLIC BLOOD PRESSURE: 142 MMHG | HEIGHT: 70 IN | RESPIRATION RATE: 16 BRPM | TEMPERATURE: 97.5 F | WEIGHT: 274 LBS

## 2022-06-13 DIAGNOSIS — D69.6 THROMBOCYTOPENIA (HCC): ICD-10-CM

## 2022-06-13 DIAGNOSIS — Z01.818 PRE-OP EXAM: ICD-10-CM

## 2022-06-13 DIAGNOSIS — R20.0 THIGH NUMBNESS: ICD-10-CM

## 2022-06-13 DIAGNOSIS — N60.91 ATYPICAL DUCTAL HYPERPLASIA OF RIGHT BREAST: ICD-10-CM

## 2022-06-13 DIAGNOSIS — E11.9 TYPE 2 DIABETES MELLITUS WITHOUT COMPLICATION, WITHOUT LONG-TERM CURRENT USE OF INSULIN (HCC): ICD-10-CM

## 2022-06-13 DIAGNOSIS — I10 ESSENTIAL HYPERTENSION: Primary | ICD-10-CM

## 2022-06-13 DIAGNOSIS — N63.0 BREAST NODULE: ICD-10-CM

## 2022-06-13 PROCEDURE — 3725F SCREEN DEPRESSION PERFORMED: CPT | Performed by: FAMILY MEDICINE

## 2022-06-13 PROCEDURE — 99214 OFFICE O/P EST MOD 30 MIN: CPT | Performed by: FAMILY MEDICINE

## 2022-06-13 NOTE — ASSESSMENT & PLAN NOTE
Lab Results   Component Value Date    HGBA1C 7 1 (H) 03/14/2022   pt unable to afford jardiance pt needs to lose weight  And increase physical activity

## 2022-06-13 NOTE — PROGRESS NOTES
Assessment/Plan:         Problem List Items Addressed This Visit        Endocrine    Type 2 diabetes mellitus without complication, without long-term current use of insulin ([address] )       Lab Results   Component Value Date    HGBA1C 7 1 (H) 03/14/2022   pt unable to afford jardiance pt needs to lose weight  And increase physical activity              Cardiovascular and Mediastinum    Essential hypertension - Primary     High today will increase valsartan  To 320mg po qd  Follow up 1 week              Other    Thrombocytopenia (HCC)     Chronic and stable             Atypical ductal hyperplasia of right breast     Will have lumpectomy           Pre-op exam     bp is high today willl increase meds recheck next week           Thigh numbness     Bilateral latearl thighs may be meralgia paresthetica will need EMG to confirm since bilateral           Relevant Orders    EMG 2 limb lower extremity    RESOLVED: Breast nodule     Will have lumpectomy 6/30/22                   Subjective: pt here  For pre surgery evaluation  For lumpectomy right brest 6/30 dr Adriane Akbar pt feels well has been getting some numbness in both lateral thighs no pain in hips or back for 3 weeks only mild now  No urinary or bowel problems     Patient ID: Alma Berry is a 49 y o  female  HPI    The following portions of the patient's history were reviewed and updated as appropriate:   Past Medical History:  She has a past medical history of Diabetes ([address] ), Hepatitis C, and Hypertension  ,  _______________________________________________________________________  Medical Problems:  does not have any pertinent problems on file ,  _______________________________________________________________________  Past Surgical History:   has a past surgical history that includes Tubal ligation; Ankle surgery; Mammo (historical) (02/2019); Colonoscopy (03/2019); Mammo stereotactic breast biopsy right (all inc) (Right, 4/11/2022);  Breast biopsy (Right); Breast biopsy (Right, 04/11/2022); and Mammo needle localization left (all inc) (Right, 6/9/2022)  ,  _______________________________________________________________________  Family History:  family history includes Arthritis in her paternal grandmother; Breast cancer (age of onset: 79) in her mother; Dementia in her father; Diabetes in her father, maternal grandmother, and mother; Heart attack in her maternal grandfather; Heart disease in her father; Heart failure in her father; Hyperlipidemia in her maternal grandmother; Hypertension in her father and mother; Liver cancer in her paternal grandfather; No Known Problems in her brother, brother, brother, daughter, sister, and son; Stroke in her maternal grandfather ,  _______________________________________________________________________  Social History:   reports that she has never smoked  She has never used smokeless tobacco  She reports current alcohol use of about 1 0 standard drink of alcohol per week  She reports that she does not use drugs  ,  _______________________________________________________________________  Allergies:  is allergic to lisinopril     _______________________________________________________________________  Current Outpatient Medications   Medication Sig Dispense Refill    BLACK COHOSH EXTRACT PO Take by mouth      Diclofenac Sodium (VOLTAREN) 1 % Apply 2 g topically 4 (four) times a day as needed      EQL EVENING PRIMROSE  MG CAPS Take by mouth      ferrous gluconate (FERGON) 324 mg tablet Take 324 mg by mouth daily with breakfast      metFORMIN (GLUCOPHAGE) 1000 MG tablet Take 1 tablet (1,000 mg total) by mouth 2 (two) times a day with meals 60 tablet 2    omeprazole (PriLOSEC) 20 mg delayed release capsule TAKE ONE CAPSULE BY MOUTH EVERY DAY 90 capsule 1    Semaglutide,0 25 or 0 5MG/DOS, (Ozempic, 0 25 or 0 5 MG/DOSE,) 2 MG/1 5ML SOPN Inject 0 5 mg under the skin once a week 6 mL 6    valsartan (DIOVAN) 160 mg tablet Take 1 tablet (160 mg total) by mouth daily 30 tablet 6    Empagliflozin (Jardiance) 10 MG TABS Take 1 tablet (10 mg total) by mouth every morning (Patient not taking: Reported on 6/13/2022) 30 tablet 2     No current facility-administered medications for this visit      _______________________________________________________________________  Review of Systems   Respiratory: Negative for cough, chest tightness and shortness of breath  Cardiovascular: Negative for chest pain, palpitations and leg swelling  Neurological: Positive for numbness (both lateral thighs)  Negative for dizziness, weakness, light-headedness and headaches  Psychiatric/Behavioral: Negative for dysphoric mood  The patient is not nervous/anxious  Objective:  Vitals:    06/13/22 1037 06/13/22 1055   BP: 132/100 142/96   BP Location: Right arm    Patient Position: Sitting    Cuff Size: Large    Pulse: 75    Resp: 16    Temp: 97 5 °F (36 4 °C)    TempSrc: Temporal    SpO2: 98%    Weight: 124 kg (274 lb)    Height: 5' 10" (1 778 m)      Body mass index is 39 31 kg/m²  Physical Exam  Constitutional:       General: She is not in acute distress  Appearance: Normal appearance  She is well-developed  She is not ill-appearing  HENT:      Mouth/Throat:      Mouth: Mucous membranes are moist    Eyes:      Extraocular Movements: Extraocular movements intact  Conjunctiva/sclera: Conjunctivae normal       Pupils: Pupils are equal, round, and reactive to light  Neck:      Thyroid: No thyromegaly  Vascular: No carotid bruit  Cardiovascular:      Rate and Rhythm: Normal rate and regular rhythm  Pulses: Normal pulses  no weak pulses          Dorsalis pedis pulses are 2+ on the right side and 2+ on the left side  Heart sounds: Normal heart sounds  No murmur heard  Pulmonary:      Effort: Pulmonary effort is normal  No respiratory distress  Breath sounds: Normal breath sounds  Abdominal:      General: There is no distension  Palpations: Abdomen is soft  Tenderness: There is no abdominal tenderness  Musculoskeletal:      Cervical back: Normal range of motion  Feet:      Right foot:      Skin integrity: Dry skin present  No ulcer, skin breakdown, erythema, warmth or callus  Left foot:      Skin integrity: Dry skin present  No ulcer, skin breakdown, erythema, warmth or callus  Lymphadenopathy:      Cervical: No cervical adenopathy  Skin:     General: Skin is warm and dry  Neurological:      General: No focal deficit present  Mental Status: She is alert and oriented to person, place, and time  Mental status is at baseline  Cranial Nerves: No cranial nerve deficit  Coordination: Coordination normal       Deep Tendon Reflexes: Reflexes normal    Psychiatric:         Mood and Affect: Mood normal          Behavior: Behavior normal        Patient's shoes and socks removed  Right Foot/Ankle   Right Foot Inspection  Skin Exam: skin normal, skin intact and dry skin  No warmth, no callus, no erythema, no maceration, no abnormal color, no pre-ulcer, no ulcer and no callus  Toe Exam: No swelling, no tenderness, erythema and  no right toe deformity    Sensory   Vibration: intact  Proprioception: intact  Monofilament testing: intact    Vascular  The right DP pulse is 2+  Right Toe  - Comprehensive Exam  Ecchymosis: none  Swelling: none   Tenderness: none         Left Foot/Ankle  Left Foot Inspection  Skin Exam: skin normal, skin intact and dry skin  No warmth, no erythema, no maceration, normal color, no pre-ulcer, no ulcer and no callus  Sensory   Vibration: intact  Proprioception: intact  Monofilament testing: intact    Vascular  The left DP pulse is 2+       Left Toe  - Comprehensive Exam  Ecchymosis: none  Swelling: none   Tenderness: none           Assign Risk Category  No deformity present  No loss of protective sensation  No weak pulses  Risk: 0

## 2022-06-13 NOTE — PROGRESS NOTES
Post procedure Lauro coyle call completed    Bleeding: _____yes __X___no    Pain: _____yes ___X___no    Redness/Swelling: ______yes ___X___no    Band aid removed: __X___yes _____no    Advised patient to call with any questions or concerns

## 2022-06-17 ENCOUNTER — HOSPITAL ENCOUNTER (OUTPATIENT)
Dept: RADIOLOGY | Facility: HOSPITAL | Age: 57
Discharge: HOME | End: 2022-06-17
Attending: SURGERY
Payer: COMMERCIAL

## 2022-06-17 DIAGNOSIS — Z01.818 PREOPERATIVE TESTING: ICD-10-CM

## 2022-06-17 DIAGNOSIS — N60.91 ATYPICAL DUCTAL HYPERPLASIA OF RIGHT BREAST: ICD-10-CM

## 2022-06-17 PROCEDURE — 71046 X-RAY EXAM CHEST 2 VIEWS: CPT

## 2022-06-20 ENCOUNTER — OFFICE VISIT (OUTPATIENT)
Dept: FAMILY MEDICINE CLINIC | Facility: CLINIC | Age: 57
End: 2022-06-20
Payer: COMMERCIAL

## 2022-06-20 VITALS
SYSTOLIC BLOOD PRESSURE: 132 MMHG | TEMPERATURE: 97.8 F | OXYGEN SATURATION: 99 % | HEART RATE: 88 BPM | BODY MASS INDEX: 39.22 KG/M2 | DIASTOLIC BLOOD PRESSURE: 78 MMHG | WEIGHT: 274 LBS | HEIGHT: 70 IN | RESPIRATION RATE: 16 BRPM

## 2022-06-20 DIAGNOSIS — I10 ESSENTIAL HYPERTENSION: ICD-10-CM

## 2022-06-20 DIAGNOSIS — E11.9 TYPE 2 DIABETES MELLITUS WITHOUT COMPLICATION, WITHOUT LONG-TERM CURRENT USE OF INSULIN (HCC): ICD-10-CM

## 2022-06-20 DIAGNOSIS — K21.9 GASTROESOPHAGEAL REFLUX DISEASE WITHOUT ESOPHAGITIS: ICD-10-CM

## 2022-06-20 PROCEDURE — 4010F ACE/ARB THERAPY RXD/TAKEN: CPT | Performed by: FAMILY MEDICINE

## 2022-06-20 PROCEDURE — 3008F BODY MASS INDEX DOCD: CPT | Performed by: FAMILY MEDICINE

## 2022-06-20 PROCEDURE — 1036F TOBACCO NON-USER: CPT | Performed by: FAMILY MEDICINE

## 2022-06-20 PROCEDURE — 99213 OFFICE O/P EST LOW 20 MIN: CPT | Performed by: FAMILY MEDICINE

## 2022-06-20 PROCEDURE — 3075F SYST BP GE 130 - 139MM HG: CPT | Performed by: FAMILY MEDICINE

## 2022-06-20 PROCEDURE — 3078F DIAST BP <80 MM HG: CPT | Performed by: FAMILY MEDICINE

## 2022-06-20 RX ORDER — VALSARTAN 320 MG/1
320 TABLET ORAL DAILY
Qty: 90 TABLET | Refills: 3 | Status: SHIPPED | OUTPATIENT
Start: 2022-06-20

## 2022-06-20 RX ORDER — OMEPRAZOLE 20 MG/1
20 CAPSULE, DELAYED RELEASE ORAL DAILY
Qty: 90 CAPSULE | Refills: 2 | Status: SHIPPED | OUTPATIENT
Start: 2022-06-20

## 2022-06-20 NOTE — PROGRESS NOTES
Assessment/Plan:         Problem List Items Addressed This Visit        Digestive    Gastroesophageal reflux disease without esophagitis    Relevant Medications    omeprazole (PriLOSEC) 20 mg delayed release capsule       Endocrine    Type 2 diabetes mellitus without complication, without long-term current use of insulin (HCC)    Relevant Medications    metFORMIN (GLUCOPHAGE) 1000 MG tablet       Cardiovascular and Mediastinum    Essential hypertension     Cleared for surgery breast lumpectomy           Relevant Medications    valsartan (DIOVAN) 320 MG tablet            Subjective: pt here for reevaluation blood pressure on increased meds      Patient ID: Emil Daniel is a 49 y o  female  HPI    The following portions of the patient's history were reviewed and updated as appropriate:   Past Medical History:  She has a past medical history of Diabetes ([address] ), Hepatitis C, and Hypertension  ,  _______________________________________________________________________  Medical Problems:  does not have any pertinent problems on file ,  _______________________________________________________________________  Past Surgical History:   has a past surgical history that includes Tubal ligation; Ankle surgery; Mammo (historical) (02/2019); Colonoscopy (03/2019); Mammo stereotactic breast biopsy right (all inc) (Right, 4/11/2022); Breast biopsy (Right); Breast biopsy (Right, 04/11/2022); and Mammo needle localization left (all inc) (Right, 6/9/2022)  ,  _______________________________________________________________________  Family History:  family history includes Arthritis in her paternal grandmother; Breast cancer (age of onset: 79) in her mother; Dementia in her father; Diabetes in her father, maternal grandmother, and mother; Heart attack in her maternal grandfather; Heart disease in her father; Heart failure in her father; Hyperlipidemia in her maternal grandmother; Hypertension in her father and mother; Liver cancer in her paternal grandfather; No Known Problems in her brother, brother, brother, daughter, sister, and son; Stroke in her maternal grandfather ,  _______________________________________________________________________  Social History:   reports that she has never smoked  She has never used smokeless tobacco  She reports current alcohol use of about 1 0 standard drink of alcohol per week  She reports that she does not use drugs  ,  _______________________________________________________________________  Allergies:  is allergic to lisinopril     _______________________________________________________________________  Current Outpatient Medications   Medication Sig Dispense Refill    BLACK COHOSH EXTRACT PO Take by mouth      Diclofenac Sodium (VOLTAREN) 1 % Apply 2 g topically 4 (four) times a day as needed      EQL EVENING PRIMROSE  MG CAPS Take by mouth      metFORMIN (GLUCOPHAGE) 1000 MG tablet Take 1 tablet (1,000 mg total) by mouth 2 (two) times a day with meals 180 tablet 2    omeprazole (PriLOSEC) 20 mg delayed release capsule Take 1 capsule (20 mg total) by mouth daily 90 capsule 2    Semaglutide,0 25 or 0 5MG/DOS, (Ozempic, 0 25 or 0 5 MG/DOSE,) 2 MG/1 5ML SOPN Inject 0 5 mg under the skin once a week 6 mL 6    valsartan (DIOVAN) 320 MG tablet Take 1 tablet (320 mg total) by mouth daily 90 tablet 3     No current facility-administered medications for this visit      _______________________________________________________________________  Review of Systems   Constitutional: Negative for chills and fever  HENT: Negative for congestion  Respiratory: Negative for chest tightness, shortness of breath and wheezing  Cardiovascular: Negative for chest pain, palpitations and leg swelling  Gastrointestinal: Negative for abdominal pain, diarrhea, nausea and vomiting  Genitourinary: Negative for difficulty urinating, frequency and urgency  Skin: Negative for rash     Neurological: Negative for dizziness, light-headedness, numbness and headaches  Hematological: Does not bruise/bleed easily  Psychiatric/Behavioral: Negative for dysphoric mood  The patient is not nervous/anxious  Objective:  Vitals:    06/20/22 1339 06/20/22 1404   BP: 150/94 132/78   BP Location: Left arm    Patient Position: Sitting    Cuff Size: Large    Pulse: 88    Resp: 16    Temp: 97 8 °F (36 6 °C)    TempSrc: Temporal    SpO2: 99%    Weight: 124 kg (274 lb)    Height: 5' 10" (1 778 m)      Body mass index is 39 31 kg/m²  Physical Exam  Constitutional:       General: She is not in acute distress  Appearance: Normal appearance  She is well-developed  She is obese  She is not ill-appearing  Eyes:      Extraocular Movements: Extraocular movements intact  Pupils: Pupils are equal, round, and reactive to light  Cardiovascular:      Rate and Rhythm: Normal rate and regular rhythm  Pulses: Normal pulses  Heart sounds: Normal heart sounds  No murmur heard  Pulmonary:      Effort: Pulmonary effort is normal       Breath sounds: Normal breath sounds  Musculoskeletal:      Right lower leg: No edema  Left lower leg: No edema  Neurological:      General: No focal deficit present  Mental Status: She is alert and oriented to person, place, and time  Mental status is at baseline  Psychiatric:         Mood and Affect: Mood normal          Behavior: Behavior normal          Thought Content:  Thought content normal

## 2022-06-22 ENCOUNTER — ANESTHESIA EVENT (OUTPATIENT)
Dept: PERIOP | Facility: HOSPITAL | Age: 57
End: 2022-06-22
Payer: COMMERCIAL

## 2022-06-22 ENCOUNTER — TELEPHONE (OUTPATIENT)
Dept: HEMATOLOGY ONCOLOGY | Facility: CLINIC | Age: 57
End: 2022-06-22

## 2022-06-22 DIAGNOSIS — N60.91 ATYPICAL DUCTAL HYPERPLASIA OF RIGHT BREAST: Primary | ICD-10-CM

## 2022-06-22 RX ORDER — OXYCODONE HYDROCHLORIDE AND ACETAMINOPHEN 5; 325 MG/1; MG/1
1 TABLET ORAL EVERY 6 HOURS PRN
Qty: 6 TABLET | Refills: 0 | Status: SHIPPED | OUTPATIENT
Start: 2022-06-22 | End: 2022-07-28 | Stop reason: ALTCHOICE

## 2022-06-22 NOTE — TELEPHONE ENCOUNTER
Explained that when the patient saw her PCP last week, the order for her percocet was discontinued when it was listed that she wasn't taking the medication  Informed patient that Dr Vanita Flores was putting a new prescription in now that she should  at her pharmacy as soon as it was filled  Patient verbalized understanding

## 2022-06-22 NOTE — TELEPHONE ENCOUNTER
CALL TRANSFER   Reason for patient call? Patient calling in regards to pain medicationoxyCODONE-acetaminophen (Percocet) 5-325 mg per tablet    that was cancelled when she went to pickup a refill  She would like to speak with a Nurse  Patient's primary physician? Dr Nayely Malhotra     RN call was transferred to and time it was transferred? Ana, 1:17pm   Informed patient that the message will be forwarded to the team and someone will get back to them as soon as possible    Did you relay this information to the patient?   Yes

## 2022-06-24 RX ORDER — FERROUS SULFATE 325(65) MG
325 TABLET ORAL DAILY
COMMUNITY

## 2022-06-24 NOTE — PRE-PROCEDURE INSTRUCTIONS
Pre-Surgery Instructions:   Medication Instructions    BLACK COHOSH EXTRACT PO Stop taking 7 days prior to surgery   Diclofenac Sodium (VOLTAREN) 1 % Stop taking 3 days prior to surgery   EQL EVENING PRIMROSE  MG CAPS Stop taking 7 days prior to surgery   ferrous sulfate 325 (65 Fe) mg tablet Stop taking 7 days prior to surgery   metFORMIN (GLUCOPHAGE) 1000 MG tablet Hold day of surgery   omeprazole (PriLOSEC) 20 mg delayed release capsule Take day of surgery   Semaglutide,0 25 or 0 5MG/DOS, (Ozempic, 0 25 or 0 5 MG/DOSE,) 2 MG/1 5ML SOPN Takes weekly on Sundays    valsartan (DIOVAN) 320 MG tablet Hold day of surgery  Pre op instructions per My Surgical Experience booklet,medications per anesthesia guidelines and showering instructions per [facility] protocol reviewed-Patient has CHG  Pt  Verbalized understanding of current visitor restrictions  Instructed to call surgeon with any illness or covid exposure now till DOS  All medications instructed to take DOS are with sips water only  Instructed to avoid all ASA and OTC Vit/Supp 1 week prior to surgery and to avoid NSAIDs 3 days prior to surgery per anesthesia instructions  Tylenol ok to take prn  Pt  Verbalized an understanding of all instructions reviewed and offers no concerns at this time

## 2022-06-29 NOTE — ANESTHESIA PREPROCEDURE EVALUATION
Procedure:  BREAST PAYTON  DIRECTED LUMPECTOMY WITH BRAKETED WIRE (Right Breast)    Relevant Problems   ANESTHESIA (within normal limits)      CARDIO   (+) Essential hypertension      ENDO   (+) Type 2 diabetes mellitus without complication, without long-term current use of insulin (HCC)      GI/HEPATIC   (+) Gastroesophageal reflux disease without esophagitis      /RENAL (within normal limits)      HEMATOLOGY   (+) Iron deficiency anemia   (+) Thrombocytopenia (HCC)      NEURO/PSYCH   (+) History of hepatitis C      PULMONARY (within normal limits)      Other   (+) Atypical ductal hyperplasia of right breast   (+) Atypical squamous cell changes of undetermined significance (ASCUS) on cervical cytology with negative high risk human papilloma virus (HPV) test result   (+) BMI 38 0-38 9,adult   (+) Class 2 obesity due to excess calories without serious comorbidity with body mass index (BMI) of 38 0 to 38 9 in adult      EKG 6/9/2022:  Normal sinus rhythm  Possible Left atrial enlargement  Nonspecific ST and T wave abnormality  No previous ECGs available    Lab Results   Component Value Date    WBC 5 97 06/09/2022    HGB 14 2 06/09/2022    HCT 44 5 06/09/2022    MCV 85 06/09/2022     (L) 06/09/2022     Lab Results   Component Value Date    SODIUM 136 06/09/2022    K 4 1 06/09/2022     06/09/2022    CO2 26 06/09/2022    BUN 13 06/09/2022    CREATININE 0 48 (L) 06/09/2022    CALCIUM 9 3 06/09/2022     No results found for: INR, PROTIME  Lab Results   Component Value Date    HGBA1C 7 1 (H) 03/14/2022            Physical Exam    Airway    Mallampati score: II  TM Distance: >3 FB  Neck ROM: full     Dental       Cardiovascular  Cardiovascular exam normal    Pulmonary  Pulmonary exam normal     Other Findings        Anesthesia Plan  ASA Score- 3     Anesthesia Type- general with ASA Monitors  Additional Monitors:   Airway Plan: LMA  Plan Factors-    Chart reviewed   Patient summary reviewed  Induction- intravenous  Postoperative Plan-     Informed Consent- Anesthetic plan and risks discussed with patient  I personally reviewed this patient with the CRNA  Discussed and agreed on the Anesthesia Plan with the CRNA  Pardeep Damico

## 2022-06-30 ENCOUNTER — ANESTHESIA (OUTPATIENT)
Dept: PERIOP | Facility: HOSPITAL | Age: 57
End: 2022-06-30
Payer: COMMERCIAL

## 2022-06-30 ENCOUNTER — HOSPITAL ENCOUNTER (OUTPATIENT)
Dept: MAMMOGRAPHY | Facility: HOSPITAL | Age: 57
Discharge: HOME | End: 2022-06-30
Attending: SURGERY
Payer: COMMERCIAL

## 2022-06-30 ENCOUNTER — APPOINTMENT (OUTPATIENT)
Dept: RADIOLOGY | Facility: HOSPITAL | Age: 57
End: 2022-06-30
Payer: COMMERCIAL

## 2022-06-30 ENCOUNTER — HOSPITAL ENCOUNTER (OUTPATIENT)
Facility: HOSPITAL | Age: 57
Setting detail: OUTPATIENT SURGERY
Discharge: HOME | End: 2022-06-30
Attending: SURGERY | Admitting: SURGERY
Payer: COMMERCIAL

## 2022-06-30 VITALS
HEIGHT: 70 IN | DIASTOLIC BLOOD PRESSURE: 81 MMHG | BODY MASS INDEX: 39.22 KG/M2 | HEART RATE: 86 BPM | WEIGHT: 274 LBS | RESPIRATION RATE: 18 BRPM | OXYGEN SATURATION: 97 % | SYSTOLIC BLOOD PRESSURE: 151 MMHG | TEMPERATURE: 97.3 F

## 2022-06-30 VITALS — WEIGHT: 273.37 LBS | HEIGHT: 70 IN | BODY MASS INDEX: 39.14 KG/M2

## 2022-06-30 DIAGNOSIS — N60.91 BENIGN MAMMARY DYSPLASIA OF RIGHT BREAST: ICD-10-CM

## 2022-06-30 DIAGNOSIS — N60.91 ATYPICAL DUCTAL HYPERPLASIA OF RIGHT BREAST: ICD-10-CM

## 2022-06-30 LAB
GLUCOSE SERPL-MCNC: 176 MG/DL (ref 65–140)
GLUCOSE SERPL-MCNC: 192 MG/DL (ref 65–140)

## 2022-06-30 PROCEDURE — 88307 TISSUE EXAM BY PATHOLOGIST: CPT | Performed by: PATHOLOGY

## 2022-06-30 PROCEDURE — 19281 PERQ DEVICE BREAST 1ST IMAG: CPT

## 2022-06-30 PROCEDURE — 76098 X-RAY EXAM SURGICAL SPECIMEN: CPT | Performed by: SURGERY

## 2022-06-30 PROCEDURE — A4648 IMPLANTABLE TISSUE MARKER: HCPCS

## 2022-06-30 PROCEDURE — 19301 PARTIAL MASTECTOMY: CPT | Performed by: SURGERY

## 2022-06-30 PROCEDURE — 19301 PARTIAL MASTECTOMY: CPT | Performed by: PHYSICIAN ASSISTANT

## 2022-06-30 PROCEDURE — 82948 REAGENT STRIP/BLOOD GLUCOSE: CPT

## 2022-06-30 RX ORDER — EPHEDRINE SULFATE 50 MG/ML
INJECTION INTRAVENOUS AS NEEDED
Status: DISCONTINUED | OUTPATIENT
Start: 2022-06-30 | End: 2022-06-30

## 2022-06-30 RX ORDER — MIDAZOLAM HYDROCHLORIDE 2 MG/2ML
INJECTION, SOLUTION INTRAMUSCULAR; INTRAVENOUS AS NEEDED
Status: DISCONTINUED | OUTPATIENT
Start: 2022-06-30 | End: 2022-06-30

## 2022-06-30 RX ORDER — CEFAZOLIN SODIUM 1 G/50ML
1000 SOLUTION INTRAVENOUS ONCE
Status: COMPLETED | OUTPATIENT
Start: 2022-06-30 | End: 2022-06-30

## 2022-06-30 RX ORDER — DEXAMETHASONE SODIUM PHOSPHATE 10 MG/ML
INJECTION, SOLUTION INTRAMUSCULAR; INTRAVENOUS AS NEEDED
Status: DISCONTINUED | OUTPATIENT
Start: 2022-06-30 | End: 2022-06-30

## 2022-06-30 RX ORDER — PROPOFOL 10 MG/ML
INJECTION, EMULSION INTRAVENOUS AS NEEDED
Status: DISCONTINUED | OUTPATIENT
Start: 2022-06-30 | End: 2022-06-30

## 2022-06-30 RX ORDER — KETOROLAC TROMETHAMINE 30 MG/ML
INJECTION, SOLUTION INTRAMUSCULAR; INTRAVENOUS AS NEEDED
Status: DISCONTINUED | OUTPATIENT
Start: 2022-06-30 | End: 2022-06-30

## 2022-06-30 RX ORDER — FENTANYL CITRATE/PF 50 MCG/ML
25 SYRINGE (ML) INJECTION
Status: DISCONTINUED | OUTPATIENT
Start: 2022-06-30 | End: 2022-06-30 | Stop reason: HOSPADM

## 2022-06-30 RX ORDER — MAGNESIUM HYDROXIDE 1200 MG/15ML
LIQUID ORAL AS NEEDED
Status: DISCONTINUED | OUTPATIENT
Start: 2022-06-30 | End: 2022-06-30 | Stop reason: HOSPADM

## 2022-06-30 RX ORDER — OXYCODONE HYDROCHLORIDE AND ACETAMINOPHEN 5; 325 MG/1; MG/1
1 TABLET ORAL EVERY 4 HOURS PRN
Status: DISCONTINUED | OUTPATIENT
Start: 2022-06-30 | End: 2022-06-30 | Stop reason: HOSPADM

## 2022-06-30 RX ORDER — SODIUM CHLORIDE, SODIUM LACTATE, POTASSIUM CHLORIDE, CALCIUM CHLORIDE 600; 310; 30; 20 MG/100ML; MG/100ML; MG/100ML; MG/100ML
100 INJECTION, SOLUTION INTRAVENOUS CONTINUOUS
Status: DISCONTINUED | OUTPATIENT
Start: 2022-06-30 | End: 2022-06-30 | Stop reason: HOSPADM

## 2022-06-30 RX ORDER — HYDROMORPHONE HCL IN WATER/PF 6 MG/30 ML
0.2 PATIENT CONTROLLED ANALGESIA SYRINGE INTRAVENOUS
Status: DISCONTINUED | OUTPATIENT
Start: 2022-06-30 | End: 2022-06-30 | Stop reason: HOSPADM

## 2022-06-30 RX ORDER — GLYCOPYRROLATE 0.2 MG/ML
INJECTION INTRAMUSCULAR; INTRAVENOUS AS NEEDED
Status: DISCONTINUED | OUTPATIENT
Start: 2022-06-30 | End: 2022-06-30

## 2022-06-30 RX ORDER — LIDOCAINE HYDROCHLORIDE 10 MG/ML
INJECTION, SOLUTION EPIDURAL; INFILTRATION; INTRACAUDAL; PERINEURAL AS NEEDED
Status: DISCONTINUED | OUTPATIENT
Start: 2022-06-30 | End: 2022-06-30

## 2022-06-30 RX ORDER — SODIUM CHLORIDE, SODIUM LACTATE, POTASSIUM CHLORIDE, CALCIUM CHLORIDE 600; 310; 30; 20 MG/100ML; MG/100ML; MG/100ML; MG/100ML
INJECTION, SOLUTION INTRAVENOUS CONTINUOUS PRN
Status: DISCONTINUED | OUTPATIENT
Start: 2022-06-30 | End: 2022-06-30

## 2022-06-30 RX ORDER — ONDANSETRON 2 MG/ML
INJECTION INTRAMUSCULAR; INTRAVENOUS AS NEEDED
Status: DISCONTINUED | OUTPATIENT
Start: 2022-06-30 | End: 2022-06-30

## 2022-06-30 RX ORDER — CEFAZOLIN SODIUM 2 G/50ML
2000 SOLUTION INTRAVENOUS ONCE
Status: COMPLETED | OUTPATIENT
Start: 2022-06-30 | End: 2022-06-30

## 2022-06-30 RX ORDER — ONDANSETRON 2 MG/ML
4 INJECTION INTRAMUSCULAR; INTRAVENOUS ONCE AS NEEDED
Status: DISCONTINUED | OUTPATIENT
Start: 2022-06-30 | End: 2022-06-30 | Stop reason: HOSPADM

## 2022-06-30 RX ORDER — FENTANYL CITRATE 50 UG/ML
INJECTION, SOLUTION INTRAMUSCULAR; INTRAVENOUS AS NEEDED
Status: DISCONTINUED | OUTPATIENT
Start: 2022-06-30 | End: 2022-06-30

## 2022-06-30 RX ORDER — BUPIVACAINE HYDROCHLORIDE AND EPINEPHRINE 2.5; 5 MG/ML; UG/ML
INJECTION, SOLUTION INFILTRATION; PERINEURAL AS NEEDED
Status: DISCONTINUED | OUTPATIENT
Start: 2022-06-30 | End: 2022-06-30 | Stop reason: HOSPADM

## 2022-06-30 RX ORDER — LIDOCAINE HYDROCHLORIDE 10 MG/ML
5 INJECTION, SOLUTION EPIDURAL; INFILTRATION; INTRACAUDAL; PERINEURAL ONCE
Status: COMPLETED | OUTPATIENT
Start: 2022-06-30 | End: 2022-06-30

## 2022-06-30 RX ADMIN — CEFAZOLIN SODIUM 2000 MG: 2 SOLUTION INTRAVENOUS at 09:41

## 2022-06-30 RX ADMIN — SODIUM CHLORIDE, SODIUM LACTATE, POTASSIUM CHLORIDE, AND CALCIUM CHLORIDE: .6; .31; .03; .02 INJECTION, SOLUTION INTRAVENOUS at 09:41

## 2022-06-30 RX ADMIN — FENTANYL CITRATE 25 MCG: 50 INJECTION INTRAMUSCULAR; INTRAVENOUS at 10:02

## 2022-06-30 RX ADMIN — PROPOFOL 200 MG: 10 INJECTION, EMULSION INTRAVENOUS at 09:45

## 2022-06-30 RX ADMIN — ONDANSETRON 4 MG: 2 INJECTION INTRAMUSCULAR; INTRAVENOUS at 10:14

## 2022-06-30 RX ADMIN — EPHEDRINE SULFATE 5 MG: 50 INJECTION, SOLUTION INTRAVENOUS at 10:17

## 2022-06-30 RX ADMIN — EPHEDRINE SULFATE 5 MG: 50 INJECTION, SOLUTION INTRAVENOUS at 10:18

## 2022-06-30 RX ADMIN — FENTANYL CITRATE 25 MCG: 50 INJECTION INTRAMUSCULAR; INTRAVENOUS at 10:15

## 2022-06-30 RX ADMIN — GLYCOPYRROLATE 0.1 MG: 0.2 INJECTION INTRAMUSCULAR; INTRAVENOUS at 09:45

## 2022-06-30 RX ADMIN — MIDAZOLAM 2 MG: 1 INJECTION INTRAMUSCULAR; INTRAVENOUS at 09:41

## 2022-06-30 RX ADMIN — LIDOCAINE HYDROCHLORIDE 50 MG: 10 INJECTION, SOLUTION EPIDURAL; INFILTRATION; INTRACAUDAL; PERINEURAL at 09:45

## 2022-06-30 RX ADMIN — CEFAZOLIN SODIUM 1000 MG: 1 SOLUTION INTRAVENOUS at 09:44

## 2022-06-30 RX ADMIN — LIDOCAINE HYDROCHLORIDE 5 ML: 10 INJECTION, SOLUTION EPIDURAL; INFILTRATION; INTRACAUDAL at 08:30

## 2022-06-30 RX ADMIN — DEXAMETHASONE SODIUM PHOSPHATE 10 MG: 10 INJECTION, SOLUTION INTRAMUSCULAR; INTRAVENOUS at 09:45

## 2022-06-30 RX ADMIN — FENTANYL CITRATE 25 MCG: 50 INJECTION INTRAMUSCULAR; INTRAVENOUS at 09:55

## 2022-06-30 RX ADMIN — KETOROLAC TROMETHAMINE 30 MG: 30 INJECTION, SOLUTION INTRAMUSCULAR at 10:17

## 2022-06-30 RX ADMIN — FENTANYL CITRATE 25 MCG: 50 INJECTION INTRAMUSCULAR; INTRAVENOUS at 09:51

## 2022-06-30 NOTE — INTERVAL H&P NOTE
H&P reviewed  After examining the patient I find no changes in the patients condition since the H&P had been written   Valentina tested and functional, films reviewed    Vitals:    06/30/22 0715   BP: 166/84   Pulse: 75   Resp: 18   Temp: 97 7 °F (36 5 °C)   SpO2: 96%

## 2022-06-30 NOTE — OP NOTE
OPERATIVE REPORT  PATIENT NAME: Stiven Bustos    :  1965  MRN: [id number]  Pt Location: [facility]    SURGERY DATE: 2022    Surgeon(s) and Role:     * Mary Nunes MD - Primary     * Margo Quintero PA-C - Assisting    Preop Diagnosis:  Atypical ductal hyperplasia of right breast [N60 91]    Post-Op Diagnosis Codes:     * Atypical ductal hyperplasia of right breast [N60 91]    Procedure(s) (LRB):  BREAST PAYTON  DIRECTED LUMPECTOMY WITH BRAKETED WIRE (Right)    Specimen(s):  ID Type Source Tests Collected by Time Destination   1 : right breast lumpectomy marker per protocol Tissue Breast, NOS TISSUE EXAM Mary Nunes MD 2022 1002        Estimated Blood Loss:   Minimal    Drains:  * No LDAs found *    Anesthesia Type:   General    Operative Indications:  Atypical ductal hyperplasia of right breast [N60 91]      Operative Findings:  Good specimen radiograph    Complications:   None    Procedure and Technique:    The prior post biopsy images and needle loc images  were reviewed prior to the procedure  Lumpectomy  The patient was brought to the operation room and placed under general anesthesia  Attention was paid to ensure appropriate padding and correct positioning  The PAYTON  detector was then used to locate the position of the PAYTON deflector and the skin was marked in this area  Two wires were placed to bracket the region  The right breast was prepped and draped in a sterile fashion  I initiated a time-out, identifying the patient, the correct side and the above procedure  All parties agreed with the time out  The planned incision was then injected with 0 25% Marcaine for local anesthesia  The incision was sharply incised  The PAYTON dectector and wires were used to guide the dissection    Superior, inferior, medial and lateral planes were developed around the PAYTON deflector and wires and the specimen truncated with electrocautery beyond the deflector and wire tips   The specimen was then inked for orientation purposes  A specimen radiograph was taken in two dimensions  Superficial bleeding controlled with electrocautery and the wound was extensively irrigated  The wound was closed with two layers, an inner layer of 3-0 vicryl and a subcuticular layer of 4-0 monocryl  Steri-strips were applied  The counts were correct x 2       A physician assistant was required during the procedure for retraction tissue handling,dissection and suturing    Patient Disposition:  PACU       SIGNATURE: Keivn Galvez MD  DATE: June 30, 2022  TIME: 10:17 AM

## 2022-06-30 NOTE — DISCHARGE INSTRUCTIONS
POST-OPERATIVE BREAST CARE INSTRUCTIONS    Wound Closure/Dressing: Your wound is closed with:  Steri strips-white pieces of tape that hold your incision together along with surgical glue          Dissolvable sutures-underneath the skin    Wound care: If you have gauze over your wound you may remove it the day after surgery  Leave the Steri-strips on, they will fall off on their own in 1-2 weeks  You may shower using soap and water to clean your wound  Gently pat dry  Drain Care: If you do not have a drain, disregard this section  Visiting Nursing should have been arranged upon discharge from hospital   A nurse will call your home to schedule an initial visit  Please call the number below if you have not received a call within 48 hours of hospital discharge  You may shower with the GENO drains  Wash area around the drains with soap and water and pat dry  Record drain outputs on chart given to you at pre op visit and bring that chart to office at post op appt  GENO drains can be removed in the office by a nurse either at post op visit OR when drain output is 30 ccs or less in a 24 hour period for three days in a row  If you had plastic surgery or reconstructive surgery, the plastic surgeon will manage the drains  Other:      You can begin wearing your own bra when it feels comfortable  You may resume using deodorant the day after surgery                 Post-op visit:  your post-op visit is scheduled for:  July 20, 22 @ 3 PM    Call our office at [phone] if you have any  of the following:    Redness, swelling, heat, unusual drainage or heavy bleeding from wound  Fever greater than 101 °  Pain not relieved by medication

## 2022-06-30 NOTE — INTERVAL H&P NOTE
H&P reviewed  After examining the patient I find no changes in the patients condition since the H&P had been written      Vitals:    06/30/22 0715   BP: 166/84   Pulse: 75   Resp: 18   Temp: 97 7 °F (36 5 °C)   SpO2: 96%

## 2022-06-30 NOTE — ANESTHESIA POSTPROCEDURE EVALUATION
Post-Op Assessment Note    CV Status:  Stable    Pain management: adequate     Mental Status:  Alert and awake   Hydration Status:  Euvolemic   PONV Controlled:  Controlled   Airway Patency:  Patent      Post Op Vitals Reviewed: Yes      Staff: CRNA   Comments: VSS report RN        No complications documented      BP   121/68   Temp      Pulse  75   Resp      SpO2   95 RA

## 2022-06-30 NOTE — NURSING NOTE
Patient transported to [facility] for right mammo needle localization  Procedure explained to patient and all questions answered  Consent obtained  Time out performed at 08:16  Patient tolerated procedure well  No complaints per patient  Patient transported back to [address] by [employer]

## 2022-07-06 ENCOUNTER — TELEPHONE (OUTPATIENT)
Dept: SURGICAL ONCOLOGY | Facility: CLINIC | Age: 57
End: 2022-07-06

## 2022-07-06 ENCOUNTER — OFFICE VISIT (OUTPATIENT)
Dept: SURGICAL ONCOLOGY | Facility: CLINIC | Age: 57
End: 2022-07-06

## 2022-07-06 VITALS
HEART RATE: 88 BPM | OXYGEN SATURATION: 98 % | HEIGHT: 70 IN | BODY MASS INDEX: 39.08 KG/M2 | TEMPERATURE: 97.2 F | RESPIRATION RATE: 18 BRPM | DIASTOLIC BLOOD PRESSURE: 84 MMHG | WEIGHT: 273 LBS | SYSTOLIC BLOOD PRESSURE: 138 MMHG

## 2022-07-06 DIAGNOSIS — R21 RASH: Primary | ICD-10-CM

## 2022-07-06 PROCEDURE — 99024 POSTOP FOLLOW-UP VISIT: CPT

## 2022-07-06 NOTE — PROGRESS NOTES
Surgical Oncology Follow Up       [address]  [facility] [place]  [address] [address]    Junnie Phoenix [place]  1965  [phone]  [facility]  [facility] [place]  [address] [facility] [address]    Chief Complaint   Patient presents with    Post-op       Assessment/Plan:  1  Rash         Discussion/Summary:  Patient is a 21-year-old female 6 days s/p right breast lumpectomy for ADH  She is presenting today for a rash that is going towards her breast incision  Patient states that the rash has always been there and then it was 1st noticed by her mother and daughter  Patient reports that she has been putting Vaseline over the area but it is getting worse and spreading towards the incision  Patient's daughter stated that the area is not healing  On clinical exam the skin around the Steri-Strips and into her right axilla is red and irritated  Patient states it does itch at times  It appears that the patient is allergic to the Steri-Strips or adhesive used during surgery  I had Dr Jean Zambrano come and take a look and he agrees that this is the etiology of the right breast rash  The Steri-Strips were removed and the patient noted in instant relief in this area  I cleansed the area with normal saline and instructed the patient to keep the area clean and dry  I informed her that she can use hydrocortisone cream which can help with inflammation and itch  She is scheduled to see Dr Jean Zambrano for a postop visit on 07/20/2022  I instructed her to call with any questions or concerns prior to the next visit  All questions were answered today  History of Present Illness:     Oncology History    No history exists         -Interval History: Patient is a 21-year-old female 6 days s/p right breast lumpectomy for ADH    Patient states that the rash has always been there and then it was 1st noticed by her mother and daughter  Patient reports that she has been putting Vaseline over the area but it is getting worse and spreading towards the incision  She denies changes on her breast exam including signs of infection or drainage for lumpectomy scar  Review of Systems:  Review of Systems   Constitutional: Negative for activity change, appetite change, fatigue and unexpected weight change  Respiratory: Negative for cough and shortness of breath  Cardiovascular: Negative for chest pain  Gastrointestinal: Negative for abdominal pain, diarrhea, nausea and vomiting  Endocrine: Negative for heat intolerance  Musculoskeletal: Negative for arthralgias, back pain and myalgias  Skin: Negative for rash  Neurological: Negative for weakness and headaches  Hematological: Negative for adenopathy         Patient Active Problem List   Diagnosis    Vulvovaginal candidiasis    Atypical squamous cell changes of undetermined significance (ASCUS) on cervical cytology with negative high risk human papilloma virus (HPV) test result    BMI 38 0-38 9,adult    Type 2 diabetes mellitus without complication, without long-term current use of insulin (HCC)    Essential hypertension    Gastroesophageal reflux disease without esophagitis    History of hepatitis C    Foot pain, right    Class 2 obesity due to excess calories without serious comorbidity with body mass index (BMI) of 38 0 to 38 9 in adult    Chronic pain of both ankles    Iron deficiency anemia    Thrombocytopenia (HCC)    Atypical ductal hyperplasia of right breast    Pre-op exam    Thigh numbness    Rash     Past Medical History:   Diagnosis Date    Diabetes mellitus ([address] )     GERD (gastroesophageal reflux disease)     Hepatitis C     Treated    Hypertension      Past Surgical History:   Procedure Laterality Date    ANKLE SURGERY      BREAST BIOPSY Right     core bx-benign    BREAST BIOPSY Right 04/11/2022    BREAST LUMPECTOMY Right 6/30/2022 Procedure: BREAST PAYTON  DIRECTED LUMPECTOMY WITH Jonh Cure;  Surgeon: Ric Ritchie MD;  [facility]: [facility];  Service: Surgical Oncology    COLONOSCOPY  2019    MAMMO (HISTORICAL)  2019    MAMMO NEEDLE LOCALIZATION LEFT (ALL INC) Right 2022    MAMMO NEEDLE LOCALIZATION RIGHT (ALL INC) Right 2022    MAMMO STEREOTACTIC BREAST BIOPSY RIGHT (ALL INC) Right 2022    TUBAL LIGATION       Family History   Problem Relation Age of Onset    Breast cancer Mother 79    Hypertension Mother     Diabetes Mother     Hypertension Father     Diabetes Father     Heart failure Father     Dementia Father     Heart disease Father     No Known Problems Sister     No Known Problems Brother     No Known Problems Daughter     No Known Problems Son     Hyperlipidemia Maternal Grandmother     Diabetes Maternal Grandmother     Heart attack Maternal Grandfather     Stroke Maternal Grandfather     Arthritis Paternal Grandmother     Liver cancer Paternal Grandfather         unknown age   Latisha Clunes No Known Problems Brother     No Known Problems Brother      Social History     Socioeconomic History    Marital status: Single     Spouse name: Not on file    Number of children: Not on file    Years of education: Not on file    Highest education level: Not on file   Occupational History    Not on file   Tobacco Use    Smoking status: Never Smoker    Smokeless tobacco: Never Used   Vaping Use    Vaping Use: Never used   Substance and Sexual Activity    Alcohol use: Not Currently     Comment: socially    Drug use: Never    Sexual activity: Not Currently   Other Topics Concern    Not on file   Social History Narrative    Rivka:    Most recent tobacco use screenin-    Alcohol intake: None     Social Determinants of Health     Financial Resource Strain: Not on file   Food Insecurity: Not on file   Transportation Needs: Not on file   Physical Activity: Not on file   Stress: Not on file Social Connections: Not on file   Intimate Partner Violence: Not on file   Housing Stability: Not on file       Current Outpatient Medications:     BLACK COHOSH EXTRACT PO, Take by mouth, Disp: , Rfl:     Diclofenac Sodium (VOLTAREN) 1 %, Apply 2 g topically 4 (four) times a day as needed, Disp: , Rfl:     EQL EVENING PRIMROSE  MG CAPS, Take by mouth, Disp: , Rfl:     ferrous sulfate 325 (65 Fe) mg tablet, Take 325 mg by mouth daily, Disp: , Rfl:     metFORMIN (GLUCOPHAGE) 1000 MG tablet, Take 1 tablet (1,000 mg total) by mouth 2 (two) times a day with meals, Disp: 180 tablet, Rfl: 2    omeprazole (PriLOSEC) 20 mg delayed release capsule, Take 1 capsule (20 mg total) by mouth daily (Patient taking differently: Take 20 mg by mouth daily in the early morning), Disp: 90 capsule, Rfl: 2    Semaglutide,0 25 or 0 5MG/DOS, (Ozempic, 0 25 or 0 5 MG/DOSE,) 2 MG/1 5ML SOPN, Inject 0 5 mg under the skin once a week (Patient taking differently: Inject 0 5 mg under the skin once a week Takes on Sunday), Disp: 6 mL, Rfl: 6    valsartan (DIOVAN) 320 MG tablet, Take 1 tablet (320 mg total) by mouth daily (Patient taking differently: Take 320 mg by mouth daily in the early morning), Disp: 90 tablet, Rfl: 3    oxyCODONE-acetaminophen (Percocet) 5-325 mg per tablet, Take 1 tablet by mouth every 6 (six) hours as needed (as needed for pain) Max Daily Amount: 4 tablets (Patient taking differently: Take 1 tablet by mouth every 6 (six) hours as needed (as needed for pain) POST OP), Disp: 6 tablet, Rfl: 0  Allergies   Allergen Reactions    Lisinopril Cough     Vitals:    07/06/22 1527   BP: 138/84   Pulse: 88   Resp: 18   Temp: (!) 97 2 °F (36 2 °C)   SpO2: 98%       Physical Exam  Constitutional:       General: She is not in acute distress  Appearance: Normal appearance  Cardiovascular:      Rate and Rhythm: Normal rate and regular rhythm  Pulses: Normal pulses  Heart sounds: Normal heart sounds  Pulmonary:      Effort: Pulmonary effort is normal       Breath sounds: Normal breath sounds  Chest:      Chest wall: No mass  Breasts:      Right: Skin change (rash) present  No swelling, bleeding, inverted nipple, mass, nipple discharge, tenderness, axillary adenopathy or supraclavicular adenopathy  Left: No swelling, bleeding, inverted nipple, mass, nipple discharge, skin change, tenderness, axillary adenopathy or supraclavicular adenopathy  Comments: Redness/irritation near the incision  No masses, nodularity,nipple changes or discharge, or adenopathy appreciated on physical exam      Abdominal:      General: Abdomen is flat  Palpations: Abdomen is soft  Lymphadenopathy:      Upper Body:      Right upper body: No supraclavicular, axillary or pectoral adenopathy  Left upper body: No supraclavicular, axillary or pectoral adenopathy  Skin:     General: Skin is warm  Neurological:      General: No focal deficit present  Mental Status: She is alert and oriented to person, place, and time  Psychiatric:         Mood and Affect: Mood normal          Behavior: Behavior normal            Results:    Imaging  XR chest pa & lateral    Result Date: 6/19/2022  Narrative: CHEST INDICATION:   N60 91: Unspecified benign mammary dysplasia of right breast [id number]: Encounter for other preprocedural examination  COMPARISON:  Abdomen CT from 3/17/2015  EXAM PERFORMED/VIEWS:  XR CHEST PA & LATERAL FINDINGS: Cardiomediastinal silhouette appears unremarkable  The lungs are clear  No pneumothorax or pleural effusion  Osseous structures appear within normal limits for patient age  Impression: No acute cardiopulmonary disease  Workstation performed: [id number]     Mammo needle localization right (all inc)    Result Date: 6/30/2022  Narrative: MAMMOGRAPHY NEEDLE LOCALIZATION Indication: Right breast carcinoma   Localize stereotactic clip, Saviscout reflector and anterior calcifications preoperatively Comparison: Prior imaging studies of the right breast Procedure: Informed consent was obtained prior to the procedure, discussing possible complications such as bleeding, infection, or allergic reaction  After verifying patient and side of interest and initialing side of concern (time out procedure), and utilizing digital mammographic guidance and sterile technique, a needle localization procedure of the stereotactic clip, Saviscout reflector  in the lateral quadrant was performed from a lateral approach  The localizing  wire was positioned adjacent to the targeted stereotactic and Saviscout reflector clip  At the same time a 2nd localizing wire was positioned more anteriorly approximately 3 cm from a lateral approach  A radiopaque skin marker was placed at the wire entry sites  The patient tolerated the procedure well, leaving the department in satisfactory condition, with  guidance images available on PACS  Impression: Impression: Successful needle localization procedure of the targeted stereotactic clip, Saviscout reflector and bracketed anteriorly encompassing the microcalcifications extending 3 cm   Workstation performed: [id number]     MAMMO PAYTON  NEEDLE LOC RIGHT (ALL INC)    Result Date: 6/9/2022  Narrative: DIAGNOSIS: Atypical ductal hyperplasia of right breast INDICATION: Carlee Pearson is a 49 y o  female presenting for PAYTON  localization right breast 10:00 o'clock biopsy site  Previous stereotactic biopsy 04/11/2022 showing atypical ductal hyperplasia  Prior to the procedure, previous imaging was reviewed  The procedure was explained to the patient in detail  All questions were answered  Written and verbal informed consent was obtained  FINDINGS: RIGHT A) CALCIFICATIONS: Images of the right breast calcifications in the upper outer quadrant at 10 o'clock were obtained  The patient was placed upright on the biopsy table   A localization for surgical biopsy was performed under digital mammographic guidance using a lateral approach  The skin was prepped in the usual fashion  Anesthesia was administered using lidocaine 1%  A 16 G needle was directed toward the U biopsy clip under mammographic guidance in LM projection, lateral approach  Orientation was switched to craniocaudal   The needle was adjusted and the reflector was deployed  YULIYA  reflector function was confirmed using the hand piece  Postprocedure two view mammogram shows the YULIYA  reflector is immediately adjacent to the U biopsy clip  Residual calcifications best shown on magnification views are present extending anterior to the reflector for approximately 3 cm  The patient tolerated the procedure well  There were no immediate complications  Impression:  1  Uncomplicated mammographically guided YULIYA  reflector localization right breast 10:00 o'clock U biopsy clip, site of atypical ductal hyperplasia  Please note that the residual calcifications extend for at least 3 cm anterior to the reflector in biopsy clip  RECOMMENDATION:      - Surgical consultation for the right breast  Workstation ID: [id number]     Mammo yuliya  breast specimen (No Charge)    Result Date: 6/30/2022  Narrative: 2 orthogonal specimen radiographs are submitted for review  Specimen contains YULIYA  reflector and initially placed U biopsy clip centrally  Separately placed wires are present  There are calcifications in the specimen  I reviewed the above imaging data  Advance Care Planning/Advance Directives:  Discussed disease status, cancer treatment plans and/or cancer treatment goals with the patient

## 2022-07-06 NOTE — TELEPHONE ENCOUNTER
Patient called the office to report concerns about a rash that is spreading towards her breast incision  Patient states that the rash has always been there and that it was first noticed by her mother and her daughter  Patient reports that she has been putting Vaseline over the area but it is getting worse and spreading towards her incision  Patient's daughter stated that the area is "not healing " Patient was scheduled for a same-day appointment with Jodean Goodpasture, CRNP at 3:30

## 2022-07-20 ENCOUNTER — OFFICE VISIT (OUTPATIENT)
Dept: SURGICAL ONCOLOGY | Facility: CLINIC | Age: 57
End: 2022-07-20

## 2022-07-20 ENCOUNTER — APPOINTMENT (OUTPATIENT)
Dept: LAB | Facility: CLINIC | Age: 57
End: 2022-07-20
Payer: COMMERCIAL

## 2022-07-20 VITALS
WEIGHT: 276 LBS | RESPIRATION RATE: 18 BRPM | HEIGHT: 70 IN | TEMPERATURE: 97.5 F | DIASTOLIC BLOOD PRESSURE: 80 MMHG | HEART RATE: 98 BPM | OXYGEN SATURATION: 98 % | BODY MASS INDEX: 39.51 KG/M2 | SYSTOLIC BLOOD PRESSURE: 122 MMHG

## 2022-07-20 DIAGNOSIS — Z00.00 ANNUAL PHYSICAL EXAM: ICD-10-CM

## 2022-07-20 DIAGNOSIS — N60.91 ATYPICAL DUCTAL HYPERPLASIA OF RIGHT BREAST: ICD-10-CM

## 2022-07-20 DIAGNOSIS — E11.9 TYPE 2 DIABETES MELLITUS WITHOUT COMPLICATION, WITHOUT LONG-TERM CURRENT USE OF INSULIN (HCC): ICD-10-CM

## 2022-07-20 DIAGNOSIS — Z91.89 INCREASED RISK OF BREAST CANCER: ICD-10-CM

## 2022-07-20 DIAGNOSIS — Z80.3 FAMILY HISTORY OF BREAST CANCER: ICD-10-CM

## 2022-07-20 DIAGNOSIS — E61.1 LOW IRON: ICD-10-CM

## 2022-07-20 DIAGNOSIS — Z98.890 STATUS POST RIGHT BREAST LUMPECTOMY: Primary | ICD-10-CM

## 2022-07-20 LAB
ANION GAP SERPL CALCULATED.3IONS-SCNC: 8 MMOL/L (ref 4–13)
BACTERIA UR QL AUTO: ABNORMAL /HPF
BASOPHILS # BLD AUTO: 0.04 THOUSANDS/ΜL (ref 0–0.1)
BASOPHILS NFR BLD AUTO: 1 % (ref 0–1)
BILIRUB UR QL STRIP: NEGATIVE
BUN SERPL-MCNC: 13 MG/DL (ref 5–25)
CALCIUM SERPL-MCNC: 9.8 MG/DL (ref 8.4–10.2)
CHLORIDE SERPL-SCNC: 99 MMOL/L (ref 96–108)
CHOLEST SERPL-MCNC: 185 MG/DL
CLARITY UR: CLEAR
CO2 SERPL-SCNC: 28 MMOL/L (ref 21–32)
COLOR UR: YELLOW
CREAT SERPL-MCNC: 0.59 MG/DL (ref 0.6–1.3)
EOSINOPHIL # BLD AUTO: 0.15 THOUSAND/ΜL (ref 0–0.61)
EOSINOPHIL NFR BLD AUTO: 3 % (ref 0–6)
ERYTHROCYTE [DISTWIDTH] IN BLOOD BY AUTOMATED COUNT: 14.4 % (ref 11.6–15.1)
EST. AVERAGE GLUCOSE BLD GHB EST-MCNC: 203 MG/DL
FERRITIN SERPL-MCNC: 66 NG/ML (ref 8–388)
GFR SERPL CREATININE-BSD FRML MDRD: 104 ML/MIN/1.73SQ M
GLUCOSE P FAST SERPL-MCNC: 221 MG/DL (ref 65–99)
GLUCOSE UR STRIP-MCNC: ABNORMAL MG/DL
HBA1C MFR BLD: 8.7 %
HCT VFR BLD AUTO: 42.1 % (ref 34.8–46.1)
HDLC SERPL-MCNC: 48 MG/DL
HGB BLD-MCNC: 14 G/DL (ref 11.5–15.4)
HGB UR QL STRIP.AUTO: ABNORMAL
IMM GRANULOCYTES # BLD AUTO: 0.02 THOUSAND/UL (ref 0–0.2)
IMM GRANULOCYTES NFR BLD AUTO: 0 % (ref 0–2)
IRON SATN MFR SERPL: 25 % (ref 15–50)
IRON SERPL-MCNC: 101 UG/DL (ref 50–170)
KETONES UR STRIP-MCNC: NEGATIVE MG/DL
LDLC SERPL CALC-MCNC: 107 MG/DL (ref 0–100)
LEUKOCYTE ESTERASE UR QL STRIP: ABNORMAL
LYMPHOCYTES # BLD AUTO: 1.51 THOUSANDS/ΜL (ref 0.6–4.47)
LYMPHOCYTES NFR BLD AUTO: 25 % (ref 14–44)
MCH RBC QN AUTO: 28.6 PG (ref 26.8–34.3)
MCHC RBC AUTO-ENTMCNC: 33.3 G/DL (ref 31.4–37.4)
MCV RBC AUTO: 86 FL (ref 82–98)
MONOCYTES # BLD AUTO: 0.45 THOUSAND/ΜL (ref 0.17–1.22)
MONOCYTES NFR BLD AUTO: 8 % (ref 4–12)
MUCOUS THREADS UR QL AUTO: ABNORMAL
NEUTROPHILS # BLD AUTO: 3.83 THOUSANDS/ΜL (ref 1.85–7.62)
NEUTS SEG NFR BLD AUTO: 63 % (ref 43–75)
NITRITE UR QL STRIP: NEGATIVE
NON-SQ EPI CELLS URNS QL MICRO: ABNORMAL /HPF
NONHDLC SERPL-MCNC: 137 MG/DL
NRBC BLD AUTO-RTO: 0 /100 WBCS
PH UR STRIP.AUTO: 6 [PH]
PLATELET # BLD AUTO: 92 THOUSANDS/UL (ref 149–390)
POTASSIUM SERPL-SCNC: 4.3 MMOL/L (ref 3.5–5.3)
PROT UR STRIP-MCNC: ABNORMAL MG/DL
RBC # BLD AUTO: 4.9 MILLION/UL (ref 3.81–5.12)
RBC #/AREA URNS AUTO: ABNORMAL /HPF
SODIUM SERPL-SCNC: 135 MMOL/L (ref 135–147)
SP GR UR STRIP.AUTO: 1.02 (ref 1–1.03)
TIBC SERPL-MCNC: 398 UG/DL (ref 250–450)
TRIGL SERPL-MCNC: 148 MG/DL
UROBILINOGEN UR STRIP-ACNC: <2 MG/DL
WBC # BLD AUTO: 6 THOUSAND/UL (ref 4.31–10.16)
WBC #/AREA URNS AUTO: ABNORMAL /HPF

## 2022-07-20 PROCEDURE — 83036 HEMOGLOBIN GLYCOSYLATED A1C: CPT

## 2022-07-20 PROCEDURE — 80048 BASIC METABOLIC PNL TOTAL CA: CPT

## 2022-07-20 PROCEDURE — 80061 LIPID PANEL: CPT

## 2022-07-20 PROCEDURE — 81001 URINALYSIS AUTO W/SCOPE: CPT | Performed by: FAMILY MEDICINE

## 2022-07-20 PROCEDURE — 3052F HG A1C>EQUAL 8.0%<EQUAL 9.0%: CPT | Performed by: FAMILY MEDICINE

## 2022-07-20 PROCEDURE — 85025 COMPLETE CBC W/AUTO DIFF WBC: CPT

## 2022-07-20 PROCEDURE — 83550 IRON BINDING TEST: CPT

## 2022-07-20 PROCEDURE — 83540 ASSAY OF IRON: CPT

## 2022-07-20 PROCEDURE — 36415 COLL VENOUS BLD VENIPUNCTURE: CPT

## 2022-07-20 PROCEDURE — 82728 ASSAY OF FERRITIN: CPT

## 2022-07-20 PROCEDURE — 99024 POSTOP FOLLOW-UP VISIT: CPT | Performed by: SURGERY

## 2022-07-20 NOTE — PROGRESS NOTES
Surgical Oncology Breast Post-Op       [facility]  [facility]  [address] [facility] [id number]    Filomena Ellsworth Luis Carlos  1965  [id number]  [address]  [facility]  [address] [facility] [address]    Chief Complaint:   Fernando Manuel is seen for a post-operative visit of her recent breast surgery  Oncology History:     Oncology History    No history exists  Assessment & Plan:   Assessment/Plan    Patient presents for a follow-up postoperative visit  She had atypical ductal hyperplasia only  Her TC 8 risk model was approximately 28%  I reviewed this with her  I had a long conversation with the patient her mother talking about various risk calculator is, the American Cancer ascites recommendation for MRIs, additional other screening such as automated breast ultrasounds, additional clinical exam is as well as 3D mammograms compared to original 2 to the mammograms  I recommended at a minimum a annual 3D mammograms  I thought in her particular case MRIs might over diagnose her  Automated breast ultrasounds are probably going to result in less unnecessary biopsies but may not  very small lesions or noninvasive disease that an MRI might discern  Explained the future contrast enhanced mammography may be the best way to go if she would like to do additional imaging  The mother interjected that she felt MRIs were probably unnecessary  I recommend the patient meet with our nurse practitioner who was running the high risk breast program for further discussions and decision in  She will take some time to consider this and may be due her own research  All questions were answered the patient's satisfaction  Her wound is now healing well  The patient is agreeable to travel to [facility] but may ultimately return here since she has close her  All of which are perfectly acceptable    History of Present Illness:   See Oncology History    Interval History:   See Assessment & Plan    Review of Systems:   Review of Systems   All other systems reviewed and are negative        Past Medical History:     Patient Active Problem List   Diagnosis    Vulvovaginal candidiasis    Atypical squamous cell changes of undetermined significance (ASCUS) on cervical cytology with negative high risk human papilloma virus (HPV) test result    BMI 38 0-38 9,adult    Type 2 diabetes mellitus without complication, without long-term current use of insulin (HCC)    Essential hypertension    Gastroesophageal reflux disease without esophagitis    History of hepatitis C    Foot pain, right    Class 2 obesity due to excess calories without serious comorbidity with body mass index (BMI) of 38 0 to 38 9 in adult    Chronic pain of both ankles    Iron deficiency anemia    Thrombocytopenia (HCC)    Atypical ductal hyperplasia of right breast    Pre-op exam    Thigh numbness    Rash     Past Medical History:   Diagnosis Date    Diabetes mellitus ([address] )     GERD (gastroesophageal reflux disease)     Hepatitis C     Treated    Hypertension      Past Surgical History:   Procedure Laterality Date    ANKLE SURGERY      BREAST BIOPSY Right     core bx-benign    BREAST BIOPSY Right 04/11/2022    BREAST LUMPECTOMY Right 6/30/2022    Procedure: BREAST PAYTON  DIRECTED LUMPECTOMY WITH Jolly Aguilar;  Surgeon: Kevin Galvez MD;  [facility];  Service: Surgical Oncology    COLONOSCOPY  03/2019    MAMMO (HISTORICAL)  02/2019    MAMMO NEEDLE LOCALIZATION LEFT (ALL INC) Right 6/9/2022    MAMMO NEEDLE LOCALIZATION RIGHT (ALL INC) Right 6/30/2022    MAMMO STEREOTACTIC BREAST BIOPSY RIGHT (ALL INC) Right 4/11/2022    TUBAL LIGATION       Family History   Problem Relation Age of Onset    Breast cancer Mother 79    Hypertension Mother     Diabetes Mother     Hypertension Father     Diabetes Father     Heart failure Father     Dementia Father     Heart disease Father     No Known Problems Sister     No Known Problems Brother     No Known Problems Daughter     No Known Problems Son     Hyperlipidemia Maternal Grandmother     Diabetes Maternal Grandmother     Heart attack Maternal Grandfather     Stroke Maternal Grandfather     Arthritis Paternal Grandmother     Liver cancer Paternal Grandfather         unknown age   Clifm Pedro No Known Problems Brother     No Known Problems Brother      Social History     Socioeconomic History    Marital status: Single     Spouse name: Not on file    Number of children: Not on file    Years of education: Not on file    Highest education level: Not on file   Occupational History    Not on file   Tobacco Use    Smoking status: Never Smoker    Smokeless tobacco: Never Used   Vaping Use    Vaping Use: Never used   Substance and Sexual Activity    Alcohol use: Not Currently     Comment: socially    Drug use: Never    Sexual activity: Not Currently   Other Topics Concern    Not on file   Social History Narrative    Kennard:    Most recent tobacco use screenin-    Alcohol intake: None     Social Determinants of Health     Financial Resource Strain: Not on file   Food Insecurity: Not on file   Transportation Needs: Not on file   Physical Activity: Not on file   Stress: Not on file   Social Connections: Not on file   Intimate Partner Violence: Not on file   Housing Stability: Not on file       Current Outpatient Medications:     BLACK COHOSH EXTRACT PO, Take by mouth, Disp: , Rfl:     Diclofenac Sodium (VOLTAREN) 1 %, Apply 2 g topically 4 (four) times a day as needed, Disp: , Rfl:     EQL EVENING PRIMROSE  MG CAPS, Take by mouth, Disp: , Rfl:     ferrous sulfate 325 (65 Fe) mg tablet, Take 325 mg by mouth daily, Disp: , Rfl:     metFORMIN (GLUCOPHAGE) 1000 MG tablet, Take 1 tablet (1,000 mg total) by mouth 2 (two) times a day with meals, Disp: 180 tablet, Rfl: 2   omeprazole (PriLOSEC) 20 mg delayed release capsule, Take 1 capsule (20 mg total) by mouth daily (Patient taking differently: Take 20 mg by mouth daily in the early morning), Disp: 90 capsule, Rfl: 2    Semaglutide,0 25 or 0 5MG/DOS, (Ozempic, 0 25 or 0 5 MG/DOSE,) 2 MG/1 5ML SOPN, Inject 0 5 mg under the skin once a week (Patient taking differently: Inject 0 5 mg under the skin once a week Takes on Sunday), Disp: 6 mL, Rfl: 6    valsartan (DIOVAN) 320 MG tablet, Take 1 tablet (320 mg total) by mouth daily (Patient taking differently: Take 320 mg by mouth daily in the early morning), Disp: 90 tablet, Rfl: 3    oxyCODONE-acetaminophen (Percocet) 5-325 mg per tablet, Take 1 tablet by mouth every 6 (six) hours as needed (as needed for pain) Max Daily Amount: 4 tablets (Patient taking differently: Take 1 tablet by mouth every 6 (six) hours as needed (as needed for pain) POST OP), Disp: 6 tablet, Rfl: 0  Allergies   Allergen Reactions    Lisinopril Cough       Physical Exams:     Vitals:    07/20/22 1453   BP: 122/80   Pulse: 98   Resp: 18   Temp: 97 5 °F (36 4 °C)   SpO2: 98%     Physical Exam  Chest:          Comments: Examination of the patient's breast wound demonstrates that is healing quite well  There is a minimal area of erythema still from a reaction to Steri-Strips  There is no evidence of infection hematoma or seroma  Results & Discussion:   See above  I reviewed the options of 3D mammography, automated breast ultrasounds, fast and standard MRIs as well as upcoming contrast enhanced mammography  All questions were answered the patient's satisfaction  She will consider these options and follow up with Ambar Yeung for an opinion regarding further management and follow-up options

## 2022-07-21 ENCOUNTER — TELEPHONE (OUTPATIENT)
Dept: SURGICAL ONCOLOGY | Facility: CLINIC | Age: 57
End: 2022-07-21

## 2022-07-27 ENCOUNTER — APPOINTMENT (OUTPATIENT)
Dept: LAB | Facility: CLINIC | Age: 57
End: 2022-07-27
Payer: COMMERCIAL

## 2022-07-27 DIAGNOSIS — R31.9 HEMATURIA, UNSPECIFIED TYPE: Primary | ICD-10-CM

## 2022-07-27 DIAGNOSIS — R31.9 HEMATURIA, UNSPECIFIED TYPE: ICD-10-CM

## 2022-07-27 DIAGNOSIS — R80.9 PROTEINURIA, UNSPECIFIED TYPE: Primary | ICD-10-CM

## 2022-07-27 LAB
BACTERIA UR QL AUTO: ABNORMAL /HPF
BILIRUB UR QL STRIP: NEGATIVE
CLARITY UR: CLEAR
COLOR UR: ABNORMAL
GLUCOSE UR STRIP-MCNC: NEGATIVE MG/DL
HGB UR QL STRIP.AUTO: ABNORMAL
HYALINE CASTS #/AREA URNS LPF: ABNORMAL /LPF
KETONES UR STRIP-MCNC: NEGATIVE MG/DL
LEUKOCYTE ESTERASE UR QL STRIP: NEGATIVE
MUCOUS THREADS UR QL AUTO: ABNORMAL
NITRITE UR QL STRIP: NEGATIVE
NON-SQ EPI CELLS URNS QL MICRO: ABNORMAL /HPF
PH UR STRIP.AUTO: 5.5 [PH]
PROT UR STRIP-MCNC: ABNORMAL MG/DL
RBC #/AREA URNS AUTO: ABNORMAL /HPF
SP GR UR STRIP.AUTO: 1.01 (ref 1–1.03)
UROBILINOGEN UR STRIP-ACNC: <2 MG/DL
WBC #/AREA URNS AUTO: ABNORMAL /HPF

## 2022-07-27 PROCEDURE — 3066F NEPHROPATHY DOC TX: CPT | Performed by: FAMILY MEDICINE

## 2022-07-27 PROCEDURE — 81001 URINALYSIS AUTO W/SCOPE: CPT

## 2022-07-28 ENCOUNTER — OFFICE VISIT (OUTPATIENT)
Dept: FAMILY MEDICINE CLINIC | Facility: CLINIC | Age: 57
End: 2022-07-28
Payer: COMMERCIAL

## 2022-07-28 VITALS
WEIGHT: 277 LBS | RESPIRATION RATE: 18 BRPM | DIASTOLIC BLOOD PRESSURE: 82 MMHG | HEART RATE: 80 BPM | HEIGHT: 70 IN | SYSTOLIC BLOOD PRESSURE: 130 MMHG | BODY MASS INDEX: 39.65 KG/M2 | TEMPERATURE: 97.7 F | OXYGEN SATURATION: 98 %

## 2022-07-28 DIAGNOSIS — E66.09 CLASS 2 OBESITY DUE TO EXCESS CALORIES WITHOUT SERIOUS COMORBIDITY WITH BODY MASS INDEX (BMI) OF 38.0 TO 38.9 IN ADULT: ICD-10-CM

## 2022-07-28 DIAGNOSIS — E11.65 TYPE 2 DIABETES MELLITUS WITH HYPERGLYCEMIA, WITHOUT LONG-TERM CURRENT USE OF INSULIN (HCC): ICD-10-CM

## 2022-07-28 DIAGNOSIS — R80.8 OTHER PROTEINURIA: ICD-10-CM

## 2022-07-28 DIAGNOSIS — K21.9 GASTROESOPHAGEAL REFLUX DISEASE WITHOUT ESOPHAGITIS: ICD-10-CM

## 2022-07-28 DIAGNOSIS — Z86.2 HISTORY OF IRON DEFICIENCY ANEMIA: Primary | ICD-10-CM

## 2022-07-28 PROCEDURE — 99214 OFFICE O/P EST MOD 30 MIN: CPT | Performed by: FAMILY MEDICINE

## 2022-07-28 PROCEDURE — 3725F SCREEN DEPRESSION PERFORMED: CPT | Performed by: FAMILY MEDICINE

## 2022-07-28 RX ORDER — GLIMEPIRIDE 1 MG/1
1 TABLET ORAL
Qty: 30 TABLET | Refills: 5 | Status: SHIPPED | OUTPATIENT
Start: 2022-07-28

## 2022-07-28 NOTE — ASSESSMENT & PLAN NOTE
Lab Results   Component Value Date    HGBA1C 8 7 (H) 07/20/2022   pt has added smoothies  To diet and sugar and unable  To afford jardiance and will be getting new insurance pt to refrain smoothies  Will add glimepiride short term

## 2022-07-28 NOTE — PROGRESS NOTES
Assessment/Plan:         Problem List Items Addressed This Visit        Digestive    Gastroesophageal reflux disease without esophagitis     Ok on omeprazole            Endocrine    Type 2 diabetes mellitus with hyperglycemia, without long-term current use of insulin (HCC)       Lab Results   Component Value Date    HGBA1C 8 7 (H) 07/20/2022   pt has added smoothies  To diet and sugar and unable  To afford jardiance and will be getting new insurance pt to refrain smoothies  Will add glimepiride short term         Relevant Medications    glimepiride (AMARYL) 1 mg tablet    Other Relevant Orders    Hemoglobin I1Y    Basic metabolic panel       Other    Class 2 obesity due to excess calories without serious comorbidity with body mass index (BMI) of 38 0 to 38 9 in adult     Discussion on diet and exercise guidelines for weight loss and  health reviewed with pt            History of iron deficiency anemia - Primary     Iron stores are restored cbc nl          Other proteinuria     Check 24 hour protein                 Subjective: pt here for interval visit recent lumpectomy  Breast atypia      Patient ID: Natalia Leigh is a 49 y o  female  HPI    The following portions of the patient's history were reviewed and updated as appropriate:   Past Medical History:  She has a past medical history of Diabetes mellitus ([address] ), GERD (gastroesophageal reflux disease), Hepatitis C, and Hypertension  ,  _______________________________________________________________________  Medical Problems:  does not have any pertinent problems on file ,  _______________________________________________________________________  Past Surgical History:   has a past surgical history that includes Tubal ligation; Ankle surgery; Mammo (historical) (02/2019); Colonoscopy (03/2019); Mammo stereotactic breast biopsy right (all inc) (Right, 4/11/2022); Breast biopsy (Right); Breast biopsy (Right, 04/11/2022);  Mammo needle localization left (all inc) (Right, 6/9/2022); Mammo needle localization right (all inc) (Right, 6/30/2022); and Breast lumpectomy (Right, 6/30/2022)  ,  _______________________________________________________________________  Family History:  family history includes Arthritis in her paternal grandmother; Breast cancer (age of onset: 79) in her mother; Dementia in her father; Diabetes in her father, maternal grandmother, and mother; Heart attack in her maternal grandfather; Heart disease in her father; Heart failure in her father; Hyperlipidemia in her maternal grandmother; Hypertension in her father and mother; Liver cancer in her paternal grandfather; No Known Problems in her brother, brother, brother, daughter, sister, and son; Stroke in her maternal grandfather ,  _______________________________________________________________________  Social History:   reports that she has never smoked  She has never used smokeless tobacco  She reports previous alcohol use  She reports that she does not use drugs  ,  _______________________________________________________________________  Allergies:  is allergic to lisinopril     _______________________________________________________________________  Current Outpatient Medications   Medication Sig Dispense Refill    BLACK COHOSH EXTRACT PO Take by mouth      Diclofenac Sodium (VOLTAREN) 1 % Apply 2 g topically 4 (four) times a day as needed      EQL EVENING PRIMROSE  MG CAPS Take by mouth      ferrous sulfate 325 (65 Fe) mg tablet Take 325 mg by mouth daily      glimepiride (AMARYL) 1 mg tablet Take 1 tablet (1 mg total) by mouth daily with breakfast 30 tablet 5    metFORMIN (GLUCOPHAGE) 1000 MG tablet Take 1 tablet (1,000 mg total) by mouth 2 (two) times a day with meals 180 tablet 2    omeprazole (PriLOSEC) 20 mg delayed release capsule Take 1 capsule (20 mg total) by mouth daily (Patient taking differently: Take 20 mg by mouth daily in the early morning) 90 capsule 2    Semaglutide,0 25 or 0 5MG/DOS, (Ozempic, 0 25 or 0 5 MG/DOSE,) 2 MG/1 5ML SOPN Inject 0 5 mg under the skin once a week (Patient taking differently: Inject 0 5 mg under the skin once a week Takes on Sunday) 6 mL 6    valsartan (DIOVAN) 320 MG tablet Take 1 tablet (320 mg total) by mouth daily (Patient taking differently: Take 320 mg by mouth daily in the early morning) 90 tablet 3     No current facility-administered medications for this visit      _______________________________________________________________________  Review of Systems   Constitutional: Negative for appetite change, chills, fatigue and fever  Respiratory: Negative for cough, chest tightness and shortness of breath  Cardiovascular: Negative for chest pain, palpitations and leg swelling  Gastrointestinal: Negative for abdominal pain, constipation, diarrhea, nausea and vomiting  Genitourinary: Negative for difficulty urinating and frequency  Musculoskeletal: Negative for arthralgias, back pain and neck pain  Skin: Negative for rash  Neurological: Negative for dizziness, weakness, light-headedness, numbness and headaches  Hematological: Does not bruise/bleed easily  Psychiatric/Behavioral: Negative for dysphoric mood and sleep disturbance  The patient is not nervous/anxious  Objective:  Vitals:    07/28/22 1010   BP: 130/82   BP Location: Left arm   Patient Position: Sitting   Cuff Size: Large   Pulse: 80   Resp: 18   Temp: 97 7 °F (36 5 °C)   TempSrc: Temporal   SpO2: 98%   Weight: 126 kg (277 lb)   Height: 5' 10" (1 778 m)     Body mass index is 39 75 kg/m²  Physical Exam  Vitals reviewed  Constitutional:       General: She is not in acute distress  Appearance: Normal appearance  She is well-developed  She is obese  She is not ill-appearing  Eyes:      Extraocular Movements: Extraocular movements intact  Pupils: Pupils are equal, round, and reactive to light     Cardiovascular:      Rate and Rhythm: Normal rate and regular rhythm  Pulses: Normal pulses  Heart sounds: Normal heart sounds  No murmur heard  Pulmonary:      Effort: Pulmonary effort is normal       Breath sounds: Normal breath sounds  Musculoskeletal:      Right lower leg: No edema  Left lower leg: No edema  Neurological:      General: No focal deficit present  Mental Status: She is alert and oriented to person, place, and time  Mental status is at baseline  Psychiatric:         Mood and Affect: Mood normal          Behavior: Behavior normal          Thought Content:  Thought content normal

## 2022-07-29 ENCOUNTER — APPOINTMENT (OUTPATIENT)
Dept: LAB | Facility: CLINIC | Age: 57
End: 2022-07-29
Payer: COMMERCIAL

## 2022-07-29 DIAGNOSIS — R80.9 PROTEINURIA, UNSPECIFIED TYPE: ICD-10-CM

## 2022-07-29 LAB
PROT 24H UR-MCNC: 156 MG/24 HRS (ref 40–150)
SPECIMEN VOL UR: 1200 ML

## 2022-07-29 PROCEDURE — 84156 ASSAY OF PROTEIN URINE: CPT

## 2022-07-29 PROCEDURE — 3060F POS MICROALBUMINURIA REV: CPT | Performed by: FAMILY MEDICINE

## 2022-10-11 ENCOUNTER — PROCEDURE VISIT (OUTPATIENT)
Dept: NEUROLOGY | Facility: CLINIC | Age: 57
End: 2022-10-11
Payer: COMMERCIAL

## 2022-10-11 DIAGNOSIS — R20.0 THIGH NUMBNESS: ICD-10-CM

## 2022-10-11 PROCEDURE — 95886 MUSC TEST DONE W/N TEST COMP: CPT | Performed by: PHYSICAL MEDICINE & REHABILITATION

## 2022-10-11 PROCEDURE — 95911 NRV CNDJ TEST 9-10 STUDIES: CPT | Performed by: PHYSICAL MEDICINE & REHABILITATION

## 2022-10-11 NOTE — PROGRESS NOTES
EMG 2 limb lower extremity     Date/Time 10/11/2022 1:45 PM     Performed by  Sonia Perez MD     Authorized by Narinder Ramirez MD                [facility] AT [address]  [phone]    Electromyography & Nerve Conduction Studies Report          Full Name: Anabella Reed Gender: Female  MRN: [id number] YOB: 1965      Visit Date: 10/11/2022 1:12 PM  Age: 56 Years  Examining Physician: Sonia Perez MD   Referring Physician: DR Sharonda Olmstead History: 61-year-old female with past medical history of type 2 diabetes mellitus presents with complaints of numbness in both thighs on the lateral aspect which started a few months back  Symptoms have resolved since she stopped working and is no longer on her feet for prolonged periods of time  She denies any radicular symptoms  Patient is being evaluated for a focal neuropathy versus diabetic peripheral neuropathy  TEMP 32      Sensory Nerve Conduction Study       Nerve / Sites Rec  Site Onset Lat Peak Lat  Amp Segments Distance Velocity     ms ms µV  cm m/s   R Sural - (Antidromic)      Calf Ankle 4 1 5 1 0 67 Calf - Ankle 14 34      Ref  ?4 4 ? 6 0 Ref  ?40   L Sural - (Antidromic)      Calf Ankle NR NR NR Calf - Ankle 14 NR      Ref  ?4 4 ? 6 0 Ref  ?40   R Superficial peroneal - (Antidromic)      Lat leg Ankle 5 0 6 1 3 5 Lat leg - Ankle 14 28      Ref  ?4 4 ? 6 0 Ref  ?40   L Superficial peroneal - (Antidromic)      Lat leg Ankle 4 0 5 1 6 5 Lat leg - Ankle 14 35      Ref  ?4 4 ? 6 0 Ref  ?40       Motor Nerve Conduction Study       Nerve / Sites Muscle Latency Ref  Amplitude Ref  Segments Distance Lat Diff Velocity Ref  ms ms mV mV  cm ms m/s m/s   R Peroneal - EDB      Ankle EDB 6 0 ?6 5 2 5 ?2 0 Ankle - EDB 9         B  Fib Head EDB 12 8  2 3  B  Fib Head - Ankle 32 6 85 47 ?44      A  Fib Head EDB 14 8  2 1  A  Fib Head - B   Fib Head 10 1 96 51 ?44   L Peroneal - EDB      Ankle EDB 4 8 ?6 5 2 1 ?2 0 Ankle - EDB 9         B  Fib Head EDB 14 2  2 0  B  Fib Head - Ankle 32 9 44 34 ?44      A  Fib Head EDB 16 1  2 0  A  Fib Head - B  Fib Head 10 1 85 54 ?44   R Tibial - AH      Ankle AH 6 6 ?5 8 1 1 ?4 0 Ankle - AH 9         Knee AH 16 8  1 0  Knee - Ankle 38 10 23 37 ?41   L Tibial - AH      Ankle AH 6 9 ?5 8 3 2 ?4 0 Ankle - AH 9         Knee AH 15 8  2 0  Knee - Ankle 38 8 88 43 ?41   L Peroneal - Tib Ant      Fib Head Tib Ant 2 9 ?6 7 1 3 ?3 0 Fib Head - Tib Ant          Pop fossa Tib Ant 4 8  1 0  Pop fossa - Fib Head 10 1 98 51 ?44       F Waves       Nerve F Latency Ref  ms ms   R Peroneal - EDB 57 1 ? 56 0   R Tibial - AH 59 6 ? 56 0   L Peroneal - EDB 53 5 ? 56 0   L Tibial - AH 58 0 ?56 0       H Reflex       Nerve H Latency    ms   R Tibial - Soleus 35 7   L Tibial - Soleus 33 9       EMG Summary Table     Spontaneous MUAP Recruitment   Muscle Nerve Roots IA Fib PSW Fasc H F  Dur  Amp PPP Config Pattern   L  Tibialis anterior Deep peroneal (Fibular) L4-L5 NL None None None None NL NL None NL NL   L  Abductor hallucis Tibial S1-S2 NL None None None None Gr  Incr  NL None NL Reduced   R  Abductor hallucis Tibial S1-S2 NL None None None None NL Sl  Incr  Few NL Reduced   L  Extensor digitorum brevis Tibial L5-S1 NL None None None None NL Gr  Incr  Few NL Reduced   R  Extensor digitorum brevis Tibial L5-S1 NL None None None None Gr  Incr  Gr  Incr  Few NL Reduced   L  Lumbar paraspinals Spinal L1-L5 NL None None None None NL NL None NL NL   R  Lumbar paraspinals Spinal L1-L5 NL None None None None NL NL None NL NL   L  Gastrocnemius (Medial head) Tibial S1-S2 NL None None None None NL NL None NL NL   R  Gastrocnemius (Medial head) Tibial S1-S2 NL None None None None NL NL None NL NL   L  Quadriceps Femoral L2-L4 NL None None None None NL NL None NL NL   R  Quadriceps Femoral L2-L4 NL None None None None NL NL None NL NL   R   Tibialis anterior Deep peroneal (Fibular) L4-L5 NL None None None None NL NL None NL NL                                     Summary    Motor and sensory conduction studies were performed on the bilateral peroneal, tibial and sural nerves  The right peroneal motor response to the EDB was normal   The left peroneal motor terminal latency while recording from the EDB was normal with a normal compound motor action potential amplitude and a slow conduction velocity distally but a normal conduction velocity across the fibular head  The left peroneal motor terminal latency while recording from the tibialis anterior was normal with a low compound motor action potential amplitude and a normal conduction velocity across the ankle  The right tibial motor terminal latency was prolonged with a low compound motor action potential amplitude and a slow conduction velocity across the ankle  The left tibial motor terminal latency was prolonged with a low compound motor action potential amplitude and normal conduction velocity across the ankle  The right peroneal  and bilateral tibial F waves were normal   The left peroneal F-wave was normal     The right sural and superficial peroneal sensory peak latency was prolonged with a low sensory action potential amplitude and a slow conduction velocity across the ankle  The left sural sensory response was absent  The left superficial peroneal sensory peak latency was normal with a low sensory action potential amplitude and slow conduction velocity across the ankle  The right H soleus response was prolonged as compared to the left  Concentric needle EMG was performed on various distal and proximal muscles of the lower extremities bilaterally including EDB, tibialis anterior, gastrocnemius medius, vastus lateralis, gluteus medius and the low lumbar paraspinal regions  There was no evidence of spontaneous activity seen    Moderate decreased recruitment of giant and polyphasic motor units was noted in the bilateral abductor hallucis and extensor digitorum brevis  The compound motor unit potentials were of normal configuration and interference patterns were full or full for effort in the remaining muscles tested  Impression:    Abnormal study  There is electrophysiologic evidence of a:    1  Chronic length-dependent sensory motor peripheral neuropathy, 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

## 2022-10-16 NOTE — PROGRESS NOTES
Assessment/Plan:  EBX attempted-Stenotic os so unable to pass pipelle through cervix  Complete pelvic ultrasound as discussed  Will determine plan of care upon those results  Vaginal atrophy noted  Will consider vaginal estrogen pending US results  Return to office for annual exam          1  Post-menopausal bleeding  -     US pelvis complete w transvaginal; Future; Expected date: 10/17/2022    2  Vaginal atrophy      Endometrial biopsy    Date/Time: 10/17/2022 1:15 PM  Performed by: ILDEFONSO Sorto  Authorized by: ILDEFONSO Sorto   Universal Protocol:  Consent: Verbal consent obtained  Written consent obtained  Risks and benefits: risks, benefits and alternatives were discussed  Consent given by: patient  Time out: Immediately prior to procedure a "time out" was called to verify the correct patient, procedure, equipment, support staff and site/side marked as required  Timeout called at: 10/17/2022 1:15 PM   Patient understanding: patient states understanding of the procedure being performed  Patient consent: the patient's understanding of the procedure matches consent given  Procedure consent: procedure consent matches procedure scheduled  Patient identity confirmed: verbally with patient      Indication:     Indications: Post-menopausal bleeding      Chronicity of post-menopausal bleeding:  New    Progression of post-menopausal bleeding:  Resolved  Procedure:     Procedure: endometrial biopsy with Pipelle      A bivalve speculum was placed in the vagina: yes      Cervix cleaned and prepped: yes      A paracervical block was performed: no      An intracervical block was performed: no      Unable to perform due to: cervical stenosis    Findings:     Uterus size (weeks): limited by habitus  Cervix: normal    Comments:     Procedure comments:  Endometrial biopsy attempted but unable to pass pipelle through cervix  Consent signed              Subjective:      Patient ID: Lena Person yoan a 56 y o  female  HPI:    Destiny Barton is a 56 y o   female who is here today for a problem visit accompanied by her mother and also has her daughter Carlos Coelho on the phone  LMP 14 months ago with no vaginal bleeding until   2 week ago when she had light spotting intermittently each day x 1 week  Never needed more than a panty liner which was never saturated  Denies pelvic pain, cramping or recent coitus (it's been years)  Denies vaginal itching or discharge  Denies bladder or bowel issues  Last HSV outbreak was approx was many months ago  Unemployed since late 2022  The following portions of the patient's history were reviewed and updated as appropriate: allergies, current medications, past family history, past medical history, past surgical history and problem list     Review of Systems   Constitutional: Negative  Gastrointestinal: Negative for abdominal pain, constipation, diarrhea, nausea and vomiting  Genitourinary: Positive for vaginal bleeding and vaginal discharge  Negative for decreased urine volume, dysuria, genital sores, pelvic pain, urgency and vaginal pain  Musculoskeletal: Negative for arthralgias and myalgias  Skin: Negative  Hematological: Negative for adenopathy  Psychiatric/Behavioral: Negative  All other systems reviewed and are negative  Objective:      /82 (BP Location: Left arm, Patient Position: Sitting, Cuff Size: Large)   Ht 5' 9 75" (1 772 m)   Wt 129 kg (283 lb 12 8 oz)   LMP 2021 (Approximate)   BMI 41 01 kg/m²          Physical Exam  Vitals and nursing note reviewed  Exam conducted with a chaperone present  Constitutional:       Appearance: She is well-developed  She is obese  Genitourinary:     General: Normal vulva  Exam position: Lithotomy position  Labia:         Right: No rash, tenderness, lesion or injury  Left: No rash, tenderness, lesion or injury         Urethra: No prolapse, urethral pain, urethral swelling or urethral lesion  Vagina: No signs of injury and foreign body  Bleeding (atrophic with scant bleeding noted with any contact with speculum and proctoswab) present  No vaginal discharge, erythema or tenderness  Cervix: No cervical motion tenderness, discharge or friability  Uterus: Normal        Adnexa:         Right: No mass, tenderness or fullness  Left: No mass, tenderness or fullness  Rectum: No external hemorrhoid  Comments: Physical exam limited by habitus  Lymphadenopathy:      Lower Body: No right inguinal adenopathy  No left inguinal adenopathy  Skin:     General: Skin is warm and dry  Neurological:      Mental Status: She is alert and oriented to person, place, and time     Psychiatric:         Mood and Affect: Mood normal          Behavior: Behavior normal

## 2022-10-17 ENCOUNTER — OFFICE VISIT (OUTPATIENT)
Dept: OBGYN CLINIC | Facility: CLINIC | Age: 57
End: 2022-10-17

## 2022-10-17 VITALS
HEIGHT: 70 IN | SYSTOLIC BLOOD PRESSURE: 144 MMHG | DIASTOLIC BLOOD PRESSURE: 82 MMHG | WEIGHT: 283.8 LBS | BODY MASS INDEX: 40.63 KG/M2

## 2022-10-17 DIAGNOSIS — N95.2 VAGINAL ATROPHY: ICD-10-CM

## 2022-10-17 DIAGNOSIS — N95.0 POST-MENOPAUSAL BLEEDING: Primary | ICD-10-CM

## 2022-10-17 NOTE — PATIENT INSTRUCTIONS
Stenotic os so unable to pass pipelle through cervix  Complete pelvic ultrasound as discussed  Will determine plan of care upon those results  Vaginal atrophy noted  Will consider vaginal estrogen pending US results    Return to office for annual exam

## 2022-10-21 ENCOUNTER — OFFICE VISIT (OUTPATIENT)
Dept: SURGICAL ONCOLOGY | Facility: CLINIC | Age: 57
End: 2022-10-21
Payer: COMMERCIAL

## 2022-10-21 VITALS
RESPIRATION RATE: 18 BRPM | DIASTOLIC BLOOD PRESSURE: 68 MMHG | BODY MASS INDEX: 40.37 KG/M2 | HEIGHT: 70 IN | HEART RATE: 58 BPM | OXYGEN SATURATION: 97 % | SYSTOLIC BLOOD PRESSURE: 114 MMHG | WEIGHT: 282 LBS | TEMPERATURE: 98.4 F

## 2022-10-21 DIAGNOSIS — Z91.89 INCREASED RISK OF BREAST CANCER: ICD-10-CM

## 2022-10-21 DIAGNOSIS — Z12.31 VISIT FOR SCREENING MAMMOGRAM: ICD-10-CM

## 2022-10-21 DIAGNOSIS — N60.91 ATYPICAL DUCTAL HYPERPLASIA OF RIGHT BREAST: Primary | ICD-10-CM

## 2022-10-21 PROCEDURE — 99213 OFFICE O/P EST LOW 20 MIN: CPT | Performed by: NURSE PRACTITIONER

## 2022-10-21 NOTE — PATIENT INSTRUCTIONS
Breast Self Exam for Women   AMBULATORY CARE:   A breast self-exam (BSE)  is a way to check your breasts for lumps and other changes  Regular BSEs can help you know how your breasts normally look and feel  Most breast lumps or changes are not cancer, but you should always have them checked by a healthcare provider  Why you should do a BSE:  Breast cancer is the most common type of cancer in women  Even if you have mammograms, you may still want to do a BSE regularly  If you know how your breasts normally feel and look, it may help you know when to contact your healthcare provider  Mammograms can miss some cancers  You may find a lump during a BSE that did not show up on a mammogram   When you should do a BSE:  If you have periods, you may want to do your BSE 1 week after your period ends  This is the time when your breasts may be the least swollen, lumpy, or tender  You can do regular BSEs even if you are breastfeeding or have breast implants  Call your doctor if:   You find any lumps or changes in your breasts  You have breast pain or fluid coming from your nipples  You have questions or concerns about your condition or care  How to do a BSE:       Look at your breasts in a mirror  Look at the size and shape of each breast and nipple  Check for swelling, lumps, dimpling, scaly skin, or other skin changes  Look for nipple changes, such as a nipple that is painful or beginning to pull inward  Gently squeeze both nipples and check to see if fluid (that is not breast milk) comes out of them  If you find any of these or other breast changes, contact your healthcare provider  Check your breasts while you sit or  the following 3 positions:    Sherman your arms down at your sides  Raise your hands and join them behind your head  Put firm pressure with your hands on your hips  Bend slightly forward while you look at your breasts in the mirror  Lie down and feel your breasts    When you lie down, your breast tissue spreads out evenly over your chest  This makes it easier for you to feel for lumps and anything that may not be normal for your breasts  Do a BSE on one breast at a time  Place a small pillow or towel under your left shoulder  Put your left arm behind your head  Use the 3 middle fingers of your right hand  Use your fingertip pads, on the top of your fingers  Your fingertip pad is the most sensitive part of your finger  Use small circles to feel your breast tissue  Use your fingertip pads to make dime-sized, overlapping circles on your breast and armpits  Use light, medium, and firm pressure  First, press lightly  Second, press with medium pressure to feel a little deeper into the breast  Last, use firm pressure to feel deep within your breast     Examine your entire breast area  Examine the breast area from above the breast to below the breast where you feel only ribs  Make small circles with your fingertips, starting in the middle of your armpit  Make circles going up and down the breast area  Continue toward your breast and all the way across it  Examine the area from your armpit all the way over to the middle of your chest (breastbone)  Stop at the middle of your chest     Move the pillow or towel to your right shoulder, and put your right arm behind your head  Use the 3 fingertip pads of your left hand, and repeat the above steps to do a BSE on your right breast     What else you can do to check for breast problems or cancer:  Talk to your healthcare provider about mammograms  A mammogram is an x-ray of your breasts to screen for breast cancer or other problems  Your provider can tell you the benefits and risks of mammograms  The first mammogram is usually at age 39 or 48  Your provider may recommend you start at 36 or younger if your risk for breast cancer is high  Mammograms usually continue every 1 to 2 years until age 76         Follow up with your doctor as directed:  Write down your questions so you remember to ask them during your visits  © Copyright [employer] 2022 Information is for End User's use only and may not be sold, redistributed or otherwise used for commercial purposes  All illustrations and images included in CareNotes® are the copyrighted property of A D A M , [employer]  or Cristopher Van  The above information is an  only  It is not intended as medical advice for individual conditions or treatments  Talk to your doctor, nurse or pharmacist before following any medical regimen to see if it is safe and effective for you

## 2022-10-21 NOTE — PROGRESS NOTES
Surgical Oncology Follow Up       [facility]  [facility]  [place] [address]    Nayeli Moore  1965  [id number]      Chief Complaint   Patient presents with   • Follow-up       Assessment/Plan:  1  Atypical ductal hyperplasia of right breast      2  Increased risk of breast cancer  - Consume healthy diet, exercise regularly, maintain healthy weight  - Patient to contact me if interested in chemoprevention  - Annual 3d mammography, CBE q 6 mo    3  Visit for screening mammogram  - Mammo screening bilateral w 3d & cad; Future      Discussion/Summary:  Patient is a 17-year-old female that underwent a right lumpectomy with Dr Sruthi Rebolledo for ADH in June of 2022  She also has a family history of breast cancer in her mother who was diagnosed in her late 62s  Her mother feels that her breast cancer was related to estrogen use  No other family history of cancers  Patient, her mother and I discussed patient overall risk  Her lifetime TC risk was estimated to be 28%  I reviewed risk reducing measures with her  Patient states that she does not consume excess alcohol or smoke but states that she could work on nutrition/exercise  I reviewed the option of chemoprevention  Patient is not currently interested in meeting with Medical Oncology for further discussion  We discussed adjuvant screening options  At minimum I recommended clinical breast exams every 6 months and annual 3D mammography  Patient does not have dense breast tissue so I do not believe she would benefit from an automated breast ultrasound  We discussed the option of adjuvant MRI  Patient prefers to avoid this  I think this is reasonable  There are no worrisome findings on her clinical exam today  She does have some scarring at her lumpectomy site for which I encouraged her to massage    Also encouraged her to wear supportive bra and massage the right breast to help minimize the mild edema that is present  Script given for mammogram which will be due in April  Patient states that she has her annual visit with her gynecologist in December  We will therefore see her back in June and then annually thereafter so her visits are staggered with her gynecologist   She was instructed to contact us with any changes on self-exam   All of her questions and her mother's questions were answered today  History of Present Illness:     -Interval History:  Patient is a 72-year-old female that presents today for follow-up visit for ADH, family history of breast cancer, increased risk of breast cancer  She notes some mild swelling of the right breast   Otherwise denies any new breast masses, skin changes, nipple changes or discharge  Review of Systems:  Review of Systems   Constitutional: Negative for chills, fatigue and fever  Respiratory: Negative for cough and shortness of breath  Cardiovascular: Negative for chest pain  Hematological: Negative for adenopathy  Psychiatric/Behavioral: Negative for confusion         Patient Active Problem List   Diagnosis   • Vulvovaginal candidiasis   • Atypical squamous cell changes of undetermined significance (ASCUS) on cervical cytology with negative high risk human papilloma virus (HPV) test result   • BMI 38 0-38 9,adult   • Type 2 diabetes mellitus with hyperglycemia, without long-term current use of insulin (HCC)   • Essential hypertension   • Gastroesophageal reflux disease without esophagitis   • History of hepatitis C   • Foot pain, right   • Class 2 obesity due to excess calories without serious comorbidity with body mass index (BMI) of 38 0 to 38 9 in adult   • Chronic pain of both ankles   • History of iron deficiency anemia   • Thrombocytopenia (HCC)   • Atypical ductal hyperplasia of right breast   • Pre-op exam   • Thigh numbness   • Rash   • Other proteinuria   • Increased risk of breast cancer     Past Medical History:   Diagnosis Date   • Diabetes mellitus ([facility] [address] )    • GERD (gastroesophageal reflux disease)    • Hepatitis C     Treated   • Hypertension      Past Surgical History:   Procedure Laterality Date   • ANKLE SURGERY     • BREAST BIOPSY Right     core bx-benign   • BREAST BIOPSY Right 04/11/2022   • BREAST LUMPECTOMY Right 6/30/2022    Procedure: BREAST PAYTON  DIRECTED LUMPECTOMY WITH Kamla Kong;  Surgeon: Geneva Marti MD;  Location: [facility];  Service: Surgical Oncology   • COLONOSCOPY  03/2019   • MAMMO (HISTORICAL)  02/2019   • MAMMO NEEDLE LOCALIZATION LEFT (ALL INC) Right 6/9/2022   • MAMMO NEEDLE LOCALIZATION RIGHT (ALL INC) Right 6/30/2022   • MAMMO STEREOTACTIC BREAST BIOPSY RIGHT (ALL INC) Right 4/11/2022   • TUBAL LIGATION       Family History   Problem Relation Age of Onset   • Breast cancer Mother 79   • Hypertension Mother    • Diabetes Mother    • Hypertension Father    • Diabetes Father    • Heart failure Father    • Dementia Father    • Heart disease Father    • No Known Problems Sister    • No Known Problems Brother    • No Known Problems Daughter    • No Known Problems Son    • Hyperlipidemia Maternal Grandmother    • Diabetes Maternal Grandmother    • Heart attack Maternal Grandfather    • Stroke Maternal Grandfather    • Arthritis Paternal Grandmother    • Liver cancer Paternal Grandfather         unknown age   • No Known Problems Brother    • No Known Problems Brother      Social History     Socioeconomic History   • Marital status: Single     Spouse name: Not on file   • Number of children: Not on file   • Years of education: Not on file   • Highest education level: Not on file   Occupational History   • Not on file   Tobacco Use   • Smoking status: Never Smoker   • Smokeless tobacco: Never Used   Vaping Use   • Vaping Use: Never used   Substance and Sexual Activity   • Alcohol use: Not Currently     Comment: socially   • Drug use: Never   • Sexual activity: Not Currently   Other Topics Concern   • Not on file   Social History Narrative    [place]:    Most recent tobacco use screenin-    Alcohol intake: None     Social Determinants of Health     Financial Resource Strain: Not on file   Food Insecurity: Not on file   Transportation Needs: Not on file   Physical Activity: Not on file   Stress: Not on file   Social Connections: Not on file   Intimate Partner Violence: Not on file   Housing Stability: Not on file       Current Outpatient Medications:   •  BLACK COHOSH EXTRACT PO, Take by mouth, Disp: , Rfl:   •  Diclofenac Sodium (VOLTAREN) 1 %, Apply 2 g topically 4 (four) times a day as needed, Disp: , Rfl:   •  EQL EVENING PRIMROSE  MG CAPS, Take by mouth, Disp: , Rfl:   •  ferrous sulfate 325 (65 Fe) mg tablet, Take 325 mg by mouth daily, Disp: , Rfl:   •  glimepiride (AMARYL) 1 mg tablet, Take 1 tablet (1 mg total) by mouth daily with breakfast, Disp: 30 tablet, Rfl: 5  •  metFORMIN (GLUCOPHAGE) 1000 MG tablet, Take 1 tablet (1,000 mg total) by mouth 2 (two) times a day with meals, Disp: 180 tablet, Rfl: 2  •  omeprazole (PriLOSEC) 20 mg delayed release capsule, Take 1 capsule (20 mg total) by mouth daily (Patient taking differently: Take 20 mg by mouth daily in the early morning), Disp: 90 capsule, Rfl: 2  •  Semaglutide,0 25 or 0 5MG/DOS, (Ozempic, 0 25 or 0 5 MG/DOSE,) 2 MG/1 5ML SOPN, Inject 0 5 mg under the skin once a week (Patient taking differently: Inject 0 5 mg under the skin once a week Takes on ), Disp: 6 mL, Rfl: 6  •  valsartan (DIOVAN) 320 MG tablet, Take 1 tablet (320 mg total) by mouth daily (Patient taking differently: Take 320 mg by mouth daily in the early morning), Disp: 90 tablet, Rfl: 3  Allergies   Allergen Reactions   • Lisinopril Cough   • Steri Strip [Medical Tape] Rash     Vitals:    10/21/22 1126   BP: 114/68   Pulse: 58   Resp: 18   Temp: 98 4 °F (36 9 °C)   SpO2: 97%       Physical Exam  Vitals reviewed  Constitutional:       Appearance: Normal appearance   She is obese  HENT:      Head: Normocephalic and atraumatic  Pulmonary:      Effort: Pulmonary effort is normal    Chest:   Breasts:      Right: Skin change (surgical scar) present  No swelling, bleeding, inverted nipple, mass, nipple discharge, tenderness, axillary adenopathy or supraclavicular adenopathy  Left: No swelling, bleeding, inverted nipple, mass, nipple discharge, skin change, tenderness, axillary adenopathy or supraclavicular adenopathy  Comments: There is firmness at the medial aspect of the lumpectomy site  Massage encouraged  Patient states this has been stable since the surgery  There is subtle right breast edema, massage encouraged  Lymphadenopathy:      Upper Body:      Right upper body: No supraclavicular or axillary adenopathy  Left upper body: No supraclavicular or axillary adenopathy  Neurological:      General: No focal deficit present  Mental Status: She is alert and oriented to person, place, and time  Psychiatric:         Mood and Affect: Mood normal              Advance Care Planning/Advance Directives:  Discussed disease status and treatment goals with the patient

## 2022-10-24 ENCOUNTER — HOSPITAL ENCOUNTER (OUTPATIENT)
Dept: ULTRASOUND IMAGING | Facility: HOSPITAL | Age: 57
Discharge: HOME | End: 2022-10-24
Payer: COMMERCIAL

## 2022-10-24 DIAGNOSIS — N95.0 POST-MENOPAUSAL BLEEDING: ICD-10-CM

## 2022-10-24 PROCEDURE — 76856 US EXAM PELVIC COMPLETE: CPT

## 2022-10-24 PROCEDURE — 76830 TRANSVAGINAL US NON-OB: CPT

## 2022-10-27 ENCOUNTER — TELEPHONE (OUTPATIENT)
Dept: OBGYN CLINIC | Facility: MEDICAL CENTER | Age: 57
End: 2022-10-27

## 2022-10-27 DIAGNOSIS — N88.2 STENOTIC CERVICAL OS: Primary | ICD-10-CM

## 2022-10-27 RX ORDER — MISOPROSTOL 200 UG/1
TABLET ORAL
Qty: 2 TABLET | Refills: 0 | Status: SHIPPED | OUTPATIENT
Start: 2022-10-27

## 2022-10-27 NOTE — TELEPHONE ENCOUNTER
Spoke with Domenic Mclean and reviewed her recentl pelvic ultrasound results  Endometrial lining measuring 3 mm  Adenomyosis suspected  Will send in rx for cytotec 400 mcg to be inserted intravaginally 3 hours before repeat attempt for EBX  Benefits, risks and alternatives reviewed  If specimen is obtained and normal results will consider vaginal estrogen  No further bleeding since day of last visit on 10/17/22  She will call office to schedule EBX  All questions answered

## 2022-11-02 ENCOUNTER — TELEPHONE (OUTPATIENT)
Dept: FAMILY MEDICINE CLINIC | Facility: CLINIC | Age: 57
End: 2022-11-02

## 2022-11-03 DIAGNOSIS — E11.65 TYPE 2 DIABETES MELLITUS WITH HYPERGLYCEMIA, WITHOUT LONG-TERM CURRENT USE OF INSULIN (HCC): Primary | ICD-10-CM

## 2022-11-07 ENCOUNTER — OFFICE VISIT (OUTPATIENT)
Dept: OBGYN CLINIC | Facility: CLINIC | Age: 57
End: 2022-11-07

## 2022-11-07 VITALS
HEART RATE: 73 BPM | BODY MASS INDEX: 41.77 KG/M2 | HEIGHT: 69 IN | WEIGHT: 282 LBS | SYSTOLIC BLOOD PRESSURE: 129 MMHG | DIASTOLIC BLOOD PRESSURE: 85 MMHG

## 2022-11-07 DIAGNOSIS — M25.561 RIGHT KNEE PAIN, UNSPECIFIED CHRONICITY: Primary | ICD-10-CM

## 2022-11-07 DIAGNOSIS — M17.11 PRIMARY OSTEOARTHRITIS OF RIGHT KNEE: ICD-10-CM

## 2022-11-07 DIAGNOSIS — E11.65 TYPE 2 DIABETES MELLITUS WITH HYPERGLYCEMIA, WITHOUT LONG-TERM CURRENT USE OF INSULIN (HCC): ICD-10-CM

## 2022-11-07 DIAGNOSIS — M17.11 PATELLOFEMORAL ARTHRITIS OF RIGHT KNEE: ICD-10-CM

## 2022-11-07 RX ORDER — BUPIVACAINE HYDROCHLORIDE 2.5 MG/ML
4 INJECTION, SOLUTION INFILTRATION; PERINEURAL
Status: COMPLETED | OUTPATIENT
Start: 2022-11-07 | End: 2022-11-07

## 2022-11-07 RX ORDER — METHYLPREDNISOLONE ACETATE 40 MG/ML
1 INJECTION, SUSPENSION INTRA-ARTICULAR; INTRALESIONAL; INTRAMUSCULAR; SOFT TISSUE
Status: COMPLETED | OUTPATIENT
Start: 2022-11-07 | End: 2022-11-07

## 2022-11-07 RX ADMIN — METHYLPREDNISOLONE ACETATE 1 ML: 40 INJECTION, SUSPENSION INTRA-ARTICULAR; INTRALESIONAL; INTRAMUSCULAR; SOFT TISSUE at 12:59

## 2022-11-07 RX ADMIN — BUPIVACAINE HYDROCHLORIDE 4 ML: 2.5 INJECTION, SOLUTION INFILTRATION; PERINEURAL at 12:59

## 2022-11-07 NOTE — PROGRESS NOTES
Orthopaedics Office Visit - New  Patient Visit    ASSESSMENT/PLAN:    Assessment:   1  Severe patellofemoral arthritis right knee   2  Right knee osteoarthritis     Plan: 1  Weight bearing as tolerated right lower extremity   2  X-ray right knee was reviewed in the office today which showed tricompartmental osteoarthritis but severe in the patellofemoral joint   3  Discussed with patient  Conservative treatments: steroid injections, visco supplementation injections  4  Patient received a right knee steroid injections in the office today  She a diabetic with recent hemoglobin A1c is hovering around 7 and was advised to monitor her blood glucose levels for the next few days    5  Will be ordering Monvisc for the right knee   6  Will contact patient when injection has been approved from the insurance company     To Do Next Visit:  Monvisc injection in 6 weeks     _____________________________________________________  CHIEF COMPLAINT:  Chief Complaint   Patient presents with   • Right Knee - Pain, Swelling     Pain after taking a walk  Ice pack helped and steroid pills          SUBJECTIVE:  Mackenzie Maravilla is a 56 y o  female who presents who presents today for right knee pain  She was referred by her PCP Dr Garcia  She states she is been having right knee pain for the last 1-2 weeks  She denies any injuries  She states she is been having pain over the anterior ( deep) and medial aspect of the right knee  She states at that time she was having trouble weight-bearing and was icing  and elevate and taking Tylenol for pain relief  [facility] had x-rays taken of the right knee which showed no acute fracture dislocation but severe patellofemoral arthritis  She was  prescribed a Medrol Dosepak and she states as she is tapering down the pain is coming back ( last dosage today)  Denies instability  She is having pain with flexion of the knee at rest  transitional positions and walking  She also notes her extension is limited  She was previously walking 1-2 miles a day for exercise  SHe is currently taking Tylenol as needed for pain relief  She denies any numbness or tingling  She has no history of having any treatments on the right knee  Patient is a diabetic   She states she is currently working with her PCP on changing her diabetic medications for better control      PAST MEDICAL HISTORY:  Past Medical History:   Diagnosis Date   • Diabetes mellitus ([address] )    • GERD (gastroesophageal reflux disease)    • Hepatitis C     Treated   • Hypertension        PAST SURGICAL HISTORY:  Past Surgical History:   Procedure Laterality Date   • ANKLE SURGERY     • BREAST BIOPSY Right     core bx-benign   • BREAST BIOPSY Right 04/11/2022   • BREAST LUMPECTOMY Right 6/30/2022    Procedure: BREAST PAYTON  DIRECTED LUMPECTOMY WITH Zander Brady;  Surgeon: Kishan Ibarra MD;  Location: [facility];  Service: Surgical Oncology   • COLONOSCOPY  03/2019   • MAMMO (HISTORICAL)  02/2019   • MAMMO NEEDLE LOCALIZATION LEFT (ALL INC) Right 6/9/2022   • MAMMO NEEDLE LOCALIZATION RIGHT (ALL INC) Right 6/30/2022   • MAMMO STEREOTACTIC BREAST BIOPSY RIGHT (ALL INC) Right 4/11/2022   • TUBAL LIGATION         FAMILY HISTORY:  Family History   Problem Relation Age of Onset   • Breast cancer Mother 79   • Hypertension Mother    • Diabetes Mother    • Hypertension Father    • Diabetes Father    • Heart failure Father    • Dementia Father    • Heart disease Father    • No Known Problems Sister    • No Known Problems Brother    • No Known Problems Daughter    • No Known Problems Son    • Hyperlipidemia Maternal Grandmother    • Diabetes Maternal Grandmother    • Heart attack Maternal Grandfather    • Stroke Maternal Grandfather    • Arthritis Paternal Grandmother    • Liver cancer Paternal Grandfather         unknown age   • No Known Problems Brother    • No Known Problems Brother        SOCIAL HISTORY:  Social History     Tobacco Use • Smoking status: Never Smoker   • Smokeless tobacco: Never Used   Vaping Use   • Vaping Use: Never used   Substance Use Topics   • Alcohol use: Not Currently     Comment: socially   • Drug use: Never       MEDICATIONS:    Current Outpatient Medications:   •  BLACK COHOSH EXTRACT PO, Take by mouth, Disp: , Rfl:   •  Diclofenac Sodium (VOLTAREN) 1 %, Apply 2 g topically 4 (four) times a day as needed, Disp: , Rfl:   •  EQL EVENING PRIMROSE  MG CAPS, Take by mouth, Disp: , Rfl:   •  ferrous sulfate 325 (65 Fe) mg tablet, Take 325 mg by mouth daily, Disp: , Rfl:   •  glimepiride (AMARYL) 1 mg tablet, Take 1 tablet (1 mg total) by mouth daily with breakfast, Disp: 30 tablet, Rfl: 5  •  liraglutide (VICTOZA) injection, Inject 0 2 mL (1 2 mg total) under the skin daily, Disp: 3 mL, Rfl: 6  •  metFORMIN (GLUCOPHAGE) 1000 MG tablet, Take 1 tablet (1,000 mg total) by mouth 2 (two) times a day with meals, Disp: 180 tablet, Rfl: 2  •  miSOPROStol (Cytotec) 200 mcg tablet, Insert 2 tablets intravaginally 3 hours before procedure , Disp: 2 tablet, Rfl: 0  •  omeprazole (PriLOSEC) 20 mg delayed release capsule, Take 1 capsule (20 mg total) by mouth daily (Patient taking differently: Take 20 mg by mouth daily in the early morning), Disp: 90 capsule, Rfl: 2  •  valsartan (DIOVAN) 320 MG tablet, Take 1 tablet (320 mg total) by mouth daily (Patient taking differently: Take 320 mg by mouth daily in the early morning), Disp: 90 tablet, Rfl: 3    ALLERGIES:  Allergies   Allergen Reactions   • Lisinopril Cough   • Steri Strip [Medical Tape] Rash       REVIEW OF SYSTEMS:  MSK: Right knee pain  Neuro: None   Pertinent items are otherwise noted in HPI  A comprehensive review of systems was otherwise negative      LABS:  HgA1c:   Lab Results   Component Value Date    HGBA1C 8 7 (H) 07/20/2022     BMP:   Lab Results   Component Value Date    CALCIUM 9 8 07/20/2022    K 4 3 07/20/2022    CO2 28 07/20/2022    CL 99 07/20/2022    BUN 13 07/20/2022    CREATININE 0 59 (L) 07/20/2022     CBC: No components found for: CBC    _____________________________________________________  PHYSICAL EXAMINATION:  Vital signs: /85   Pulse 73   Ht 5' 9" (1 753 m)   Wt 128 kg (282 lb)   LMP 07/27/2021 (Approximate)   BMI 41 64 kg/m²   General: No acute distress, awake and alert  Psychiatric: Mood and affect appear appropriate  HEENT: Trachea Midline, No torticollis, no apparent facial trauma  Cardiovascular: No audible murmurs; Extremities appear perfused  Pulmonary: No audible wheezing or stridor  Skin: No open lesions; see further details (if any) below    MUSCULOSKELETAL EXAMINATION:  Extremities:  Right lower extremity was exposed inspected  Skin overlying the right knee is intact  No superficial abrasions or ecchymosis surrounding the knee  There is no effusion  There is crepitus in the patellofemoral joint with range of motion  Range of motion is well-maintained from 0-120 degrees  Patient has tenderness to palpation over the medial and lateral borders of the patella  She has some medial and lateral joint line tenderness  No varus valgus instability  Negative anterior-posterior drawer testing  Sensation is intact to light touch in superficial peroneal, deep peroneal, sural, saphenous, plantar nerve distributions  Tibialis anterior, extensor hallucis longus, gastrocnemius muscles intact  Plus two dorsalis pedis and posterior tibial pulses  _____________________________________________________  STUDIES REVIEWED:  I personally reviewed the images and interpretation is as follows:  X-ray knee demonstrates tricompartmental osteoarthritis, worse in the patellofemoral joint with almost complete obliteration of the lateral patellofemoral joint space  There appears to be some chondrocalcinosis in the medial and lateral compartments  No acute fractures dislocations    No osseous lesions    PROCEDURES PERFORMED:  Large joint arthrocentesis: WINSTON knee  Universal Protocol:  Consent: Verbal consent obtained  Risks and benefits: risks, benefits and alternatives were discussed  Time out: Immediately prior to procedure a "time out" was called to verify the correct patient, procedure, equipment, support staff and site/side marked as required    Timeout called at: 11/7/2022 12:58 PM   Patient understanding: patient states understanding of the procedure being performed  Site marked: the operative site was marked  Supporting Documentation  Indications: pain   Procedure Details  Location: knee - R knee  Preparation: Patient was prepped and draped in the usual sterile fashion  Needle size: 22 G  Medications administered: 4 mL bupivacaine 0 25 %; 1 mL methylPREDNISolone acetate 40 mg/mL    Patient tolerance: patient tolerated the procedure well with no immediate complications  Dressing:  Sterile dressing applied          Scribe Attestation    I,:  Michael Edwards am acting as a scribe while in the presence of the attending physician :       I,:  Cindy Deleon DO personally performed the services described in this documentation    as scribed in my presence :

## 2022-11-07 NOTE — TELEPHONE ENCOUNTER
Patient is looking for her refill on her Liragutide    [address]    Patient ph # [phone]

## 2022-11-08 ENCOUNTER — TELEPHONE (OUTPATIENT)
Dept: FAMILY MEDICINE CLINIC | Facility: CLINIC | Age: 57
End: 2022-11-08

## 2022-11-08 DIAGNOSIS — E11.65 TYPE 2 DIABETES MELLITUS WITH HYPERGLYCEMIA, WITHOUT LONG-TERM CURRENT USE OF INSULIN (HCC): Primary | ICD-10-CM

## 2022-11-08 NOTE — TELEPHONE ENCOUNTER
Patient called says she needs a script for her test strips    Don't see it in her chart      [facility] # [phone]

## 2022-11-13 PROBLEM — N95.0 POST-MENOPAUSAL BLEEDING: Status: ACTIVE | Noted: 2022-11-13

## 2022-11-13 NOTE — PROGRESS NOTES
Endometrial biopsy    Date/Time: 11/14/2022 2:39 PM  Performed by: ILDEFONSO Sorto  Authorized by: ILDEFONSO Sorto   Universal Protocol:  Consent: Verbal consent obtained  Written consent obtained  Risks and benefits: risks, benefits and alternatives were discussed  Consent given by: patient  Time out: Immediately prior to procedure a "time out" was called to verify the correct patient, procedure, equipment, support staff and site/side marked as required  Timeout called at: 11/14/2022 2:39 PM   Patient understanding: patient states understanding of the procedure being performed  Patient consent: the patient's understanding of the procedure matches consent given  Procedure consent: procedure consent matches procedure scheduled  Relevant documents: relevant documents present and verified  Patient identity confirmed: verbally with patient      Indication:     Indications: Post-menopausal bleeding      Progression of post-menopausal bleeding:  Resolved  Pre-procedure:     Anesthesia premedication: cytotec 400 mcg inserted intravaginally 3 hours ago  Procedure:     A bivalve speculum was placed in the vagina: yes      Cervix cleaned and prepped: yes      Unable to perform due to: cervical stenosis    Comments:     Procedure comments:  Last evaluated in office on 10/17/22 with c/o vaginal spotting x 1 week after 14 months of amenorrhea  EBX attempted that day but her cervical os was stenotic    10/24/22 pelvic US suggestive of adenomyosis  Endometrial stripe of 3 mm  Cytotec ordered with instructions provided for today  Removed at time of procedure  Unable to perform EBX today due to stenotic os  Os mostly flush with vaginal wall even with use of tenaculum for stabilization  Agreeable to monitoring for any additional vaginal bleeding with low threshold  Discussed use of a vaginal moisturizer twice weekly  Caution with use of applicator as vaginal wall is intolerant of touch and bleeds easily  Return to office with any vaginal bleeding for appt with Dr Daryl Lovell  May consider hysteroscopy/ D&C

## 2022-11-14 ENCOUNTER — PROCEDURE VISIT (OUTPATIENT)
Dept: OBGYN CLINIC | Facility: CLINIC | Age: 57
End: 2022-11-14

## 2022-11-14 VITALS
SYSTOLIC BLOOD PRESSURE: 132 MMHG | HEIGHT: 69 IN | WEIGHT: 283.8 LBS | DIASTOLIC BLOOD PRESSURE: 68 MMHG | BODY MASS INDEX: 42.03 KG/M2

## 2022-11-14 DIAGNOSIS — N95.0 POST-MENOPAUSAL BLEEDING: Primary | ICD-10-CM

## 2022-11-14 RX ORDER — HYALURONATE SODIUM, STABILIZED 60 MG/3 ML
SYRINGE (ML) INTRAARTICULAR
COMMUNITY
Start: 2022-11-10

## 2022-11-21 ENCOUNTER — APPOINTMENT (OUTPATIENT)
Dept: LAB | Facility: CLINIC | Age: 57
End: 2022-11-21

## 2022-11-21 DIAGNOSIS — E11.65 TYPE 2 DIABETES MELLITUS WITH HYPERGLYCEMIA, WITHOUT LONG-TERM CURRENT USE OF INSULIN (HCC): ICD-10-CM

## 2022-11-21 LAB
ANION GAP SERPL CALCULATED.3IONS-SCNC: 9 MMOL/L (ref 4–13)
BUN SERPL-MCNC: 14 MG/DL (ref 5–25)
CALCIUM SERPL-MCNC: 9.2 MG/DL (ref 8.4–10.2)
CHLORIDE SERPL-SCNC: 101 MMOL/L (ref 96–108)
CO2 SERPL-SCNC: 27 MMOL/L (ref 21–32)
CREAT SERPL-MCNC: 0.49 MG/DL (ref 0.6–1.3)
EST. AVERAGE GLUCOSE BLD GHB EST-MCNC: 171 MG/DL
GFR SERPL CREATININE-BSD FRML MDRD: 110 ML/MIN/1.73SQ M
GLUCOSE P FAST SERPL-MCNC: 190 MG/DL (ref 65–99)
HBA1C MFR BLD: 7.6 %
POTASSIUM SERPL-SCNC: 4.6 MMOL/L (ref 3.5–5.3)
SODIUM SERPL-SCNC: 137 MMOL/L (ref 135–147)

## 2022-11-28 ENCOUNTER — OFFICE VISIT (OUTPATIENT)
Dept: FAMILY MEDICINE CLINIC | Facility: CLINIC | Age: 57
End: 2022-11-28

## 2022-11-28 ENCOUNTER — TELEPHONE (OUTPATIENT)
Dept: FAMILY MEDICINE CLINIC | Facility: CLINIC | Age: 57
End: 2022-11-28

## 2022-11-28 VITALS
HEIGHT: 69 IN | TEMPERATURE: 97.2 F | BODY MASS INDEX: 42.8 KG/M2 | HEART RATE: 87 BPM | OXYGEN SATURATION: 97 % | SYSTOLIC BLOOD PRESSURE: 122 MMHG | DIASTOLIC BLOOD PRESSURE: 84 MMHG | RESPIRATION RATE: 16 BRPM | WEIGHT: 289 LBS

## 2022-11-28 DIAGNOSIS — E66.09 CLASS 2 OBESITY DUE TO EXCESS CALORIES WITHOUT SERIOUS COMORBIDITY WITH BODY MASS INDEX (BMI) OF 38.0 TO 38.9 IN ADULT: ICD-10-CM

## 2022-11-28 DIAGNOSIS — K21.9 GASTROESOPHAGEAL REFLUX DISEASE WITHOUT ESOPHAGITIS: ICD-10-CM

## 2022-11-28 DIAGNOSIS — I10 ESSENTIAL HYPERTENSION: ICD-10-CM

## 2022-11-28 DIAGNOSIS — E11.65 TYPE 2 DIABETES MELLITUS WITH HYPERGLYCEMIA, WITHOUT LONG-TERM CURRENT USE OF INSULIN (HCC): Primary | ICD-10-CM

## 2022-11-28 RX ORDER — MULTIVIT WITH MINERALS/LUTEIN
1000 TABLET ORAL DAILY
COMMUNITY

## 2022-11-28 NOTE — TELEPHONE ENCOUNTER
Just had an office visit with Dr Corinne Long and her pharmacy told her the Jardiance that   Dr Corinne Long prescribed needs to be prior  Authorized      Patient ph # [phone]

## 2022-11-28 NOTE — PROGRESS NOTES
Name: Shadia Alegre      : 1965      MRN: [id number]  Encounter Provider: Ting Crane MD  Encounter Date: 2022   Encounter department: [address] [place] [address] MEDICINE    Assessment & Plan     1  Type 2 diabetes mellitus with hyperglycemia, without long-term current use of insulin [facility])  Assessment & Plan:    Lab Results   Component Value Date    HGBA1C 7 6 (H) 2022   insurance  Wont cover any injectables ; jardiance wasn't covered by previous insurance willtry again    Orders:  -     Empagliflozin (Jardiance) 10 MG TABS tablet; Take 1 tablet (10 mg total) by mouth every morning  -     Comprehensive metabolic panel; Future; Expected date: 2022  -     Hemoglobin A1C; Future; Expected date: 2022  -     Lipid panel; Future; Expected date: 2022  -     Microalbumin / creatinine urine ratio; Future; Expected date: 2022    2  Gastroesophageal reflux disease without esophagitis  Assessment & Plan:  Ok on meds      3  Essential hypertension  Assessment & Plan:  Well controlled on current therapy continue with current medications and will reassess next visit        4  Class 2 obesity due to excess calories without serious comorbidity with body mass index (BMI) of 38 0 to 38 9 in adult  Assessment & Plan:  Discussion on diet and exercise guidelines for weight loss and  health reviewed with pt                Subjective      HPI pt here for interval visit multiple problems    Review of Systems   Constitutional: Negative for appetite change, chills, fatigue and fever  Respiratory: Negative for cough, chest tightness and shortness of breath  Cardiovascular: Negative for chest pain, palpitations and leg swelling  Gastrointestinal: Negative for abdominal pain, constipation, diarrhea, nausea and vomiting  Genitourinary: Negative for difficulty urinating and frequency  Musculoskeletal: Positive for arthralgias (knee oain seeing ortho)   Negative for back pain and neck pain    Skin: Negative for rash  Neurological: Negative for dizziness, weakness, light-headedness, numbness and headaches  Hematological: Does not bruise/bleed easily  Psychiatric/Behavioral: Negative for dysphoric mood and sleep disturbance  The patient is not nervous/anxious  Current Outpatient Medications on File Prior to Visit   Medication Sig   • Ascorbic Acid (vitamin C) 1000 MG tablet Take 1,000 mg by mouth daily   • BLACK COHOSH EXTRACT PO Take by mouth   • Diclofenac Sodium (VOLTAREN) 1 % Apply 2 g topically 4 (four) times a day as needed   • EQL EVENING PRIMROSE  MG CAPS Take by mouth   • ferrous sulfate 325 (65 Fe) mg tablet Take 325 mg by mouth daily   • glimepiride (AMARYL) 1 mg tablet Take 1 tablet (1 mg total) by mouth daily with breakfast   • metFORMIN (GLUCOPHAGE) 1000 MG tablet Take 1 tablet (1,000 mg total) by mouth 2 (two) times a day with meals   • omeprazole (PriLOSEC) 20 mg delayed release capsule Take 1 capsule (20 mg total) by mouth daily   • miSOPROStol (Cytotec) 200 mcg tablet Insert 2 tablets intravaginally 3 hours before procedure  • valsartan (DIOVAN) 320 MG tablet Take 1 tablet (320 mg total) by mouth daily (Patient not taking: Reported on 11/14/2022)   • [DISCONTINUED] Durolane 60 MG/3ML injection  (Patient not taking: Reported on 11/28/2022)   • [DISCONTINUED] liraglutide (VICTOZA) injection Inject 0 2 mL (1 2 mg total) under the skin daily (Patient not taking: Reported on 11/28/2022)       Objective     /84 (BP Location: Left arm, Patient Position: Sitting, Cuff Size: Standard)   Pulse 87   Temp (!) 97 2 °F (36 2 °C) (Temporal)   Resp 16   Ht 5' 9" (1 753 m)   Wt 131 kg (289 lb)   LMP 07/27/2021 (Approximate)   SpO2 97%   BMI 42 68 kg/m²     Physical Exam  Vitals reviewed  Constitutional:       General: She is not in acute distress  Appearance: Normal appearance  She is well-developed  She is not ill-appearing     HENT:      Mouth/Throat: Mouth: Mucous membranes are moist    Eyes:      Extraocular Movements: Extraocular movements intact  Conjunctiva/sclera: Conjunctivae normal       Pupils: Pupils are equal, round, and reactive to light  Neck:      Thyroid: No thyromegaly  Vascular: No carotid bruit  Cardiovascular:      Rate and Rhythm: Normal rate and regular rhythm  Pulses: Normal pulses  Heart sounds: Normal heart sounds  No murmur heard  Pulmonary:      Effort: Pulmonary effort is normal  No respiratory distress  Breath sounds: Normal breath sounds  Chest:      Chest wall: No tenderness  Abdominal:      General: Bowel sounds are normal  There is no distension  Palpations: Abdomen is soft  Tenderness: There is no abdominal tenderness  Musculoskeletal:      Cervical back: Normal range of motion and neck supple  Lymphadenopathy:      Cervical: No cervical adenopathy  Skin:     General: Skin is warm and dry  Neurological:      General: No focal deficit present  Mental Status: She is alert and oriented to person, place, and time  Mental status is at baseline  Cranial Nerves: No cranial nerve deficit        Deep Tendon Reflexes: Reflexes normal    Psychiatric:         Mood and Affect: Mood normal          Behavior: Behavior normal        Wilma Ramirez MD

## 2022-11-29 ENCOUNTER — TELEPHONE (OUTPATIENT)
Dept: FAMILY MEDICINE CLINIC | Facility: CLINIC | Age: 57
End: 2022-11-29

## 2022-12-02 ENCOUNTER — TELEPHONE (OUTPATIENT)
Dept: FAMILY MEDICINE CLINIC | Facility: CLINIC | Age: 57
End: 2022-12-02

## 2022-12-02 NOTE — TELEPHONE ENCOUNTER
Angelique advised 
Ok to stop glimepiide stay on jardiance
Pt insurance approved her for Jardiance  She is calling to see if she should stop her metformin and glimepiride 
tc
DIFFUSE

## 2022-12-05 ENCOUNTER — ANNUAL EXAM (OUTPATIENT)
Dept: OBGYN CLINIC | Facility: CLINIC | Age: 57
End: 2022-12-05

## 2022-12-05 VITALS
DIASTOLIC BLOOD PRESSURE: 74 MMHG | SYSTOLIC BLOOD PRESSURE: 120 MMHG | HEIGHT: 70 IN | BODY MASS INDEX: 40.92 KG/M2 | WEIGHT: 285.8 LBS

## 2022-12-05 DIAGNOSIS — Z78.0 POST-MENOPAUSAL: ICD-10-CM

## 2022-12-05 DIAGNOSIS — Z91.89 INCREASED RISK OF BREAST CANCER: ICD-10-CM

## 2022-12-05 DIAGNOSIS — Z01.419 ENCNTR FOR GYN EXAM (GENERAL) (ROUTINE) W/O ABN FINDINGS: Primary | ICD-10-CM

## 2022-12-05 NOTE — PATIENT INSTRUCTIONS
Calcium 1200 mg + 600-1000 IU Vit D daily  Exercise 150 minutes per week minimum including weight bearing exercises  Weight loss recommended  Pap with high risk HPV Q 5 years, if normal   Due 2025  Annual mammogram scheduled 3/23 (Dr Manjit Taylor) and monthly breast self exam recommended  Colonoscopy- Due 2032  Kegels 20 times twice daily  Silicone based lubricant with sex  (Use water based lubricant with condoms or sexual toys )    Vaginal moisturizers twice weekly as needed  Return to office in one year or sooner, if needed

## 2022-12-05 NOTE — PROGRESS NOTES
Assessment/Plan:  Calcium 1200 mg + 600-1000 IU Vit D daily  Exercise 150 minutes per week minimum including weight bearing exercises  Weight loss recommended  Pap with high risk HPV Q 5 years, if normal   Due 2025  Annual mammogram scheduled 3/23 (Dr Kong Gee) and monthly breast self exam recommended  Colonoscopy- Due 2032  Kegels 20 times twice daily  Silicone based lubricant with sex  (Use water based lubricant with condoms or sexual toys )    Vaginal moisturizers twice weekly as needed  Return to office in one year or sooner, if needed  1  Encntr for gyn exam (general) (routine) w/o abn findings    2  Increased risk of breast cancer    3  Post-menopausal    4  BMI 40 0-44 9, adult (HCC)               Subjective:      Patient ID: Harjinder Bingham is a 56 y o  female  HPI    Harjinder Bingham is a 56 y o  [id number] Prachi Eastman- 28 yo,  Shruti Steve is 31 (Mar[employer]- will be discharged next year)  lives with his wife who is teaching gifted kids)  female who is here today for her annual visit  Medically complex  Last if office for evaluation of PMB  LMP approx 16 months ago  Had vaginal bleeding/light spotting x 1 weeks  No recurrence  EBX was unable to be performed even with use of cytotec due to stenotic os  Pelvic US on 10/24/22 was normal other than suspected adenomyosis  Aware of low threshold for recurrence of any vaginal bleeding  Hx of HSV with last/only outbreak months ago  No valtrex currently taken  Exercise- will start next week  Plans to do pool aerobics  Elevated BMI  Currently unemployed  Harjinder Bingham is not sexually active or dating  She is not interested in STD screening today  She denies vaginal discharge, itching or pelvic pain  She has no  urinary concerns, does not have incontinence  No bowel concerns  No breast concerns  Last pap: 2/24/20 normal with negative HR HPV  DEXA scan: N/A  Mammogram: Scheduled 3/23/23 and followed by Dr Kong Gee   Hx of right ADH with lumpectomy and PAYTON  placement  6/22  Mom had breast cancer at age 79  Colonoscopy: 2/22 with 10 year recall  The following portions of the patient's history were reviewed and updated as appropriate: allergies, current medications, past family history, past medical history, past social history, past surgical history and problem list     Review of Systems   Constitutional: Negative  Negative for activity change, appetite change, chills, diaphoresis, fatigue, fever and unexpected weight change  HENT: Negative for congestion, dental problem, sneezing, sore throat and trouble swallowing  Eyes: Negative for visual disturbance  Respiratory: Negative for chest tightness and shortness of breath  Cardiovascular: Negative for chest pain and leg swelling  Gastrointestinal: Negative for abdominal pain, constipation, diarrhea, nausea and vomiting  Genitourinary: Negative for difficulty urinating, dysuria, frequency, hematuria, pelvic pain, urgency, vaginal bleeding, vaginal discharge and vaginal pain  Musculoskeletal: Negative for back pain and neck pain  Skin: Negative  Allergic/Immunologic: Negative  Neurological: Negative for weakness and headaches  Hematological: Negative for adenopathy  Psychiatric/Behavioral: Negative  Objective:      /74 (BP Location: Right arm, Patient Position: Sitting, Cuff Size: Standard)   Ht 5' 10 47" (1 79 m)   Wt 130 kg (285 lb 12 8 oz)   LMP 07/27/2021 (Approximate)   BMI 40 46 kg/m²          Physical Exam  Vitals and nursing note reviewed  Constitutional:       Appearance: Normal appearance  She is well-developed  She is obese  HENT:      Head: Normocephalic  Neck:      Thyroid: No thyromegaly  Cardiovascular:      Rate and Rhythm: Normal rate and regular rhythm  Heart sounds: Normal heart sounds  Pulmonary:      Effort: Pulmonary effort is normal       Breath sounds: Normal breath sounds     Chest:   Breasts: Breasts are symmetrical       Right: Normal  No inverted nipple, mass, nipple discharge, skin change or tenderness  Left: Normal  No inverted nipple, mass, nipple discharge, skin change or tenderness  Comments: Right breast scar from previous biopsy  Abdominal:      Palpations: Abdomen is soft  Genitourinary:     General: Normal vulva  Exam position: Lithotomy position  Labia:         Right: No rash, tenderness, lesion or injury  Left: No rash, tenderness, lesion or injury  Urethra: No prolapse, urethral pain, urethral swelling or urethral lesion  Vagina: No signs of injury and foreign body  No vaginal discharge, erythema, tenderness, bleeding, lesions or prolapsed vaginal walls  Cervix: Normal       Uterus: Normal        Adnexa: Right adnexa normal and left adnexa normal         Right: No mass, tenderness or fullness  Left: No mass, tenderness or fullness  Rectum: No external hemorrhoid  Comments: Vulvovaginal atrophy  Physical exam limited by habitus  Musculoskeletal:         General: Normal range of motion  Cervical back: Normal range of motion  Lymphadenopathy:      Head:      Right side of head: No submental, submandibular, tonsillar or occipital adenopathy  Left side of head: No submental, submandibular, tonsillar or occipital adenopathy  Upper Body:      Right upper body: No supraclavicular or axillary adenopathy  Left upper body: No supraclavicular or axillary adenopathy  Lower Body: No right inguinal adenopathy  No left inguinal adenopathy  Skin:     General: Skin is warm and dry  Neurological:      Mental Status: She is alert and oriented to person, place, and time  Psychiatric:         Mood and Affect: Mood normal          Behavior: Behavior normal  Behavior is cooperative

## 2022-12-19 ENCOUNTER — PROCEDURE VISIT (OUTPATIENT)
Dept: OBGYN CLINIC | Facility: CLINIC | Age: 57
End: 2022-12-19

## 2022-12-19 VITALS
HEIGHT: 70 IN | WEIGHT: 285 LBS | BODY MASS INDEX: 40.8 KG/M2 | SYSTOLIC BLOOD PRESSURE: 150 MMHG | HEART RATE: 52 BPM | DIASTOLIC BLOOD PRESSURE: 80 MMHG

## 2022-12-19 DIAGNOSIS — M17.11 PRIMARY OSTEOARTHRITIS OF RIGHT KNEE: Primary | ICD-10-CM

## 2022-12-19 DIAGNOSIS — M17.11 PATELLOFEMORAL ARTHRITIS OF RIGHT KNEE: ICD-10-CM

## 2022-12-19 NOTE — PROGRESS NOTES
1  Primary osteoarthritis of right knee        2  Patellofemoral arthritis of right knee          Patient is here for her VISCO injection of Durolane into the right knee  Patient reports pain in her right knee today  The pain in the right knee has been improved since her previous corticosteroid injection  She started with aqua therapy and that her right lower extremity which is also improved  She is doing this 4-5 times a week  Patient is extremely motivated in her healing process and trying to decrease the pain in her right knee  All organ systems normal  Physical exam of the knee shows no effusion no ecchymosis  Large joint arthrocentesis: R knee  Universal Protocol:  Consent: Verbal consent obtained  Risks and benefits: risks, benefits and alternatives were discussed  Consent given by: patient  Time out: Immediately prior to procedure a "time out" was called to verify the correct patient, procedure, equipment, support staff and site/side marked as required  Patient understanding: patient states understanding of the procedure being performed  Site marked: the operative site was marked  Patient identity confirmed: verbally with patient    Supporting Documentation  Indications: pain   Procedure Details  Location: knee - R knee  Preparation: Patient was prepped and draped in the usual sterile fashion  Needle size: 22 G  Medications administered: 3 mL sodium hyaluronate 60 MG/3ML    Patient tolerance: patient tolerated the procedure well with no immediate complications  Dressing:  Sterile dressing applied          Patient tolerated procedure follow up as needed

## 2022-12-21 ENCOUNTER — OFFICE VISIT (OUTPATIENT)
Dept: ENDOCRINOLOGY | Facility: CLINIC | Age: 57
End: 2022-12-21

## 2022-12-21 ENCOUNTER — TELEPHONE (OUTPATIENT)
Dept: ENDOCRINOLOGY | Facility: CLINIC | Age: 57
End: 2022-12-21

## 2022-12-21 VITALS
HEART RATE: 53 BPM | WEIGHT: 287 LBS | SYSTOLIC BLOOD PRESSURE: 126 MMHG | DIASTOLIC BLOOD PRESSURE: 76 MMHG | BODY MASS INDEX: 41.18 KG/M2

## 2022-12-21 DIAGNOSIS — R53.83 OTHER FATIGUE: ICD-10-CM

## 2022-12-21 DIAGNOSIS — E78.2 MIXED HYPERLIPIDEMIA: ICD-10-CM

## 2022-12-21 DIAGNOSIS — Z13.29 SCREENING FOR THYROID DISORDER: ICD-10-CM

## 2022-12-21 DIAGNOSIS — E11.65 TYPE 2 DIABETES MELLITUS WITH HYPERGLYCEMIA, WITHOUT LONG-TERM CURRENT USE OF INSULIN (HCC): Primary | ICD-10-CM

## 2022-12-21 DIAGNOSIS — I10 PRIMARY HYPERTENSION: ICD-10-CM

## 2022-12-21 RX ORDER — BLOOD-GLUCOSE METER
KIT MISCELLANEOUS 2 TIMES DAILY
Qty: 1 EACH | Refills: 0 | Status: SHIPPED | OUTPATIENT
Start: 2022-12-21 | End: 2022-12-28

## 2022-12-21 RX ORDER — TIRZEPATIDE 5 MG/.5ML
INJECTION, SOLUTION SUBCUTANEOUS
Qty: 2 ML | Refills: 5 | Status: SHIPPED | OUTPATIENT
Start: 2022-12-21

## 2022-12-21 RX ORDER — LANCETS 28 GAUGE
EACH MISCELLANEOUS 2 TIMES DAILY
Qty: 200 EACH | Refills: 1 | Status: SHIPPED | OUTPATIENT
Start: 2022-12-21

## 2022-12-21 RX ORDER — BLOOD-GLUCOSE METER
1 KIT MISCELLANEOUS 2 TIMES DAILY
Qty: 200 STRIP | Refills: 1 | Status: SHIPPED | OUTPATIENT
Start: 2022-12-21 | End: 2022-12-28

## 2022-12-21 RX ORDER — ATORVASTATIN CALCIUM 10 MG/1
TABLET, FILM COATED ORAL
Qty: 30 TABLET | Refills: 6 | Status: SHIPPED | OUTPATIENT
Start: 2022-12-21

## 2022-12-21 NOTE — TELEPHONE ENCOUNTER
Patient called and said she needs a new meter please    [facility] [address] [place] [address]   [phone]    Thank you

## 2022-12-21 NOTE — PROGRESS NOTES
Rakel Bravo 56 y o  female MRN: [id number]    Encounter: [id number]      Assessment/Plan     Assessment: This is a 56y o -year-old female with diabetes with hyperglycemia  Plan:    Diagnoses and all orders for this visit:    Type 2 diabetes mellitus with hyperglycemia, without long-term current use of insulin ([facility] [address] )    Lab Results   Component Value Date    HGBA1C 7 6 (H) 11/21/2022   A1c is elevated suggestive of hyperglycemia  Continue Jardiance 10 mg daily and metformin 1000 mg twice a day  We will start (Mounjaro) 2 5 mg once a week for 4 weeks, increase the dose to 5 mg after 4 weeks if there is no nausea, vomiting or abdominal pain  Discussed to check blood sugars twice daily and keep a log and send log in 1 month, for blood sugar is 80 to 160 mg per DL  Discussed hypoglycemia symptoms and treatment  Refer to nutrition therapy    We discussed pathophysiology of type 2 diabetes, mechanism of action of insulin as well as other medications, importance of glycemic control, to goal of 6 5-7% to prevent diabetes complications such as neuropathy, nephropathy, coronary artery disease, stroke and retinopathy  Discussed importance of fingerstick monitoring, and sending log in 2 weeks  Discussed importance of follow-up appointment  Discussed hypoglycemia symptoms and treatment  Discussed importance of follow-up with Ophthalmology and podiatry    -     Tirzepatide Erla Brash) 5 MG/0 5ML SOPN; Inject 5 mg in to skin once a week  -     Ambulatory referral to Diabetic Education; Future  -     Basic metabolic panel; Future  -     Hemoglobin A1C; Future  -     Microalbumin / creatinine urine ratio; Future    BMI 38 0-38 9,adult  Rule out thyroid problem, Hashimoto thyroiditis  Obtain  thyroid blood work, patient  gives family history of thyroid problems  -     T4, free; Future  -     TSH, 3rd generation; Future  -     Thyroid Antibodies Panel;  Future    Primary hypertension  BP Readings from Last 3 Encounters: 12/21/22 126/76   12/19/22 150/80   12/05/22 120/74   Blood pressure well controlled  Continue current management  Mixed hyperlipidemia  LDL is elevated, goal is less than 100 mg/dl   Start Lipitor 10 mg at bedtime, discussed the side effects and benefits  Discussed to call if she notices any muscle aches or pains, could be side effect of statins  We will follow-up lipid profile and liver profile in 3 months  -     atorvastatin (LIPITOR) 10 mg tablet; Take one tablet daily at bedtime  -     Lipid panel- Lab Collect; Future  -     Hepatic function panel; Future      Screening for thyroid disorder  Thyroid blood work is ordered  -     T4, free; Future  -     TSH, 3rd generation; Future  -     Thyroid Antibodies Panel; Future        CC: Diabetes    History of Present Illness     HPI:    Dax Rice is 51-year-old woman with medical history of type 2 diabetes, hypertension, hyperlipidemia is here for evaluation of type 2 diabetes and management  Duration - 10 years   His course has been stable  Current medication is Jardiance 10 mg daily, metformin 1000 mg twice a day  Complications - None     For hyperlipidemia, she is currently not taking any statins  For hypertension, she takes valsartan 320 mg daily  Lab Results   Component Value Date    HGBA1C 7 6 (H) 11/21/2022     Lab Results   Component Value Date    LDLCALC 107 (H) 07/20/2022     BP Readings from Last 3 Encounters:   12/21/22 126/76   12/19/22 150/80   12/05/22 120/74        Review of Systems   Constitutional: Positive for unexpected weight change  Negative for activity change, diaphoresis, fatigue and fever  HENT: Negative  Eyes: Negative for visual disturbance  Respiratory: Negative for cough, chest tightness and shortness of breath  Cardiovascular: Negative for chest pain, palpitations and leg swelling  Gastrointestinal: Negative for abdominal pain, blood in stool, constipation, diarrhea, nausea and vomiting     Endocrine: Negative for cold intolerance, heat intolerance, polydipsia, polyphagia and polyuria  Genitourinary: Negative for dysuria, enuresis, frequency and urgency  Musculoskeletal: Negative for arthralgias and myalgias  Skin: Negative for pallor, rash and wound  Allergic/Immunologic: Negative  Neurological: Negative for dizziness, tremors, weakness and numbness  Hematological: Negative  Psychiatric/Behavioral: Negative  Negative for confusion  The patient is not nervous/anxious          Historical Information   Past Medical History:   Diagnosis Date   • Allergic    • Diabetes mellitus ([facility] [address] )    • GERD (gastroesophageal reflux disease)    • Hepatitis C     Treated   • Hypertension      Past Surgical History:   Procedure Laterality Date   • ANKLE SURGERY     • BREAST BIOPSY Right     core bx-benign   • BREAST BIOPSY Right 04/11/2022   • BREAST LUMPECTOMY Right 6/30/2022    Procedure: BREAST PAYTON  DIRECTED LUMPECTOMY WITH Jonna Fleischer;  Surgeon: Vera Moore MD;  Location: [facility];  Service: Surgical Oncology   • COLONOSCOPY  03/2019   • MAMMO (HISTORICAL)  02/2019   • MAMMO NEEDLE LOCALIZATION LEFT (ALL INC) Right 6/9/2022   • MAMMO NEEDLE LOCALIZATION RIGHT (ALL INC) Right 6/30/2022   • MAMMO STEREOTACTIC BREAST BIOPSY RIGHT (ALL INC) Right 4/11/2022   • TUBAL LIGATION       Social History   Social History     Substance and Sexual Activity   Alcohol Use Yes   • Alcohol/week: 1 0 standard drink   • Types: 1 Glasses of wine per week    Comment: socially     Social History     Substance and Sexual Activity   Drug Use Never     Social History     Tobacco Use   Smoking Status Never   Smokeless Tobacco Never     Family History:   Family History   Problem Relation Age of Onset   • Breast cancer Mother    • Hypertension Mother    • Diabetes Mother    • Hypertension Father    • Diabetes Father    • Heart failure Father    • Dementia Father    • Heart disease Father    • No Known Problems Sister    • No Known Problems Brother    • No Known Problems Brother    • No Known Problems Brother    • Hyperlipidemia Maternal Grandmother    • Diabetes Maternal Grandmother    • Heart attack Maternal Grandfather    • Stroke Maternal Grandfather    • Arthritis Paternal Grandmother    • Liver cancer Paternal Grandfather         unknown age   • No Known Problems Daughter    • No Known Problems Son        Meds/Allergies   Current Outpatient Medications   Medication Sig Dispense Refill   • Ascorbic Acid (vitamin C) 1000 MG tablet Take 1,000 mg by mouth daily     • atorvastatin (LIPITOR) 10 mg tablet Take one tablet daily at bedtime 30 tablet 6   • BLACK COHOSH EXTRACT PO Take by mouth     • Diclofenac Sodium (VOLTAREN) 1 % Apply 2 g topically 4 (four) times a day as needed     • Empagliflozin (Jardiance) 10 MG TABS tablet Take 1 tablet (10 mg total) by mouth every morning 30 tablet 6   • EQL EVENING PRIMROSE  MG CAPS Take by mouth     • ferrous sulfate 325 (65 Fe) mg tablet Take 325 mg by mouth daily     • metFORMIN (GLUCOPHAGE) 1000 MG tablet Take 1 tablet (1,000 mg total) by mouth 2 (two) times a day with meals 180 tablet 2   • omeprazole (PriLOSEC) 20 mg delayed release capsule Take 1 capsule (20 mg total) by mouth daily 90 capsule 2   • Tirzepatide (Mounjaro) 5 MG/0 5ML SOPN Inject 5 mg in to skin once a week 2 mL 5   • valsartan (DIOVAN) 320 MG tablet Take 1 tablet (320 mg total) by mouth daily 90 tablet 3     No current facility-administered medications for this visit  Allergies   Allergen Reactions   • Lisinopril Cough   • Steri Strip [Medical Tape] Rash       Objective   Vitals: Blood pressure 126/76, pulse (!) 53, weight 130 kg (287 lb), last menstrual period 07/27/2021  Physical Exam  Vitals reviewed  Constitutional:       General: She is not in acute distress  Appearance: Normal appearance  She is obese  She is not ill-appearing  HENT:      Head: Normocephalic and atraumatic        Nose: Nose normal  Mouth/Throat:      Mouth: Mucous membranes are moist    Eyes:      Extraocular Movements: Extraocular movements intact  Conjunctiva/sclera: Conjunctivae normal       Pupils: Pupils are equal, round, and reactive to light  Cardiovascular:      Rate and Rhythm: Normal rate and regular rhythm  Pulmonary:      Effort: Pulmonary effort is normal  No respiratory distress  Breath sounds: Normal breath sounds  Abdominal:      General: Bowel sounds are normal       Palpations: Abdomen is soft  Musculoskeletal:         General: Normal range of motion  Cervical back: Normal range of motion and neck supple  Right lower leg: No edema  Left lower leg: No edema  Skin:     General: Skin is warm and dry  Findings: No rash  Neurological:      General: No focal deficit present  Mental Status: She is alert and oriented to person, place, and time  Psychiatric:         Mood and Affect: Mood normal          Behavior: Behavior normal          The history was obtained from the review of the chart, patient      Lab Results:   Lab Results   Component Value Date/Time    Hemoglobin A1C 7 6 (H) 11/21/2022 10:41 AM    Hemoglobin A1C 8 7 (H) 07/20/2022 10:09 AM    Hemoglobin A1C 7 1 (H) 03/14/2022 10:45 AM    WBC 6 00 07/20/2022 10:09 AM    WBC 5 97 06/09/2022 09:37 AM    WBC 6 39 01/04/2022 11:31 AM    Hemoglobin 14 0 07/20/2022 10:09 AM    Hemoglobin 14 2 06/09/2022 09:37 AM    Hemoglobin 11 3 (L) 01/04/2022 11:31 AM    Hematocrit 42 1 07/20/2022 10:09 AM    Hematocrit 44 5 06/09/2022 09:37 AM    Hematocrit 39 0 01/04/2022 11:31 AM    MCV 86 07/20/2022 10:09 AM    MCV 85 06/09/2022 09:37 AM    MCV 77 (L) 01/04/2022 11:31 AM    Platelets 92 (L) 86/12/0995 10:09 AM    Platelets 730 (L) 31/39/6003 09:37 AM    Platelets 150 (L) 82/99/9682 11:31 AM    BUN 14 11/21/2022 10:41 AM    BUN 13 07/20/2022 10:09 AM    BUN 13 06/09/2022 09:37 AM    Potassium 4 6 11/21/2022 10:41 AM    Potassium 4 3 07/20/2022 10:09 AM    Potassium 4 1 06/09/2022 09:37 AM    Chloride 101 11/21/2022 10:41 AM    Chloride 99 07/20/2022 10:09 AM    Chloride 101 06/09/2022 09:37 AM    CO2 27 11/21/2022 10:41 AM    CO2 28 07/20/2022 10:09 AM    CO2 26 06/09/2022 09:37 AM    Creatinine 0 49 (L) 11/21/2022 10:41 AM    Creatinine 0 59 (L) 07/20/2022 10:09 AM    Creatinine 0 48 (L) 06/09/2022 09:37 AM    AST 27 06/09/2022 09:37 AM    AST 20 03/14/2022 10:45 AM    ALT 18 06/09/2022 09:37 AM    ALT 20 03/14/2022 10:45 AM    Albumin 4 5 06/09/2022 09:37 AM    Albumin 3 9 03/14/2022 10:45 AM    HDL, Direct 48 (L) 07/20/2022 10:09 AM    Triglycerides 148 07/20/2022 10:09 AM           Imaging Studies: I have personally reviewed pertinent reports  Portions of the record may have been created with voice recognition software  Occasional wrong word or "sound a like" substitutions may have occurred due to the inherent limitations of voice recognition software  Read the chart carefully and recognize, using context, where substitutions have occurred

## 2022-12-21 NOTE — PATIENT INSTRUCTIONS
Start Mounjoro 2 5 mg once a week for 4 weeks, increase the dose to 0 5 mg once a week after 4 weeks if no nausea or vomiting or abdominal pain   Check blood sugars twice daily, before breakfast and before dinner, send log in 4 weeks ( goal for blood sugars is  mg/dl)       Hypoglycemia instructions   Blanka Jean  12/21/2022  [id number]    Low Blood Sugar    Steps to treat low blood sugar  1  Test blood sugar if you have symptoms of low blood sugar:   Low Blood Sugar Symptoms:  o Sweaty  o Dizzy  o Rapid heartbeat  o Shaky    o Bad mood  o Hungry      2  Treat blood sugar less than 70 with 15 grams of fast-acting carbohydrate:   Examples of 15 grams Fast-Acting Carbohydrate:  o 4 oz juice  o 4 oz regular soda  o 3-4 glucose tablets (chew)  o 3-4 hard candies (chew)              3    Wait 15 minutes and test your blood sugar again           4   If blood sugar is less than 100, repeat steps 2-3       5  When your blood sugar is 100 or more, eat a snack if it will be longer than one hour until your next meal  The snack should be 15 grams of carbohydrate and a protein:   Examples of snacks:  o ½ sandwich  o 6 crackers with cheese  o Piece of fruit with cheese or peanut butter  o 6 crackers with peanut butter

## 2022-12-27 NOTE — TELEPHONE ENCOUNTER
mounjaro denied did not meet medical necessity needs to have tried 2 preferred meds and failed   Preferred trulicity and victoza

## 2022-12-28 RX ORDER — BLOOD-GLUCOSE METER
EACH MISCELLANEOUS 2 TIMES DAILY
Qty: 1 KIT | Refills: 0 | Status: SHIPPED | OUTPATIENT
Start: 2022-12-28

## 2022-12-28 RX ORDER — SEMAGLUTIDE 1.34 MG/ML
INJECTION, SOLUTION SUBCUTANEOUS
Qty: 6 ML | Refills: 1 | Status: SHIPPED | OUTPATIENT
Start: 2022-12-28

## 2022-12-28 RX ORDER — LANCETS 33 GAUGE
1 EACH MISCELLANEOUS 2 TIMES DAILY
Qty: 100 EACH | Refills: 1 | Status: SHIPPED | OUTPATIENT
Start: 2022-12-28

## 2022-12-28 RX ORDER — BLOOD SUGAR DIAGNOSTIC
1 STRIP MISCELLANEOUS 2 TIMES DAILY
Qty: 100 STRIP | Refills: 1 | Status: SHIPPED | OUTPATIENT
Start: 2022-12-28

## 2022-12-28 NOTE — TELEPHONE ENCOUNTER
We can try Ozempic, 0 25 mg once a week for 4 weeks and increase dose to 0 5 mg once a week after 4 weeks     Please make RX ready also inform patient that mounjaro is not covered    [facility]

## 2022-12-30 ENCOUNTER — OFFICE VISIT (OUTPATIENT)
Dept: DIABETES SERVICES | Facility: CLINIC | Age: 57
End: 2022-12-30

## 2022-12-30 VITALS — BODY MASS INDEX: 40.61 KG/M2 | WEIGHT: 283 LBS

## 2022-12-30 DIAGNOSIS — E11.65 TYPE 2 DIABETES MELLITUS WITH HYPERGLYCEMIA, WITHOUT LONG-TERM CURRENT USE OF INSULIN (HCC): Primary | ICD-10-CM

## 2022-12-30 NOTE — PATIENT INSTRUCTIONS
Eat 3 regular meals ~4-5 hours apart and 1-2 snacks per day as needed  Aim for 30-45 grams of carbohydrate per meal and 0-15 grams of carbohydrate per snack

## 2022-12-30 NOTE — PROGRESS NOTES
Medical Nutrition Therapy      Assessment    Visit Type: Initial visit  Chief complaint/Medical Diagnosis/reason for visit: E11 65 (ICD-10-CM) - Type 2 diabetes mellitus with hyperglycemia, without long-term current use of insulin ([facility] [address] )    LOVE Jean was seen today accompanied by her mom regarding type 2 diabetes  Matt Garcia reports that this is not a new diagnosis, but does not recall receiving any diabetes or nutrition education in the past  She states she is unsure what to eat and also brought in her glucometer today as she is unsure how to use it  She is currently taking 10 mg of Jardiance daily, 1,000 mg of metformin twice daily and Ozempic once weekly  Last HbA1c was 7 6% at the end of November 2022  Instructed Angelique on use of OneTouch Verio Reflect glucometer  We also discussed target blood glucose ranges  Angelique sometimes skips breakfast and she does not feel like eating in the morning  She may sip on a Glucerna shake in the morning as breakfast  Matt Garcia reports that she eats out frequently  Problems identified in food recall include inconsistent carbohydrate intake  Explained basic pathophysiology of diabetes and impact of diet on blood glucose levels  Together we discussed what foods contain carbohydrates, reading a food label, timing of meals and snacks, serving sizes, the role of fiber, the importance of consistent carbohydrate intake in the appropriate amounts  Used the portion booklet to teach Matt Garcia more about food groups and basic carbohydrate counting  Manual Kieran to establish a regular meal pattern of 3 meals 4-5 hours apart and 1-2 snacks per day as needed  Discussed keeping carbohydrates consistent, aiming for 30-45 grams of carbohydrate per meal and 0-15 grams of carbohydrate per snack  Matt Garcia and her mom demonstrated good understanding of education provided at today's visit  Plan is to follow-up in February      Ht Readings from Last 1 Encounters:   12/19/22 5' 10" (1 778 m)     Wt Readings from Last 3 Encounters:   12/30/22 128 kg (283 lb)   12/21/22 130 kg (287 lb)   12/19/22 129 kg (285 lb)     Weight Change: Feels she gained weight when she was put on glimepiride (no longer taking); however, states she was also less active due to knee issue  Per EMR review, Brad Sevilla has gained ~17 pounds over the past year  Barriers to Learning: no barriers    Do you follow any special diet presently?: No  Who shops: patient and her mom  Who cooks: patient and her mom    Food Log: Completed via the method of usual daily food recall    Breakfast: sometimes has no breakfast, may sip on a Glucerna shake OR may have some scrambled eggs OR gets breakfast from [employer]; egg mcmuffin and hash browns  Morning Snack: none  Lunch: hoagie from [address] OR fried chicken from [employer]  Afternoon Snack: peanut butter crackers OR sugar-free popsicles OR clementines  Dinner: ardon - but has cut down recently, hamburger once in a while, ham once in a while, steak, chicken thighs in air fryer marinated with Memo dressing, vegetables - cauliflower, corn, peas, snap peas, spinach, salads, pasta (tortellini), rice, mashed or baked potatoe  Evening Snack: does not eat after 7 PM  Beverages: diet Vincentian  [place] [facility] [place] iced tea, water, once in a while a fruit Smoothie from [facility], mountain dew or [employer] regular soda (once in a while)   Eating out/Take out: 4 to 5 days per week  [facility] minutes 5 [facility] per week)    Estimated calorie needs: 1,800 kcals/day   Carbohydrate: 30-45 g/meal, 0-15 g/snack       Fat: 5 servings/day      Protein: 8 ounces/day    Nutrition Diagnosis:  Inconsistent carbohydrate intake related to food and nutrition related knowledge deficit concerning appropriate amount and timing of carbohydrate intake as evidenced by estimated carbohydrate intake that is different from recommended types or ingested on an irregular basis      Intervention: label reading, behavior modification strategies, carbohydrate counting, meal timing, meal planning, monitoring portion control and exercise guidelines     Treatment Goals: Patient understands education and recommendations, Patient will consume 3 meals a day, Patient will monitor portion control, Patient will count carbohydrates, Patient will exercise and Patient will monitor blood glucose    Monitoring and evaluation:    Term code indicator  FH 4 4 Mealtime Behavior Criteria: Eat 3 regular meals ~4-5 hours apart and 1-2 snacks per day as needed  Term code indicator  FH 1 6 3 Carbohydrate Intake Criteria: Aim for 30-45 grams of carbohydrate per meal and 0-15 grams of carbohydrate per snack  Materials Provided: Portion booklet, "Know Your Numbers" handout, hypo/hyperglycemia handout    Patient’s Response to Instruction:  Comprehension: good  Motivation: good  Expected Compliance: good    Start- Stop: 10:45-12:00  Total Minutes: 75 Minutes  Group or Individual Instruction: MNT-I  Other: Brian Landry MD    Thank you for coming to the [place] [id number] for education today  Please feel free to call with any questions or concerns      Andressa Del [address], [address] [place] [address], [address] [id number]

## 2023-01-03 ENCOUNTER — TELEPHONE (OUTPATIENT)
Dept: ENDOCRINOLOGY | Facility: CLINIC | Age: 58
End: 2023-01-03

## 2023-01-09 ENCOUNTER — TELEPHONE (OUTPATIENT)
Dept: ENDOCRINOLOGY | Facility: CLINIC | Age: 58
End: 2023-01-09

## 2023-01-09 DIAGNOSIS — E11.65 TYPE 2 DIABETES MELLITUS WITH HYPERGLYCEMIA, WITHOUT LONG-TERM CURRENT USE OF INSULIN (HCC): Primary | ICD-10-CM

## 2023-01-09 NOTE — TELEPHONE ENCOUNTER
Ok, please inform pt and make prescription ready for Trulicity 1 04 mg once a week        Roxy Skiff'

## 2023-01-09 NOTE — TELEPHONE ENCOUNTER
Pollyann Hamman, we will start with Victoza 0 6 mg once a day, for 4 weeks, crease the dose to 1 2 mg after 4 weeks    Please Make prescription ready    Marilyn Nicole

## 2023-01-09 NOTE — TELEPHONE ENCOUNTER
[facility] [employer]    I todl her you would call her back after checking

## 2023-01-23 ENCOUNTER — APPOINTMENT (OUTPATIENT)
Dept: RADIOLOGY | Facility: AMBULARY SURGERY CENTER | Age: 58
End: 2023-01-23
Attending: STUDENT IN AN ORGANIZED HEALTH CARE EDUCATION/TRAINING PROGRAM

## 2023-01-23 ENCOUNTER — OFFICE VISIT (OUTPATIENT)
Dept: OBGYN CLINIC | Facility: CLINIC | Age: 58
End: 2023-01-23

## 2023-01-23 VITALS
WEIGHT: 283 LBS | HEART RATE: 93 BPM | BODY MASS INDEX: 40.52 KG/M2 | DIASTOLIC BLOOD PRESSURE: 85 MMHG | HEIGHT: 70 IN | SYSTOLIC BLOOD PRESSURE: 131 MMHG

## 2023-01-23 DIAGNOSIS — M17.11 PRIMARY OSTEOARTHRITIS OF RIGHT KNEE: Primary | ICD-10-CM
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
## 2023-01-23 DIAGNOSIS — M17.11 PRIMARY OSTEOARTHRITIS OF RIGHT KNEE: ICD-10-CM

## 2023-01-23 RX ORDER — LIDOCAINE 50 MG/G
1 PATCH TOPICAL DAILY
Qty: 30 PATCH | Refills: 1 | Status: SHIPPED | OUTPATIENT
Start: 2023-01-23

## 2023-01-23 RX ORDER — MELOXICAM 15 MG/1
15 TABLET ORAL DAILY
Qty: 30 TABLET | Refills: 3 | Status: SHIPPED | OUTPATIENT
Start: 2023-01-23

## 2023-01-23 NOTE — PROGRESS NOTES
Orthopaedics Office Visit - Follow up Patient Visit    ASSESSMENT/PLAN:    Assessment:   Right Knee osteoarthritis, patellofemoral osteoarthritis    Plan:   · Order placed today for Meloxicam to be taken as prescribed, be sure to drink plenty of fluids while taking, take with food  Patient was also consulted about possible negative effects of anti-inflammatory medications on kidney function especially in diabetics  Patient is being followed closely by primary care doctor and if any changes in her labs are noted on upcoming laboratory evaluations which are previously ordered we will stop the anti-inflammatory medications  · May apply Voltaren gel to the painful area up to 4 times a day  · May wear Lidocaine patch as needed for pain to the right knee  · Patient can continue to be weightbearing as tolerated activity as tolerated  · Patient is only 2 months from a corticosteroid injection and 5 weeks from a viscosupplementation shot  We will hold off on performing any more intra-articular injections at this time until another month has elapsed due to the patient's diabetes and higher risk  · Patient will follow up in approximately 5 to 6 weeks if pain is not appropriately controlled  · Patient instructed to follow-up with her primary care doctor about multiple falls  Patient had a mechanical reason for the 3 falls which she described in the office today however daughter is concerned about possible medical reasons for the falls  To Do Next Visit:      _____________________________________________________  CHIEF COMPLAINT:  Chief Complaint   Patient presents with   • Right Knee - Pain, Follow-up     Injection         SUBJECTIVE:  11/7/2022: Osvaldo Colvin is a 56 y o  female who presents who presents today for right knee pain  She was referred by her PCP Dr Garcia  She states she is been having right knee pain for the last 1-2 weeks  She denies any injuries    She states she is been having pain over the anterior ( deep) and medial aspect of the right knee  She states at that time she was having trouble weight-bearing and was icing  and elevate and taking Tylenol for pain relief  [facility] had x-rays taken of the right knee which showed no acute fracture dislocation but severe patellofemoral arthritis  She was  prescribed a Medrol Dosepak and she states as she is tapering down the pain is coming back ( last dosage today)  Denies instability  She is having pain with flexion of the knee at rest  transitional positions and walking  She also notes her extension is limited  She was previously walking 1-2 miles a day for exercise  SHe is currently taking Tylenol as needed for pain relief  She denies any numbness or tingling  She has no history of having any treatments on the right knee  Patient is a diabetic   She states she is currently working with her PCP on changing her diabetic medications for better control  Interval history 1/23/2023:  Nathalie Rose is a 56 y o  female who presents for follow up evaluation of Right knee pain  She received a one-dose Duralane injection 12/19/2022 which provided her some relief of her symptoms  She reports three falls directly onto the right knee since her last visit, most recently in January  She states that the first fall was from tripping over a rock, second fall was from wet floor, and the third fall was over a step  Denies inability to bear weight after these falls  Her daughter, who presents on the phone for this visit, states she is concerned that her mother has increasing general instability causing these falls, unsure if due to diagnosed neuropathies or another cause  Notes that she has occasional episodes of swelling of the knee that worsens throughout the day  Her pain is aggravated with increased activity and relieved with rest  She also notes bilateral numbness of the thigh located laterally and anterior   Taking ice and Tylenol as needed for pain relief  PAST MEDICAL HISTORY:  Past Medical History:   Diagnosis Date   • Allergic    • Diabetes mellitus ([address] )    • GERD (gastroesophageal reflux disease)    • Hepatitis C     Treated   • Hypertension        PAST SURGICAL HISTORY:  Past Surgical History:   Procedure Laterality Date   • ANKLE SURGERY     • BREAST BIOPSY Right     core bx-benign   • BREAST BIOPSY Right 04/11/2022   • BREAST LUMPECTOMY Right 6/30/2022    Procedure: BREAST PAYTON  DIRECTED LUMPECTOMY WITH Brayden Eckert;  Surgeon: Shaun Goodson MD;  Location: [facility];  Service: Surgical Oncology   • COLONOSCOPY  03/2019   • MAMMO (HISTORICAL)  02/2019   • MAMMO NEEDLE LOCALIZATION LEFT (ALL INC) Right 6/9/2022   • MAMMO NEEDLE LOCALIZATION RIGHT (ALL INC) Right 6/30/2022   • MAMMO STEREOTACTIC BREAST BIOPSY RIGHT (ALL INC) Right 4/11/2022   • TUBAL LIGATION         FAMILY HISTORY:  Family History   Problem Relation Age of Onset   • Breast cancer Mother    • Hypertension Mother    • Diabetes Mother    • Hypertension Father    • Diabetes Father    • Heart failure Father    • Dementia Father    • Heart disease Father    • No Known Problems Sister    • No Known Problems Brother    • No Known Problems Brother    • No Known Problems Brother    • Hyperlipidemia Maternal Grandmother    • Diabetes Maternal Grandmother    • Heart attack Maternal Grandfather    • Stroke Maternal Grandfather    • Arthritis Paternal Grandmother    • Liver cancer Paternal Grandfather         unknown age   • No Known Problems Daughter    • No Known Problems Son        SOCIAL HISTORY:  Social History     Tobacco Use   • Smoking status: Never   • Smokeless tobacco: Never   Vaping Use   • Vaping Use: Never used   Substance Use Topics   • Alcohol use:  Yes     Alcohol/week: 1 0 standard drink     Types: 1 Glasses of wine per week     Comment: socially   • Drug use: Never       MEDICATIONS:    Current Outpatient Medications:   •  Ascorbic Acid (vitamin C) 1000 MG tablet, Take 1,000 mg by mouth daily, Disp: , Rfl:   •  atorvastatin (LIPITOR) 10 mg tablet, Take one tablet daily at bedtime, Disp: 30 tablet, Rfl: 6  •  BLACK COHOSH EXTRACT PO, Take by mouth, Disp: , Rfl:   •  Blood Glucose Monitoring Suppl (OneTouch Verio) w/Device KIT, Use 2 (two) times a day, Disp: 1 kit, Rfl: 0  •  Diclofenac Sodium (VOLTAREN) 1 %, Apply 2 g topically 4 (four) times a day as needed, Disp: , Rfl:   •  Empagliflozin (Jardiance) 10 MG TABS tablet, Take 1 tablet (10 mg total) by mouth every morning, Disp: 30 tablet, Rfl: 6  •  EQL EVENING PRIMROSE  MG CAPS, Take by mouth, Disp: , Rfl:   •  ferrous sulfate 325 (65 Fe) mg tablet, Take 325 mg by mouth daily, Disp: , Rfl:   •  glucose blood (OneTouch Verio) test strip, Use 1 each 2 (two) times a day Use as instructed, Disp: 100 strip, Rfl: 1  •  Lancets (freestyle) lancets, Use 2 (two) times a day, Disp: 200 each, Rfl: 1  •  Lancets (OneTouch Delica Plus VLEGJP36G) MISC, Use 1 Device 2 (two) times a day, Disp: 100 each, Rfl: 1  •  liraglutide (VICTOZA) injection, Inject 0 1 mL (0 6 mg total) under the skin daily Inject 0 6mg daily for 4 weeks then increase to 1 2mg, Disp: 54 mL, Rfl: 1  •  metFORMIN (GLUCOPHAGE) 1000 MG tablet, Take 1 tablet (1,000 mg total) by mouth 2 (two) times a day with meals, Disp: 180 tablet, Rfl: 2  •  omeprazole (PriLOSEC) 20 mg delayed release capsule, Take 1 capsule (20 mg total) by mouth daily, Disp: 90 capsule, Rfl: 2  •  Tirzepatide (Mounjaro) 5 MG/0 5ML SOPN, Inject 5 mg in to skin once a week, Disp: 2 mL, Rfl: 5  •  valsartan (DIOVAN) 320 MG tablet, Take 1 tablet (320 mg total) by mouth daily, Disp: 90 tablet, Rfl: 3  •  Semaglutide,0 25 or 0 5MG/DOS, (Ozempic, 0 25 or 0 5 MG/DOSE,) 2 MG/1 5ML SOPN, 0 25 mg once a week for 4 weeks and increase dose to 0 5 mg once a week (Patient not taking: Reported on 12/30/2022), Disp: 6 mL, Rfl: 1    ALLERGIES:  Allergies   Allergen Reactions   • Lisinopril Cough   • [employer] Tape] Rash       REVIEW OF SYSTEMS:  MSK: right knee pain  Neuro: known peripheral neuropathies  Pertinent items are otherwise noted in HPI  A comprehensive review of systems was otherwise negative  LABS:  HgA1c:   Lab Results   Component Value Date    HGBA1C 7 6 (H) 11/21/2022     BMP:   Lab Results   Component Value Date    CALCIUM 9 2 11/21/2022    K 4 6 11/21/2022    CO2 27 11/21/2022     11/21/2022    BUN 14 11/21/2022    CREATININE 0 49 (L) 11/21/2022     CBC: No components found for: CBC    _____________________________________________________  PHYSICAL EXAMINATION:  Vital signs: /85 (BP Location: Right arm, Patient Position: Sitting, Cuff Size: Large)   Pulse 93   Ht 5' 10" (1 778 m)   Wt 128 kg (283 lb)   LMP 07/27/2021 (Approximate)   BMI 40 61 kg/m²   General: No acute distress, awake and alert  Psychiatric: Mood and affect appear appropriate  HEENT: Trachea Midline, No torticollis, no apparent facial trauma  Cardiovascular: No audible murmurs; Extremities appear perfused  Pulmonary: No audible wheezing or stridor  Skin: No open lesions; see further details (if any) below    MUSCULOSKELETAL EXAMINATION:  Extremities:   Right Knee Exam  Patient's right knee was exposed inspected  Patient skin is intact overlying the right knee  There is some surrounding ecchymosis over the anterior aspect of the knee  Patient has range of motion of the knee from 0 to 120 degrees  Patient has tenderness to palpation over the distal insertion of the iliotibial band with some sweat superficial swelling overlying the inferior aspect of the iliotibial band  Patient also has some tenderness over the patellar tendon with no palpable defects  Patient is able to perform a straight leg raise  Patient has continued medial lateral joint line tenderness however this is not the main area of her pain that she has been experiencing since the falls    Patient has no pain over the pes anserine tendons  Patient has no varus or valgus knee instability  Negative anterior and posterior drawer testing  Patient's sensation is intact to light touch in superficial peroneal, deep peroneal, sural, saphenous, plantar nerve distributions and comparable to the contralateral side  Patient's tibialis anterior, extensor houses longus and gastrocnemius muscles intact  The limb is well-perfused  _____________________________________________________  STUDIES REVIEWED:  I personally reviewed the images and interpretation is as follows:  X-rays of right knee obtained today were reviewed and demonstrate: no acute osseous abnormalities, no minimal advancement in arthritis compared to previously obtained images    Again noted is patient's tricompartmental osteoarthritis      PROCEDURES PERFORMED:  Procedures    Latha Shaw    Scribe Attestation    I,:  Latha Shaw am acting as a scribe while in the presence of the attending physician :       I,:  John Iglesias DO personally performed the services described in this documentation    as scribed in my presence :

## 2023-02-16 ENCOUNTER — OFFICE VISIT (OUTPATIENT)
Dept: DIABETES SERVICES | Facility: CLINIC | Age: 58
End: 2023-02-16

## 2023-02-16 VITALS — BODY MASS INDEX: 40.03 KG/M2 | WEIGHT: 279 LBS

## 2023-02-16 DIAGNOSIS — E11.65 TYPE 2 DIABETES MELLITUS WITH HYPERGLYCEMIA, WITHOUT LONG-TERM CURRENT USE OF INSULIN (HCC): Primary | ICD-10-CM

## 2023-02-16 NOTE — PROGRESS NOTES
Medical Nutrition Therapy        Assessment     Visit Type: Follow-up visit  Chief complaint/Medical Diagnosis/reason for visit: E11 65 (ICD-10-CM) - Type 2 diabetes mellitus with hyperglycemia, without long-term current use of insulin ([facility] )      LOVE Lay was seen today accompanied by her mom regarding type 2 diabetes  This is a follow-up visit  She was last seen here on 12/30/2022  She is currently taking 10 mg of Jardiance daily, 1,000 mg of metformin twice daily and 1 2 mg of Victoza once weekly  Last HbA1c was 7 6% at the end of November 2022  No new HbA1c since last visit  She was instructed on glucometer use at our last visit  Phyliss Nageotte denies issue using her OneTouch Verio Reflect glucometer  She checks her blood sugar once or twice daily  Reviewed OneTouch yuko with recent blood sugars  Blood sugar ranged from 109-195 mg/dL the past 2 weeks  Denies hypoglycemia  Angelique expressed concern for elevated blood sugars in the morning  Discussed pathophysiology for elevated morning blood sugars  She does not usually eat past 7 PM  Discussed having an evening snack with ~15 grams of carbohydrate along with a good source of protein to see if this helps lower her fasting blood sugar  Discussed examples of appropriate snacks  Phyliss Nageotte [facility]  Conducted updated dietary recall  See below for details  Emphasized importance of consistent carbohydrate intake  Goal is 30-45 grams of carbohydrate per meal and 0-15 grams of carbohydrate per snack  Plan is to follow-up in April 2023          Ht Readings from Last 1 Encounters:   12/19/22 5' 10" (1 778 m)          Wt Readings from Last 3 Encounters:   12/30/22 128 kg (283 lb)   12/21/22 130 kg (287 lb)   12/19/22 129 kg (285 lb)      Weight Change: Phyliss Nageotte has lost ~4 pounds since her last visit       Barriers to Learning: no barriers     Do you follow any special diet presently?: No  Who shops: patient and her mom  Who cooks: patient and her mom     Food Log: Completed via the method of usual daily food recall     Breakfast: sometimes has no breakfast, may sip on a Glucerna shake the days she has water aerobics OR may have some scrambled eggs OR gets breakfast from [employer]; egg mcmuffin and hash browns   Morning Snack: none  Lunch: jayleen from [facility] - splits with her mom OR sushi OR fried chicken from [employer]  Afternoon Snack: peanut butter crackers OR sugar-free popsicles OR clementines  Dinner: ardon - but has cut down recently, hamburger once in a while, ham once in a while, steak, chicken thighs in air fryer marinated with Memo dressing, vegetables - cauliflower, corn, peas, snap peas, spinach, salads, pasta (tortellini), rice, mashed or baked potato  Evening Snack: may have a mitch, sugar-free popsicle or peanut butter crackers, once in a while may have a banana or apple slices, does not eat after 7 PM  Beverages: water, diet Citizen of [place] [facility] [place] iced tea, cut out fruit smoothies from [facility], mountain dew or coke regular soda (once in a while)   Eating out/Take out: 4 to 5 days per week  Exercise goes to the  for swimming classes (30 minutes 3-5 days per week)    Estimated calorie needs: 1,800 kcals/day     Carbohydrate: 30-45 g/meal, 0-15 g/snack        Fat: 5 servings/day      Protein: 8 ounces/day     Nutrition Diagnosis:  Inconsistent carbohydrate intake related to food and nutrition related knowledge deficit concerning appropriate amount and timing of carbohydrate intake as evidenced by estimated carbohydrate intake that is different from recommended types or ingested on an irregular basis      Intervention: label reading, behavior modification strategies, carbohydrate counting, meal timing, meal planning, monitoring portion control and exercise guidelines      Treatment Goals: Patient understands education and recommendations, Patient will consume 3 meals a day, Patient will monitor portion control, Patient will count carbohydrates, Patient will exercise and Patient will monitor blood glucose     Monitoring and evaluation:    Term code indicator  FH 4 4 Mealtime Behavior Criteria: Eat 3 regular meals ~4-5 hours apart and 1-2 snacks per day as needed  Term code indicator  FH 1 6 3 Carbohydrate Intake Criteria: Aim for 30-45 grams of carbohydrate per meal and 0-15 grams of carbohydrate per snack      Materials Provided: None     Patient’s Response to Instruction:  Comprehension: good  Motivation: good  Expected Compliance: good     Start- Stop: 10:00-11:00  Total Minutes: 60 Minutes  Group or Individual Instruction: MNT-I  Other: Viraj Bocanegra MD    Thank you for coming to the [place] [id number] for education today   Please feel free to call with any questions or concerns      Andressa Staley [address], [address] [place] [address], [address] [facility]  [address] [facility] [id number]

## 2023-02-16 NOTE — PATIENT INSTRUCTIONS
Eat 3 regular meals ~4-5 hours apart with 1-2 snacks per day as needed  Keep carbohydrate intake consistent  Aim for 30-45 grams of carbohydrate per meal and 0-15 grams of carbohydrate per snack

## 2023-03-07 ENCOUNTER — OFFICE VISIT (OUTPATIENT)
Dept: OBGYN CLINIC | Facility: CLINIC | Age: 58
End: 2023-03-07

## 2023-03-07 VITALS
SYSTOLIC BLOOD PRESSURE: 106 MMHG | HEART RATE: 101 BPM | DIASTOLIC BLOOD PRESSURE: 70 MMHG | BODY MASS INDEX: 39.22 KG/M2 | WEIGHT: 274 LBS | HEIGHT: 70 IN

## 2023-03-07 DIAGNOSIS — M17.11 PRIMARY OSTEOARTHRITIS OF RIGHT KNEE: Primary | ICD-10-CM

## 2023-03-07 RX ORDER — BUPIVACAINE HYDROCHLORIDE 2.5 MG/ML
4 INJECTION, SOLUTION INFILTRATION; PERINEURAL
Status: COMPLETED | OUTPATIENT
Start: 2023-03-07 | End: 2023-03-07

## 2023-03-07 RX ORDER — METHYLPREDNISOLONE ACETATE 40 MG/ML
1 INJECTION, SUSPENSION INTRA-ARTICULAR; INTRALESIONAL; INTRAMUSCULAR; SOFT TISSUE
Status: COMPLETED | OUTPATIENT
Start: 2023-03-07 | End: 2023-03-07

## 2023-03-07 RX ADMIN — METHYLPREDNISOLONE ACETATE 1 ML: 40 INJECTION, SUSPENSION INTRA-ARTICULAR; INTRALESIONAL; INTRAMUSCULAR; SOFT TISSUE at 14:42

## 2023-03-07 RX ADMIN — BUPIVACAINE HYDROCHLORIDE 4 ML: 2.5 INJECTION, SOLUTION INFILTRATION; PERINEURAL at 14:42

## 2023-03-07 NOTE — PROGRESS NOTES
Orthopaedics Office Visit - Follow up Patient Visit    ASSESSMENT/PLAN:    Assessment:   55 yo female with right knee osteoarthritis, severe patellofemoral osteoarthritis      Plan:   · Patient has tried non operative treatment and is not getting adequate relief at this time  Discussed with patient that she is not a candidate for right total knee replacement currently due to A1C of 7 6  She will continue to work on her glucose control with her PCP  Patient will go for updated HgbA1C which is already ordered in her chart  She is aware goal is 7 0 or less  · Patient received right knee steroid injection today  Patient tolerated the procedure well  Post injection instructions reviewed  Discussed with the patient that the corticosteroid injection can temporarily increase her blood sugar results and instructed to perform tight glycemic control over the next 72 hours  · Continue meloxicam prn pain  · May apply Voltaren gel to the painful area up to 4 times a day  · May wear Lidocaine patch as needed for pain to the right knee  · Patient can continue to be weightbearing as tolerated activity as tolerated  · Continue aqua therapy  To Do Next Visit:  Consider TKA planning pending A1C vs repeat CSI    _____________________________________________________  CHIEF COMPLAINT:  Chief Complaint   Patient presents with   • Right Knee - Follow-up         SUBJECTIVE:  11/7/2022: Tank Carrasco is a 56 y o  female who presents who presents today for right knee pain  She was referred by her PCP Dr Garcia  She states she is been having right knee pain for the last 1-2 weeks  She denies any injuries  She states she is been having pain over the anterior ( deep) and medial aspect of the right knee  She states at that time she was having trouble weight-bearing and was icing  and elevate and taking Tylenol for pain relief    [facility] had x-rays taken of the right knee which showed no acute fracture dislocation but severe patellofemoral arthritis  She was  prescribed a Medrol Dosepak and she states as she is tapering down the pain is coming back ( last dosage today)  Denies instability  She is having pain with flexion of the knee at rest  transitional positions and walking  She also notes her extension is limited  She was previously walking 1-2 miles a day for exercise  SHe is currently taking Tylenol as needed for pain relief  She denies any numbness or tingling  She has no history of having any treatments on the right knee  Patient is a diabetic   She states she is currently working with her PCP on changing her diabetic medications for better control  Interval history 1/23/2023:  Sixto Jordan is a 56 y o  female who presents for follow up evaluation of Right knee pain  She received a one-dose Duralane injection 12/19/2022 which provided her some relief of her symptoms  She reports three falls directly onto the right knee since her last visit, most recently in January  She states that the first fall was from tripping over a rock, second fall was from wet floor, and the third fall was over a step  Denies inability to bear weight after these falls  Her daughter, who presents on the phone for this visit, states she is concerned that her mother has increasing general instability causing these falls, unsure if due to diagnosed neuropathies or another cause  Notes that she has occasional episodes of swelling of the knee that worsens throughout the day  Her pain is aggravated with increased activity and relieved with rest  She also notes bilateral numbness of the thigh located laterally and anterior  Taking ice and Tylenol as needed for pain relief  Interval History 3/7/23:  Patient reports today with her mother  Patient states her knee pain has persistently worsened since her last visit  Patient received right knee steroid injection on 11/7/22 and reports some relief   She then has right knee single VS injection on 12/19/22 and reports some additional relief  Overall patient states that neither injection took her pain away completely  She notes persistent pain over the anteromedial aspect of the knee  Patient reports swelling as well  Pain is worsened with weight bearing  She notes crunching and clicking in the knee and feels her knee can be unstable  Patient is taking meloxicam and applying lidocaine patches  Patient notes some improvement in her symptoms after application of ice  She has been doing aqua therapy but does not note any improvement in her symptoms  Patient states she has not had any falls since her last visit  Patient is hoping to proceed with right total knee replacement  She is not a smoker  Patient is diabetic, with most recent A1C 7 6         PAST MEDICAL HISTORY:  Past Medical History:   Diagnosis Date   • Allergic    • Diabetes mellitus ([address] )    • GERD (gastroesophageal reflux disease)    • Hepatitis C     Treated   • Hypertension        PAST SURGICAL HISTORY:  Past Surgical History:   Procedure Laterality Date   • ANKLE SURGERY     • BREAST BIOPSY Right     core bx-benign   • BREAST BIOPSY Right 04/11/2022   • BREAST LUMPECTOMY Right 6/30/2022    Procedure: BREAST PAYTON  DIRECTED LUMPECTOMY WITH Brayden Eckert;  Surgeon: Shaun Goodson MD;  [facility];  Service: Surgical Oncology   • COLONOSCOPY  03/2019   • MAMMO (HISTORICAL)  02/2019   • MAMMO NEEDLE LOCALIZATION LEFT (ALL INC) Right 6/9/2022   • MAMMO NEEDLE LOCALIZATION RIGHT (ALL INC) Right 6/30/2022   • MAMMO STEREOTACTIC BREAST BIOPSY RIGHT (ALL INC) Right 4/11/2022   • TUBAL LIGATION         FAMILY HISTORY:  Family History   Problem Relation Age of Onset   • Breast cancer Mother    • Hypertension Mother    • Diabetes Mother    • Hypertension Father    • Diabetes Father    • Heart failure Father    • Dementia Father    • Heart disease Father    • No Known Problems Sister    • No Known Problems Brother    • No Known Problems Brother    • No Known Problems Brother    • Hyperlipidemia Maternal Grandmother    • Diabetes Maternal Grandmother    • Heart attack Maternal Grandfather    • Stroke Maternal Grandfather    • Arthritis Paternal Grandmother    • Liver cancer Paternal Grandfather         unknown age   • No Known Problems Daughter    • No Known Problems Son        SOCIAL HISTORY:  Social History     Tobacco Use   • Smoking status: Never   • Smokeless tobacco: Never   Vaping Use   • Vaping Use: Never used   Substance Use Topics   • Alcohol use:  Yes     Alcohol/week: 1 0 standard drink     Types: 1 Glasses of wine per week     Comment: socially   • Drug use: Never       MEDICATIONS:    Current Outpatient Medications:   •  Ascorbic Acid (vitamin C) 1000 MG tablet, Take 1,000 mg by mouth daily, Disp: , Rfl:   •  atorvastatin (LIPITOR) 10 mg tablet, Take one tablet daily at bedtime, Disp: 30 tablet, Rfl: 6  •  BLACK COHOSH EXTRACT PO, Take by mouth, Disp: , Rfl:   •  Blood Glucose Monitoring Suppl (OneTouch Verio) w/Device KIT, Use 2 (two) times a day, Disp: 1 kit, Rfl: 0  •  Diclofenac Sodium (VOLTAREN) 1 %, Apply 2 g topically 4 (four) times a day as needed, Disp: , Rfl:   •  Empagliflozin (Jardiance) 10 MG TABS tablet, Take 1 tablet (10 mg total) by mouth every morning, Disp: 30 tablet, Rfl: 6  •  EQL EVENING PRIMROSE  MG CAPS, Take by mouth, Disp: , Rfl:   •  ferrous sulfate 325 (65 Fe) mg tablet, Take 325 mg by mouth daily, Disp: , Rfl:   •  glucose blood (OneTouch Verio) test strip, Use 1 each 2 (two) times a day Use as instructed, Disp: 100 strip, Rfl: 1  •  Lancets (freestyle) lancets, Use 2 (two) times a day, Disp: 200 each, Rfl: 1  •  Lancets (OneTouch Delica Plus [id number]) MISC, Use 1 Device 2 (two) times a day, Disp: 100 each, Rfl: 1  •  lidocaine (Lidoderm) 5 %, Apply 1 patch topically over 12 hours daily Remove & Discard patch within 12 hours or as directed by MD, Disp: 30 patch, Rfl: 1  •  liraglutide (VICTOZA) injection, Inject 0 1 mL (0 6 mg total) under the skin daily Inject 0 6mg daily for 4 weeks then increase to 1 2mg, Disp: 54 mL, Rfl: 1  •  meloxicam (Mobic) 15 mg tablet, Take 1 tablet (15 mg total) by mouth daily Take medications with food  Discontinue if GI upset occurs, Disp: 30 tablet, Rfl: 3  •  metFORMIN (GLUCOPHAGE) 1000 MG tablet, Take 1 tablet (1,000 mg total) by mouth 2 (two) times a day with meals, Disp: 180 tablet, Rfl: 2  •  omeprazole (PriLOSEC) 20 mg delayed release capsule, Take 1 capsule (20 mg total) by mouth daily, Disp: 90 capsule, Rfl: 2  •  valsartan (DIOVAN) 320 MG tablet, Take 1 tablet (320 mg total) by mouth daily, Disp: 90 tablet, Rfl: 3  •  Semaglutide,0 25 or 0 5MG/DOS, (Ozempic, 0 25 or 0 5 MG/DOSE,) 2 MG/1 5ML SOPN, 0 25 mg once a week for 4 weeks and increase dose to 0 5 mg once a week (Patient not taking: Reported on 12/30/2022), Disp: 6 mL, Rfl: 1  •  Tirzepatide (Mounjaro) 5 MG/0 5ML SOPN, Inject 5 mg in to skin once a week, Disp: 2 mL, Rfl: 5    ALLERGIES:  Allergies   Allergen Reactions   • Lisinopril Cough   • Steri Strip [Medical Tape] Rash       REVIEW OF SYSTEMS:  MSK: right knee pain  Neuro: known peripheral neuropathies  Pertinent items are otherwise noted in HPI  A comprehensive review of systems was otherwise negative      LABS:  HgA1c:   Lab Results   Component Value Date    HGBA1C 7 6 (H) 11/21/2022     BMP:   Lab Results   Component Value Date    CALCIUM 9 2 11/21/2022    K 4 6 11/21/2022    CO2 27 11/21/2022     11/21/2022    BUN 14 11/21/2022    CREATININE 0 49 (L) 11/21/2022     CBC: No components found for: CBC    _____________________________________________________  PHYSICAL EXAMINATION:  Vital signs: /70 (BP Location: Right arm, Patient Position: Sitting, Cuff Size: Large)   Pulse 101   Ht 5' 10" (1 778 m)   Wt 124 kg (274 lb)   LMP 07/27/2021 (Approximate)   BMI 39 31 kg/m²   General: No acute distress, awake and alert  Psychiatric: Mood and affect appear appropriate  HEENT: Trachea Midline, No torticollis, no apparent facial trauma  Cardiovascular: No audible murmurs; Extremities appear perfused  Pulmonary: No audible wheezing or stridor  Skin: No open lesions; see further details (if any) below    MUSCULOSKELETAL EXAMINATION:  Extremities:   Right Knee Exam  Patient's right knee was exposed inspected  Patient skin is intact overlying the right knee  No ecchymosis  Patient has range of motion of the knee from 0 to 115 degrees  Patient is able to perform a straight leg raise  Patient has lateral joint line tenderness Patient has no pain over the pes anserine tendons  Patient has no varus or valgus knee instability  Negative anterior and posterior drawer testing  Patient's sensation is intact to light touch in superficial peroneal, deep peroneal, sural, saphenous, plantar nerve distributions and comparable to the contralateral side  Patient's tibialis anterior, extensor houses longus and gastrocnemius muscles intact  The limb is well-perfused  _____________________________________________________  STUDIES REVIEWED:  I personally reviewed the images and interpretation is as follows:    No updated x-rays obtained today          PROCEDURES PERFORMED:  Large joint arthrocentesis: R knee  Universal Protocol:  Consent: Verbal consent obtained    Risks and benefits: risks, benefits and alternatives were discussed  Consent given by: patient  Site marked: the operative site was marked  Supporting Documentation  Indications: pain   Procedure Details  Location: knee - R knee  Preparation: Patient was prepped and draped in the usual sterile fashion  Ultrasound guidance: no  Approach: anterolateral  Medications administered: 4 mL bupivacaine 0 25 %; 1 mL methylPREDNISolone acetate 40 mg/mL    Patient tolerance: patient tolerated the procedure well with no immediate complications  Dressing:  Sterile dressing applied          Rhona Sands CHARITY    Scribe Attestation    I,:  Nash Ferrell PA-C am acting as a scribe while in the presence of the attending physician :       I,:  Sarabjit Hicks, DO personally performed the services described in this documentation    as scribed in my presence :

## 2023-03-18 DIAGNOSIS — K21.9 GASTROESOPHAGEAL REFLUX DISEASE WITHOUT ESOPHAGITIS: ICD-10-CM

## 2023-03-20 ENCOUNTER — LAB (OUTPATIENT)
Dept: LAB | Facility: CLINIC | Age: 58
End: 2023-03-20

## 2023-03-20 DIAGNOSIS — E11.65 TYPE 2 DIABETES MELLITUS WITH HYPERGLYCEMIA, WITHOUT LONG-TERM CURRENT USE OF INSULIN (HCC): ICD-10-CM

## 2023-03-20 DIAGNOSIS — E78.2 MIXED HYPERLIPIDEMIA: ICD-10-CM

## 2023-03-20 DIAGNOSIS — Z13.29 SCREENING FOR THYROID DISORDER: ICD-10-CM

## 2023-03-20 LAB
ALBUMIN SERPL BCP-MCNC: 4.4 G/DL (ref 3.5–5)
ALP SERPL-CCNC: 64 U/L (ref 34–104)
ALT SERPL W P-5'-P-CCNC: 14 U/L (ref 7–52)
ANION GAP SERPL CALCULATED.3IONS-SCNC: 9 MMOL/L (ref 4–13)
AST SERPL W P-5'-P-CCNC: 19 U/L (ref 13–39)
BILIRUB DIRECT SERPL-MCNC: 0.08 MG/DL (ref 0–0.2)
BILIRUB SERPL-MCNC: 0.77 MG/DL (ref 0.2–1)
BUN SERPL-MCNC: 11 MG/DL (ref 5–25)
CALCIUM SERPL-MCNC: 9.6 MG/DL (ref 8.4–10.2)
CHLORIDE SERPL-SCNC: 100 MMOL/L (ref 96–108)
CHOLEST SERPL-MCNC: 102 MG/DL
CO2 SERPL-SCNC: 26 MMOL/L (ref 21–32)
CREAT SERPL-MCNC: 0.51 MG/DL (ref 0.6–1.3)
GFR SERPL CREATININE-BSD FRML MDRD: 108 ML/MIN/1.73SQ M
GLUCOSE P FAST SERPL-MCNC: 127 MG/DL (ref 65–99)
HDLC SERPL-MCNC: 47 MG/DL
LDLC SERPL CALC-MCNC: 38 MG/DL (ref 0–100)
NONHDLC SERPL-MCNC: 55 MG/DL
POTASSIUM SERPL-SCNC: 3.8 MMOL/L (ref 3.5–5.3)
PROT SERPL-MCNC: 8 G/DL (ref 6.4–8.4)
SODIUM SERPL-SCNC: 135 MMOL/L (ref 135–147)
T4 FREE SERPL-MCNC: 1.17 NG/DL (ref 0.76–1.46)
TRIGL SERPL-MCNC: 85 MG/DL
TSH SERPL DL<=0.05 MIU/L-ACNC: 1.76 UIU/ML (ref 0.45–4.5)

## 2023-03-20 RX ORDER — OMEPRAZOLE 20 MG/1
CAPSULE, DELAYED RELEASE ORAL
Qty: 90 CAPSULE | Refills: 2 | Status: SHIPPED | OUTPATIENT
Start: 2023-03-20

## 2023-03-21 ENCOUNTER — APPOINTMENT (EMERGENCY)
Dept: CT IMAGING | Facility: HOSPITAL | Age: 58
End: 2023-03-21

## 2023-03-21 ENCOUNTER — OFFICE VISIT (OUTPATIENT)
Dept: URGENT CARE | Facility: CLINIC | Age: 58
End: 2023-03-21

## 2023-03-21 ENCOUNTER — HOSPITAL ENCOUNTER (EMERGENCY)
Facility: HOSPITAL | Age: 58
Discharge: HOME | End: 2023-03-21
Attending: EMERGENCY MEDICINE

## 2023-03-21 ENCOUNTER — APPOINTMENT (EMERGENCY)
Dept: RADIOLOGY | Facility: HOSPITAL | Age: 58
End: 2023-03-21

## 2023-03-21 VITALS
BODY MASS INDEX: 38.08 KG/M2 | DIASTOLIC BLOOD PRESSURE: 73 MMHG | OXYGEN SATURATION: 98 % | SYSTOLIC BLOOD PRESSURE: 125 MMHG | WEIGHT: 272 LBS | HEART RATE: 51 BPM | RESPIRATION RATE: 16 BRPM | HEIGHT: 71 IN | TEMPERATURE: 97.7 F

## 2023-03-21 VITALS
OXYGEN SATURATION: 96 % | DIASTOLIC BLOOD PRESSURE: 94 MMHG | HEART RATE: 84 BPM | RESPIRATION RATE: 14 BRPM | TEMPERATURE: 98.6 F | SYSTOLIC BLOOD PRESSURE: 153 MMHG

## 2023-03-21 DIAGNOSIS — M25.571 ACUTE RIGHT ANKLE PAIN: Primary | ICD-10-CM

## 2023-03-21 DIAGNOSIS — M65.271 CALCIFIC TENDONITIS OF FOOT, RIGHT: Primary | ICD-10-CM

## 2023-03-21 LAB
ALBUMIN SERPL BCP-MCNC: 4.3 G/DL (ref 3.5–5)
ALP SERPL-CCNC: 63 U/L (ref 34–104)
ALT SERPL W P-5'-P-CCNC: 13 U/L (ref 7–52)
ANION GAP SERPL CALCULATED.3IONS-SCNC: 12 MMOL/L (ref 4–13)
AST SERPL W P-5'-P-CCNC: 18 U/L (ref 13–39)
BASOPHILS # BLD AUTO: 0.05 THOUSANDS/ÂΜL (ref 0–0.1)
BASOPHILS NFR BLD AUTO: 1 % (ref 0–1)
BILIRUB SERPL-MCNC: 0.92 MG/DL (ref 0.2–1)
BUN SERPL-MCNC: 10 MG/DL (ref 5–25)
CALCIUM SERPL-MCNC: 9.6 MG/DL (ref 8.4–10.2)
CHLORIDE SERPL-SCNC: 102 MMOL/L (ref 96–108)
CO2 SERPL-SCNC: 24 MMOL/L (ref 21–32)
CREAT SERPL-MCNC: 0.46 MG/DL (ref 0.6–1.3)
EOSINOPHIL # BLD AUTO: 0.05 THOUSAND/ÂΜL (ref 0–0.61)
EOSINOPHIL NFR BLD AUTO: 1 % (ref 0–6)
ERYTHROCYTE [DISTWIDTH] IN BLOOD BY AUTOMATED COUNT: 13.8 % (ref 11.6–15.1)
ERYTHROCYTE [SEDIMENTATION RATE] IN BLOOD: 49 MM/HOUR (ref 0–29)
EST. AVERAGE GLUCOSE BLD GHB EST-MCNC: 131 MG/DL
GFR SERPL CREATININE-BSD FRML MDRD: 112 ML/MIN/1.73SQ M
GLUCOSE SERPL-MCNC: 126 MG/DL (ref 65–140)
HBA1C MFR BLD: 6.2 %
HCT VFR BLD AUTO: 45 % (ref 34.8–46.1)
HGB BLD-MCNC: 14.4 G/DL (ref 11.5–15.4)
IMM GRANULOCYTES # BLD AUTO: 0.03 THOUSAND/UL (ref 0–0.2)
IMM GRANULOCYTES NFR BLD AUTO: 0 % (ref 0–2)
LYMPHOCYTES # BLD AUTO: 1.19 THOUSANDS/ÂΜL (ref 0.6–4.47)
LYMPHOCYTES NFR BLD AUTO: 14 % (ref 14–44)
MCH RBC QN AUTO: 28.9 PG (ref 26.8–34.3)
MCHC RBC AUTO-ENTMCNC: 32 G/DL (ref 31.4–37.4)
MCV RBC AUTO: 90 FL (ref 82–98)
MONOCYTES # BLD AUTO: 0.59 THOUSAND/ÂΜL (ref 0.17–1.22)
MONOCYTES NFR BLD AUTO: 7 % (ref 4–12)
NEUTROPHILS # BLD AUTO: 6.8 THOUSANDS/ÂΜL (ref 1.85–7.62)
NEUTS SEG NFR BLD AUTO: 77 % (ref 43–75)
NRBC BLD AUTO-RTO: 0 /100 WBCS
PLATELET # BLD AUTO: 109 THOUSANDS/UL (ref 149–390)
POTASSIUM SERPL-SCNC: 3.9 MMOL/L (ref 3.5–5.3)
PROT SERPL-MCNC: 7.8 G/DL (ref 6.4–8.4)
RBC # BLD AUTO: 4.99 MILLION/UL (ref 3.81–5.12)
SODIUM SERPL-SCNC: 138 MMOL/L (ref 135–147)
THYROGLOB AB SERPL-ACNC: <1 IU/ML (ref 0–0.9)
THYROPEROXIDASE AB SERPL-ACNC: <9 IU/ML (ref 0–34)
WBC # BLD AUTO: 8.71 THOUSAND/UL (ref 4.31–10.16)

## 2023-03-21 RX ORDER — ACETAMINOPHEN 325 MG/1
975 TABLET ORAL ONCE
Status: COMPLETED | OUTPATIENT
Start: 2023-03-21 | End: 2023-03-21

## 2023-03-21 RX ORDER — PREDNISONE 20 MG/1
40 TABLET ORAL DAILY
Qty: 10 TABLET | Refills: 0 | Status: SHIPPED | OUTPATIENT
Start: 2023-03-21 | End: 2023-03-27

## 2023-03-21 RX ORDER — PREDNISONE 20 MG/1
40 TABLET ORAL ONCE
Status: COMPLETED | OUTPATIENT
Start: 2023-03-21 | End: 2023-03-21

## 2023-03-21 RX ADMIN — PREDNISONE 40 MG: 20 TABLET ORAL at 13:54

## 2023-03-21 RX ADMIN — ACETAMINOPHEN 975 MG: 325 TABLET ORAL at 11:19

## 2023-03-21 RX ADMIN — IOHEXOL 100 ML: 350 INJECTION, SOLUTION INTRAVENOUS at 12:06

## 2023-03-21 NOTE — ED ATTENDING ATTESTATION
3/21/2023  ICk DO, saw and evaluated the patient  I have discussed the patient with the resident/non-physician practitioner and agree with the resident's/non-physician practitioner's findings, Plan of Care, and MDM as documented in the resident's/non-physician practitioner's note, except where noted  All available labs and Radiology studies were reviewed  I was present for key portions of any procedure(s) performed by the resident/non-physician practitioner and I was immediately available to provide assistance  At this point I agree with the current assessment done in the Emergency Department  I have conducted an independent evaluation of this patient a history and physical is as follows:      68-year-old female presents right foot pain, has a area over the right lower foot medial aspect that is red swollen and very tender, she is unable to walk secondary to pain  No known trauma to the area ,  Eating well drinking well no fevers no chills no falls no injury no trauma        Review of Systems   Constitutional: Negative for fever  Respiratory: Negative for chest tightness and shortness of breath  Cardiovascular: Negative for chest pain  Skin: Negative for rash  Neurological: Negative for dizziness, light-headedness and headaches  Physical Exam  Vitals reviewed  Constitutional:       Appearance: She is well-developed  HENT:      Head: Atraumatic  Eyes:      General: No scleral icterus  Right eye: No discharge  Left eye: No discharge  Conjunctiva/sclera: Conjunctivae normal    Neck:      Trachea: No tracheal deviation  Pulmonary:      Effort: Pulmonary effort is normal  No respiratory distress  Breath sounds: No stridor  Musculoskeletal:         General: No deformity  Cervical back: Neck supple  Skin:     General: Skin is warm and dry  Coloration: Skin is not pale  Findings: No erythema or rash        Comments: Swelling erythema and severe tenderness to medial aspect of foot below the lateral malleoli, no bony tenderness, full range of motion of right ankle without any discomfort   Neurological:      Mental Status: She is alert  Motor: No abnormal muscle tone  Coordination: Coordination normal                  Labs Reviewed   CBC AND DIFFERENTIAL - Abnormal       Result Value Ref Range Status    WBC 8 71  4 31 - 10 16 Thousand/uL Final    RBC 4 99  3 81 - 5 12 Million/uL Final    Hemoglobin 14 4  11 5 - 15 4 g/dL Final    Hematocrit 45 0  34 8 - 46 1 % Final    MCV 90  82 - 98 fL Final    MCH 28 9  26 8 - 34 3 pg Final    MCHC 32 0  31 4 - 37 4 g/dL Final    RDW 13 8  11 6 - 15 1 % Final    Platelets 726 (*) 836 - 390 Thousands/uL Final    Comment: Results verified by repeat    nRBC 0  /100 WBCs Final    Comment: This is an appended report  These results have been appended to a previously preliminary verified report  Neutrophils Relative 77 (*) 43 - 75 % Final    Immat GRANS % 0  0 - 2 % Final    Lymphocytes Relative 14  14 - 44 % Final    Monocytes Relative 7  4 - 12 % Final    Eosinophils Relative 1  0 - 6 % Final    Basophils Relative 1  0 - 1 % Final    Neutrophils Absolute 6 80  1 85 - 7 62 Thousands/µL Final    Immature Grans Absolute 0 03  0 00 - 0 20 Thousand/uL Final    Lymphocytes Absolute 1 19  0 60 - 4 47 Thousands/µL Final    Monocytes Absolute 0 59  0 17 - 1 22 Thousand/µL Final    Eosinophils Absolute 0 05  0 00 - 0 61 Thousand/µL Final    Basophils Absolute 0 05  0 00 - 0 10 Thousands/µL Final    Narrative: This is an appended report  These results have been appended to a previously verified report     COMPREHENSIVE METABOLIC PANEL - Abnormal    Sodium 138  135 - 147 mmol/L Final    Potassium 3 9  3 5 - 5 3 mmol/L Final    Chloride 102  96 - 108 mmol/L Final    CO2 24  21 - 32 mmol/L Final    ANION GAP 12  4 - 13 mmol/L Final    BUN 10  5 - 25 mg/dL Final    Creatinine 0 46 (*) 0 60 - 1 30 mg/dL Final    Comment: Standardized to IDMS reference method    Glucose 126  65 - 140 mg/dL Final    Comment: If the patient is fasting, the ADA then defines impaired fasting glucose as > 100 mg/dL and diabetes as > or equal to 123 mg/dL  Specimen collection should occur prior to Sulfasalazine administration due to the potential for falsely depressed results  Specimen collection should occur prior to Sulfapyridine administration due to the potential for falsely elevated results  Calcium 9 6  8 4 - 10 2 mg/dL Final    AST 18  13 - 39 U/L Final    Comment: Specimen collection should occur prior to Sulfasalazine administration due to the potential for falsely depressed results  ALT 13  7 - 52 U/L Final    Comment: Specimen collection should occur prior to Sulfasalazine administration due to the potential for falsely depressed results  Alkaline Phosphatase 63  34 - 104 U/L Final    Total Protein 7 8  6 4 - 8 4 g/dL Final    Albumin 4 3  3 5 - 5 0 g/dL Final    Total Bilirubin 0 92  0 20 - 1 00 mg/dL Final    eGFR 112  ml/min/1 73sq m Final    Narrative:     [place] guidelines for Chronic Kidney Disease (CKD):   •  Stage 1 with normal or high GFR (GFR > 90 mL/min/1 73 square meters)  •  Stage 2 Mild CKD (GFR = 60-89 mL/min/1 73 square meters)  •  Stage 3A Moderate CKD (GFR = 45-59 mL/min/1 73 square meters)  •  Stage 3B Moderate CKD (GFR = 30-44 mL/min/1 73 square meters)  •  Stage 4 Severe CKD (GFR = 15-29 mL/min/1 73 square meters)  •  Stage 5 End Stage CKD (GFR <15 mL/min/1 73 square meters)  Note: GFR calculation is accurate only with a steady state creatinine   SEDIMENTATION RATE - Abnormal    Sed Rate 49 (*) 0 - 29 mm/hour Final         CT lower extremity w contrast right   Final Result   No CT evidence of osteomyelitis  No abscess collection              Workstation performed: [id number]         XR ankle 3+ views RIGHT   ED Interpretation   No acute fracture ED Course         Critical Care Time  Procedures      MDM  Number of Diagnoses or Management Options  Calcific tendonitis of foot, right: new, needed workup  Diagnosis management comments:       Initial ED assessment: 56-year-old female, right foot pain, unable to ambulate  due to discomfort  Swelling erythema  medial aspect of the right foot  Initial DDx includes but is not limited to: Soft tissue abscess, doubt septic joint or gout as she does not have pain with range of motion of the joint only with palpation of the medial foot ,  Calcific tendinitis, foreign body with infection, occult trauma    Initial ED plan:   X-ray, possible CT        Final ED summary/disposition:   After evaluation and workup in the emergency department, CT showing calcific tendinitis no abscess discharged with prednisone taper and will follow with orthopedics, given a walker for ambulatory dysfunction        Amount and/or Complexity of Data Reviewed  Tests in the radiology section of CPT®: ordered and reviewed  Review and summarize past medical records: yes  Independent visualization of images, tracings, or specimens: yes            Time reflects when diagnosis was documented in both MDM as applicable and the Disposition within this note     Time User Action Codes Description Comment    3/21/2023  1:38 PM Cyndi Funes Add [[id number]] Calcific tendonitis of foot, right       ED Disposition     ED Disposition   Discharge    Condition   Stable    Date/Time   Tue Mar 21, 2023  1:38 PM    [address] discharge to home/self care                 Follow-up Information     Follow up With Specialties Details Why Contact Info Additional Information    Gracia Tobin MD Family Medicine Call in 3 days As needed, For ED follow-up [address]  [address] Emergency Department Emergency Medicine Go to  If symptoms worsen or do not resolve [address] [facility] [address] [facility] Emergency Department, [address], [place], [place], [address], MD Orthopedic Surgery Call in 3 days For ED follow-up [address] [facility]        [facility] Call in 3 days For ED follow-up [address] [facility] [id number]  [address], [address] [place], [place], [address] [facility]

## 2023-03-21 NOTE — PROGRESS NOTES
[facility]        NAME: Natalia Leigh is a 56 y o  female  : 1965    MRN: [id number]  DATE: 2023  TIME: 9:51 AM    Assessment and Plan   Acute right ankle pain [M25 571]  1  Acute right ankle pain          --Will need urgent evaluation and treatment to for possible cellulitis/septic joint vs  inflammatory arthritis vs  Charcot foot vs  DVT/other  Patient agreeable to proceed directly to ER  Deemed safe to proceed via private motor vehicle  Patient Instructions     --Please proceed directly to ER for further evaluation and treatment    Chief Complaint     Chief Complaint   Patient presents with   • Foot Pain     Pt  C/o right foot pain, redness, swelling, and painful to the touch x 4-5 days  Pt  States no injury known  History of Present Illness       Here with complaints of right ankle pain x 5 days  Insiduous onset with no known precipitating injury or activity  Pain felt medial>lateral aspect of ankle, with associated swelling, along with redness of medial ankle  Pain increased with weight bearing, ankle dorsiflexion  Rates 6/10 at present  Tylenol, ice not helping  No fever/chills  Right ankle sprain 3 months ago  Resolved without issue  Denies other right ankle injuries or surgeries  Denies prior history of gout, RA, other inflammatory arthritis  Denies history of cellulitis, MRSA  T2D, with recent A1C 6 2  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  No history Charcot foot  Review of Systems   Review of Systems   Constitutional: Negative for fever  Musculoskeletal:        Per HPI   Skin: Positive for color change           Current Medications       Current Outpatient Medications:   •  Ascorbic Acid (vitamin C) 1000 MG tablet, Take 1,000 mg by mouth daily, Disp: , Rfl:   •  atorvastatin (LIPITOR) 10 mg tablet, Take one tablet daily at bedtime, Disp: 30 tablet, Rfl: 6  •  BLACK COHOSH EXTRACT PO, Take by mouth, Disp: , Rfl:   •  Empagliflozin (Jardiance) 10 MG TABS tablet, Take 1 tablet (10 mg total) by mouth every morning, Disp: 30 tablet, Rfl: 6  •  EQL EVENING PRIMROSE  MG CAPS, Take by mouth, Disp: , Rfl:   •  ferrous sulfate 325 (65 Fe) mg tablet, Take 325 mg by mouth daily, Disp: , Rfl:   •  liraglutide (VICTOZA) injection, Inject 0 1 mL (0 6 mg total) under the skin daily Inject 0 6mg daily for 4 weeks then increase to 1 2mg, Disp: 54 mL, Rfl: 1  •  metFORMIN (GLUCOPHAGE) 1000 MG tablet, Take 1 tablet (1,000 mg total) by mouth 2 (two) times a day with meals, Disp: 180 tablet, Rfl: 2  •  omeprazole (PriLOSEC) 20 mg delayed release capsule, TAKE 1 CAPSULE BY MOUTH DAILY, Disp: 90 capsule, Rfl: 2  •  valsartan (DIOVAN) 320 MG tablet, Take 1 tablet (320 mg total) by mouth daily, Disp: 90 tablet, Rfl: 3  •  Blood Glucose Monitoring Suppl (OneTouch Verio) w/Device KIT, Use 2 (two) times a day, Disp: 1 kit, Rfl: 0  •  Diclofenac Sodium (VOLTAREN) 1 %, Apply 2 g topically 4 (four) times a day as needed, Disp: , Rfl:   •  glucose blood (OneTouch Verio) test strip, Use 1 each 2 (two) times a day Use as instructed, Disp: 100 strip, Rfl: 1  •  Lancets (freestyle) lancets, Use 2 (two) times a day, Disp: 200 each, Rfl: 1  •  Lancets (OneTouch Delica Plus [id number]) MISC, Use 1 Device 2 (two) times a day, Disp: 100 each, Rfl: 1  •  lidocaine (Lidoderm) 5 %, Apply 1 patch topically over 12 hours daily Remove & Discard patch within 12 hours or as directed by MD, Disp: 30 patch, Rfl: 1  •  meloxicam (Mobic) 15 mg tablet, Take 1 tablet (15 mg total) by mouth daily Take medications with food    Discontinue if GI upset occurs, Disp: 30 tablet, Rfl: 3  •  Semaglutide,0 25 or 0 5MG/DOS, (Ozempic, 0 25 or 0 5 MG/DOSE,) 2 MG/1 5ML SOPN, 0 25 mg once a week for 4 weeks and increase dose to 0 5 mg once a week (Patient not taking: Reported on 12/30/2022), Disp: 6 mL, Rfl: 1  •  Tirzepatide (Mounjaro) 5 MG/0 5ML SOPN, Inject 5 mg in to skin once a week, Disp: 2 mL, Rfl: 5    Current Allergies     Allergies as of 03/21/2023 - Reviewed 03/21/2023   Allergen Reaction Noted   • Lisinopril Cough 02/18/2019   • Steri strip [medical tape] Rash 10/21/2022            The following portions of the patient's history were reviewed and updated as appropriate: allergies, current medications, past family history, past medical history, past social history, past surgical history and problem list      Past Medical History:   Diagnosis Date   • Allergic    • Diabetes mellitus ([address] )    • GERD (gastroesophageal reflux disease)    • Hepatitis C     Treated   • Hypertension        Past Surgical History:   Procedure Laterality Date   • ANKLE SURGERY     • BREAST BIOPSY Right     core bx-benign   • BREAST BIOPSY Right 04/11/2022   • BREAST LUMPECTOMY Right 6/30/2022    Procedure: BREAST PAYTON  DIRECTED LUMPECTOMY WITH Braulio Lunch;  Surgeon: Joseph Grant MD;  [facility];  Service: Surgical Oncology   • COLONOSCOPY  03/2019   • MAMMO (HISTORICAL)  02/2019   • MAMMO NEEDLE LOCALIZATION LEFT (ALL INC) Right 6/9/2022   • MAMMO NEEDLE LOCALIZATION RIGHT (ALL INC) Right 6/30/2022   • MAMMO STEREOTACTIC BREAST BIOPSY RIGHT (ALL INC) Right 4/11/2022   • TUBAL LIGATION         Family History   Problem Relation Age of Onset   • Breast cancer Mother    • Hypertension Mother    • Diabetes Mother    • Hypertension Father    • Diabetes Father    • Heart failure Father    • Dementia Father    • Heart disease Father    • No Known Problems Sister    • No Known Problems Brother    • No Known Problems Brother    • No Known Problems Brother    • Hyperlipidemia Maternal Grandmother    • Diabetes Maternal Grandmother    • Heart attack Maternal Grandfather    • Stroke Maternal Grandfather    • Arthritis Paternal Grandmother    • Liver cancer Paternal Grandfather         unknown age   • No Known Problems Daughter    • No Known Problems Son          Medications have been verified          Objective   /73   Pulse (!) 51   Temp 97 7 °F (36 5 °C)   Resp 16   Ht 5' 10 5" (1 791 m)   Wt 123 kg (272 lb)   LMP 07/27/2021 (Approximate)   SpO2 98%   BMI 38 48 kg/m²   Patient's last menstrual period was 07/27/2021 (approximate)  Physical Exam     Physical Exam  Constitutional:       General: She is not in acute distress  Pulmonary:      Effort: Pulmonary effort is normal    Musculoskeletal:         General: Tenderness present  No swelling  Comments: Right ankle with moderate diffuse swelling  Mild tenderness noted lateral and dorsal aspects  More pronounced tenderness noted medial aspect, with 3 x 6 cm area of diffuse faint erythema, induration, warmth noted along medial aspect of midfoot/hindfoot  No skin break noted  Ankle AROM decreased 25% plantar flexion and dorsiflexion with pain  Antalgic gait noted  Neurological:      Mental Status: She is alert     Psychiatric:         Mood and Affect: Mood normal

## 2023-03-21 NOTE — DISCHARGE INSTRUCTIONS
Controlling her pain with alternating Tylenol/Motrin  Take the prescribed steroid for 5 days in order to help reduce inflammation, and monitor symptoms closely  If they are worsening or not resolving then return to the ED immediately for further evaluation and treatment

## 2023-03-21 NOTE — ED PROVIDER NOTES
History  Chief Complaint   Patient presents with   • Foot Pain     Pt presents to the ED with c/o of ulcer forming on arch of right foot  Pain for approx 1 week  Pt diabetic, no injury  79-year-old female with history of diabetes presents today with 5 days of worsening right ankle pain  She states it started gradually and has progressively worsened  She states she is unable to bear weight  She has good range of motion without pain  It is tender to the touch  She denies any traumatic event  Has never had any symptoms like this before  Denies fever/chills, nausea/vomiting, chest pain/shortness of breath  Prior to Admission Medications   Prescriptions Last Dose Informant Patient Reported? Taking?    Ascorbic Acid (vitamin C) 1000 MG tablet   Yes No   Sig: Take 1,000 mg by mouth daily   BLACK COHOSH EXTRACT PO   Yes No   Sig: Take by mouth   Blood Glucose Monitoring Suppl (OneTouch Verio) w/Device KIT   No No   Sig: Use 2 (two) times a day   Diclofenac Sodium (VOLTAREN) 1 %   Yes No   Sig: Apply 2 g topically 4 (four) times a day as needed   EQL EVENING PRIMROSE  MG CAPS   Yes No   Sig: Take by mouth   Empagliflozin (Jardiance) 10 MG TABS tablet   No No   Sig: Take 1 tablet (10 mg total) by mouth every morning   Lancets (OneTouch Delica Plus [id number]) MISC   No No   Sig: Use 1 Device 2 (two) times a day   Lancets (freestyle) lancets   No No   Sig: Use 2 (two) times a day   Semaglutide,0 25 or 0 5MG/DOS, (Ozempic, 0 25 or 0 5 MG/DOSE,) 2 MG/1 5ML SOPN   No No   Si 25 mg once a week for 4 weeks and increase dose to 0 5 mg once a week   Patient not taking: Reported on 2022   Tirzepatide (Mounjaro) 5 MG/0 5ML SOPN   No No   Sig: Inject 5 mg in to skin once a week   atorvastatin (LIPITOR) 10 mg tablet   No No   Sig: Take one tablet daily at bedtime   ferrous sulfate 325 (65 Fe) mg tablet   Yes No   Sig: Take 325 mg by mouth daily   glucose blood (OneTouch Verio) test strip   No No   Sig: Use 1 each 2 (two) times a day Use as instructed   lidocaine (Lidoderm) 5 %   No No   Sig: Apply 1 patch topically over 12 hours daily Remove & Discard patch within 12 hours or as directed by MD   liraglutide (VICTOZA) injection   No No   Sig: Inject 0 1 mL (0 6 mg total) under the skin daily Inject 0 6mg daily for 4 weeks then increase to 1 2mg   meloxicam (Mobic) 15 mg tablet   No No   Sig: Take 1 tablet (15 mg total) by mouth daily Take medications with food    Discontinue if GI upset occurs   metFORMIN (GLUCOPHAGE) 1000 MG tablet   No No   Sig: Take 1 tablet (1,000 mg total) by mouth 2 (two) times a day with meals   omeprazole (PriLOSEC) 20 mg delayed release capsule   No No   Sig: TAKE 1 CAPSULE BY MOUTH DAILY   valsartan (DIOVAN) 320 MG tablet   No No   Sig: Take 1 tablet (320 mg total) by mouth daily      Facility-Administered Medications: None       Past Medical History:   Diagnosis Date   • Allergic    • Diabetes mellitus (HCC)    • GERD (gastroesophageal reflux disease)    • Hepatitis C     Treated   • Hypertension        Past Surgical History:   Procedure Laterality Date   • ANKLE SURGERY     • BREAST BIOPSY Right     core bx-benign   • BREAST BIOPSY Right 04/11/2022   • BREAST LUMPECTOMY Right 6/30/2022    Procedure: BREAST PAYTON  DIRECTED LUMPECTOMY WITH Radha Reddy;  Surgeon: Valencia Adam MD;  [facility];  Service: Surgical Oncology   • COLONOSCOPY  03/2019   • MAMMO (HISTORICAL)  02/2019   • MAMMO NEEDLE LOCALIZATION LEFT (ALL INC) Right 6/9/2022   • MAMMO NEEDLE LOCALIZATION RIGHT (ALL INC) Right 6/30/2022   • MAMMO STEREOTACTIC BREAST BIOPSY RIGHT (ALL INC) Right 4/11/2022   • TUBAL LIGATION         Family History   Problem Relation Age of Onset   • Breast cancer Mother    • Hypertension Mother    • Diabetes Mother    • Hypertension Father    • Diabetes Father    • Heart failure Father    • Dementia Father    • Heart disease Father    • No Known Problems Sister    • No Known Problems Brother    • No Known Problems Brother    • No Known Problems Brother    • Hyperlipidemia Maternal Grandmother    • Diabetes Maternal Grandmother    • Heart attack Maternal Grandfather    • Stroke Maternal Grandfather    • Arthritis Paternal Grandmother    • Liver cancer Paternal Grandfather         unknown age   • No Known Problems Daughter    • No Known Problems Son      I have reviewed and agree with the history as documented  E-Cigarette/Vaping   • E-Cigarette Use Never User      E-Cigarette/Vaping Substances   • Nicotine No    • THC No    • CBD No    • Flavoring No    • Other No    • Unknown No      Social History     Tobacco Use   • Smoking status: Never   • Smokeless tobacco: Never   Vaping Use   • Vaping Use: Never used   Substance Use Topics   • Alcohol use: Yes     Alcohol/week: 1 0 standard drink     Types: 1 Glasses of wine per week     Comment: socially   • Drug use: Never        Review of Systems   Constitutional: Negative for chills and fever  HENT: Negative for congestion, rhinorrhea and sneezing  Eyes: Negative for pain and visual disturbance  Respiratory: Negative for cough and shortness of breath  Cardiovascular: Negative for chest pain and palpitations  Gastrointestinal: Negative for abdominal pain, constipation, diarrhea, nausea and vomiting  Genitourinary: Negative for dysuria and hematuria  Musculoskeletal: Positive for arthralgias (Right ankle pain)  Negative for back pain  Skin: Negative for color change and rash  Neurological: Negative for syncope, weakness, numbness and headaches  All other systems reviewed and are negative        Physical Exam  ED Triage Vitals   Temperature Pulse Respirations Blood Pressure SpO2   03/21/23 1023 03/21/23 1025 03/21/23 1023 03/21/23 1023 03/21/23 1023   98 6 °F (37 °C) 84 14 153/94 96 %      Temp Source Heart Rate Source Patient Position - Orthostatic VS BP Location FiO2 (%)   03/21/23 1023 03/21/23 1023 -- 03/21/23 1023 --   Oral Monitor  Right arm       Pain Score       03/21/23 1023       7             Orthostatic Vital Signs  Vitals:    03/21/23 1023 03/21/23 1025   BP: 153/94    Pulse:  84       Physical Exam  Vitals and nursing note reviewed  Constitutional:       General: She is not in acute distress  Appearance: She is not ill-appearing  HENT:      Head: Normocephalic and atraumatic  Right Ear: External ear normal       Left Ear: External ear normal       Nose: Nose normal  No congestion or rhinorrhea  Mouth/Throat:      Mouth: Mucous membranes are moist       Pharynx: Oropharynx is clear  Eyes:      General: No scleral icterus  Extraocular Movements: Extraocular movements intact  Cardiovascular:      Rate and Rhythm: Normal rate and regular rhythm  Pulses: Normal pulses  Heart sounds: Normal heart sounds  Pulmonary:      Effort: Pulmonary effort is normal       Breath sounds: Normal breath sounds  Abdominal:      Palpations: Abdomen is soft  Tenderness: There is no abdominal tenderness  Musculoskeletal:         General: Swelling (Redness and warmth) and tenderness (Over medial malleolus) present  No deformity or signs of injury  Normal range of motion  Cervical back: Normal range of motion  Right lower leg: No edema  Left lower leg: No edema  Skin:     General: Skin is warm and dry  Neurological:      General: No focal deficit present  Mental Status: She is alert and oriented to person, place, and time     Psychiatric:         Mood and Affect: Mood normal          Behavior: Behavior normal          ED Medications  Medications   acetaminophen (TYLENOL) tablet 975 mg (975 mg Oral Given 3/21/23 1119)   iohexol (OMNIPAQUE) 350 MG/ML injection (SINGLE-DOSE) 100 mL (100 mL Intravenous Given 3/21/23 1206)   predniSONE tablet 40 mg (40 mg Oral Given 3/21/23 1354)       Diagnostic Studies  Results Reviewed     Procedure Component Value Units Date/Time    CBC and differential [[id number]]  (Abnormal) Collected: 03/21/23 1125    Lab Status: Final result Specimen: Blood from Arm, Right Updated: 03/21/23 1236     WBC 8 71 Thousand/uL      RBC 4 99 Million/uL      Hemoglobin 14 4 g/dL      Hematocrit 45 0 %      MCV 90 fL      MCH 28 9 pg      MCHC 32 0 g/dL      RDW 13 8 %      Platelets 213 Thousands/uL      nRBC 0 /100 WBCs      Neutrophils Relative 77 %      Immat GRANS % 0 %      Lymphocytes Relative 14 %      Monocytes Relative 7 %      Eosinophils Relative 1 %      Basophils Relative 1 %      Neutrophils Absolute 6 80 Thousands/µL      Immature Grans Absolute 0 03 Thousand/uL      Lymphocytes Absolute 1 19 Thousands/µL      Monocytes Absolute 0 59 Thousand/µL      Eosinophils Absolute 0 05 Thousand/µL      Basophils Absolute 0 05 Thousands/µL     Narrative: This is an appended report  These results have been appended to a previously verified report      Comprehensive metabolic panel [[id number]]  (Abnormal) Collected: 03/21/23 1125    Lab Status: Final result Specimen: Blood from Arm, Right Updated: 03/21/23 1219     Sodium 138 mmol/L      Potassium 3 9 mmol/L      Chloride 102 mmol/L      CO2 24 mmol/L      ANION GAP 12 mmol/L      BUN 10 mg/dL      Creatinine 0 46 mg/dL      Glucose 126 mg/dL      Calcium 9 6 mg/dL      AST 18 U/L      ALT 13 U/L      Alkaline Phosphatase 63 U/L      Total Protein 7 8 g/dL      Albumin 4 3 g/dL      Total Bilirubin 0 92 mg/dL      eGFR 112 ml/min/1 73sq m     Narrative:      [place] guidelines for Chronic Kidney Disease (CKD):   •  Stage 1 with normal or high GFR (GFR > 90 mL/min/1 73 square meters)  •  Stage 2 Mild CKD (GFR = 60-89 mL/min/1 73 square meters)  •  Stage 3A Moderate CKD (GFR = 45-59 mL/min/1 73 square meters)  •  Stage 3B Moderate CKD (GFR = 30-44 mL/min/1 73 square meters)  •  Stage 4 Severe CKD (GFR = 15-29 mL/min/1 73 square meters)  •  Stage 5 End Stage CKD (GFR <15 mL/min/1 73 square meters)  Note: GFR calculation is accurate only with a steady state creatinine    Sedimentation rate, automated [[id number]]  (Abnormal) Collected: 03/21/23 1125    Lab Status: Final result Specimen: Blood from Arm, Right Updated: 03/21/23 1154     Sed Rate 49 mm/hour                  CT lower extremity w contrast right   Final Result by Avel Recio MD (03/21 1317)   No CT evidence of osteomyelitis  No abscess collection  Workstation performed: [id number]         XR ankle 3+ views RIGHT   ED Interpretation by Reza Alexis DO (03/21 1143)   No acute fracture            Procedures  Procedures      ED Course  ED Course as of 03/21/23 1406   Tue Mar 21, 2023   1149 CBC and differential(!)  Platelets at baseline    1202 Sed Rate(!): 49   1223 Comprehensive metabolic panel(!)  wnl   0352 CBC and differential(!)  wnl                                       Medical Decision Making  Owen Laws presented due to pain in her right foot for approximately 5 days  She states it has been progressively worsening with no inciting event including trauma  On arrival to the ED, she was in obvious distress and severely tender on physical exam just distal to the medial malleolus  She does have a history of diabetes, however she does not complain of fevers or chills  She has almost full range of motion in her ankle, so this appears to not be involving the joint  X-ray showed no bony abnormalities  CT scan of the lower extremity showed calcific tendinitis  Due to this, patient was given a dose of prednisone here, and prescribed 5 day burst for outpatient  She was given instructions to follow-up closely with orthopedic foot specialist and/or podiatry  Patient was given a walker to stay as nonambulatory as possible  Patient refused crutches due to instability and being uncomfortable  Patient discharged in stable condition  Strict return precautions given if symptoms are worsening or not resolving        Amount and/or Complexity of Data Reviewed  Labs: ordered  Decision-making details documented in ED Course  Radiology: ordered and independent interpretation performed  Risk  OTC drugs  Prescription drug management  Disposition  Final diagnoses:   Calcific tendonitis of foot, right     Time reflects when diagnosis was documented in both MDM as applicable and the Disposition within this note     Time User Action Codes Description Comment    3/21/2023  1:38 PM Estefany Lung Add [T98 941] Calcific tendonitis of foot, right       ED Disposition     ED Disposition   Discharge    Condition   Stable    Date/Time   Tue Mar 21, 2023  1:38 PM    [address] [facility] [address] discharge to home/self care  Follow-up Information     Follow up With Specialties Details Why Contact Info Additional Information    Flower Pitts MD Family Medicine Call in 3 days As needed, For ED follow-up [address]  [place] [address] [facility] Emergency Department Emergency Medicine Go to  If symptoms worsen or do not resolve [address] [facility] [address] [facility] Emergency Department, [address], [place], [address] [facility],  [address], MD Orthopedic Surgery Call in 3 days For ED follow-up [address] [facility] [address]        [facility] Podiatry Call in 3 days For ED follow-up [address] [facility] [phone]  [address] [facility], [address] [place] [address], [address] [place] [address]          Patient's Medications   Discharge Prescriptions    PREDNISONE 20 MG TABLET    Take 2 tablets (40 mg total) by mouth daily for 5 days       Start Date: 3/21/2023 End Date: 3/26/2023       Order Dose: 40 mg       Quantity: 10 tablet    Refills: 0     No discharge procedures on file      PDMP Review       Value Time User    PDMP Reviewed  Yes 6/22/2022  3:56 PM Myles Villegas MD           ED Provider  Attending physically available and evaluated Vickie Curtis I managed the patient along with the ED Attending      Electronically Signed by         Wanda Youngblood DO  03/21/23 4669

## 2023-03-22 ENCOUNTER — OFFICE VISIT (OUTPATIENT)
Dept: OBGYN CLINIC | Facility: CLINIC | Age: 58
End: 2023-03-22

## 2023-03-22 ENCOUNTER — APPOINTMENT (OUTPATIENT)
Dept: LAB | Facility: AMBULARY SURGERY CENTER | Age: 58
End: 2023-03-22

## 2023-03-22 VITALS
DIASTOLIC BLOOD PRESSURE: 78 MMHG | SYSTOLIC BLOOD PRESSURE: 108 MMHG | HEIGHT: 71 IN | WEIGHT: 272 LBS | HEART RATE: 111 BPM | BODY MASS INDEX: 38.08 KG/M2

## 2023-03-22 DIAGNOSIS — M19.90 INFLAMMATORY ARTHROPATHY: ICD-10-CM

## 2023-03-22 DIAGNOSIS — M19.90 INFLAMMATORY ARTHROPATHY: Primary | ICD-10-CM

## 2023-03-22 LAB
ANA SER QL IA: NEGATIVE
ANISOCYTOSIS BLD QL SMEAR: PRESENT
B BURGDOR IGG+IGM SER-ACNC: <0.2 AI
BASOPHILS # BLD MANUAL: 0.11 THOUSAND/UL (ref 0–0.1)
BASOPHILS NFR MAR MANUAL: 1 % (ref 0–1)
CRP SERPL QL: 30.6 MG/L
DACRYOCYTES BLD QL SMEAR: PRESENT
EOSINOPHIL # BLD MANUAL: 0 THOUSAND/UL (ref 0–0.4)
EOSINOPHIL NFR BLD MANUAL: 0 % (ref 0–6)
ERYTHROCYTE [DISTWIDTH] IN BLOOD BY AUTOMATED COUNT: 13.5 % (ref 11.6–15.1)
GIANT PLATELETS BLD QL SMEAR: PRESENT
HCT VFR BLD AUTO: 44.6 % (ref 34.8–46.1)
HGB BLD-MCNC: 14.4 G/DL (ref 11.5–15.4)
LYMPHOCYTES # BLD AUTO: 0.44 THOUSAND/UL (ref 0.6–4.47)
LYMPHOCYTES # BLD AUTO: 4 % (ref 14–44)
MCH RBC QN AUTO: 28.4 PG (ref 26.8–34.3)
MCHC RBC AUTO-ENTMCNC: 32.3 G/DL (ref 31.4–37.4)
MCV RBC AUTO: 88 FL (ref 82–98)
MICROCYTES BLD QL AUTO: PRESENT
MONOCYTES # BLD AUTO: 0.22 THOUSAND/UL (ref 0–1.22)
MONOCYTES NFR BLD: 2 % (ref 4–12)
NEUTROPHILS # BLD MANUAL: 9.93 THOUSAND/UL (ref 1.85–7.62)
NEUTS BAND NFR BLD MANUAL: 9 % (ref 0–8)
NEUTS SEG NFR BLD AUTO: 82 % (ref 43–75)
PLATELET # BLD AUTO: 115 THOUSANDS/UL (ref 149–390)
PLATELET BLD QL SMEAR: ABNORMAL
POLYCHROMASIA BLD QL SMEAR: PRESENT
RBC # BLD AUTO: 5.07 MILLION/UL (ref 3.81–5.12)
RBC MORPH BLD: PRESENT
URATE SERPL-MCNC: 3.8 MG/DL (ref 2–7.5)
VARIANT LYMPHS # BLD AUTO: 2 %
WBC # BLD AUTO: 10.91 THOUSAND/UL (ref 4.31–10.16)

## 2023-03-22 NOTE — PATIENT INSTRUCTIONS
Charcot Arthropathy     What is it? Charcot arthropathy, also known as Charcot foot and ankle, is a syndrome in patients who have neuropathy or loss of sensation  It includes fractures and dislocations of bones and joints that occur with minimal or no known trauma  Symptoms and Clinical Presentation  Initially, there may be swelling, redness and increased warmth of the foot and ankle  Later, when fractures and dislocations occur, there may be severe deformities of the foot and ankle, including collapse of the midfoot arch (often called rocker bottom foot) or instability of the ankle and hindfoot  The syndrome progresses through three general stages:    Stage 1 (acute, development-fragmentation): marked redness, swelling, warmth; early radiographs show soft tissue swelling, and bony fragmentation and joint dislocation may be noted several weeks after onset  Stage 2 (subacute, coalescence): decreased redness, swelling and warmth; radiographs show early bony healing  Stage 3 (chronic, reconstruction-consolidation): redness, swelling, warmth resolved; bony healing or nonunion and residual deformity are frequently present  Cause (including risk factors)   Charcot foot occurs in patients with peripheral neuropathy resulting from diverse conditions including diabetes mellitus, leprosy, syphilis, poliomyelitis, chronic alcoholism or syringomyelia  Repetitive microtrauma that exceeds the rate of healing may cause fractures and dislocations  Changes in circulation may cause resorption of bone, weakening the bone and increasing susceptibility to fracture and dislocation  Anatomy   Charcot arthropathy may affect any part of the foot and ankle, including (in decreasing order of frequency) the midfoot, hindfoot, ankle, heel and forefoot  Multiple regions may be involved concurrently  Fractures and dislocations frequently involve several bones and joints, with extensive fragmentation and deformity     Diagnosis   Time between onset of symptoms and diagnosis may be several weeks or months  Delay in diagnosis may or may not affect the end result because gross instability may occur even if prompt diagnosis is made  Diagnosis is based on a high index of suspicion for this problem in patients with neuropathy  Increased redness, swelling and warmth may be the only early signs  Some patients have pain  Early radiographs may show soft tissue swelling with no bony changes, but repeat radiographs several weeks later may show bone and joint changes  Treatment Options   Non-Surgical: Non-operative treatment includes a protective splint, walking brace, orthosis or cast  Early weightbearing is allowed in stage 1 by 41 percent of specialists and in stage 2 by 49 percent of specialists, and other specialists recommend non-weightbearing  After stable healing is noted in stage 3, treatment includes accommodative footwear with protective orthoses  Surgical: Selected patients with instability in the early stages may be treated with open reduction and internal fixation and fusion  In the later stages, surgical options may include realignment osteotomy and fusion (correction of deformity) or ostectomy (removal of bony prominence that could cause an ulcer )  Recovery  Healing may require several months  Healing times after surgery may be twice the usual duration than for a non-diabetic foot  With Charcot foot and ankle, healing after fusion may require six months of protection and subsequent orthoses  Outcome   Charcot foot and ankle may recur or flare up  Furthermore, bilateral involvement is common  Therefore, impairment with this condition is permanent  Patients use protective footwear and orthoses, and limit standing and walking to that required for activities of daily living   Regular lifelong follow-up is required with a specialist   Complications  Severe deformities may include collapse of the midfoot arch (called rocker bottom foot) with associated plantar midfoot ulcer  Deformities may occur in any part of the foot and ankle and result in ulcers from bony pressure against the shoe or ground; ulcers may become infected, and infections may be limb- and life-threatening  Some Charcot joints, such as the ankle, may heal with fibrous tissue (non-union) and this may result in gross instability (“floppy foot”) that may predispose to ulcers and may be difficult to support with braces  Frequently Asked Questions   Why is it common for there to be a delay between onset of signs and diagnosis of Charcot foot? The initial signs of Charcot foot are non-specific and are more typically seen in other more common conditions such as infections and rheumatologic conditions  Many patients do not have pain or have pain from neuropathy that was preexisting  Physicians who are not specialists in orthopaedic foot and ankle problems may see a Charcot foot very few times in an entire career, less frequently than other conditions such as septic arthritis, gout, rheumatoid arthritis and other inflammatory arthropathies  Does a delay between onset of signs and diagnosis worsen the prognosis? Not necessarily  Some cases of Charcot arthropathy become unstable in the very early stages and have destabilized before the patient sees the doctor  Other cases may destabilize even if the foot and ankle are optimally protected early in the course of the problem  Early protection may help decrease risk of further instability in some patients, but instability might occur despite early protection  The massive bony fragmentation may cause instability very differently than a fracture resulting from trauma in a non-neuropathic patient

## 2023-03-22 NOTE — PROGRESS NOTES
CRISTAL Alvarez  Attending, Orthopaedic Surgery  [facility] [address] [facility]        ORTHOPAEDIC FOOT AND ANKLE CLINIC VISIT     Assessment:     Encounter Diagnosis   Name Primary? • Inflammatory arthropathy Yes              Plan:   · The patient verbalized understanding of exam findings and treatment plan  We engaged in the shared decision-making process and treatment options were discussed at length with the patient  Surgical and conservative management discussed today along with risks and benefits  · Patient likely has an underlying inflammatory arthropathy or Charcot as she had a warm, red, swollen foot without injury  · Rheumatologic panel labs will be ordered to determine possible etiology  · If rheumatologic panel is negative, we will get her into PT and recheck with an xray in 4 weeks  · We will see patient back in 4 weeks with repeat XRs and to review rheumatologic workup  Return in about 4 weeks (around 4/19/2023)  History of Present Illness:   Chief Complaint:   Chief Complaint   Patient presents with   • Right Foot - Pain     Chrissy Swanson is a 56 y o  female who is being seen for right foot pain  Patient states that she has noticed pain at the medial aspect of her right foot since last Thursday  Patient does not recall any inciting events but states that the pain was limiting her from bearing full weight and she developed tenderness to palpation to the same region over the last week  Yesterday, the pain was bad enough for the patient to go to the ED, at which time an XR and CT was performed and patient was given diagnosis of calcific tendonitis of posterior tibial tendon as well as plantar fasciitis  She was discharged with oral steroids, which she says has helped with pain relief  Pain is localized at medial aspect of foot with minimal radiating and described as sharp and severe  Patient denies numbness, tingling or radicular pain    Denies history of neuropathy  Patient does not smoke, does have diabetes and does not take blood thinners  Patient denies family history of anesthesia complications and has not had any complications with anesthesia  Pain/symptom timing:  Worse during the day when active  Pain/symptom context:  Worse with activites and work  Pain/symptom modifying factors:  Rest makes better, activities make worse  Pain/symptom associated signs/symptoms: none    Prior treatment   · NSAIDsYes    · Injections No   · Bracing/Orthotics No   · Physical Therapy No    Orthopedic Surgical History:   See below    Past Medical, Surgical and Social History:  Past Medical History:  has a past medical history of Allergic, Diabetes mellitus ([facility] [address] ), GERD (gastroesophageal reflux disease), Hepatitis C, and Hypertension  Problem List: does not have any pertinent problems on file  Past Surgical History:  has a past surgical history that includes Tubal ligation; Ankle surgery; Mammo (historical) (02/2019); Colonoscopy (03/2019); Mammo stereotactic breast biopsy right (all inc) (Right, 4/11/2022); Breast biopsy (Right); Breast biopsy (Right, 04/11/2022); Mammo needle localization left (all inc) (Right, 6/9/2022); Mammo needle localization right (all inc) (Right, 6/30/2022); and Breast lumpectomy (Right, 6/30/2022)  Family History: family history includes Arthritis in her paternal grandmother; Breast cancer in her mother; Dementia in her father; Diabetes in her father, maternal grandmother, and mother; Heart attack in her maternal grandfather; Heart disease in her father; Heart failure in her father; Hyperlipidemia in her maternal grandmother; Hypertension in her father and mother; Liver cancer in her paternal grandfather; No Known Problems in her brother, brother, brother, daughter, sister, and son; Stroke in her maternal grandfather  Social History:  reports that she has never smoked   She has never used smokeless tobacco  She reports current alcohol use of about 1 0 standard drink per week  She reports that she does not use drugs  Current Medications: has a current medication list which includes the following prescription(s): vitamin c, atorvastatin, black cohosh, onetouch verio, empagliflozin, eql evening primrose oil, ferrous sulfate, onetouch verio, freestyle, onetouch delica plus [id number], lidocaine, liraglutide, meloxicam, metformin, omeprazole, prednisone, valsartan, diclofenac sodium, ozempic (0 25 or 0 5 mg/dose), and mounjaro  Allergies: is allergic to lisinopril and steri strip [medical tape]  Review of Systems:  General- denies fever/chills  HEENT- denies hearing loss or sore throat  Eyes- denies eye pain or visual disturbances, denies red eyes  Respiratory- denies cough or SOB  Cardio- denies chest pain or palpitations  GI- denies abdominal pain  Endocrine- denies urinary frequency  Urinary- denies pain with urination  Musculoskeletal- Negative except noted above  Skin- denies rashes or wounds  Neurological- denies dizziness or headache  Psychiatric- denies anxiety or difficulty concentrating    Physical Exam:   /78 (BP Location: Left arm, Patient Position: Sitting, Cuff Size: Adult)   Pulse (!) 111   Ht 5' 10 5" (1 791 m)   Wt 123 kg (272 lb)   LMP 07/27/2021 (Approximate)   BMI 38 48 kg/m²   General/Constitutional: No apparent distress: well-nourished and well developed  Eyes: normal ocular motion  Cardio: RRR, Normal S1S2, No m/r/g  Lymphatic: No appreciable lymphadenopathy  Respiratory: Non-labored breathing, CTA b/l no w/c/r  Vascular: No edema, swelling or tenderness, except as noted in detailed exam   Integumentary: No impressive skin lesions present, except as noted in detailed exam   Neuro: No ataxia or tremors noted  Psych: Normal mood and affect, oriented to person, place and time  Appropriate affect  Musculoskeletal: Normal, except as noted in detailed exam and in HPI      Examination    Right    Gait Normal   Musculoskeletal Tender to palpation at medial aspect of foot    Skin Minimal erythema over medial aspect of foot      Nails Normal    Range of Motion  20 degrees dorsiflexion, 40 degrees plantarflexion  Subtalar motion: normal    Stability Stable    Muscle Strength 5/5 tibialis anterior  5/5 gastrocnemius-soleus  5/5 posterior tibialis  5/5 peroneal/eversion strength  5/5 EHL  5/5 FHL    Neurologic Normal    Sensation Intact to light touch throughout sural, saphenous, superficial peroneal, deep peroneal and medial/lateral plantar nerve distributions  Oldham-Duglas 5 07 filament (10g) testing deferred  Cardiovascular Brisk capillary refill < 2 seconds,intact DP and PT pulses    Special Tests None      Imaging Studies:   3 views of the right ankle as well as CT of the right lower extremity were reviewed and interpreted independently that demonstrate calcifications along plantar fascia and posterior tibial tendon distribution, as well as osteophyte at dorsal aspect of talus  Reviewed by me personally  Isiah Hoyer Lachman, MD  Foot & Ankle Surgery   Department of [address] [place] [address]      I personally performed the service  Isiah Hoyer Lachman, MD

## 2023-03-23 LAB — RHEUMATOID FACT SER QL LA: NEGATIVE

## 2023-03-27 ENCOUNTER — OFFICE VISIT (OUTPATIENT)
Dept: FAMILY MEDICINE CLINIC | Facility: CLINIC | Age: 58
End: 2023-03-27

## 2023-03-27 VITALS
SYSTOLIC BLOOD PRESSURE: 124 MMHG | WEIGHT: 275 LBS | HEIGHT: 71 IN | BODY MASS INDEX: 38.5 KG/M2 | DIASTOLIC BLOOD PRESSURE: 82 MMHG | TEMPERATURE: 97.3 F | HEART RATE: 70 BPM | OXYGEN SATURATION: 98 % | RESPIRATION RATE: 16 BRPM

## 2023-03-27 DIAGNOSIS — D69.6 THROMBOCYTOPENIA (HCC): ICD-10-CM

## 2023-03-27 DIAGNOSIS — E11.9 TYPE 2 DIABETES MELLITUS WITHOUT COMPLICATION, WITHOUT LONG-TERM CURRENT USE OF INSULIN (HCC): Primary | ICD-10-CM

## 2023-03-27 DIAGNOSIS — M72.2 PLANTAR FASCIITIS OF RIGHT FOOT: ICD-10-CM

## 2023-03-27 DIAGNOSIS — E78.5 DYSLIPIDEMIA: ICD-10-CM

## 2023-03-27 DIAGNOSIS — I10 ESSENTIAL HYPERTENSION: ICD-10-CM

## 2023-03-27 NOTE — PROGRESS NOTES
Name: Kerry Giordano      : 1965      MRN: [id number]  Encounter Provider: Jannette Ferrer MD  Encounter Date: 3/27/2023   Encounter department: [address] [place] [address] MEDICINE    Assessment & Plan     1  Type 2 diabetes mellitus without complication, without long-term current use of insulin [facility])  Assessment & Plan:    Lab Results   Component Value Date    HGBA1C 6 2 (H) 2023   excellent control    Orders:  -     Hemoglobin A1C; Future; Expected date: 2023  -     Basic metabolic panel; Future; Expected date: 2023    2  Essential hypertension  Assessment & Plan:  Well controlled on current therapy continue with current medications and will reassess next visit        3  Thrombocytopenia ([address] )  Assessment & Plan:  chronic      4  Plantar fasciitis of right foot  Assessment & Plan:  Went to ortho now on prednisone will be sending to podiatry     Orders:  -     Ambulatory Referral to Podiatry; Future    5  Dyslipidemia  Assessment & Plan:  Labs ok             Subjective      HPI Pt is here for interval visit and evaluation of multiple medical problems, review of medications, labs, Health Maintenance and any recent specialty consults pt has plantar fasciitis right foot       Review of Systems   Constitutional: Negative for appetite change, chills, fatigue and fever  Respiratory: Negative for cough, chest tightness and shortness of breath  Cardiovascular: Negative for chest pain, palpitations and leg swelling  Gastrointestinal: Negative for abdominal pain, constipation, diarrhea, nausea and vomiting  Genitourinary: Negative for difficulty urinating and frequency  Musculoskeletal: Positive for arthralgias (knee pain; right foot tendonitis)  Negative for back pain, gait problem and neck pain  Skin: Negative for rash  Neurological: Negative for dizziness, weakness, light-headedness, numbness and headaches  Hematological: Does not bruise/bleed easily  Psychiatric/Behavioral: Negative for dysphoric mood and sleep disturbance  The patient is not nervous/anxious  Patient's shoes and socks removed  Right Foot/Ankle   Right Foot Inspection  Skin Exam: skin normal, skin intact and dry skin  No warmth, no callus, no erythema, no maceration, no abnormal color, no pre-ulcer, no ulcer and no callus  Toe Exam: No swelling, no tenderness, erythema and  no right toe deformity    Sensory   Vibration: diminished  Proprioception: intact  Monofilament testing: diminished    Vascular  The right DP pulse is 2+  Right Toe  - Comprehensive Exam  Ecchymosis: none  Swelling: none   Tenderness: none         Left Foot/Ankle  Left Foot Inspection  Skin Exam: skin normal, skin intact and dry skin  No warmth, no erythema, no maceration, normal color, no pre-ulcer, no ulcer and no callus  Sensory   Vibration: intact  Proprioception: intact  Monofilament testing: intact    Vascular  The left DP pulse is 2+       Left Toe  - Comprehensive Exam  Ecchymosis: none  Swelling: none   Tenderness: none           Assign Risk Category  No deformity present  No loss of protective sensation  No weak pulses  Risk: 0      Current Outpatient Medications on File Prior to Visit   Medication Sig   • Ascorbic Acid (vitamin C) 1000 MG tablet Take 1,000 mg by mouth daily   • atorvastatin (LIPITOR) 10 mg tablet Take one tablet daily at bedtime   • BLACK COHOSH EXTRACT PO Take by mouth   • Blood Glucose Monitoring Suppl (OneTouch Verio) w/Device KIT Use 2 (two) times a day   • Empagliflozin (Jardiance) 10 MG TABS tablet Take 1 tablet (10 mg total) by mouth every morning   • EQL EVENING PRIMROSE  MG CAPS Take by mouth   • ferrous sulfate 325 (65 Fe) mg tablet Take 325 mg by mouth daily   • glucose blood (OneTouch Verio) test strip Use 1 each 2 (two) times a day Use as instructed   • Lancets (freestyle) lancets Use 2 (two) times a day   • Lancets (OneTouch Delica Plus [id number]) MISC Use 1 "Device 2 (two) times a day   • lidocaine (Lidoderm) 5 % Apply 1 patch topically over 12 hours daily Remove & Discard patch within 12 hours or as directed by MD   • liraglutide (VICTOZA) injection Inject 0 1 mL (0 6 mg total) under the skin daily Inject 0 6mg daily for 4 weeks then increase to 1 2mg   • meloxicam (Mobic) 15 mg tablet Take 1 tablet (15 mg total) by mouth daily Take medications with food  Discontinue if GI upset occurs   • metFORMIN (GLUCOPHAGE) 1000 MG tablet Take 1 tablet (1,000 mg total) by mouth 2 (two) times a day with meals   • omeprazole (PriLOSEC) 20 mg delayed release capsule TAKE 1 CAPSULE BY MOUTH DAILY   • predniSONE 20 mg tablet Take 2 tablets (40 mg total) by mouth daily for 5 days   • valsartan (DIOVAN) 320 MG tablet Take 1 tablet (320 mg total) by mouth daily   • Diclofenac Sodium (VOLTAREN) 1 % Apply 2 g topically 4 (four) times a day as needed   • Semaglutide,0 25 or 0 5MG/DOS, (Ozempic, 0 25 or 0 5 MG/DOSE,) 2 MG/1 5ML SOPN 0 25 mg once a week for 4 weeks and increase dose to 0 5 mg once a week (Patient not taking: Reported on 12/30/2022)   • Tirzepatide (Mounjaro) 5 MG/0 5ML SOPN Inject 5 mg in to skin once a week       Objective     /82 (BP Location: Left arm, Patient Position: Sitting, Cuff Size: Standard)   Pulse 70   Temp (!) 97 3 °F (36 3 °C) (Tympanic)   Resp 16   Ht 5' 10 5\" (1 791 m)   Wt 125 kg (275 lb)   LMP 07/27/2021 (Approximate)   SpO2 98%   BMI 38 90 kg/m²     Physical Exam  Vitals reviewed  Constitutional:       General: She is not in acute distress  Appearance: Normal appearance  She is well-developed  She is not ill-appearing  HENT:      Mouth/Throat:      Mouth: Mucous membranes are moist    Eyes:      Extraocular Movements: Extraocular movements intact  Conjunctiva/sclera: Conjunctivae normal       Pupils: Pupils are equal, round, and reactive to light  Neck:      Thyroid: No thyromegaly  Vascular: No carotid bruit   " Cardiovascular:      Rate and Rhythm: Normal rate and regular rhythm  Pulses: Normal pulses  no weak pulses          Dorsalis pedis pulses are 2+ on the right side and 2+ on the left side  Heart sounds: Normal heart sounds  No murmur heard  Pulmonary:      Effort: Pulmonary effort is normal  No respiratory distress  Breath sounds: Normal breath sounds  Chest:      Chest wall: No tenderness  Abdominal:      General: Bowel sounds are normal  There is no distension  Palpations: Abdomen is soft  Tenderness: There is no abdominal tenderness  Musculoskeletal:         General: Tenderness (right plantar fascia) present  Cervical back: Normal range of motion and neck supple  Feet:      Right foot:      Skin integrity: Dry skin present  No ulcer, skin breakdown, erythema, warmth or callus  Left foot:      Skin integrity: Dry skin present  No ulcer, skin breakdown, erythema, warmth or callus  Lymphadenopathy:      Cervical: No cervical adenopathy  Skin:     General: Skin is warm and dry  Neurological:      General: No focal deficit present  Mental Status: She is alert and oriented to person, place, and time  Mental status is at baseline  Cranial Nerves: No cranial nerve deficit  Deep Tendon Reflexes: Reflexes normal    Psychiatric:         Mood and Affect: Mood normal          Behavior: Behavior normal      BMI Counseling: Body mass index is 38 9 kg/m²  The BMI is above normal  Nutrition recommendations include 3-5 servings of fruits/vegetables daily, reducing fast food intake, consuming healthier snacks, decreasing soda and/or juice intake and moderation in carbohydrate intake  Exercise recommendations include exercising 3-5 times per week      Rekha Balderrama MD

## 2023-04-03 ENCOUNTER — TELEPHONE (OUTPATIENT)
Dept: OBGYN CLINIC | Facility: CLINIC | Age: 58
End: 2023-04-03

## 2023-04-03 ENCOUNTER — APPOINTMENT (OUTPATIENT)
Dept: LAB | Facility: CLINIC | Age: 58
End: 2023-04-03

## 2023-04-03 DIAGNOSIS — R30.0 BURNING WITH URINATION: ICD-10-CM

## 2023-04-03 DIAGNOSIS — R30.0 BURNING WITH URINATION: Primary | ICD-10-CM

## 2023-04-03 LAB
BACTERIA UR QL AUTO: ABNORMAL /HPF
BILIRUB UR QL STRIP: NEGATIVE
CLARITY UR: CLEAR
COLOR UR: YELLOW
GLUCOSE UR STRIP-MCNC: ABNORMAL MG/DL
HGB UR QL STRIP.AUTO: ABNORMAL
KETONES UR STRIP-MCNC: NEGATIVE MG/DL
LEUKOCYTE ESTERASE UR QL STRIP: ABNORMAL
MUCOUS THREADS UR QL AUTO: ABNORMAL
NITRITE UR QL STRIP: NEGATIVE
NON-SQ EPI CELLS URNS QL MICRO: ABNORMAL /HPF
PH UR STRIP.AUTO: 6 [PH]
PROT UR STRIP-MCNC: ABNORMAL MG/DL
RBC #/AREA URNS AUTO: ABNORMAL /HPF
SP GR UR STRIP.AUTO: 1.04 (ref 1–1.03)
UROBILINOGEN UR STRIP-ACNC: <2 MG/DL
WBC #/AREA URNS AUTO: ABNORMAL /HPF

## 2023-04-03 NOTE — TELEPHONE ENCOUNTER
Patient having some burning after urinating, pressure, urger to go, would rather just go to lab for a ua as she believes she is getting a uti, orders placed

## 2023-04-03 NOTE — TELEPHONE ENCOUNTER
Patient having pressure when trying to urinate but not having any urination  Scheduled patient for office visit today with Bonnie   Patient does not  not know if this is UTI

## 2023-04-04 ENCOUNTER — OFFICE VISIT (OUTPATIENT)
Dept: ENDOCRINOLOGY | Facility: CLINIC | Age: 58
End: 2023-04-04

## 2023-04-04 ENCOUNTER — TELEPHONE (OUTPATIENT)
Dept: OBGYN CLINIC | Facility: CLINIC | Age: 58
End: 2023-04-04

## 2023-04-04 VITALS
WEIGHT: 267.2 LBS | DIASTOLIC BLOOD PRESSURE: 70 MMHG | TEMPERATURE: 97.6 F | SYSTOLIC BLOOD PRESSURE: 116 MMHG | BODY MASS INDEX: 37.8 KG/M2

## 2023-04-04 DIAGNOSIS — I10 ESSENTIAL HYPERTENSION: ICD-10-CM

## 2023-04-04 DIAGNOSIS — E11.9 TYPE 2 DIABETES MELLITUS WITHOUT COMPLICATION, WITHOUT LONG-TERM CURRENT USE OF INSULIN (HCC): Primary | ICD-10-CM

## 2023-04-04 DIAGNOSIS — E78.5 DYSLIPIDEMIA: ICD-10-CM

## 2023-04-04 NOTE — TELEPHONE ENCOUNTER
Spoke with lab  Currently Group B strep and grm neg rods  Total sensitivities should be available be   Available tomorrow, 4/5

## 2023-04-04 NOTE — TELEPHONE ENCOUNTER
----- Message from Cecile Herrera [place] sent at 4/4/2023 12:35 PM EDT -----  Can you please call the lab to ask them if they are completing a sensitivity?

## 2023-04-04 NOTE — PROGRESS NOTES
Patient Progress Note      CC: Dm      Referring Provider  No referring provider defined for this encounter  History of Present Illness:   Sid Cuenca is a 56 y o  female with a history of type 2 diabetes without long term use of insulin  Diabetes course has been stable  Denies recent illness or hospitalizations  Denies recent severe hypoglycemic or severe hyperglycemic episodes  Denies any issues with her current regimen  Home glucose monitoring: are performed regularly, typically checking twice a day but the past 2 weeks she did not check frequently due to a busy schedule    Home blood glucose readings: most readings in the 100s range  Occasionally higher if BG was taken too soon after eating or ate high carb meal      Current regimen: Victoza 1 8 mg daily, Jardiance 10 mg daily, metformin 1000 mg twice a day  Mounjaro was not covered which is why she switched to Victoza  compliant most of the time, denies any side effects from medications  Injects in: abdomen, thigh  Rotates sites: Yes  Hypoglycemic episodes: No, rare  H/o of hypoglycemia causing hospitalization or Intervention such as glucagon injection  or ambulance call :  No  Hypoglycemia symptoms: never less than 70 mg/dl  Treatment of hypoglycemia: discussed treatment     Medic alert tag: recommended: Yes    Diabetes education: Yes  Diet: 2 meals per day, 3 snacks per day  Timing of meals is predictable  Diabetic diet compliance:  compliant most of the time  Activity: Daily activity is predictable: Yes  She does water aerobics as tolerated; used to do it weekly  Currently treating tendonitis  Mammie Factor Ophthamology: overdue  Podiatry: March 2023    Has hypertension: on ACE inhibitor/ARB, compliant most of the time  Has hyperlipidemia: on statin - tolerating well, no myalgias  compliant most of the time, denies any side effects from medications    Thyroid disorders: No  History of pancreatitis: No    Patient Active Problem List   Diagnosis   • Vulvovaginal candidiasis   • Atypical squamous cell changes of undetermined significance (ASCUS) on cervical cytology with negative high risk human papilloma virus (HPV) test result   • BMI 38 0-38 9,adult   • Type 2 diabetes mellitus without complication, without long-term current use of insulin (HCC)   • Essential hypertension   • Gastroesophageal reflux disease without esophagitis   • History of hepatitis C   • Foot pain, right   • Class 2 obesity due to excess calories without serious comorbidity with body mass index (BMI) of 38 0 to 38 9 in adult   • Chronic pain of both ankles   • History of iron deficiency anemia   • Thrombocytopenia (HCC)   • Atypical ductal hyperplasia of right breast   • Pre-op exam   • Thigh numbness   • Rash   • Other proteinuria   • Increased risk of breast cancer   • Post-menopausal bleeding   • Plantar fasciitis of right foot   • Dyslipidemia      Past Medical History:   Diagnosis Date   • Allergic    • Diabetes mellitus ([facility] [address] )    • GERD (gastroesophageal reflux disease)    • Hepatitis C     Treated   • Hypertension       Past Surgical History:   Procedure Laterality Date   • ANKLE SURGERY     • BREAST BIOPSY Right     core bx-benign   • BREAST BIOPSY Right 04/11/2022   • BREAST LUMPECTOMY Right 6/30/2022    Procedure: BREAST PAYTON  DIRECTED LUMPECTOMY WITH Carrillo Smith;  Surgeon: Frankie Tiwari MD;  [facility];  Service: Surgical Oncology   • COLONOSCOPY  03/2019   • MAMMO (HISTORICAL)  02/2019   • MAMMO NEEDLE LOCALIZATION LEFT (ALL INC) Right 6/9/2022   • MAMMO NEEDLE LOCALIZATION RIGHT (ALL INC) Right 6/30/2022   • MAMMO STEREOTACTIC BREAST BIOPSY RIGHT (ALL INC) Right 4/11/2022   • TUBAL LIGATION        Family History   Problem Relation Age of Onset   • Breast cancer Mother    • Hypertension Mother    • Diabetes Mother    • Hypertension Father    • Diabetes Father    • Heart failure Father    • Dementia Father    • Heart disease Father    • No Known Problems Sister    • No Known Problems Brother    • No Known Problems Brother    • No Known Problems Brother    • Hyperlipidemia Maternal Grandmother    • Diabetes Maternal Grandmother    • Heart attack Maternal Grandfather    • Stroke Maternal Grandfather    • Arthritis Paternal Grandmother    • Liver cancer Paternal Grandfather         unknown age   • No Known Problems Daughter    • No Known Problems Son      Social History     Tobacco Use   • Smoking status: Never   • Smokeless tobacco: Never   Substance Use Topics   • Alcohol use:  Yes     Alcohol/week: 1 0 standard drink     Types: 1 Glasses of wine per week     Comment: socially     Allergies   Allergen Reactions   • Lisinopril Cough   • Steri Strip [Medical Tape] Rash         Current Outpatient Medications:   •  Ascorbic Acid (vitamin C) 1000 MG tablet, Take 1,000 mg by mouth daily, Disp: , Rfl:   •  atorvastatin (LIPITOR) 10 mg tablet, Take one tablet daily at bedtime, Disp: 30 tablet, Rfl: 6  •  BLACK COHOSH EXTRACT PO, Take by mouth, Disp: , Rfl:   •  Blood Glucose Monitoring Suppl (OneTouch Verio) w/Device KIT, Use 2 (two) times a day, Disp: 1 kit, Rfl: 0  •  Diclofenac Sodium (VOLTAREN) 1 %, Apply 2 g topically 4 (four) times a day as needed, Disp: , Rfl:   •  Empagliflozin (Jardiance) 10 MG TABS tablet, Take 1 tablet (10 mg total) by mouth every morning, Disp: 30 tablet, Rfl: 6  •  EQL EVENING PRIMROSE  MG CAPS, Take by mouth, Disp: , Rfl:   •  ferrous sulfate 325 (65 Fe) mg tablet, Take 325 mg by mouth daily, Disp: , Rfl:   •  glucose blood (OneTouch Verio) test strip, Use 1 each 2 (two) times a day Use as instructed, Disp: 100 strip, Rfl: 1  •  Lancets (freestyle) lancets, Use 2 (two) times a day, Disp: 200 each, Rfl: 1  •  Lancets (OneTouch Delica Plus [id number]) MISC, Use 1 Device 2 (two) times a day, Disp: 100 each, Rfl: 1  •  lidocaine (Lidoderm) 5 %, Apply 1 patch topically over 12 hours daily Remove & Discard patch within 12 hours or as directed by MD, Disp: 30 patch, Rfl: 1  •  liraglutide (VICTOZA) injection, Inject 0 1 mL (0 6 mg total) under the skin daily Inject 0 6mg daily for 4 weeks then increase to 1 2mg, Disp: 54 mL, Rfl: 1  •  meloxicam (Mobic) 15 mg tablet, Take 1 tablet (15 mg total) by mouth daily Take medications with food  Discontinue if GI upset occurs, Disp: 30 tablet, Rfl: 3  •  metFORMIN (GLUCOPHAGE) 1000 MG tablet, Take 1 tablet (1,000 mg total) by mouth 2 (two) times a day with meals, Disp: 180 tablet, Rfl: 2  •  omeprazole (PriLOSEC) 20 mg delayed release capsule, TAKE 1 CAPSULE BY MOUTH DAILY, Disp: 90 capsule, Rfl: 2  •  valsartan (DIOVAN) 320 MG tablet, Take 1 tablet (320 mg total) by mouth daily, Disp: 90 tablet, Rfl: 3  Review of Systems   Constitutional: Negative for activity change, appetite change, fatigue and unexpected weight change  HENT: Negative for trouble swallowing  Eyes: Negative for visual disturbance  Respiratory: Negative for shortness of breath  Cardiovascular: Negative for chest pain and palpitations  Gastrointestinal: Negative for constipation and diarrhea  Endocrine: Negative for polydipsia and polyuria  Musculoskeletal: Positive for arthralgias  Skin: Negative for wound  Neurological: Positive for numbness  Psychiatric/Behavioral: Negative  Physical Exam:  Body mass index is 37 8 kg/m²  /70   Temp 97 6 °F (36 4 °C)   Wt 121 kg (267 lb 3 2 oz)   LMP 07/27/2021 (Approximate)   BMI 37 80 kg/m²    Wt Readings from Last 3 Encounters:   04/04/23 121 kg (267 lb 3 2 oz)   03/27/23 125 kg (275 lb)   03/22/23 123 kg (272 lb)       Physical Exam  Vitals and nursing note reviewed  Constitutional:       Appearance: She is well-developed  HENT:      Head: Normocephalic  Eyes:      General: No scleral icterus  Pupils: Pupils are equal, round, and reactive to light  Neck:      Thyroid: No thyromegaly  Cardiovascular:      Rate and Rhythm: Normal rate and regular rhythm        Pulses: Radial pulses are 2+ on the right side and 2+ on the left side  Heart sounds: No murmur heard  Pulmonary:      Effort: Pulmonary effort is normal  No respiratory distress  Breath sounds: Normal breath sounds  No wheezing  Musculoskeletal:      Cervical back: Neck supple  Skin:     General: Skin is warm and dry  Neurological:      Mental Status: She is alert  Patient's shoes and socks were not removed  Labs:   Component      Latest Ref Rng & Units 11/21/2022 3/20/2023   Sodium      135 - 147 mmol/L 137 135   Potassium      3 5 - 5 3 mmol/L 4 6 3 8   Chloride      96 - 108 mmol/L 101 100   CO2      21 - 32 mmol/L 27 26   Anion Gap      4 - 13 mmol/L 9 9   BUN      5 - 25 mg/dL 14 11   Creatinine      0 60 - 1 30 mg/dL 0 49 (L) 0 51 (L)   GLUCOSE FASTING      65 - 99 mg/dL 190 (H) 127 (H)   Calcium      8 4 - 10 2 mg/dL 9 2 9 6   AST      13 - 39 U/L  19   ALT      7 - 52 U/L  14   Alkaline Phosphatase      34 - 104 U/L  64   Total Protein      6 4 - 8 4 g/dL  8 0   Albumin      3 5 - 5 0 g/dL  4 4   TOTAL BILIRUBIN      0 20 - 1 00 mg/dL  0 77   eGFR      ml/min/1 73sq m 110 108   Cholesterol      See Comment mg/dL  102   Triglycerides      See Comment mg/dL  85   HDL      >=50 mg/dL  47 (L)   LDL Calculated      0 - 100 mg/dL  38   Non-HDL Cholesterol      mg/dl  55   Hemoglobin A1C      Normal 3 8-5 6%; PreDiabetic 5 7-6 4%; Diabetic >=6 5%; Glycemic control for adults with diabetes <7 0% % 7 6 (H) 6 2 (H)   eAG, EST AVG Glucose      mg/dl 171 131   Free T4      0 76 - 1 46 ng/dL  1 17   TSH 3RD GENERATON      0 450 - 4 500 uIU/mL  1 762   THYROID MICROSOMAL ANTIBODY      0 - 34 IU/mL  <9   THYROGLOBULIN AB      0 0 - 0 9 IU/mL  <1 0       Plan:    Diagnoses and all orders for this visit:    Type 2 diabetes mellitus without complication, without long-term current use of insulin (HCC)  HGA1C 6 2%  Improved    Treatment regimen: continue current treatment  Episodes of hypoglycemia can lead to permanent disability and death  Discussed risks/complications associated with uncontrolled diabetes  Advised to adhere to diabetic diet, and recommended staying active/exercising routinely as tolerated  Keep carbohydrates consistent to limit blood glucose fluctuations  Advised to call if blood sugars less than 70 mg/dl or over 300 mg/dl  Check blood glucose 1-2 times a day  Discussed symptoms and treatment of hypoglycemia  Recommended routine follow-up with podiatry and ophthalmology  Ordered blood work to complete prior to next visit  -     Hemoglobin A1C; Future  -     Basic metabolic panel; Future  -     Microalbumin / creatinine urine ratio; Future    Essential hypertension  Blood pressure adequately controlled, continue current treatment  -     Basic metabolic panel; Future    Dyslipidemia  LDL 38  Continue statin therapy          Discussed with the patient diagnosis and treatment and all questions fully answered  She will call me if any problems arise  Counseled patient on diagnostic results, prognosis, risk and benefit of treatment options, instruction for management, importance of treatment compliance, risk factor reduction and impressions        Samreen Jackson PA-C

## 2023-04-04 NOTE — PATIENT INSTRUCTIONS
Hypoglycemia instructions   [facility]  4/4/2023  [id number]    Low Blood Sugar    Steps to treat low blood sugar  1  Test blood sugar if you have symptoms of low blood sugar:   Low Blood Sugar Symptoms:  o Sweaty  o Dizzy  o Rapid heartbeat  o Shaky  o Bad mood  o Hungry      2  Treat blood sugar less than 70 with 15 grams of fast-acting carbohydrate:   Examples of 15 grams Fast-Acting Carbohydrate:  o 4 oz juice  o 4 oz regular soda  o 3-4 glucose tablets (chew)  o 3-4 hard candies (chew)          3  Wait 15 minutes and test your blood sugar again     4   If blood sugar is less than 100, repeat steps 2-3     5  When your blood sugar is 100 or more, eat a snack if it will be longer than one hour until your next meal  The snack should be 15 grams of carbohydrate and a protein:   Examples of snacks:  o ½ sandwich  o 6 crackers with cheese  o Piece of fruit with cheese or peanut butter  o 6 crackers with peanut butter

## 2023-04-05 ENCOUNTER — TELEPHONE (OUTPATIENT)
Dept: OBGYN CLINIC | Facility: MEDICAL CENTER | Age: 58
End: 2023-04-05

## 2023-04-05 DIAGNOSIS — N30.00 ACUTE CYSTITIS WITHOUT HEMATURIA: Primary | ICD-10-CM

## 2023-04-05 LAB
BACTERIA UR CULT: ABNORMAL
BACTERIA UR CULT: ABNORMAL

## 2023-04-05 RX ORDER — NITROFURANTOIN 25; 75 MG/1; MG/1
100 CAPSULE ORAL 2 TIMES DAILY
Qty: 14 CAPSULE | Refills: 0 | Status: SHIPPED | OUTPATIENT
Start: 2023-04-05 | End: 2023-04-12

## 2023-04-05 NOTE — TELEPHONE ENCOUNTER
Patients daughter calling for results   Patient received received results in MY Chart   Please call before 2 pm

## 2023-04-05 NOTE — TELEPHONE ENCOUNTER
Spoke with Li Hanks and made her aware of her UA CS results and antibiotic prescription  Take all medication as directed  Monitor blood sugars closely while on prednisone and with the UTI

## 2023-04-06 ENCOUNTER — OFFICE VISIT (OUTPATIENT)
Dept: PODIATRY | Facility: CLINIC | Age: 58
End: 2023-04-06

## 2023-04-06 VITALS
HEIGHT: 71 IN | HEART RATE: 63 BPM | WEIGHT: 270 LBS | BODY MASS INDEX: 37.8 KG/M2 | DIASTOLIC BLOOD PRESSURE: 87 MMHG | SYSTOLIC BLOOD PRESSURE: 119 MMHG | OXYGEN SATURATION: 97 %

## 2023-04-06 DIAGNOSIS — E11.42 DIABETIC POLYNEUROPATHY ASSOCIATED WITH TYPE 2 DIABETES MELLITUS (HCC): ICD-10-CM

## 2023-04-06 DIAGNOSIS — M14.679 CHARCOT ARTHROPATHY OF MIDFOOT: Primary | ICD-10-CM

## 2023-04-06 DIAGNOSIS — M72.2 PLANTAR FASCIITIS OF RIGHT FOOT: ICD-10-CM

## 2023-04-06 NOTE — PROGRESS NOTES
Assessment/Plan:    The patient's clinical examination today is consistent with resolving erythema and edema to the medial aspect of the patient's right midfoot  There remains some mild to moderate tenderness with palpation of the mid foot joints medially and some mild tenderness along the posterior tibial tendon and plantar fascia of the right hindfoot  When compared to the clinical photos the patient has on her phone, the redness and swelling is much improved  The rheumatology panel was reviewed  Her sed rate and CRP was elevated  Her MARIELENA, uric acid, and rheumatoid factor was within normal limits  I agree with Dr Sonya Hamlin assessment that the patient likely is suffering from an acute Charcot midfoot arthropathy  As such, we will need to focus on offloading of the right foot until it cools down completely  She was fitted for a low tide Cam boot today for more aggressive offloading of the right midfoot as the acute phase of the Charcot arthropathy resolves  Her recent x-rays and CT scan was personally reviewed and interpreted  There is a large dorsal osteophyte to the talar head and neck  Calcifications are noted along the posterior tibial tendon as well as the plantar fascia and the spring ligament  Calcaneal spurring is noted  No fractures or dislocations are identified  Recommend follow-up with me in 3 weeks for repeat x-rays of the right foot, or with Dr Eli Rodriguez  Diagnoses and all orders for this visit:    Charcot arthropathy of midfoot  -     Cam Boot    Plantar fasciitis of right foot  -     Ambulatory Referral to Podiatry    Diabetic polyneuropathy associated with type [facility]          Subjective:      Patient ID: Meir Acevedo is a 56 y o  female  The patient presents today for her initial consultation with [facility]'s podiatry with a chief complaint of a red hot swollen right midfoot    She states that it started about 3 to 4 weeks ago without any prior history of injury or trauma to the foot  Clinical photos on her phone were examined and it did show redness and swelling along the medial aspect of the right midfoot  It was associated with pain  Since her appointment was made here, her pain has improved and the redness and swelling has come down  She was also evaluated by Dr Yoan Harrison on March 22, 2023  His notes were reviewed and appreciated        The following portions of the patient's history were reviewed and updated as appropriate: allergies, current medications, past family history, past medical history, past social history, past surgical history and problem list       PAST MEDICAL HISTORY:  Past Medical History:   Diagnosis Date   • Allergic    • Diabetes mellitus ([facility] [address] )    • GERD (gastroesophageal reflux disease)    • Hepatitis C     Treated   • Hypertension        PAST SURGICAL HISTORY:  Past Surgical History:   Procedure Laterality Date   • ANKLE SURGERY     • BREAST BIOPSY Right     core bx-benign   • BREAST BIOPSY Right 04/11/2022   • BREAST LUMPECTOMY Right 6/30/2022    Procedure: BREAST PAYTON  DIRECTED LUMPECTOMY WITH Veto Emperor;  Surgeon: Serjio Coleman MD;  [facility];  Service: Surgical Oncology   • COLONOSCOPY  03/2019   • MAMMO (HISTORICAL)  02/2019   • MAMMO NEEDLE LOCALIZATION LEFT (ALL INC) Right 6/9/2022   • MAMMO NEEDLE LOCALIZATION RIGHT (ALL INC) Right 6/30/2022   • MAMMO STEREOTACTIC BREAST BIOPSY RIGHT (ALL INC) Right 4/11/2022   • TUBAL LIGATION          ALLERGIES:  Lisinopril and Steri strip [medical tape]    MEDICATIONS:  Current Outpatient Medications   Medication Sig Dispense Refill   • Ascorbic Acid (vitamin C) 1000 MG tablet Take 1,000 mg by mouth daily     • atorvastatin (LIPITOR) 10 mg tablet Take one tablet daily at bedtime 30 tablet 6   • BLACK COHOSH EXTRACT PO Take by mouth     • Blood Glucose Monitoring Suppl (OneTouch Verio) w/Device KIT Use 2 (two) times a day 1 kit 0   • Empagliflozin (Jardiance) 10 MG TABS tablet Take 1 tablet (10 mg total) by mouth every morning 30 tablet 6   • EQL EVENING PRIMROSE  MG CAPS Take by mouth     • ferrous sulfate 325 (65 Fe) mg tablet Take 325 mg by mouth daily     • glucose blood (OneTouch Verio) test strip Use 1 each 2 (two) times a day Use as instructed 100 strip 1   • Lancets (freestyle) lancets Use 2 (two) times a day 200 each 1   • Lancets (OneTouch Delica Plus [id number]) MISC Use 1 Device 2 (two) times a day 100 each 1   • lidocaine (Lidoderm) 5 % Apply 1 patch topically over 12 hours daily Remove & Discard patch within 12 hours or as directed by MD 30 patch 1   • liraglutide (VICTOZA) injection Inject 0 1 mL (0 6 mg total) under the skin daily Inject 0 6mg daily for 4 weeks then increase to 1 2mg 54 mL 1   • meloxicam (Mobic) 15 mg tablet Take 1 tablet (15 mg total) by mouth daily Take medications with food  Discontinue if GI upset occurs 30 tablet 3   • metFORMIN (GLUCOPHAGE) 1000 MG tablet Take 1 tablet (1,000 mg total) by mouth 2 (two) times a day with meals 180 tablet 2   • nitrofurantoin (MACROBID) 100 mg capsule Take 1 capsule (100 mg total) by mouth 2 (two) times a day for 7 days 14 capsule 0   • omeprazole (PriLOSEC) 20 mg delayed release capsule TAKE 1 CAPSULE BY MOUTH DAILY 90 capsule 2   • valsartan (DIOVAN) 320 MG tablet Take 1 tablet (320 mg total) by mouth daily 90 tablet 3   • Diclofenac Sodium (VOLTAREN) 1 % Apply 2 g topically 4 (four) times a day as needed       No current facility-administered medications for this visit  SOCIAL HISTORY:  Social History     Socioeconomic History   • Marital status: Single     Spouse name: None   • Number of children: None   • Years of education: None   • Highest education level: None   Occupational History   • None   Tobacco Use   • Smoking status: Never   • Smokeless tobacco: Never   Vaping Use   • Vaping Use: Never used   Substance and Sexual Activity   • Alcohol use:  Yes     Alcohol/week: 1 0 standard drink "    Types: 1 Glasses of wine per week     Comment: socially   • Drug use: Never   • Sexual activity: Not Currently     Birth control/protection: Abstinence, Post-menopausal   Other Topics Concern   • None   Social History Narrative    Vincent:    Most recent tobacco use screenin-    Alcohol intake: None     Social Determinants of Health     Financial Resource Strain: Not on file   Food Insecurity: Not on file   Transportation Needs: Not on file   Physical Activity: Not on file   Stress: Not on file   Social Connections: Not on file   Intimate Partner Violence: Not on file   Housing Stability: Not on file        Review of Systems   Constitutional: Negative  HENT: Negative  Eyes: Negative  Respiratory: Negative  Cardiovascular: Negative  Endocrine: Negative  Musculoskeletal: Negative  Skin: Negative  Neurological: Negative  Hematological: Negative  Psychiatric/Behavioral: Negative  Objective:      /87 (BP Location: Left arm, Patient Position: Sitting, Cuff Size: Standard)   Pulse 63   Ht 5' 10 5\" (1 791 m)   Wt 122 kg (270 lb)   LMP 2021 (Approximate)   SpO2 97%   BMI 38 19 kg/m²          Physical Exam  Constitutional:       Appearance: Normal appearance  HENT:      Head: Normocephalic and atraumatic  Nose: Nose normal    Cardiovascular:      Pulses: no weak pulses          Dorsalis pedis pulses are 2+ on the right side and 2+ on the left side  Posterior tibial pulses are 2+ on the right side and 2+ on the left side  Pulmonary:      Effort: Pulmonary effort is normal    Feet:      Right foot:      Protective Sensation: 7 sites tested  0 sites sensed  Skin integrity: Erythema present  Left foot:      Protective Sensation: 7 sites tested  0 sites sensed  Skin integrity: No ulcer, skin breakdown, erythema, warmth, callus or dry skin  Comments:  There is some mild to moderate localized edema and erythema along the " patient's midfoot in the area of the navicular and vascular cuneiform joints; there is mild to moderate tenderness to palpation, the patient does state it is much improved compared to a couple of weeks ago; there are no open lesions; there is a pes planovalgus foot type  Skin:     General: Skin is warm  Capillary Refill: Capillary refill takes less than 2 seconds  Neurological:      General: No focal deficit present  Mental Status: She is alert and oriented to person, place, and time  Psychiatric:         Mood and Affect: Mood normal          Behavior: Behavior normal          Thought Content: Thought content normal          Diabetic Foot Exam    Patient's shoes and socks removed  Right Foot/Ankle   Right Foot Inspection  Skin Exam: erythema  Toe Exam: ROM and strength within normal limits  Sensory   Vibration: absent  Monofilament testing: diminished    Vascular  Capillary refills: < 3 seconds  The right DP pulse is 2+  The right PT pulse is 2+  Right Toe  - Comprehensive Exam  Ecchymosis: none  Arch: pes planus  Hammertoes: absent  Claw Toes: absent  Swelling: none   Tenderness: none         Left Foot/Ankle  Left Foot Inspection  Skin Exam: skin normal and skin intact  No dry skin, no warmth, no erythema, no maceration, normal color, no pre-ulcer, no ulcer and no callus  Toe Exam: ROM and strength within normal limits  Sensory   Vibration: absent  Monofilament testing: diminished    Vascular  Capillary refills: < 3 seconds  The left DP pulse is 2+  The left PT pulse is 2+       Left Toe  - Comprehensive Exam  Ecchymosis: none  Arch: pes planus  Hammertoes: absent  Claw toes: absent  Swelling: none   Tenderness: none           Assign Risk Category  No deformity present  Loss of protective sensation  No weak pulses  Risk: 1

## 2023-04-06 NOTE — LETTER
April 7, 2023     [facility] [address] [address]    Patient: Mir Aguirre   YOB: 1965   Date of Visit: 4/6/2023       Dear Dr Baldemar Richard: Thank you for referring Gurmeet Silvestre to me for evaluation  Below are my notes for this consultation  If you have questions, please do not hesitate to call me  I look forward to following your patient along with you  Sincerely,        Narinder Jasso DPM        CC: No Recipients  Bibi Albright  4/6/2023  3:12 PM  Signed  Assessment/Plan:    The patient's clinical examination today is consistent with resolving erythema and edema to the medial aspect of the patient's right midfoot  There remains some mild to moderate tenderness with palpation of the mid foot joints medially and some mild tenderness along the posterior tibial tendon and plantar fascia of the right hindfoot  When compared to the clinical photos the patient has on her phone, the redness and swelling is much improved  The rheumatology panel was reviewed  Her sed rate and CRP was elevated  Her MARIELENA, uric acid, and rheumatoid factor was within normal limits  I agree with Dr Stacy Flood assessment that the patient likely is suffering from an acute Charcot midfoot arthropathy  As such, we will need to focus on offloading of the right foot until it cools down completely  She was fitted for a low tide Cam boot today for more aggressive offloading of the right midfoot as the acute phase of the Charcot arthropathy resolves  Her recent x-rays and CT scan was personally reviewed and interpreted  There is a large dorsal osteophyte to the talar head and neck  Calcifications are noted along the posterior tibial tendon as well as the plantar fascia and the spring ligament  Calcaneal spurring is noted  No fractures or dislocations are identified      Recommend follow-up with me in 3 weeks for repeat x-rays of the right foot, or with   [facility]  Diagnoses and all orders for this visit:    Charcot arthropathy of midfoot  -     Cam Boot    Plantar fasciitis of right foot  -     Ambulatory Referral to Podiatry    Diabetic polyneuropathy associated with type 2 diabetes mellitus ([facility]         Subjective:     Patient ID: Ro Cano is a 56 y o  female  The patient presents today for her initial consultation with [facility] podiatry with a chief complaint of a red hot swollen right midfoot  She states that it started about 3 to 4 weeks ago without any prior history of injury or trauma to the foot  Clinical photos on her phone were examined and it did show redness and swelling along the medial aspect of the right midfoot  It was associated with pain  Since her appointment was made here, her pain has improved and the redness and swelling has come down  She was also evaluated by Dr Sofya Pack on March 22, 2023  His notes were reviewed and appreciated        The following portions of the patient's history were reviewed and updated as appropriate: allergies, current medications, past family history, past medical history, past social history, past surgical history and problem list       PAST MEDICAL HISTORY:  Past Medical History:   Diagnosis Date   • Allergic    • Diabetes mellitus ([facility] )    • GERD (gastroesophageal reflux disease)    • Hepatitis C     Treated   • Hypertension        PAST SURGICAL HISTORY:  Past Surgical History:   Procedure Laterality Date   • ANKLE SURGERY     • BREAST BIOPSY Right     core bx-benign   • BREAST BIOPSY Right 04/11/2022   • BREAST LUMPECTOMY Right 6/30/2022    Procedure: BREAST PAYTON  DIRECTED LUMPECTOMY WITH Isuaro Mcnamara;  Surgeon: Brady Lagos MD;  [facility];  Service: Surgical Oncology   • COLONOSCOPY  03/2019   • MAMMO (HISTORICAL)  02/2019   • MAMMO NEEDLE LOCALIZATION LEFT (ALL INC) Right 6/9/2022   • MAMMO NEEDLE LOCALIZATION RIGHT (ALL INC) Right 6/30/2022   • MAMMO STEREOTACTIC BREAST BIOPSY RIGHT (ALL INC) Right 4/11/2022   • TUBAL LIGATION          ALLERGIES:  Lisinopril and Steri strip [medical tape]    MEDICATIONS:  Current Outpatient Medications   Medication Sig Dispense Refill   • Ascorbic Acid (vitamin C) 1000 MG tablet Take 1,000 mg by mouth daily     • atorvastatin (LIPITOR) 10 mg tablet Take one tablet daily at bedtime 30 tablet 6   • BLACK COHOSH EXTRACT PO Take by mouth     • Blood Glucose Monitoring Suppl (OneTouch Verio) w/Device KIT Use 2 (two) times a day 1 kit 0   • Empagliflozin (Jardiance) 10 MG TABS tablet Take 1 tablet (10 mg total) by mouth every morning 30 tablet 6   • EQL EVENING PRIMROSE  MG CAPS Take by mouth     • ferrous sulfate 325 (65 Fe) mg tablet Take 325 mg by mouth daily     • glucose blood (OneTouch Verio) test strip Use 1 each 2 (two) times a day Use as instructed 100 strip 1   • Lancets (freestyle) lancets Use 2 (two) times a day 200 each 1   • Lancets (OneTouch Delica Plus [id number]) MISC Use 1 Device 2 (two) times a day 100 each 1   • lidocaine (Lidoderm) 5 % Apply 1 patch topically over 12 hours daily Remove & Discard patch within 12 hours or as directed by MD 30 patch 1   • liraglutide (VICTOZA) injection Inject 0 1 mL (0 6 mg total) under the skin daily Inject 0 6mg daily for 4 weeks then increase to 1 2mg 54 mL 1   • meloxicam (Mobic) 15 mg tablet Take 1 tablet (15 mg total) by mouth daily Take medications with food    Discontinue if GI upset occurs 30 tablet 3   • metFORMIN (GLUCOPHAGE) 1000 MG tablet Take 1 tablet (1,000 mg total) by mouth 2 (two) times a day with meals 180 tablet 2   • nitrofurantoin (MACROBID) 100 mg capsule Take 1 capsule (100 mg total) by mouth 2 (two) times a day for 7 days 14 capsule 0   • omeprazole (PriLOSEC) 20 mg delayed release capsule TAKE 1 CAPSULE BY MOUTH DAILY 90 capsule 2   • valsartan (DIOVAN) 320 MG tablet Take 1 tablet (320 mg total) by mouth daily 90 tablet 3   • Diclofenac Sodium (VOLTAREN) 1 % Apply 2 g "topically 4 (four) times a day as needed       No current facility-administered medications for this visit  SOCIAL HISTORY:  Social History     Socioeconomic History   • Marital status: Single     Spouse name: None   • Number of children: None   • Years of education: None   • Highest education level: None   Occupational History   • None   Tobacco Use   • Smoking status: Never   • Smokeless tobacco: Never   Vaping Use   • Vaping Use: Never used   Substance and Sexual Activity   • Alcohol use: Yes     Alcohol/week: 1 0 standard drink     Types: 1 Glasses of wine per week     Comment: socially   • Drug use: Never   • Sexual activity: Not Currently     Birth control/protection: Abstinence, Post-menopausal   Other Topics Concern   • None   Social History Narrative    Rivka:    Most recent tobacco use screenin-    Alcohol intake: None     Social Determinants of Health     Financial Resource Strain: Not on file   Food Insecurity: Not on file   Transportation Needs: Not on file   Physical Activity: Not on file   Stress: Not on file   Social Connections: Not on file   Intimate Partner Violence: Not on file   Housing Stability: Not on file        Review of Systems   Constitutional: Negative  HENT: Negative  Eyes: Negative  Respiratory: Negative  Cardiovascular: Negative  Endocrine: Negative  Musculoskeletal: Negative  Skin: Negative  Neurological: Negative  Hematological: Negative  Psychiatric/Behavioral: Negative  Objective:      /87 (BP Location: Left arm, Patient Position: Sitting, Cuff Size: Standard)   Pulse 63   Ht 5' 10 5\" (1 791 m)   Wt 122 kg (270 lb)   LMP 2021 (Approximate)   SpO2 97%   BMI 38 19 kg/m²         Physical Exam  Constitutional:       Appearance: Normal appearance  HENT:      Head: Normocephalic and atraumatic        Nose: Nose normal    Cardiovascular:      Pulses: no weak pulses          Dorsalis pedis pulses are 2+ on the " right side and 2+ on the left side  Posterior tibial pulses are 2+ on the right side and 2+ on the left side  Pulmonary:      Effort: Pulmonary effort is normal    Feet:      Right foot:      Protective Sensation: 7 sites tested  0 sites sensed  Skin integrity: Erythema present  Left foot:      Protective Sensation: 7 sites tested  0 sites sensed  Skin integrity: No ulcer, skin breakdown, erythema, warmth, callus or dry skin  Comments: There is some mild to moderate localized edema and erythema along the patient's midfoot in the area of the navicular and vascular cuneiform joints; there is mild to moderate tenderness to palpation, the patient does state it is much improved compared to a couple of weeks ago; there are no open lesions; there is a pes planovalgus foot type  Skin:     General: Skin is warm  Capillary Refill: Capillary refill takes less than 2 seconds  Neurological:      General: No focal deficit present  Mental Status: She is alert and oriented to person, place, and time  Psychiatric:         Mood and Affect: Mood normal          Behavior: Behavior normal          Thought Content: Thought content normal         Diabetic Foot Exam    Patient's shoes and socks removed  Right Foot/Ankle   Right Foot Inspection  Skin Exam: erythema  Toe Exam: ROM and strength within normal limits  Sensory   Vibration: absent  Monofilament testing: diminished    Vascular  Capillary refills: < 3 seconds  The right DP pulse is 2+  The right PT pulse is 2+  Right Toe  - Comprehensive Exam  Ecchymosis: none  Arch: pes planus  Hammertoes: absent  Claw Toes: absent  Swelling: none   Tenderness: none         Left Foot/Ankle  Left Foot Inspection  Skin Exam: skin normal and skin intact  No dry skin, no warmth, no erythema, no maceration, normal color, no pre-ulcer, no ulcer and no callus  Toe Exam: ROM and strength within normal limits       Sensory   Vibration: absent  Monofilament testing: diminished    Vascular  Capillary refills: < 3 seconds  The left DP pulse is 2+  The left PT pulse is 2+       Left Toe  - Comprehensive Exam  Ecchymosis: none  Arch: pes planus  Hammertoes: absent  Claw toes: absent  Swelling: none   Tenderness: none           Assign Risk Category  No deformity present  Loss of protective sensation  No weak pulses  Risk: 1

## 2023-04-21 ENCOUNTER — HOSPITAL ENCOUNTER (OUTPATIENT)
Dept: RADIOLOGY | Facility: HOSPITAL | Age: 58
Discharge: HOME | End: 2023-04-21
Attending: PODIATRIST

## 2023-04-21 DIAGNOSIS — M14.679 CHARCOT ARTHROPATHY OF MIDFOOT: ICD-10-CM

## 2023-05-04 ENCOUNTER — OFFICE VISIT (OUTPATIENT)
Dept: PODIATRY | Facility: CLINIC | Age: 58
End: 2023-05-04

## 2023-05-04 VITALS
HEIGHT: 71 IN | DIASTOLIC BLOOD PRESSURE: 73 MMHG | SYSTOLIC BLOOD PRESSURE: 121 MMHG | OXYGEN SATURATION: 97 % | HEART RATE: 57 BPM | BODY MASS INDEX: 38.62 KG/M2

## 2023-05-04 DIAGNOSIS — M77.9 ENTHESOPATHY: Primary | ICD-10-CM

## 2023-05-04 NOTE — PROGRESS NOTES
Assessment/Plan:     The patient's clinical examination today was relatively benign  There has been good interval resolution of her medial right foot pain and edema  There is no erythema nor calor nor ecchymosis noted today  There is some very mild residual tenderness with deep palpation to the insertion site of the posterior tibial tendon to the navicular tuberosity  The foot is plantigrade and there is no erythema nor edema across the midfoot or Lisfranc's joint  The patient is doing well  She has minimal to no discomfort in her right foot at this point in time  It does not appear that she is suffering from an acute Charcot arthropathy at this point in time  I will have her transition out of the cam boot and into a sneaker with a good arch support  As she is having minimal symptomatology right now, she will follow-up with me on an as-needed basis  Diagnoses and all orders for this visit:    Enthesopathy          Subjective:     Patient ID: Jeremy Morrison is a 56 y o  female  The patient presents today for follow-up status post injection therapy at her last visit secondary to suspected enthesopathy of the medial right midfoot  She states that she is doing well and has minimal pain or discomfort to her right foot today  She noted good resolution of symptoms with the pain and swelling in her right foot after injection therapy        PAST MEDICAL HISTORY:  Past Medical History:   Diagnosis Date    Allergic     Diabetes mellitus ([address] )     GERD (gastroesophageal reflux disease)     Hepatitis C     Treated    Hypertension        PAST SURGICAL HISTORY:  Past Surgical History:   Procedure Laterality Date    ANKLE SURGERY      BREAST BIOPSY Right     core bx-benign    BREAST BIOPSY Right 04/11/2022    BREAST LUMPECTOMY Right 6/30/2022    Procedure: BREAST PAYTON  DIRECTED LUMPECTOMY WITH Laura Aponte;  Surgeon: Dilia Bahena MD;  [facility];  Service: Surgical Oncology    COLONOSCOPY  03/2019    MAMMO (HISTORICAL)  02/2019    MAMMO NEEDLE LOCALIZATION LEFT (ALL INC) Right 6/9/2022    MAMMO NEEDLE LOCALIZATION RIGHT (ALL INC) Right 6/30/2022    MAMMO STEREOTACTIC BREAST BIOPSY RIGHT (ALL INC) Right 4/11/2022    TUBAL LIGATION          ALLERGIES:  Lisinopril and Steri strip [medical tape]    MEDICATIONS:  Current Outpatient Medications   Medication Sig Dispense Refill    Ascorbic Acid (vitamin C) 1000 MG tablet Take 1,000 mg by mouth daily      atorvastatin (LIPITOR) 10 mg tablet Take one tablet daily at bedtime 30 tablet 6    BLACK COHOSH EXTRACT PO Take by mouth      Blood Glucose Monitoring Suppl (OneTouch Verio) w/Device KIT Use 2 (two) times a day 1 kit 0    Empagliflozin (Jardiance) 10 MG TABS tablet Take 1 tablet (10 mg total) by mouth every morning 30 tablet 6    EQL EVENING PRIMROSE  MG CAPS Take by mouth      ferrous sulfate 325 (65 Fe) mg tablet Take 325 mg by mouth daily      glucose blood (OneTouch Verio) test strip Use 1 each 2 (two) times a day Use as instructed 100 strip 1    Lancets (freestyle) lancets Use 2 (two) times a day 200 each 1    Lancets (OneTouch Delica Plus [id number]) MISC Use 1 Device 2 (two) times a day 100 each 1    lidocaine (Lidoderm) 5 % Apply 1 patch topically over 12 hours daily Remove & Discard patch within 12 hours or as directed by MD 30 patch 1    liraglutide (VICTOZA) injection Inject 0 1 mL (0 6 mg total) under the skin daily Inject 0 6mg daily for 4 weeks then increase to 1 2mg 54 mL 1    meloxicam (Mobic) 15 mg tablet Take 1 tablet (15 mg total) by mouth daily Take medications with food    Discontinue if GI upset occurs 30 tablet 3    metFORMIN (GLUCOPHAGE) 1000 MG tablet Take 1 tablet (1,000 mg total) by mouth 2 (two) times a day with meals 180 tablet 2    methylPREDNISolone 4 MG tablet therapy pack Use as directed on package 21 tablet 1    omeprazole (PriLOSEC) 20 mg delayed release capsule TAKE 1 CAPSULE BY MOUTH DAILY 90 capsule 2    valsartan (DIOVAN) 320 MG tablet Take 1 tablet (320 mg total) by mouth daily 90 tablet 3    Diclofenac Sodium (VOLTAREN) 1 % Apply 2 g topically 4 (four) times a day as needed       No current facility-administered medications for this visit  SOCIAL HISTORY:  Social History     Socioeconomic History    Marital status: Single     Spouse name: None    Number of children: None    Years of education: None    Highest education level: None   Occupational History    None   Tobacco Use    Smoking status: Never    Smokeless tobacco: Never   Vaping Use    Vaping Use: Never used   Substance and Sexual Activity    Alcohol use: Yes     Alcohol/week: 1 0 standard drink     Types: 1 Glasses of wine per week     Comment: socially    Drug use: Never    Sexual activity: Not Currently     Birth control/protection: Abstinence, Post-menopausal   Other Topics Concern    None   Social History Narrative    Rivka:    Most recent tobacco use screenin-    Alcohol intake: None     Social Determinants of Health     Financial Resource Strain: Not on file   Food Insecurity: Not on file   Transportation Needs: Not on file   Physical Activity: Not on file   Stress: Not on file   Social Connections: Not on file   Intimate Partner Violence: Not on file   Housing Stability: Not on file        Review of Systems   Constitutional: Negative  HENT: Negative  Eyes: Negative  Respiratory: Negative  Cardiovascular: Negative  Endocrine: Negative  Musculoskeletal: Negative  Skin: Negative  Neurological: Negative  Hematological: Negative  Psychiatric/Behavioral: Negative  Objective:     Physical Exam  Constitutional:       Appearance: Normal appearance  HENT:      Head: Normocephalic and atraumatic  Nose: Nose normal    Cardiovascular:      Pulses:           Dorsalis pedis pulses are 2+ on the right side          Posterior tibial pulses are 2+ on the right side    Pulmonary:      Effort: Pulmonary effort is normal    Feet:      Right foot:      Skin integrity: Skin integrity normal       Comments: The patient's clinical examination today was relatively benign  There has been good interval resolution of her medial right foot pain and edema  There is no erythema nor calor nor ecchymosis noted today  There is some very mild residual tenderness with deep palpation to the insertion site of the posterior tibial tendon to the navicular tuberosity  The foot is plantigrade and there is no erythema nor edema across the midfoot or Lisfranc's joint  Skin:     General: Skin is warm  Capillary Refill: Capillary refill takes less than 2 seconds  Neurological:      General: No focal deficit present  Mental Status: She is alert and oriented to person, place, and time  Psychiatric:         Mood and Affect: Mood normal          Behavior: Behavior normal          Thought Content:  Thought content normal

## 2023-05-10 ENCOUNTER — TELEPHONE (OUTPATIENT)
Dept: OBGYN CLINIC | Facility: CLINIC | Age: 58
End: 2023-05-10

## 2023-05-10 NOTE — TELEPHONE ENCOUNTER
Post menopausal bleeding- appointment 5/18 Bonnie    Bleeding today- hx of PMD- unable to to EMBx even with cytotec do to stenotic os  Kike Osman called to discuss on behalf of her mother Jeyson Moncada  Advised patient to wear a panty liner and watching for heavy bleeding- bleeding thru a pad in <1 hr x 2 hrs  OV made with Bonnie on 5/18 US was done 10/2022  per note on 11/14/2022 patient needs to see Dr Raudel Torrez if there is any vaginal bleeding- will send to clerical to change appointment to an MD appointment

## 2023-05-11 ENCOUNTER — TELEPHONE (OUTPATIENT)
Dept: OBGYN CLINIC | Facility: CLINIC | Age: 58
End: 2023-05-11

## 2023-05-11 NOTE — TELEPHONE ENCOUNTER
Pt given 40 min  appt  She has some medical problems - diabetic - will discuss at the appt  I did not tell her to premedicate

## 2023-05-11 NOTE — TELEPHONE ENCOUNTER
"Pt of Ayesha being referred to you for probable d & c   Has had  in past and cervical stenosis  Began bleeding again yesterday - had small \"gush\" of blood and spotting since  Was given appt with Bonnie but for aught   Needs d&c  Your first opening is 5/22  Do you want an u/s? Last u/s 10/22  Do you want 20 min or will you try another emb    Thanks  "

## 2023-05-11 NOTE — TELEPHONE ENCOUNTER
38 ' visit- needs [facility], D&C for a second episode of postmenopausal bleeding irregardless of how it was evaluated the first time  Visit should coincide within 28 days of when she would want to have the surgery that way she does not have to come back    If she has numerous medical problems she might need medical clearance

## 2023-05-24 PROBLEM — N95.2 VAGINAL ATROPHY: Status: ACTIVE | Noted: 2023-05-24

## 2023-05-24 PROBLEM — Z78.0 POST-MENOPAUSAL: Status: ACTIVE | Noted: 2023-05-24

## 2023-05-24 PROBLEM — N88.2 STENOTIC CERVICAL OS: Status: ACTIVE | Noted: 2023-05-24

## 2023-05-24 NOTE — PROGRESS NOTES
Assessment/Plan:  Recurrent postmenopausal bleeding  BMI 38    Surgery requires general anesthesia  Risks include anesthesia, infection, uterine perforation, surgery to evaluate for uterine perforation, DVTs, paresthesias from stirrups, allergic reaction to medications, thermal injury and technical difficulties  Certain risk factors can increase the overall risk when surgery is performed  Obesity and cigarette smoking are examples  Sometimes medical clearance for surgery is required  Hysteroscopy, D&C and all other necessary procedures   23      Diagnoses and all orders for this visit:    Post-menopausal bleeding    Stenotic cervical os    Vaginal atrophy    BMI 38 0-38 9,adult              Subjective:        Patient ID: Stevie Winter is a 56 y o  female  José Miguel Monroe, a former patient, presents  to discuss the evaluation of recurrent postmenopausal bleeding  She is a  4 para 2 white female whose last menstrual period was 2021  She is here today with her mother  In 2022 she had a light eating and spotting for 7 days  An attempted endometrial biopsy failed due to stenosis  An ultra- sound was performed which revealed an endometrial stripe of 3 mm and simple adenomyosis  The following month a second attempt at endometrial biopsy using Cytotec beforehand also failed  Then had a second episode of bleeding from May 11 to , started out as bright red and it became dark brown, associated with some cramping that required 1 or 2 doses of Tylenol  She is currently not in a relationship  She is not sexually active  The following portions of the patient's history were reviewed and updated as appropriate: She  has a past medical history of Allergic, Diabetes mellitus ([address] ), GERD (gastroesophageal reflux disease), Hepatitis C, and Hypertension    Patient Active Problem List    Diagnosis Date Noted   • Post-menopausal 2023   • Stenotic cervical os 2023   • Vaginal atrophy 05/24/2023   • Charcot arthropathy of midfoot 04/06/2023   • Plantar fasciitis of right foot 03/27/2023   • Dyslipidemia 03/27/2023   • Post-menopausal bleeding 11/13/2022   • Increased risk of breast cancer 10/21/2022   • Other proteinuria 07/28/2022   • Rash 07/06/2022   • Pre-op exam 06/13/2022   • Thigh numbness 06/13/2022   • Atypical ductal hyperplasia of right breast 04/26/2022   • History of iron deficiency anemia 11/12/2021   • Thrombocytopenia ([facility] [address] ) 11/12/2021   • Chronic pain of both ankles 07/19/2021   • Class 2 obesity due to excess calories without serious comorbidity with body mass index (BMI) of 38 0 to 38 9 in adult 07/11/2021   • Foot pain, right 11/16/2020   • Type 2 diabetes mellitus without complication, without long-term current use of insulin ([facility] ) 07/13/2020   • Essential hypertension 07/13/2020   • Gastroesophageal reflux disease without esophagitis 07/13/2020   • History of hepatitis C 07/13/2020   • Atypical squamous cell changes of undetermined significance (ASCUS) on cervical cytology with negative high risk human papilloma virus (HPV) test result 02/24/2020   • BMI 38 0-38 9,adult 02/24/2020   • Vulvovaginal candidiasis 02/18/2019   PMH:  Menarche 15  G2, P2; SAVD Preeclampsia/GDM '92, SAVD '94 R Ankle Fx during pregnancy  HSV   Obesity  NIDDM ~ '03  Dyslipidemia   Hep C  Anemia  GERD   HTN  R Breast Bx '21  Menopause 8/21  R Plantar Fasciitis  Neuropathy '22  R ADH with lumpectomy and PAYTON  placement  6/22  She  has a past surgical history that includes Tubal ligation; Ankle surgery; Mammo (historical) (02/2019); Colonoscopy (03/2019); Mammo stereotactic breast biopsy right (all inc) (Right, 4/11/2022); Breast biopsy (Right); Breast biopsy (Right, 04/11/2022); Mammo needle localization left (all inc) (Right, 6/9/2022); Mammo needle localization right (all inc) (Right, 6/30/2022); and Breast lumpectomy (Right, 6/30/2022)    Her family history includes Arthritis in her paternal grandmother; Breast cancer (age of onset: 79) in her mother; Dementia in her father; Diabetes in her father, maternal grandmother, and mother; Heart attack in her maternal grandfather; Heart disease in her father; Heart failure in her father; Hyperlipidemia in her maternal grandmother; Hypertension in her father and mother; Liver cancer in her paternal grandfather; No Known Problems in her brother, brother, brother, daughter, sister, and son; Stroke in her maternal grandfather  FH:  M - DM,Breast Ca 79  F - DM, d Alzheimers 76 '11  MGM, PGM, PGF - DM  She  reports that she has never smoked  She has never used smokeless tobacco  She reports current alcohol use of about 1 0 standard drink of alcohol per week  She reports that she does not use drugs  SH:  Unemployed since June 2022, on disability for a neuropathy  She lives with her mother    Current Outpatient Medications   Medication Sig Dispense Refill   • Ascorbic Acid (vitamin C) 1000 MG tablet Take 1,000 mg by mouth daily     • atorvastatin (LIPITOR) 10 mg tablet Take one tablet daily at bedtime 30 tablet 6   • BLACK COHOSH EXTRACT PO Take by mouth     • Blood Glucose Monitoring Suppl (OneTouch Verio) w/Device KIT Use 2 (two) times a day 1 kit 0   • Diclofenac Sodium (VOLTAREN) 1 % Apply 2 g topically 4 (four) times a day as needed     • Empagliflozin (Jardiance) 10 MG TABS tablet Take 1 tablet (10 mg total) by mouth every morning 30 tablet 6   • EQL EVENING PRIMROSE  MG CAPS Take by mouth     • ferrous sulfate 325 (65 Fe) mg tablet Take 325 mg by mouth daily     • glucose blood (OneTouch Verio) test strip Test BG 2x daily as directed 200 strip 3   • Lancets (freestyle) lancets Use 2 (two) times a day 200 each 1   • Lancets (OneTouch Delica Plus [id number]) MISC Test BG 2x daily as directed 200 each 3   • lidocaine (Lidoderm) 5 % Apply 1 patch topically over 12 hours daily Remove & Discard patch within 12 hours or as directed by MD 30 patch 1   • liraglutide (VICTOZA) injection Inject 0 1 mL (0 6 mg total) under the skin daily Inject 0 6mg daily for 4 weeks then increase to 1 2mg 54 mL 1   • meloxicam (Mobic) 15 mg tablet Take 1 tablet (15 mg total) by mouth daily Take medications with food  Discontinue if GI upset occurs 30 tablet 3   • metFORMIN (GLUCOPHAGE) 1000 MG tablet Take 1 tablet (1,000 mg total) by mouth 2 (two) times a day with meals 180 tablet 2   • methylPREDNISolone 4 MG tablet therapy pack Use as directed on package 21 tablet 1   • omeprazole (PriLOSEC) 20 mg delayed release capsule TAKE 1 CAPSULE BY MOUTH DAILY 90 capsule 2   • valsartan (DIOVAN) 320 MG tablet Take 1 tablet (320 mg total) by mouth daily 90 tablet 3     No current facility-administered medications for this visit       Current Outpatient Medications on File Prior to Visit   Medication Sig   • Ascorbic Acid (vitamin C) 1000 MG tablet Take 1,000 mg by mouth daily   • atorvastatin (LIPITOR) 10 mg tablet Take one tablet daily at bedtime   • BLACK COHOSH EXTRACT PO Take by mouth   • Blood Glucose Monitoring Suppl (OneTouch Verio) w/Device KIT Use 2 (two) times a day   • Diclofenac Sodium (VOLTAREN) 1 % Apply 2 g topically 4 (four) times a day as needed   • Empagliflozin (Jardiance) 10 MG TABS tablet Take 1 tablet (10 mg total) by mouth every morning   • EQL EVENING PRIMROSE  MG CAPS Take by mouth   • ferrous sulfate 325 (65 Fe) mg tablet Take 325 mg by mouth daily   • glucose blood (OneTouch Verio) test strip Test BG 2x daily as directed   • Lancets (freestyle) lancets Use 2 (two) times a day   • Lancets (OneTouch Delica Plus [id number]) MISC Test BG 2x daily as directed   • lidocaine (Lidoderm) 5 % Apply 1 patch topically over 12 hours daily Remove & Discard patch within 12 hours or as directed by MD   • liraglutide (VICTOZA) injection Inject 0 1 mL (0 6 mg total) under the skin daily Inject 0 6mg daily for 4 weeks then increase to 1 2mg   • meloxicam (Mobic) 15 mg tablet "Take 1 tablet (15 mg total) by mouth daily Take medications with food  Discontinue if GI upset occurs   • metFORMIN (GLUCOPHAGE) 1000 MG tablet Take 1 tablet (1,000 mg total) by mouth 2 (two) times a day with meals   • methylPREDNISolone 4 MG tablet therapy pack Use as directed on package   • omeprazole (PriLOSEC) 20 mg delayed release capsule TAKE 1 CAPSULE BY MOUTH DAILY   • valsartan (DIOVAN) 320 MG tablet Take 1 tablet (320 mg total) by mouth daily     No current facility-administered medications on file prior to visit  She is allergic to lisinopril and steri strip [medical tape]       Review of Systems   Constitutional: Negative for activity change, appetite change, fatigue and unexpected weight change  Eyes: Negative for visual disturbance  Respiratory: Negative for cough, chest tightness, shortness of breath and wheezing  Cardiovascular: Negative for chest pain, palpitations and leg swelling  Breast: Patient denies tenderness, nipple discharge, masses, or erythema  Gastrointestinal: Negative for abdominal distention, abdominal pain, blood in stool, constipation, diarrhea, nausea and vomiting  Genitourinary: Negative for decreased urine volume, difficulty urinating, dysuria, frequency, hematuria, menstrual problem, pelvic pain, urgency, vaginal bleeding, vaginal discharge and vaginal pain  Musculoskeletal: Negative for arthralgias  Skin: Negative for rash  Neurological: Negative for weakness, light-headedness, numbness and headaches  Hematological: Does not bruise/bleed easily  Psychiatric/Behavioral: Negative for agitation, behavioral problems and sleep disturbance  The patient is not nervous/anxious  Objective:    Vitals:    05/25/23 1041   BP: 118/84   BP Location: Left arm   Patient Position: Sitting   Cuff Size: Large   Weight: 123 kg (272 lb)   Height: 5' 10 5\" (1 791 m)            Physical Exam  Vitals and nursing note reviewed     Constitutional:       " Appearance: Normal appearance  She is obese  HENT:      Head: Normocephalic and atraumatic  Eyes:      General: No scleral icterus  Right eye: No discharge  Left eye: No discharge  Extraocular Movements: Extraocular movements intact  Pulmonary:      Effort: Pulmonary effort is normal    Abdominal:      General: Abdomen is flat  There is no distension  Palpations: Abdomen is soft  There is no mass  Tenderness: There is no abdominal tenderness  There is no right CVA tenderness, left CVA tenderness, guarding or rebound  Hernia: No hernia is present  Genitourinary:     General: Normal vulva  Comments: Physiologic vaginal atrophy  The pelvis is deep  The vaginal apex is narrow  Cervix is clean closed  It is to the right of the midline  Bimanual examination is suboptimal due to the patient's habitus  Uterus was anteverted on ultrasound and top normal size  The adnexa was normal  Skin:     General: Skin is warm and dry  Neurological:      Mental Status: She is alert and oriented to person, place, and time  Psychiatric:         Mood and Affect: Mood normal          Behavior: Behavior normal          Thought Content:  Thought content normal          Judgment: Judgment normal

## 2023-05-25 ENCOUNTER — OFFICE VISIT (OUTPATIENT)
Dept: OBGYN CLINIC | Facility: CLINIC | Age: 58
End: 2023-05-25

## 2023-05-25 VITALS
DIASTOLIC BLOOD PRESSURE: 84 MMHG | SYSTOLIC BLOOD PRESSURE: 118 MMHG | WEIGHT: 272 LBS | HEIGHT: 71 IN | BODY MASS INDEX: 38.08 KG/M2

## 2023-05-25 DIAGNOSIS — N95.0 POST-MENOPAUSAL BLEEDING: Primary | ICD-10-CM

## 2023-05-25 DIAGNOSIS — N88.2 STENOTIC CERVICAL OS: ICD-10-CM

## 2023-05-25 DIAGNOSIS — N95.2 VAGINAL ATROPHY: ICD-10-CM

## 2023-05-25 NOTE — LETTER
May 25, 2023     Fay Carball[address]    Patient: Eric Bacon   YOB: 1965   Date of Visit: 2023       Scottie Young:    Nayana Johnson will be having a hysteroscopy with D&C for recurrent postmenopausal bleeding on   I did not think it was necessary but if you believe a visit at your office beforehand would benefit Ottoniel Pennington feel free to schedule a visit  If you have questions, please do not hesitate to call me  I look forward to following your patient along with you  Sincerely,        Rakel Dlilon MD        CC: No Recipients    Rakel Dillon MD  2023  1:48 PM  Incomplete  Assessment/Plan:  Recurrent postmenopausal bleeding  BMI 38    Surgery requires general anesthesia  Risks include anesthesia, infection, uterine perforation, surgery to evaluate for uterine perforation, DVTs, paresthesias from stirrups, allergic reaction to medications, thermal injury and technical difficulties  Certain risk factors can increase the overall risk when surgery is performed  Obesity and cigarette smoking are examples  Sometimes medical clearance for surgery is required  Hysteroscopy, D&C and all other necessary procedures   23      Diagnoses and all orders for this visit:    Post-menopausal bleeding    Stenotic cervical os    Vaginal atrophy    BMI 38 0-38 9,adult             Subjective:       Patient ID: Eric Bacon is a 56 y o  female  Ottoniel Pennington, a former patient, presents  to discuss the evaluation of recurrent postmenopausal bleeding  She is a  4 para 2 white female whose last menstrual period was 2021  She is here today with her mother  In 2022 she had a light eating and spotting for 7 days  An attempted endometrial biopsy failed due to stenosis  An ultra- sound was performed which revealed an endometrial stripe of 3 mm and simple adenomyosis    The following month a second attempt at endometrial biopsy using Cytotec beforehand also failed  Then had a second episode of bleeding from May 11 to 14, started out as bright red and it became dark brown, associated with some cramping that required 1 or 2 doses of Tylenol  She is currently not in a relationship  She is not sexually active  The following portions of the patient's history were reviewed and updated as appropriate: She  has a past medical history of Allergic, Diabetes mellitus ([facility] [address] ), GERD (gastroesophageal reflux disease), Hepatitis C, and Hypertension    Patient Active Problem List    Diagnosis Date Noted   • Post-menopausal 05/24/2023   • Stenotic cervical os 05/24/2023   • Vaginal atrophy 05/24/2023   • Charcot arthropathy of midfoot 04/06/2023   • Plantar fasciitis of right foot 03/27/2023   • Dyslipidemia 03/27/2023   • Post-menopausal bleeding 11/13/2022   • Increased risk of breast cancer 10/21/2022   • Other proteinuria 07/28/2022   • Rash 07/06/2022   • Pre-op exam 06/13/2022   • Thigh numbness 06/13/2022   • Atypical ductal hyperplasia of right breast 04/26/2022   • History of iron deficiency anemia 11/12/2021   • Thrombocytopenia ([facility] [address] ) 11/12/2021   • Chronic pain of both ankles 07/19/2021   • Class 2 obesity due to excess calories without serious comorbidity with body mass index (BMI) of 38 0 to 38 9 in adult 07/11/2021   • Foot pain, right 11/16/2020   • Type 2 diabetes mellitus without complication, without long-term current use of insulin ([facility] ) 07/13/2020   • Essential hypertension 07/13/2020   • Gastroesophageal reflux disease without esophagitis 07/13/2020   • History of hepatitis C 07/13/2020   • Atypical squamous cell changes of undetermined significance (ASCUS) on cervical cytology with negative high risk human papilloma virus (HPV) test result 02/24/2020   • BMI 38 0-38 9,adult 02/24/2020   • Vulvovaginal candidiasis 02/18/2019   PMH:  Menarche 15  G2, P2; SAVD Preeclampsia/GDM '92, SAVD '94 R Ankle Fx during pregnancy  HSV Obesity  NIDDM ~ '03  Dyslipidemia   Hep C  Anemia  GERD   HTN  R Breast Bx '21  Menopause 8/21  R Plantar Fasciitis  Neuropathy '24  R ADH with lumpectomy and PAYTON  placement  6/22  She  has a past surgical history that includes Tubal ligation; Ankle surgery; Mammo (historical) (02/2019); Colonoscopy (03/2019); Mammo stereotactic breast biopsy right (all inc) (Right, 4/11/2022); Breast biopsy (Right); Breast biopsy (Right, 04/11/2022); Mammo needle localization left (all inc) (Right, 6/9/2022); Mammo needle localization right (all inc) (Right, 6/30/2022); and Breast lumpectomy (Right, 6/30/2022)  Her family history includes Arthritis in her paternal grandmother; Breast cancer (age of onset: 79) in her mother; Dementia in her father; Diabetes in her father, maternal grandmother, and mother; Heart attack in her maternal grandfather; Heart disease in her father; Heart failure in her father; Hyperlipidemia in her maternal grandmother; Hypertension in her father and mother; Liver cancer in her paternal grandfather; No Known Problems in her brother, brother, brother, daughter, sister, and son; Stroke in her maternal grandfather  FH:  M - DM,Breast Ca 79  F - DM, d Alzheimers 76 '11  MGM, PGM, PGF - DM  She  reports that she has never smoked  She has never used smokeless tobacco  She reports current alcohol use of about 1 0 standard drink of alcohol per week  She reports that she does not use drugs  SH:  Unemployed since June 2022, on disability for a neuropathy  She lives with her mother    Current Outpatient Medications   Medication Sig Dispense Refill   • Ascorbic Acid (vitamin C) 1000 MG tablet Take 1,000 mg by mouth daily     • atorvastatin (LIPITOR) 10 mg tablet Take one tablet daily at bedtime 30 tablet 6   • BLACK COHOSH EXTRACT PO Take by mouth     • Blood Glucose Monitoring Suppl (OneTouch Verio) w/Device KIT Use 2 (two) times a day 1 kit 0   • Diclofenac Sodium (VOLTAREN) 1 % Apply 2 g topically 4 (four) times a day as needed     • Empagliflozin (Jardiance) 10 MG TABS tablet Take 1 tablet (10 mg total) by mouth every morning 30 tablet 6   • EQL EVENING PRIMROSE  MG CAPS Take by mouth     • ferrous sulfate 325 (65 Fe) mg tablet Take 325 mg by mouth daily     • glucose blood (OneTouch Verio) test strip Test BG 2x daily as directed 200 strip 3   • Lancets (freestyle) lancets Use 2 (two) times a day 200 each 1   • Lancets (OneTouch Delica Plus [id number]) MISC Test BG 2x daily as directed 200 each 3   • lidocaine (Lidoderm) 5 % Apply 1 patch topically over 12 hours daily Remove & Discard patch within 12 hours or as directed by MD 30 patch 1   • liraglutide (VICTOZA) injection Inject 0 1 mL (0 6 mg total) under the skin daily Inject 0 6mg daily for 4 weeks then increase to 1 2mg 54 mL 1   • meloxicam (Mobic) 15 mg tablet Take 1 tablet (15 mg total) by mouth daily Take medications with food  Discontinue if GI upset occurs 30 tablet 3   • metFORMIN (GLUCOPHAGE) 1000 MG tablet Take 1 tablet (1,000 mg total) by mouth 2 (two) times a day with meals 180 tablet 2   • methylPREDNISolone 4 MG tablet therapy pack Use as directed on package 21 tablet 1   • omeprazole (PriLOSEC) 20 mg delayed release capsule TAKE 1 CAPSULE BY MOUTH DAILY 90 capsule 2   • valsartan (DIOVAN) 320 MG tablet Take 1 tablet (320 mg total) by mouth daily 90 tablet 3     No current facility-administered medications for this visit       Current Outpatient Medications on File Prior to Visit   Medication Sig   • Ascorbic Acid (vitamin C) 1000 MG tablet Take 1,000 mg by mouth daily   • atorvastatin (LIPITOR) 10 mg tablet Take one tablet daily at bedtime   • BLACK COHOSH EXTRACT PO Take by mouth   • Blood Glucose Monitoring Suppl (OneTouch Verio) w/Device KIT Use 2 (two) times a day   • Diclofenac Sodium (VOLTAREN) 1 % Apply 2 g topically 4 (four) times a day as needed   • Empagliflozin (Jardiance) 10 MG TABS tablet Take 1 tablet (10 mg total) by mouth every morning   • EQL EVENING PRIMROSE  MG CAPS Take by mouth   • ferrous sulfate 325 (65 Fe) mg tablet Take 325 mg by mouth daily   • glucose blood (OneTouch Verio) test strip Test BG 2x daily as directed   • Lancets (freestyle) lancets Use 2 (two) times a day   • Lancets (OneTouch Delica Plus [id number]) MISC Test BG 2x daily as directed   • lidocaine (Lidoderm) 5 % Apply 1 patch topically over 12 hours daily Remove & Discard patch within 12 hours or as directed by MD   • liraglutide (VICTOZA) injection Inject 0 1 mL (0 6 mg total) under the skin daily Inject 0 6mg daily for 4 weeks then increase to 1 2mg   • meloxicam (Mobic) 15 mg tablet Take 1 tablet (15 mg total) by mouth daily Take medications with food  Discontinue if GI upset occurs   • metFORMIN (GLUCOPHAGE) 1000 MG tablet Take 1 tablet (1,000 mg total) by mouth 2 (two) times a day with meals   • methylPREDNISolone 4 MG tablet therapy pack Use as directed on package   • omeprazole (PriLOSEC) 20 mg delayed release capsule TAKE 1 CAPSULE BY MOUTH DAILY   • valsartan (DIOVAN) 320 MG tablet Take 1 tablet (320 mg total) by mouth daily     No current facility-administered medications on file prior to visit  She is allergic to lisinopril and steri strip [medical tape]       Review of Systems   Constitutional: Negative for activity change, appetite change, fatigue and unexpected weight change  Eyes: Negative for visual disturbance  Respiratory: Negative for cough, chest tightness, shortness of breath and wheezing  Cardiovascular: Negative for chest pain, palpitations and leg swelling  Breast: Patient denies tenderness, nipple discharge, masses, or erythema  Gastrointestinal: Negative for abdominal distention, abdominal pain, blood in stool, constipation, diarrhea, nausea and vomiting     Genitourinary: Negative for decreased urine volume, difficulty urinating, dysuria, frequency, hematuria, menstrual problem, pelvic pain, urgency, "vaginal bleeding, vaginal discharge and vaginal pain  Musculoskeletal: Negative for arthralgias  Skin: Negative for rash  Neurological: Negative for weakness, light-headedness, numbness and headaches  Hematological: Does not bruise/bleed easily  Psychiatric/Behavioral: Negative for agitation, behavioral problems and sleep disturbance  The patient is not nervous/anxious  Objective:    Vitals:    05/25/23 1041   BP: 118/84   BP Location: Left arm   Patient Position: Sitting   Cuff Size: Large   Weight: 123 kg (272 lb)   Height: 5' 10 5\" (1 791 m)           Physical Exam  Vitals and nursing note reviewed  Constitutional:       Appearance: Normal appearance  She is obese  HENT:      Head: Normocephalic and atraumatic  Eyes:      General: No scleral icterus  Right eye: No discharge  Left eye: No discharge  Extraocular Movements: Extraocular movements intact  Pulmonary:      Effort: Pulmonary effort is normal    Abdominal:      General: Abdomen is flat  There is no distension  Palpations: Abdomen is soft  There is no mass  Tenderness: There is no abdominal tenderness  There is no right CVA tenderness, left CVA tenderness, guarding or rebound  Hernia: No hernia is present  Genitourinary:     General: Normal vulva  Comments: Physiologic vaginal atrophy  The pelvis is deep  The vaginal apex is narrow  Cervix is clean closed  It is to the right of the midline  Bimanual examination is suboptimal due to the patient's habitus  Uterus was anteverted on ultrasound and top normal size  The adnexa was normal  Skin:     General: Skin is warm and dry  Neurological:      Mental Status: She is alert and oriented to person, place, and time  Psychiatric:         Mood and Affect: Mood normal          Behavior: Behavior normal          Thought Content:  Thought content normal          Judgment: Judgment normal           Pennelope Riedel, MD  5/25/2023 11:19 AM  Sign " when Signing Visit  Assessment/Plan:  Recurrent postmenopausal bleeding  BMI 38    Surgery requires general anesthesia  Risks include anesthesia, infection, uterine perforation, surgery to evaluate for uterine perforation, DVTs, paresthesias from stirrups, allergic reaction to medications, thermal injury and technical difficulties  Certain risk factors can increase the overall risk when surgery is performed  Obesity and cigarette smoking are examples  Sometimes medical clearance for surgery is required  Diagnoses and all orders for this visit:    Post-menopausal bleeding    Stenotic cervical os    Vaginal atrophy    BMI 38 0-38 9,adult             Subjective:       Patient ID: Katie Moran is a 56 y o  female  Kristen Clayton, a former patient, presents  to discuss the evaluation of recurrent postmenopausal bleeding  She is a  4 para 2 white female whose last menstrual period was 2021  She is here today with her mother  In 2022 she had a light eating and spotting for 7 days  Tempted endometrial biopsy failed due to stenosis  ReSound was performed which revealed an endometrial stripe of 3 mm and simple adenomyosis  The following month a second attempt at endometrial biopsy using Cytotec beforehand also failed         The following portions of the patient's history were reviewed and updated as appropriate: She  has a past medical history of Allergic, Diabetes mellitus ([address] ), GERD (gastroesophageal reflux disease), Hepatitis C, and Hypertension    Patient Active Problem List    Diagnosis Date Noted   • Post-menopausal 2023   • Stenotic cervical os 2023   • Vaginal atrophy 2023   • Charcot arthropathy of midfoot 2023   • Plantar fasciitis of right foot 2023   • Dyslipidemia 2023   • Post-menopausal bleeding 2022   • Increased risk of breast cancer 10/21/2022   • Other proteinuria 2022   • Rash 2022   • Pre-op exam 2022   • Thigh numbness 06/13/2022   • Atypical ductal hyperplasia of right breast 04/26/2022   • History of iron deficiency anemia 11/12/2021   • Thrombocytopenia ([facility] [address] ) 11/12/2021   • Chronic pain of both ankles 07/19/2021   • Class 2 obesity due to excess calories without serious comorbidity with body mass index (BMI) of 38 0 to 38 9 in adult 07/11/2021   • Foot pain, right 11/16/2020   • Type 2 diabetes mellitus without complication, without long-term current use of insulin [facility] ) 07/13/2020   • Essential hypertension 07/13/2020   • Gastroesophageal reflux disease without esophagitis 07/13/2020   • History of hepatitis C 07/13/2020   • Atypical squamous cell changes of undetermined significance (ASCUS) on cervical cytology with negative high risk human papilloma virus (HPV) test result 02/24/2020   • BMI 38 0-38 9,adult 02/24/2020   • Vulvovaginal candidiasis 02/18/2019   PMH:  Menarche  G2, P2;  HSV   Obesity  NIDDM  Dyslipidemia   Hep C  Anemia  GERD   HTN  Menopause 8/21  R Plantar Fasciitis  R ADH with lumpectomy and PAYTON  placement  6/22  She  has a past surgical history that includes Tubal ligation; Ankle surgery; Mammo (historical) (02/2019); Colonoscopy (03/2019); Mammo stereotactic breast biopsy right (all inc) (Right, 4/11/2022); Breast biopsy (Right); Breast biopsy (Right, 04/11/2022); Mammo needle localization left (all inc) (Right, 6/9/2022); Mammo needle localization right (all inc) (Right, 6/30/2022); and Breast lumpectomy (Right, 6/30/2022)    Her family history includes Arthritis in her paternal grandmother; Breast cancer (age of onset: 79) in her mother; Dementia in her father; Diabetes in her father, maternal grandmother, and mother; Heart attack in her maternal grandfather; Heart disease in her father; Heart failure in her father; Hyperlipidemia in her maternal grandmother; Hypertension in her father and mother; Liver cancer in her paternal grandfather; No Known Problems in her brother, brother, brother, daughter, sister, and son; Stroke in her maternal grandfather  FH:  M - Breast Ca 69  She  reports that she has never smoked  She has never used smokeless tobacco  She reports current alcohol use of about 1 0 standard drink of alcohol per week  She reports that she does not use drugs  SH:  Employed since June 2022  Current Outpatient Medications   Medication Sig Dispense Refill   • Ascorbic Acid (vitamin C) 1000 MG tablet Take 1,000 mg by mouth daily     • atorvastatin (LIPITOR) 10 mg tablet Take one tablet daily at bedtime 30 tablet 6   • BLACK COHOSH EXTRACT PO Take by mouth     • Blood Glucose Monitoring Suppl (OneTouch Verio) w/Device KIT Use 2 (two) times a day 1 kit 0   • Diclofenac Sodium (VOLTAREN) 1 % Apply 2 g topically 4 (four) times a day as needed     • Empagliflozin (Jardiance) 10 MG TABS tablet Take 1 tablet (10 mg total) by mouth every morning 30 tablet 6   • EQL EVENING PRIMROSE  MG CAPS Take by mouth     • ferrous sulfate 325 (65 Fe) mg tablet Take 325 mg by mouth daily     • glucose blood (OneTouch Verio) test strip Test BG 2x daily as directed 200 strip 3   • Lancets (freestyle) lancets Use 2 (two) times a day 200 each 1   • Lancets (OneTouch Delica Plus [id number]) MISC Test BG 2x daily as directed 200 each 3   • lidocaine (Lidoderm) 5 % Apply 1 patch topically over 12 hours daily Remove & Discard patch within 12 hours or as directed by MD 30 patch 1   • liraglutide (VICTOZA) injection Inject 0 1 mL (0 6 mg total) under the skin daily Inject 0 6mg daily for 4 weeks then increase to 1 2mg 54 mL 1   • meloxicam (Mobic) 15 mg tablet Take 1 tablet (15 mg total) by mouth daily Take medications with food    Discontinue if GI upset occurs 30 tablet 3   • metFORMIN (GLUCOPHAGE) 1000 MG tablet Take 1 tablet (1,000 mg total) by mouth 2 (two) times a day with meals 180 tablet 2   • methylPREDNISolone 4 MG tablet therapy pack Use as directed on package 21 tablet 1   • omeprazole (PriLOSEC) 20 mg delayed release capsule TAKE 1 CAPSULE BY MOUTH DAILY 90 capsule 2   • valsartan (DIOVAN) 320 MG tablet Take 1 tablet (320 mg total) by mouth daily 90 tablet 3     No current facility-administered medications for this visit  Current Outpatient Medications on File Prior to Visit   Medication Sig   • Ascorbic Acid (vitamin C) 1000 MG tablet Take 1,000 mg by mouth daily   • atorvastatin (LIPITOR) 10 mg tablet Take one tablet daily at bedtime   • BLACK COHOSH EXTRACT PO Take by mouth   • Blood Glucose Monitoring Suppl (OneTouch Verio) w/Device KIT Use 2 (two) times a day   • Diclofenac Sodium (VOLTAREN) 1 % Apply 2 g topically 4 (four) times a day as needed   • Empagliflozin (Jardiance) 10 MG TABS tablet Take 1 tablet (10 mg total) by mouth every morning   • EQL EVENING PRIMROSE  MG CAPS Take by mouth   • ferrous sulfate 325 (65 Fe) mg tablet Take 325 mg by mouth daily   • glucose blood (OneTouch Verio) test strip Test BG 2x daily as directed   • Lancets (freestyle) lancets Use 2 (two) times a day   • Lancets (OneTouch Delica Plus [id number]) MISC Test BG 2x daily as directed   • lidocaine (Lidoderm) 5 % Apply 1 patch topically over 12 hours daily Remove & Discard patch within 12 hours or as directed by MD   • liraglutide (VICTOZA) injection Inject 0 1 mL (0 6 mg total) under the skin daily Inject 0 6mg daily for 4 weeks then increase to 1 2mg   • meloxicam (Mobic) 15 mg tablet Take 1 tablet (15 mg total) by mouth daily Take medications with food    Discontinue if GI upset occurs   • metFORMIN (GLUCOPHAGE) 1000 MG tablet Take 1 tablet (1,000 mg total) by mouth 2 (two) times a day with meals   • methylPREDNISolone 4 MG tablet therapy pack Use as directed on package   • omeprazole (PriLOSEC) 20 mg delayed release capsule TAKE 1 CAPSULE BY MOUTH DAILY   • valsartan (DIOVAN) 320 MG tablet Take 1 tablet (320 mg total) by mouth daily   • [DISCONTINUED] Lancets (OneTouch Delica Plus [id number]) MISC Use 1 Device 2 (two) times a day     No current facility-administered medications on file prior to visit  She is allergic to lisinopril and steri strip [medical tape]       Review of Systems     Objective: There were no vitals filed for this visit          Physical Exam

## 2023-06-05 ENCOUNTER — OFFICE VISIT (OUTPATIENT)
Dept: LAB | Facility: CLINIC | Age: 58
End: 2023-06-05
Payer: COMMERCIAL

## 2023-06-05 ENCOUNTER — APPOINTMENT (OUTPATIENT)
Dept: LAB | Facility: CLINIC | Age: 58
End: 2023-06-05
Payer: COMMERCIAL

## 2023-06-05 DIAGNOSIS — Z01.818 PRE-OP TESTING: ICD-10-CM

## 2023-06-05 LAB
ALBUMIN SERPL BCP-MCNC: 4.3 G/DL (ref 3.5–5)
ALP SERPL-CCNC: 82 U/L (ref 34–104)
ALT SERPL W P-5'-P-CCNC: 13 U/L (ref 7–52)
ANION GAP SERPL CALCULATED.3IONS-SCNC: 8 MMOL/L (ref 4–13)
AST SERPL W P-5'-P-CCNC: 22 U/L (ref 13–39)
BILIRUB SERPL-MCNC: 0.47 MG/DL (ref 0.2–1)
BUN SERPL-MCNC: 15 MG/DL (ref 5–25)
CALCIUM SERPL-MCNC: 9.3 MG/DL (ref 8.4–10.2)
CHLORIDE SERPL-SCNC: 102 MMOL/L (ref 96–108)
CO2 SERPL-SCNC: 27 MMOL/L (ref 21–32)
CREAT SERPL-MCNC: 0.49 MG/DL (ref 0.6–1.3)
CREAT UR-MCNC: 50.1 MG/DL
ERYTHROCYTE [DISTWIDTH] IN BLOOD BY AUTOMATED COUNT: 14.4 % (ref 11.6–15.1)
EST. AVERAGE GLUCOSE BLD GHB EST-MCNC: 137 MG/DL
GFR SERPL CREATININE-BSD FRML MDRD: 110 ML/MIN/1.73SQ M
GLUCOSE P FAST SERPL-MCNC: 171 MG/DL (ref 65–99)
HBA1C MFR BLD: 6.4 %
HCT VFR BLD AUTO: 45.8 % (ref 34.8–46.1)
HGB BLD-MCNC: 14.5 G/DL (ref 11.5–15.4)
MCH RBC QN AUTO: 28.7 PG (ref 26.8–34.3)
MCHC RBC AUTO-ENTMCNC: 31.7 G/DL (ref 31.4–37.4)
MCV RBC AUTO: 91 FL (ref 82–98)
MICROALBUMIN UR-MCNC: 194 MG/L (ref 0–20)
MICROALBUMIN/CREAT 24H UR: 387 MG/G CREATININE (ref 0–30)
PLATELET # BLD AUTO: 103 THOUSANDS/UL (ref 149–390)
POTASSIUM SERPL-SCNC: 4.4 MMOL/L (ref 3.5–5.3)
PROT SERPL-MCNC: 7.4 G/DL (ref 6.4–8.4)
RBC # BLD AUTO: 5.05 MILLION/UL (ref 3.81–5.12)
SODIUM SERPL-SCNC: 137 MMOL/L (ref 135–147)
WBC # BLD AUTO: 8.51 THOUSAND/UL (ref 4.31–10.16)

## 2023-06-05 PROCEDURE — 85027 COMPLETE CBC AUTOMATED: CPT

## 2023-06-05 PROCEDURE — 83036 HEMOGLOBIN GLYCOSYLATED A1C: CPT

## 2023-06-05 PROCEDURE — 93005 ELECTROCARDIOGRAM TRACING: CPT

## 2023-06-05 PROCEDURE — 80053 COMPREHEN METABOLIC PANEL: CPT

## 2023-06-05 PROCEDURE — 36415 COLL VENOUS BLD VENIPUNCTURE: CPT

## 2023-06-06 LAB
ATRIAL RATE: 81 BPM
P AXIS: 58 DEGREES
PR INTERVAL: 146 MS
QRS AXIS: 23 DEGREES
QRSD INTERVAL: 76 MS
QT INTERVAL: 408 MS
QTC INTERVAL: 473 MS
T WAVE AXIS: 52 DEGREES
VENTRICULAR RATE: 81 BPM

## 2023-06-06 PROCEDURE — 93010 ELECTROCARDIOGRAM REPORT: CPT | Performed by: INTERNAL MEDICINE

## 2023-06-12 ENCOUNTER — TELEPHONE (OUTPATIENT)
Dept: FAMILY MEDICINE CLINIC | Facility: CLINIC | Age: 58
End: 2023-06-12

## 2023-06-12 ENCOUNTER — TELEPHONE (OUTPATIENT)
Dept: OBGYN CLINIC | Facility: CLINIC | Age: 58
End: 2023-06-12

## 2023-06-12 ENCOUNTER — OFFICE VISIT (OUTPATIENT)
Dept: OBGYN CLINIC | Facility: CLINIC | Age: 58
End: 2023-06-12
Payer: COMMERCIAL

## 2023-06-12 VITALS — WEIGHT: 272 LBS | HEIGHT: 71 IN | BODY MASS INDEX: 38.08 KG/M2

## 2023-06-12 DIAGNOSIS — M17.11 PRIMARY OSTEOARTHRITIS OF RIGHT KNEE: Primary | ICD-10-CM

## 2023-06-12 PROCEDURE — 20610 DRAIN/INJ JOINT/BURSA W/O US: CPT | Performed by: STUDENT IN AN ORGANIZED HEALTH CARE EDUCATION/TRAINING PROGRAM

## 2023-06-12 PROCEDURE — 99213 OFFICE O/P EST LOW 20 MIN: CPT | Performed by: STUDENT IN AN ORGANIZED HEALTH CARE EDUCATION/TRAINING PROGRAM

## 2023-06-12 RX ORDER — BUPIVACAINE HYDROCHLORIDE 2.5 MG/ML
4 INJECTION, SOLUTION INFILTRATION; PERINEURAL
Status: COMPLETED | OUTPATIENT
Start: 2023-06-12 | End: 2023-06-12

## 2023-06-12 RX ORDER — METHYLPREDNISOLONE ACETATE 40 MG/ML
1 INJECTION, SUSPENSION INTRA-ARTICULAR; INTRALESIONAL; INTRAMUSCULAR; SOFT TISSUE
Status: COMPLETED | OUTPATIENT
Start: 2023-06-12 | End: 2023-06-12

## 2023-06-12 RX ADMIN — BUPIVACAINE HYDROCHLORIDE 4 ML: 2.5 INJECTION, SOLUTION INFILTRATION; PERINEURAL at 13:15

## 2023-06-12 RX ADMIN — METHYLPREDNISOLONE ACETATE 1 ML: 40 INJECTION, SUSPENSION INTRA-ARTICULAR; INTRALESIONAL; INTRAMUSCULAR; SOFT TISSUE at 13:15

## 2023-06-12 NOTE — TELEPHONE ENCOUNTER
Blake William called says she has a D&C scheduled on 6/30    She had an ECG that came back abnormal and was advised to contact PCP and ask if she should continue with the procedure  She has an appt scheduled on 6/15 with Dr Celena Bergeron who ordered the ECG and is also doing the procedure      Jhoanajeff William # [phone]

## 2023-06-12 NOTE — TELEPHONE ENCOUNTER
Bon Connell wanted to check and make sure that her results are ok  She's concerned about her ECG results and would like an explanation

## 2023-06-12 NOTE — PROGRESS NOTES
Orthopaedics Office Visit - Follow up Patient Visit    ASSESSMENT/PLAN:    Assessment:   4 57 yo female with right knee osteoarthritis, severe patellofemoral osteoarthritis  2  Right deltoid sprain     Plan:  · Weightbearing as tolerated right upper and lower extremities   · Patient received right knee steroid injection today  Patient tolerated the procedure well  Post injection instructions reviewed  Discussed with the patient that the corticosteroid injection can temporarily increase her blood sugar results and instructed to perform tight glycemic control over the next 72 hours  · May apply Voltaren gel to the painful area up to 4 times a day around the knee and deltoid region   · May wear Lidocaine patch as needed for pain to the right knee  · Patient can continue to be weightbearing as tolerated activity as tolerated    To Do Next Visit:  Repeat CSI     _____________________________________________________  CHIEF COMPLAINT:  Chief Complaint   Patient presents with   • Right Knee - Follow-up         SUBJECTIVE:  11/7/2022: Magali Lewis is a 56 y o  female who presents who presents today for right knee pain  She was referred by her PCP Dr Garcia  She states she is been having right knee pain for the last 1-2 weeks  She denies any injuries  She states she is been having pain over the anterior ( deep) and medial aspect of the right knee  She states at that time she was having trouble weight-bearing and was icing  and elevate and taking Tylenol for pain relief  [facility] had x-rays taken of the right knee which showed no acute fracture dislocation but severe patellofemoral arthritis  She was  prescribed a Medrol Dosepak and she states as she is tapering down the pain is coming back ( last dosage today)  Denies instability  She is having pain with flexion of the knee at rest  transitional positions and walking  She also notes her extension is limited   She was previously walking 1-2 miles a day for exercise  SHe is currently taking Tylenol as needed for pain relief  She denies any numbness or tingling  She has no history of having any treatments on the right knee  Patient is a diabetic   She states she is currently working with her PCP on changing her diabetic medications for better control  Interval history 1/23/2023:  Floresita Barber is a 56 y o  female who presents for follow up evaluation of Right knee pain  She received a one-dose Duralane injection 12/19/2022 which provided her some relief of her symptoms  She reports three falls directly onto the right knee since her last visit, most recently in January  She states that the first fall was from tripping over a rock, second fall was from wet floor, and the third fall was over a step  Denies inability to bear weight after these falls  Her daughter, who presents on the phone for this visit, states she is concerned that her mother has increasing general instability causing these falls, unsure if due to diagnosed neuropathies or another cause  Notes that she has occasional episodes of swelling of the knee that worsens throughout the day  Her pain is aggravated with increased activity and relieved with rest  She also notes bilateral numbness of the thigh located laterally and anterior  Taking ice and Tylenol as needed for pain relief  Interval History 3/7/23:  Patient reports today with her mother  Patient states her knee pain has persistently worsened since her last visit  Patient received right knee steroid injection on 11/7/22 and reports some relief  She then has right knee single VS injection on 12/19/22 and reports some additional relief  Overall patient states that neither injection took her pain away completely  She notes persistent pain over the anteromedial aspect of the knee  Patient reports swelling as well  Pain is worsened with weight bearing   She notes crunching and clicking in the knee and feels her knee can be unstable  Patient is taking meloxicam and applying lidocaine patches  Patient notes some improvement in her symptoms after application of ice  She has been doing aqua therapy but does not note any improvement in her symptoms  Patient states she has not had any falls since her last visit  Patient is hoping to proceed with right total knee replacement  She is not a smoker  Patient is diabetic, with most recent A1C 7 6  Interval history 6/12/23   Patient presents today follow up for chronic right knee pain due to osteoarthritis  Patient had a right knee steroid injection on 3/7/23 with relief  She states her right knee has been doing better since retiring from work  She notes occasional anterior right knee pain with weightbearing  She notes for the last month and a half she has been having pain over lateral biceps region  She notes pain with lifting over reaching backwards  She is using Voltaren gel and and lidocaine patches  for pain relief  She denies numbness or paresthesias          PAST MEDICAL HISTORY:  Past Medical History:   Diagnosis Date   • Allergic    • Diabetes mellitus ([address] )    • GERD (gastroesophageal reflux disease)    • Hepatitis C     Treated   • Hypertension        PAST SURGICAL HISTORY:  Past Surgical History:   Procedure Laterality Date   • ANKLE SURGERY     • BREAST BIOPSY Right     core bx-benign   • BREAST BIOPSY Right 04/11/2022   • BREAST LUMPECTOMY Right 6/30/2022    Procedure: BREAST PAYTON  DIRECTED LUMPECTOMY WITH Mahsa Stockton;  Surgeon: Dorothea Reese MD;  Location: [facility];  Service: Surgical Oncology   • COLONOSCOPY  03/2019   • MAMMO (HISTORICAL)  02/2019   • MAMMO NEEDLE LOCALIZATION LEFT (ALL INC) Right 6/9/2022   • MAMMO NEEDLE LOCALIZATION RIGHT (ALL INC) Right 6/30/2022   • MAMMO STEREOTACTIC BREAST BIOPSY RIGHT (ALL INC) Right 4/11/2022   • TUBAL LIGATION         FAMILY HISTORY:  Family History   Problem Relation Age of Onset   • Breast cancer Mother 79   • Hypertension Mother    • Diabetes Mother    • Hypertension Father    • Diabetes Father    • Heart failure Father    • Dementia Father    • Heart disease Father    • No Known Problems Sister    • No Known Problems Daughter    • Hyperlipidemia Maternal Grandmother    • Diabetes Maternal Grandmother    • Heart attack Maternal Grandfather    • Stroke Maternal Grandfather    • Arthritis Paternal Grandmother    • Liver cancer Paternal Grandfather         unknown age   • No Known Problems Brother    • No Known Problems Brother    • No Known Problems Brother    • No Known Problems Son        SOCIAL HISTORY:  Social History     Tobacco Use   • Smoking status: Never   • Smokeless tobacco: Never   Vaping Use   • Vaping Use: Never used   Substance Use Topics   • Alcohol use:  Yes     Alcohol/week: 1 0 standard drink of alcohol     Types: 1 Glasses of wine per week     Comment: socially   • Drug use: Never       MEDICATIONS:    Current Outpatient Medications:   •  Ascorbic Acid (vitamin C) 1000 MG tablet, Take 1,000 mg by mouth daily, Disp: , Rfl:   •  atorvastatin (LIPITOR) 10 mg tablet, Take one tablet daily at bedtime, Disp: 30 tablet, Rfl: 6  •  Blood Glucose Monitoring Suppl (OneTouch Verio) w/Device KIT, Use 2 (two) times a day, Disp: 1 kit, Rfl: 0  •  Diclofenac Sodium (VOLTAREN) 1 %, Apply 2 g topically 4 (four) times a day as needed, Disp: , Rfl:   •  Empagliflozin (Jardiance) 10 MG TABS tablet, Take 1 tablet (10 mg total) by mouth every morning, Disp: 30 tablet, Rfl: 6  •  ferrous sulfate 325 (65 Fe) mg tablet, Take 325 mg by mouth daily, Disp: , Rfl:   •  glucose blood (OneTouch Verio) test strip, Test BG 2x daily as directed, Disp: 200 strip, Rfl: 3  •  lidocaine (Lidoderm) 5 %, Apply 1 patch topically over 12 hours daily Remove & Discard patch within 12 hours or as directed by MD, Disp: 30 patch, Rfl: 1  •  liraglutide (VICTOZA) injection, Inject 0 1 mL (0 6 mg total) under the skin daily Inject 0 6mg daily for 4 "weeks then increase to 1 2mg, Disp: 54 mL, Rfl: 1  •  meloxicam (Mobic) 15 mg tablet, Take 1 tablet (15 mg total) by mouth daily Take medications with food  Discontinue if GI upset occurs, Disp: 30 tablet, Rfl: 3  •  metFORMIN (GLUCOPHAGE) 1000 MG tablet, Take 1 tablet (1,000 mg total) by mouth 2 (two) times a day with meals, Disp: 180 tablet, Rfl: 2  •  methylPREDNISolone 4 MG tablet therapy pack, Use as directed on package, Disp: 21 tablet, Rfl: 1  •  omeprazole (PriLOSEC) 20 mg delayed release capsule, TAKE 1 CAPSULE BY MOUTH DAILY, Disp: 90 capsule, Rfl: 2  •  valsartan (DIOVAN) 320 MG tablet, Take 1 tablet (320 mg total) by mouth daily, Disp: 90 tablet, Rfl: 3  •  BLACK COHOSH EXTRACT PO, Take by mouth (Patient not taking: Reported on 6/12/2023), Disp: , Rfl:   •  EQL EVENING PRIMROSE  MG CAPS, Take by mouth (Patient not taking: Reported on 6/12/2023), Disp: , Rfl:   •  Lancets (freestyle) lancets, Use 2 (two) times a day (Patient not taking: Reported on 6/12/2023), Disp: 200 each, Rfl: 1  •  Lancets (OneTouch Delica Plus AQJFAM90L) MISC, Test BG 2x daily as directed (Patient not taking: Reported on 6/12/2023), Disp: 200 each, Rfl: 3    ALLERGIES:  Allergies   Allergen Reactions   • Lisinopril Cough   • Steri Strip [Medical Tape] Rash       REVIEW OF SYSTEMS:  MSK: right knee pain  Neuro: known peripheral neuropathies  Pertinent items are otherwise noted in HPI  A comprehensive review of systems was otherwise negative      LABS:  HgA1c:   Lab Results   Component Value Date    HGBA1C 6 4 (H) 06/05/2023     BMP:   Lab Results   Component Value Date    BUN 15 06/05/2023    CALCIUM 9 3 06/05/2023     06/05/2023    CO2 27 06/05/2023    CREATININE 0 49 (L) 06/05/2023    K 4 4 06/05/2023     CBC: No components found for: \"CBC\"    _____________________________________________________  PHYSICAL EXAMINATION:  Vital signs: Ht 5' 10 5\" (1 791 m)   Wt 123 kg (272 lb)   LMP 07/27/2021 (Approximate)   BMI " 38 48 kg/m²   General: No acute distress, awake and alert  Psychiatric: Mood and affect appear appropriate  HEENT: Trachea Midline, No torticollis, no apparent facial trauma  Cardiovascular: No audible murmurs; Extremities appear perfused  Pulmonary: No audible wheezing or stridor  Skin: No open lesions; see further details (if any) below    MUSCULOSKELETAL EXAMINATION:  Extremities:   Right Knee Exam  Patient's right knee was exposed inspected  Patient skin is intact overlying the right knee  No ecchymosis  Patient has range of motion of the knee from 0 to 115 degrees  Patient is able to perform a straight leg raise  Patient has lateral joint line tenderness Patient has no pain over the pes anserine tendons  Patient has no varus or valgus knee instability  Negative anterior and posterior drawer testing  Patient's sensation is intact to light touch in superficial peroneal, deep peroneal, sural, saphenous, plantar nerve distributions and comparable to the contralateral side  Patient's tibialis anterior, extensor hallux longus and gastrocnemius muscles intact  The limb is well-perfused  The right upper extremity was exposed inspected  Patient skin intact overlying the right upper extremity  No ecchymosis no swelling  Patient has full range of motion of the right shoulder without pain  The patient has tenderness to palpation over the deltoid insertion point  This wrap reproduces her pain  She has some pain with resisted abduction and internal rotation and extension at that same location  She denies any feelings of instability  Denies any numbness or paresthesias  Patient's sensation is intact to light touch in the axillary, median, radial, ulnar nerve distributions  AIN, PIN, ulnar, axillary nerves intact    +2 radial pulses distally    _____________________________________________________  STUDIES REVIEWED:  I personally reviewed the images and interpretation is as follows:    No updated x-rays "obtained today          PROCEDURES PERFORMED:  Large joint arthrocentesis: R knee  Universal Protocol:  Consent: Verbal consent obtained  Risks and benefits: risks, benefits and alternatives were discussed  Consent given by: patient  Time out: Immediately prior to procedure a \"time out\" was called to verify the correct patient, procedure, equipment, support staff and site/side marked as required  Timeout called at: 6/12/2023 1:40 PM   Patient understanding: patient states understanding of the procedure being performed  Site marked: the operative site was marked  Supporting Documentation  Indications: pain   Procedure Details  Location: knee - R knee  Preparation: Patient was prepped and draped in the usual sterile fashion  Needle size: 22 G  Approach: anterolateral  Medications administered: 4 mL bupivacaine 0 25 %; 1 mL methylPREDNISolone acetate 40 mg/mL    Patient tolerance: patient tolerated the procedure well with no immediate complications  Dressing:  Sterile dressing applied              Scribe Attestation    I,:  Warden Warren Edwards am acting as a scribe while in the presence of the attending physician :       I,:  Rosalia Gallegos DO personally performed the services described in this documentation    as scribed in my presence  :           "

## 2023-06-12 NOTE — TELEPHONE ENCOUNTER
There are some mild abnormalities of the EKG  She probably should see Dr Eran Loja to make sure these would not interfere with her surgery  She was also instructed to be seen around June 8 with me , which has already passed for her preoperative history and physical   Please make sure she comes in for that

## 2023-06-13 ENCOUNTER — TELEPHONE (OUTPATIENT)
Dept: OBGYN CLINIC | Facility: CLINIC | Age: 58
End: 2023-06-13

## 2023-06-13 NOTE — TELEPHONE ENCOUNTER
She should cancel her preop with me for now if she thinks Dr Pari Matthews cannot give her medical clearance  Tyra Patricio has this rule that I have to do a history and physical within 28 days of her surgery  If we have to wait for the cardiologist she will have to come twice

## 2023-06-13 NOTE — TELEPHONE ENCOUNTER
Spoke to Dr in [facility] they dont take her insurance    Dr Iqra Torres office told her to check with her insurance,  Her ins said to go to [facility]) cardiologist they are booked until Aug    Thurs is her pre-op--Surgery is June 30th-     Told her to check with Dr Iqra Torres and let me know what she says- then I can relay info to Dr David Mchugh

## 2023-06-13 NOTE — TELEPHONE ENCOUNTER
Pt is having trouble finding a cardiologist who could her insurance  [facility] told her they don't have an opening till August  She is wondering if she really needs this for the surgery  Has a preop appt with Dr Montes Stands on Thursday

## 2023-06-14 ENCOUNTER — TELEPHONE (OUTPATIENT)
Dept: OBGYN CLINIC | Facility: HOSPITAL | Age: 58
End: 2023-06-14

## 2023-06-14 ENCOUNTER — OFFICE VISIT (OUTPATIENT)
Dept: PODIATRY | Facility: CLINIC | Age: 58
End: 2023-06-14
Payer: COMMERCIAL

## 2023-06-14 VITALS
HEART RATE: 99 BPM | SYSTOLIC BLOOD PRESSURE: 131 MMHG | DIASTOLIC BLOOD PRESSURE: 97 MMHG | OXYGEN SATURATION: 97 % | WEIGHT: 276 LBS | BODY MASS INDEX: 38.64 KG/M2 | HEIGHT: 71 IN

## 2023-06-14 DIAGNOSIS — M77.9 ENTHESOPATHY: Primary | ICD-10-CM

## 2023-06-14 DIAGNOSIS — M72.2 PLANTAR FASCIITIS OF RIGHT FOOT: ICD-10-CM

## 2023-06-14 DIAGNOSIS — E11.42 DIABETIC POLYNEUROPATHY ASSOCIATED WITH TYPE 2 DIABETES MELLITUS (HCC): ICD-10-CM

## 2023-06-14 PROCEDURE — 99212 OFFICE O/P EST SF 10 MIN: CPT | Performed by: PODIATRIST

## 2023-06-14 NOTE — PROGRESS NOTES
Assessment/Plan:  Recurrent postmenopausal bleeding  Cervical stenosis  BMI 39    Hysteroscopy, D&C and all other necessary procedures   23  US reserved in case US guidance needed - short cervix, stenosis, large posterior nabothian cysts    Medical clearance later today    Informed consent obtained on May 25 and reinforced today     I reviewed the ultrasound films and was struck by the size of the nabothian cysts posteriorly  AV uterus      EKG 3/5/23 -  Sinus rhythm with frequent Premature ventricular complexes  Possible Left atrial enlargement  When compared with ECG of 2022 09:42,  Premature ventricular complexes are now Present  Nonspecific T wave abnormality, improved in Anterior leads       Diagnoses and all orders for this visit:    Post-menopausal bleeding    Stenotic cervical os    Vaginal atrophy    BMI 39 0-39 9,adult    Abnormal EKG              Subjective:        Patient ID: Jing Sotomayor is a 56 y o  female  Mer Lai is a 74-year-old  2 para 2 female whose LMP was in 2021  She then developed postmenopausal bleeding in 2022  Attempted endometrial biopsy failed due to cervical stenosis  Reassuring ultrasound was obtained with an endometrial stripe of 3  Adenomyosis was suspected along with nabothian cysts  She then went on to have a second episode of postmenopausal bleeding May 11 to  of this year  The need for a thorough evaluation in the form of a hysteroscopy with D&C was explained and her surgery is scheduled for   She has not experienced any additional bleeding  Medical clearance has been requested  A preoperative EKG was abnormal with frequent PVCs and a nonspecific T wave abnormality  She is seeing her PCP later today  Informed consent was obtained on May 25  Her health has not changed at all since that visit        The following portions of the patient's history were reviewed and updated as appropriate: She  has a past medical history of Allergic, Diabetes mellitus [facility] ), GERD (gastroesophageal reflux disease), Hepatitis C, and Hypertension  Patient Active Problem List    Diagnosis Date Noted   • Post-menopausal 05/24/2023   • Stenotic cervical os 05/24/2023   • Vaginal atrophy 05/24/2023   • Charcot arthropathy of midfoot 04/06/2023   • Plantar fasciitis of right foot 03/27/2023   • Dyslipidemia 03/27/2023   • Post-menopausal bleeding 11/13/2022   • Increased risk of breast cancer 10/21/2022   • Other proteinuria 07/28/2022   • Rash 07/06/2022   • Pre-op exam 06/13/2022   • Thigh numbness 06/13/2022   • Atypical ductal hyperplasia of right breast 04/26/2022   • History of iron deficiency anemia 11/12/2021   • Thrombocytopenia ([facility] ) 11/12/2021   • Chronic pain of both ankles 07/19/2021   • Class 2 obesity due to excess calories without serious comorbidity with body mass index (BMI) of 38 0 to 38 9 in adult 07/11/2021   • Foot pain, right 11/16/2020   • Type 2 diabetes mellitus without complication, without long-term current use of insulin ([facility] ) 07/13/2020   • Essential hypertension 07/13/2020   • Gastroesophageal reflux disease without esophagitis 07/13/2020   • History of hepatitis C 07/13/2020   • Atypical squamous cell changes of undetermined significance (ASCUS) on cervical cytology with negative high risk human papilloma virus (HPV) test result 02/24/2020   • BMI 38 0-38 9,adult 02/24/2020   • Vulvovaginal candidiasis 02/18/2019   PMH:  Menarche 15  G2, P2; SAVD Preeclampsia/GDM '92, SAVD '94 R Ankle Fx during pregnancy  HSV   Obesity  NIDDM ~ '03  Dyslipidemia   Hep C  Anemia  GERD   HTN  R Breast Bx '21  Menopause 8/21  R Plantar Fasciitis  Neuropathy 7/22  R ADH with lumpectomy and PAYTON  placement  6/22  She  has a past surgical history that includes Tubal ligation; Ankle surgery; Mammo (historical) (02/2019); Colonoscopy (03/2019); Mammo stereotactic breast biopsy right (all inc) (Right, 4/11/2022);  Breast biopsy (Right); Breast biopsy (Right, 04/11/2022); Mammo needle localization left (all inc) (Right, 6/9/2022); Mammo needle localization right (all inc) (Right, 6/30/2022); and Breast lumpectomy (Right, 6/30/2022)  Her family history includes Arthritis in her paternal grandmother; Breast cancer (age of onset: 79) in her mother; Dementia in her father; Diabetes in her father, maternal grandmother, and mother; Heart attack in her maternal grandfather; Heart disease in her father; Heart failure in her father; Hyperlipidemia in her maternal grandmother; Hypertension in her father and mother; Liver cancer in her paternal grandfather; No Known Problems in her brother, brother, brother, daughter, sister, and son; Stroke in her maternal grandfather  FH:  M - DM,Breast Ca 79  F - DM, d Alzheimers 76 '11  MGM, PGM, PGF - DM  She  reports that she has never smoked  She has never used smokeless tobacco  She reports current alcohol use of about 1 0 standard drink of alcohol per week  She reports that she does not use drugs  SH:  Unemployed since June 2022, on disability for a neuropathy  She lives with her mother    Current Outpatient Medications   Medication Sig Dispense Refill   • Ascorbic Acid (vitamin C) 1000 MG tablet Take 1,000 mg by mouth daily     • atorvastatin (LIPITOR) 10 mg tablet Take one tablet daily at bedtime 30 tablet 6   • Blood Glucose Monitoring Suppl (OneTouch Verio) w/Device KIT Use 2 (two) times a day 1 kit 0   • Diclofenac Sodium (VOLTAREN) 1 % Apply 2 g topically 4 (four) times a day as needed     • Empagliflozin (Jardiance) 10 MG TABS tablet Take 1 tablet (10 mg total) by mouth every morning 30 tablet 6   • ferrous sulfate 325 (65 Fe) mg tablet Take 325 mg by mouth daily     • glucose blood (OneTouch Verio) test strip Test BG 2x daily as directed 200 strip 3   • Lancets (freestyle) lancets Use 2 (two) times a day 200 each 1   • Lancets (OneTouch Delica Plus [id number]) MISC Test BG 2x daily as directed 200 each 3   • lidocaine (Lidoderm) 5 % Apply 1 patch topically over 12 hours daily Remove & Discard patch within 12 hours or as directed by MD 30 patch 1   • liraglutide (VICTOZA) injection Inject 0 1 mL (0 6 mg total) under the skin daily Inject 0 6mg daily for 4 weeks then increase to 1 2mg 54 mL 1   • metFORMIN (GLUCOPHAGE) 1000 MG tablet Take 1 tablet (1,000 mg total) by mouth 2 (two) times a day with meals 180 tablet 2   • omeprazole (PriLOSEC) 20 mg delayed release capsule TAKE 1 CAPSULE BY MOUTH DAILY 90 capsule 2   • valsartan (DIOVAN) 320 MG tablet Take 1 tablet (320 mg total) by mouth daily 90 tablet 3     No current facility-administered medications for this visit       Current Outpatient Medications on File Prior to Visit   Medication Sig   • Ascorbic Acid (vitamin C) 1000 MG tablet Take 1,000 mg by mouth daily   • atorvastatin (LIPITOR) 10 mg tablet Take one tablet daily at bedtime   • Blood Glucose Monitoring Suppl (OneTouch Verio) w/Device KIT Use 2 (two) times a day   • Diclofenac Sodium (VOLTAREN) 1 % Apply 2 g topically 4 (four) times a day as needed   • Empagliflozin (Jardiance) 10 MG TABS tablet Take 1 tablet (10 mg total) by mouth every morning   • ferrous sulfate 325 (65 Fe) mg tablet Take 325 mg by mouth daily   • glucose blood (OneTouch Verio) test strip Test BG 2x daily as directed   • Lancets (freestyle) lancets Use 2 (two) times a day   • Lancets (OneTouch Delica Plus [id number]) MISC Test BG 2x daily as directed   • lidocaine (Lidoderm) 5 % Apply 1 patch topically over 12 hours daily Remove & Discard patch within 12 hours or as directed by MD   • liraglutide (VICTOZA) injection Inject 0 1 mL (0 6 mg total) under the skin daily Inject 0 6mg daily for 4 weeks then increase to 1 2mg   • metFORMIN (GLUCOPHAGE) 1000 MG tablet Take 1 tablet (1,000 mg total) by mouth 2 (two) times a day with meals   • omeprazole (PriLOSEC) 20 mg delayed release capsule TAKE 1 CAPSULE BY MOUTH DAILY   • valsartan (DIOVAN) 320 MG "tablet Take 1 tablet (320 mg total) by mouth daily   • [DISCONTINUED] meloxicam (Mobic) 15 mg tablet Take 1 tablet (15 mg total) by mouth daily Take medications with food  Discontinue if GI upset occurs   • [DISCONTINUED] methylPREDNISolone 4 MG tablet therapy pack Use as directed on package   • [DISCONTINUED] BLACK COHOSH EXTRACT PO Take by mouth (Patient not taking: Reported on 6/12/2023)   • [DISCONTINUED] EQL EVENING PRIMROSE  MG CAPS Take by mouth (Patient not taking: Reported on 6/12/2023)     No current facility-administered medications on file prior to visit  She is allergic to lisinopril and steri strip [medical tape]       Review of Systems   Constitutional: Negative for activity change, appetite change, fatigue and unexpected weight change  Eyes: Negative for visual disturbance  Respiratory: Negative for cough, chest tightness, shortness of breath and wheezing  Cardiovascular: Negative for chest pain, palpitations and leg swelling  Breast: Patient denies tenderness, nipple discharge, masses, or erythema  Gastrointestinal: Negative for abdominal distention, abdominal pain, blood in stool, constipation, diarrhea, nausea and vomiting  Genitourinary: Negative for decreased urine volume, difficulty urinating, dysuria, frequency, hematuria, menstrual problem, pelvic pain, urgency, vaginal bleeding, vaginal discharge and vaginal pain  She is not sexually active   Musculoskeletal: Negative for arthralgias  Skin: Negative for rash  Neurological: Positive for numbness  Negative for weakness, light-headedness and headaches  Hematological: Does not bruise/bleed easily  Psychiatric/Behavioral: Negative for agitation, behavioral problems and sleep disturbance  The patient is not nervous/anxious            Objective:    Vitals:    06/15/23 0914   BP: 130/80   BP Location: Right arm   Patient Position: Sitting   Cuff Size: Large   Weight: 125 kg (275 lb)   Height: 5' 9\" (1 753 " m)            Physical Exam  Vitals and nursing note reviewed  Constitutional:       Appearance: Normal appearance  She is obese  HENT:      Head: Normocephalic and atraumatic  Eyes:      General: No scleral icterus  Right eye: No discharge  Left eye: No discharge  Extraocular Movements: Extraocular movements intact  Cardiovascular:      Rate and Rhythm: Normal rate and regular rhythm  Heart sounds: Normal heart sounds  No murmur heard  Pulmonary:      Effort: Pulmonary effort is normal       Breath sounds: Normal breath sounds  Chest:      Comments: deferred  Abdominal:      General: Abdomen is flat  There is no distension  Palpations: Abdomen is soft  There is no mass  Tenderness: There is no abdominal tenderness  There is no right CVA tenderness, left CVA tenderness, guarding or rebound  Hernia: No hernia is present  Genitourinary:     Comments: The labia majora is inflamed  The patient has no symptoms  There are no lesions  Physiologic vaginal atrophy  The pelvis is deep  The vaginal apex is narrow  Cervix is clean, closed, stenotic and short  Bimanual examination is suboptimal due to the patient's habitus  Uterus was anteverted on ultrasound and top normal size  The adnexa was normal  Musculoskeletal:         General: No swelling or tenderness  Cervical back: Normal range of motion and neck supple  No rigidity or tenderness  Right lower leg: No edema  Left lower leg: No edema  Skin:     General: Skin is warm and dry  Findings: No bruising  Neurological:      Mental Status: She is alert and oriented to person, place, and time  Psychiatric:         Mood and Affect: Mood normal          Behavior: Behavior normal          Thought Content:  Thought content normal          Judgment: Judgment normal

## 2023-06-14 NOTE — PROGRESS NOTES
Assessment/Plan: On clinical examination today, there is no significant tenderness palpation along the medial aspect of the patient's right foot and ankle  There is no tenderness along the plantar fascia nor to the calcaneus  There is no tenderness with palpation of the midfoot joints or the ankle joint of the right lower extremity  The patient's office visit today is benign  She is doing well  I did recommend use of some good-quality OTC arch supports and some were recommended to her today  She is doing well, I have recommended follow-up on as-needed basis only  Diagnoses and all orders for this visit:    Enthesopathy    Plantar fasciitis of right foot    Diabetic polyneuropathy associated with type 2 diabetes mellitus ([facility] )          Subjective:     Patient ID: Elizabeth Powell is a 56 y o  female  The patient presents today for follow-up of right midfoot and arch pain  She has been doing well since her last visit  She notes some occasional mild tenderness in the foot towards the end the day, for the most part, she is doing well and has no significant pain or discomfort today  Review of Systems   Constitutional: Negative  HENT: Negative  Eyes: Negative  Respiratory: Negative  Cardiovascular: Negative  Endocrine: Negative  Musculoskeletal: Negative  Neurological: Negative  Hematological: Negative  Psychiatric/Behavioral: Negative  Objective:     Physical Exam  Constitutional:       Appearance: Normal appearance  HENT:      Head: Normocephalic and atraumatic  Nose: Nose normal    Cardiovascular:      Pulses:           Dorsalis pedis pulses are 2+ on the right side and 2+ on the left side  Posterior tibial pulses are 1+ on the right side and 1+ on the left side     Pulmonary:      Effort: Pulmonary effort is normal    Feet:      Right foot:      Skin integrity: Skin integrity normal       Left foot:      Skin integrity: Skin integrity normal  Comments: On clinical examination today, there is no significant tenderness palpation along the medial aspect of the patient's right foot and ankle  There is no tenderness along the plantar fascia nor to the calcaneus  There is no tenderness with palpation of the midfoot joints or the ankle joint of the right lower extremity  Skin:     General: Skin is warm  Capillary Refill: Capillary refill takes less than 2 seconds  Neurological:      General: No focal deficit present  Mental Status: She is alert and oriented to person, place, and time  Psychiatric:         Mood and Affect: Mood normal          Behavior: Behavior normal          Thought Content:  Thought content normal

## 2023-06-14 NOTE — PATIENT INSTRUCTIONS
Recommend quality over the counter arch supports:    - Powerstep Colrain series insoles (3/4 length)  - Spenco Orthotic Arch insoles (3/4 length)  - Superfeet insoles

## 2023-06-14 NOTE — TELEPHONE ENCOUNTER
Caller: patient    Doctor: Davide Min    Reason for call: pt is using OTC Voltaren 2%,  She asking if she can get a prescription for the 3%, she feels that the OTC is not strong enough    Call back#: [phone]

## 2023-06-15 ENCOUNTER — OFFICE VISIT (OUTPATIENT)
Dept: FAMILY MEDICINE CLINIC | Facility: CLINIC | Age: 58
End: 2023-06-15
Payer: COMMERCIAL

## 2023-06-15 ENCOUNTER — OFFICE VISIT (OUTPATIENT)
Dept: OBGYN CLINIC | Facility: CLINIC | Age: 58
End: 2023-06-15
Payer: COMMERCIAL

## 2023-06-15 VITALS
SYSTOLIC BLOOD PRESSURE: 130 MMHG | WEIGHT: 275 LBS | DIASTOLIC BLOOD PRESSURE: 80 MMHG | BODY MASS INDEX: 40.73 KG/M2 | HEIGHT: 69 IN

## 2023-06-15 VITALS
OXYGEN SATURATION: 99 % | RESPIRATION RATE: 16 BRPM | TEMPERATURE: 97.2 F | HEART RATE: 79 BPM | HEIGHT: 71 IN | BODY MASS INDEX: 38.78 KG/M2 | WEIGHT: 277 LBS | SYSTOLIC BLOOD PRESSURE: 124 MMHG | DIASTOLIC BLOOD PRESSURE: 82 MMHG

## 2023-06-15 DIAGNOSIS — N95.2 VAGINAL ATROPHY: ICD-10-CM

## 2023-06-15 DIAGNOSIS — N95.0 POST-MENOPAUSAL BLEEDING: ICD-10-CM

## 2023-06-15 DIAGNOSIS — E11.9 TYPE 2 DIABETES MELLITUS WITHOUT COMPLICATION, WITHOUT LONG-TERM CURRENT USE OF INSULIN (HCC): ICD-10-CM

## 2023-06-15 DIAGNOSIS — R94.31 ABNORMAL EKG: Primary | ICD-10-CM

## 2023-06-15 DIAGNOSIS — N88.2 STENOTIC CERVICAL OS: ICD-10-CM

## 2023-06-15 DIAGNOSIS — I10 ESSENTIAL HYPERTENSION: ICD-10-CM

## 2023-06-15 DIAGNOSIS — R94.31 ABNORMAL EKG: ICD-10-CM

## 2023-06-15 DIAGNOSIS — Z78.0 POSTMENOPAUSAL: ICD-10-CM

## 2023-06-15 DIAGNOSIS — Z01.818 PRE-OP EXAM: ICD-10-CM

## 2023-06-15 DIAGNOSIS — N95.0 POST-MENOPAUSAL BLEEDING: Primary | ICD-10-CM

## 2023-06-15 DIAGNOSIS — D69.6 THROMBOCYTOPENIA (HCC): ICD-10-CM

## 2023-06-15 PROCEDURE — 99214 OFFICE O/P EST MOD 30 MIN: CPT | Performed by: FAMILY MEDICINE

## 2023-06-15 PROCEDURE — 99213 OFFICE O/P EST LOW 20 MIN: CPT | Performed by: OBSTETRICS & GYNECOLOGY

## 2023-06-15 NOTE — TELEPHONE ENCOUNTER
Caller: Patient     Doctor: Uma Presumbeatriz    Reason for call: following up for yesterdays question   She asked us to send it to [address] [place] [address],  [address], [place] [address], [place] [address]   Phone:  [phone]  Fax:  [phone]   [place]     Call back#: [phone]

## 2023-06-15 NOTE — ASSESSMENT & PLAN NOTE
Lab Results   Component Value Date    HGBA1C 6 4 (H) 06/05/2023   Well controlled on current therapy continue with current medications and will reassess next visit

## 2023-06-15 NOTE — PROGRESS NOTES
Name: Fany Young      : 1965      MRN: [id number]  Encounter Provider: Albert Castro MD  Encounter Date: 6/15/2023   Encounter department: [address] [place] [address] MEDICINE    Assessment & Plan     1  Abnormal EKG  Assessment & Plan:  PVCs recent EKG      2  Essential hypertension  Assessment & Plan:  Well controlled on current therapy continue with current medications and will reassess next visit        3  Type 2 diabetes mellitus without complication, without long-term current use of insulin (HCC)  Assessment & Plan:    Lab Results   Component Value Date    HGBA1C 6 4 (H) 2023   Well controlled on current therapy continue with current medications and will reassess next visit        4  Post-menopausal bleeding  Assessment & Plan:  Due for hysteroscopy       5  Postmenopausal  -     DXA bone density spine hip and pelvis; Future; Expected date: 06/15/2023    6  Thrombocytopenia (HCC)  Assessment & Plan:  Chronic and stable       7  Pre-op exam  Assessment & Plan:  scheduled for hysteroscopy  needs cardiac clearance given abnormal ekg              Subjective      HPI pt has hysteroscopy scheduled for  had abnormal EKG with PVC's and non specific T changed  Pt with DM and  HTN  And Dyslipidemia no chest pain no palpitations no known problems with anesthesia no hx of dvt/PE    Review of Systems   Constitutional: Negative for chills and fever  HENT: Negative for congestion  Respiratory: Negative for chest tightness, shortness of breath and wheezing  Cardiovascular: Negative for chest pain, palpitations and leg swelling  Gastrointestinal: Negative for abdominal pain, diarrhea, nausea and vomiting  Genitourinary: Negative for difficulty urinating, frequency and urgency  Skin: Negative for rash  Neurological: Negative for dizziness, light-headedness, numbness and headaches  Hematological: Does not bruise/bleed easily     Psychiatric/Behavioral: Negative for dysphoric mood  The patient is not nervous/anxious  Current Outpatient Medications on File Prior to Visit   Medication Sig   • Ascorbic Acid (vitamin C) 1000 MG tablet Take 1,000 mg by mouth daily   • atorvastatin (LIPITOR) 10 mg tablet Take one tablet daily at bedtime   • Blood Glucose Monitoring Suppl (OneTouch Verio) w/Device KIT Use 2 (two) times a day   • Diclofenac Sodium (VOLTAREN) 1 % Apply 2 g topically 4 (four) times a day as needed   • Empagliflozin (Jardiance) 10 MG TABS tablet Take 1 tablet (10 mg total) by mouth every morning   • ferrous sulfate 325 (65 Fe) mg tablet Take 325 mg by mouth daily   • glucose blood (OneTouch Verio) test strip Test BG 2x daily as directed   • lidocaine (Lidoderm) 5 % Apply 1 patch topically over 12 hours daily Remove & Discard patch within 12 hours or as directed by MD   • liraglutide (VICTOZA) injection Inject 0 1 mL (0 6 mg total) under the skin daily Inject 0 6mg daily for 4 weeks then increase to 1 2mg   • metFORMIN (GLUCOPHAGE) 1000 MG tablet Take 1 tablet (1,000 mg total) by mouth 2 (two) times a day with meals   • omeprazole (PriLOSEC) 20 mg delayed release capsule TAKE 1 CAPSULE BY MOUTH DAILY   • valsartan (DIOVAN) 320 MG tablet Take 1 tablet (320 mg total) by mouth daily   • Lancets (freestyle) lancets Use 2 (two) times a day   • Lancets (OneTouch Delica Plus [id number]) MISC Test BG 2x daily as directed   • [DISCONTINUED] BLACK COHOSH EXTRACT PO Take by mouth (Patient not taking: Reported on 6/12/2023)   • [DISCONTINUED] EQL EVENING PRIMROSE  MG CAPS Take by mouth (Patient not taking: Reported on 6/12/2023)   • [DISCONTINUED] meloxicam (Mobic) 15 mg tablet Take 1 tablet (15 mg total) by mouth daily Take medications with food    Discontinue if GI upset occurs   • [DISCONTINUED] methylPREDNISolone 4 MG tablet therapy pack Use as directed on package       Objective     /82 (BP Location: Left arm, Patient Position: Sitting, Cuff Size: Standard)   Pulse 79 "Temp (!) 97 2 °F (36 2 °C) (Tympanic)   Resp 16   Ht 5' 10 5\" (1 791 m)   Wt 126 kg (277 lb)   LMP 07/27/2021 (Approximate)   SpO2 99%   BMI 39 18 kg/m²     Physical Exam  Constitutional:       General: She is not in acute distress  Appearance: Normal appearance  She is well-developed  She is not ill-appearing  HENT:      Mouth/Throat:      Mouth: Mucous membranes are moist    Eyes:      Extraocular Movements: Extraocular movements intact  Conjunctiva/sclera: Conjunctivae normal       Pupils: Pupils are equal, round, and reactive to light  Neck:      Thyroid: No thyromegaly  Vascular: No carotid bruit  Cardiovascular:      Rate and Rhythm: Normal rate and regular rhythm  Pulses: Normal pulses  Heart sounds: Normal heart sounds  No murmur heard  Pulmonary:      Effort: Pulmonary effort is normal  No respiratory distress  Breath sounds: Normal breath sounds  Abdominal:      General: There is no distension  Palpations: Abdomen is soft  Tenderness: There is no abdominal tenderness  Musculoskeletal:      Cervical back: Normal range of motion  Lymphadenopathy:      Cervical: No cervical adenopathy  Skin:     General: Skin is warm and dry  Neurological:      General: No focal deficit present  Mental Status: She is alert and oriented to person, place, and time  Mental status is at baseline  Cranial Nerves: No cranial nerve deficit        Coordination: Coordination normal       Deep Tendon Reflexes: Reflexes normal    Psychiatric:         Mood and Affect: Mood normal          Behavior: Behavior normal        Reymundo Tillman MD  "

## 2023-06-17 DIAGNOSIS — I10 ESSENTIAL HYPERTENSION: ICD-10-CM

## 2023-06-19 ENCOUNTER — OFFICE VISIT (OUTPATIENT)
Dept: SURGICAL ONCOLOGY | Facility: CLINIC | Age: 58
End: 2023-06-19
Payer: COMMERCIAL

## 2023-06-19 ENCOUNTER — TELEPHONE (OUTPATIENT)
Dept: ADMINISTRATIVE | Facility: HOSPITAL | Age: 58
End: 2023-06-19

## 2023-06-19 ENCOUNTER — TELEPHONE (OUTPATIENT)
Dept: FAMILY MEDICINE CLINIC | Facility: CLINIC | Age: 58
End: 2023-06-19

## 2023-06-19 VITALS
SYSTOLIC BLOOD PRESSURE: 130 MMHG | OXYGEN SATURATION: 98 % | RESPIRATION RATE: 16 BRPM | HEIGHT: 71 IN | WEIGHT: 269 LBS | DIASTOLIC BLOOD PRESSURE: 80 MMHG | HEART RATE: 41 BPM | TEMPERATURE: 97.3 F | BODY MASS INDEX: 37.66 KG/M2

## 2023-06-19 DIAGNOSIS — Z91.89 INCREASED RISK OF BREAST CANCER: Primary | ICD-10-CM

## 2023-06-19 DIAGNOSIS — Z12.31 VISIT FOR SCREENING MAMMOGRAM: ICD-10-CM

## 2023-06-19 DIAGNOSIS — N60.91 ATYPICAL DUCTAL HYPERPLASIA OF RIGHT BREAST: ICD-10-CM

## 2023-06-19 PROCEDURE — 99213 OFFICE O/P EST LOW 20 MIN: CPT

## 2023-06-19 RX ORDER — VALSARTAN 320 MG/1
TABLET ORAL
Qty: 90 TABLET | Refills: 3 | Status: SHIPPED | OUTPATIENT
Start: 2023-06-19

## 2023-06-19 NOTE — TELEPHONE ENCOUNTER
Hi Dr Tiffany Dye! I reached out to pt to help with scheduling her DXA screening. Pt stated she wanted to hold off on scheduling due to getting a procedure done. While I had patient was on the phone she stated she needs to see a cardiologist for clearance for the procedure that is scheduled for 6/30 pt thought your office was to call and set that up for her. I explained to pt that a referral would need to be placed in order to see a cardiologist. Patient stated Dr Rob oH had her do an EKG as well as blood work and seen something off that he wanted to have a cardiologist clear her for surgery. Can you place referral for cardiology?

## 2023-06-19 NOTE — PROGRESS NOTES
Surgical Oncology Follow Up       [address]  [facility]  [address]  [facility] [address]    Von Aldridge  1965  [id number]  [address] [facility]  [facility] [place]  [address] [facility] [address] [id number]    Chief Complaint   Patient presents with   • Follow-up       Assessment/Plan:  1  Increased risk of breast cancer  - 1 year follow up  - q6 mo cbe with gyn    2  Atypical ductal hyperplasia of right breast    3  Visit for screening mammogram  - Mammo screening bilateral w 3d & cad; Future       Discussion/Summary: Patient is a 61-year-old female that underwent a right lumpectomy with Dr John Reed for ADH in June of 2022  She also has a family history of breast cancer in her mother who was diagnosed in her late 62s  She had a b/l screening mammogram on 4/08/2023 which was BIRADS 2 category 2 density  She has a D&C with hysteroscopy scheduled on 6/30/2023  Patient's pulse rate was 41 and then again 46 bpm  She recently had an EKG pending a hysteroscopy on 6/5 which was NSR with PVC's  She states her PCP is trying to get her in with a cardiologist for further cardiac workup  She is currently asymptomatic and denies being light headed or being on beta-blockers  She has never had any cardiac issues in the past  I am going to reach out to her pcp and I recommend she follow up with them as well  There were no concerns on her breast exam  I will see the patient back in 1 year or sooner should the need arise  She was instructed to call with any questions or concerns prior to this time  All questions were answered today  History of Present Illness:     Oncology History    No history exists         -Interval History: Patient is a 61-year-old female that underwent a right lumpectomy with Dr John Reed for ADH in June of 2022  She had a b/l screening mammogram on 4/08/2023 which was BIRADS 2 category 2 density  Patient's pulse rate was 41 and then again 46 bpm  She is currently asymptomatic and denies being light headed or being on beta-blockers  She denies changes on her breast exam      Review of Systems:  Review of Systems   Constitutional: Negative for activity change, appetite change, fatigue and unexpected weight change  Respiratory: Negative for cough and shortness of breath  Cardiovascular: Negative for chest pain  Gastrointestinal: Negative for abdominal pain, diarrhea, nausea and vomiting  Endocrine: Negative for heat intolerance  Musculoskeletal: Negative for arthralgias, back pain and myalgias  Skin: Negative for rash  Neurological: Negative for weakness and headaches  Hematological: Negative for adenopathy         Patient Active Problem List   Diagnosis   • Vulvovaginal candidiasis   • Atypical squamous cell changes of undetermined significance (ASCUS) on cervical cytology with negative high risk human papilloma virus (HPV) test result   • BMI 38 0-38 9,adult   • Type 2 diabetes mellitus without complication, without long-term current use of insulin (HCC)   • Essential hypertension   • Gastroesophageal reflux disease without esophagitis   • History of hepatitis C   • Foot pain, right   • Class 2 obesity due to excess calories without serious comorbidity with body mass index (BMI) of 38 0 to 38 9 in adult   • Chronic pain of both ankles   • History of iron deficiency anemia   • Thrombocytopenia (HCC)   • Atypical ductal hyperplasia of right breast   • Pre-op exam   • Thigh numbness   • Rash   • Other proteinuria   • Increased risk of breast cancer   • Post-menopausal bleeding   • Plantar fasciitis of right foot   • Dyslipidemia   • Charcot arthropathy of midfoot   • Post-menopausal   • Stenotic cervical os   • Vaginal atrophy   • Abnormal EKG     Past Medical History:   Diagnosis Date   • Allergic    • Diabetes mellitus ([facility] )    • GERD (gastroesophageal reflux disease)    • Hepatitis C Treated   • Hypertension      Past Surgical History:   Procedure Laterality Date   • ANKLE SURGERY     • BREAST BIOPSY Right     core bx-benign   • BREAST BIOPSY Right 04/11/2022   • BREAST LUMPECTOMY Right 6/30/2022    Procedure: BREAST PAYTON  DIRECTED LUMPECTOMY WITH Ericka Shields;  Surgeon: Terry Kurtz MD;  Location: [facility];  Service: Surgical Oncology   • COLONOSCOPY  03/2019   • MAMMO (HISTORICAL)  02/2019   • MAMMO NEEDLE LOCALIZATION LEFT (ALL INC) Right 6/9/2022   • MAMMO NEEDLE LOCALIZATION RIGHT (ALL INC) Right 6/30/2022   • MAMMO STEREOTACTIC BREAST BIOPSY RIGHT (ALL INC) Right 4/11/2022   • TUBAL LIGATION       Family History   Problem Relation Age of Onset   • Breast cancer Mother 79   • Hypertension Mother    • Diabetes Mother    • Hypertension Father    • Diabetes Father    • Heart failure Father    • Dementia Father    • Heart disease Father    • No Known Problems Sister    • No Known Problems Daughter    • Hyperlipidemia Maternal Grandmother    • Diabetes Maternal Grandmother    • Heart attack Maternal Grandfather    • Stroke Maternal Grandfather    • Arthritis Paternal Grandmother    • Liver cancer Paternal Grandfather         unknown age   • No Known Problems Brother    • No Known Problems Brother    • No Known Problems Brother    • No Known Problems Son      Social History     Socioeconomic History   • Marital status: Single     Spouse name: Not on file   • Number of children: Not on file   • Years of education: Not on file   • Highest education level: Not on file   Occupational History   • Not on file   Tobacco Use   • Smoking status: Never   • Smokeless tobacco: Never   Vaping Use   • Vaping Use: Never used   Substance and Sexual Activity   • Alcohol use:  Yes     Alcohol/week: 1 0 standard drink of alcohol     Types: 1 Glasses of wine per week     Comment: socially   • Drug use: Never   • Sexual activity: Not Currently     Birth control/protection: Abstinence, Post-menopausal   Other Topics Concern   • Not on file   Social History Narrative    Rivka:    Most recent tobacco use screenin-    Alcohol intake: None     Social Determinants of Health     Financial Resource Strain: Not on file   Food Insecurity: Not on file   Transportation Needs: Not on file   Physical Activity: Not on file   Stress: Not on file   Social Connections: Not on file   Intimate Partner Violence: Not on file   Housing Stability: Not on file       Current Outpatient Medications:   •  Ascorbic Acid (vitamin C) 1000 MG tablet, Take 1,000 mg by mouth daily, Disp: , Rfl:   •  atorvastatin (LIPITOR) 10 mg tablet, Take one tablet daily at bedtime, Disp: 30 tablet, Rfl: 6  •  Blood Glucose Monitoring Suppl (OneTouch Verio) w/Device KIT, Use 2 (two) times a day, Disp: 1 kit, Rfl: 0  •  Diclofenac Sodium (VOLTAREN) 1 %, Apply 2 g topically 4 (four) times a day as needed, Disp: , Rfl:   •  Empagliflozin (Jardiance) 10 MG TABS tablet, Take 1 tablet (10 mg total) by mouth every morning, Disp: 30 tablet, Rfl: 6  •  ferrous sulfate 325 (65 Fe) mg tablet, Take 325 mg by mouth daily, Disp: , Rfl:   •  glucose blood (OneTouch Verio) test strip, Test BG 2x daily as directed, Disp: 200 strip, Rfl: 3  •  lidocaine (Lidoderm) 5 %, Apply 1 patch topically over 12 hours daily Remove & Discard patch within 12 hours or as directed by MD, Disp: 30 patch, Rfl: 1  •  liraglutide (VICTOZA) injection, Inject 0 1 mL (0 6 mg total) under the skin daily Inject 0 6mg daily for 4 weeks then increase to 1 2mg, Disp: 54 mL, Rfl: 1  •  metFORMIN (GLUCOPHAGE) 1000 MG tablet, Take 1 tablet (1,000 mg total) by mouth 2 (two) times a day with meals, Disp: 180 tablet, Rfl: 2  •  omeprazole (PriLOSEC) 20 mg delayed release capsule, TAKE 1 CAPSULE BY MOUTH DAILY, Disp: 90 capsule, Rfl: 2  •  valsartan (DIOVAN) 320 MG tablet, TAKE 1 TABLET BY MOUTH DAILY, Disp: 90 tablet, Rfl: 3  •  Lancets (freestyle) lancets, Use 2 (two) times a day (Patient not taking: Reported on 6/19/2023), Disp: 200 each, Rfl: 1  •  Lancets (OneTouch Delica Plus [id number]) MISC, Test BG 2x daily as directed (Patient not taking: Reported on 6/19/2023), Disp: 200 each, Rfl: 3  Allergies   Allergen Reactions   • Lisinopril Cough   • Steri Strip [Medical Tape] Rash     Vitals:    06/19/23 1003   BP: 130/80   Resp: 16   Temp: (!) 97 3 °F (36 3 °C)   SpO2: 98%       Physical Exam  Constitutional:       General: She is not in acute distress  Appearance: Normal appearance  Cardiovascular:      Rate and Rhythm: Regular rhythm  Bradycardia present  Pulses: Normal pulses  Heart sounds: Normal heart sounds  Pulmonary:      Effort: Pulmonary effort is normal       Breath sounds: Normal breath sounds  Chest:      Chest wall: No mass  Breasts:     Right: No swelling, bleeding, inverted nipple, mass, nipple discharge, skin change or tenderness  Left: No swelling, bleeding, inverted nipple, mass, nipple discharge, skin change or tenderness  Abdominal:      General: Abdomen is flat  Palpations: Abdomen is soft  Lymphadenopathy:      Upper Body:      Right upper body: No supraclavicular, axillary or pectoral adenopathy  Left upper body: No supraclavicular, axillary or pectoral adenopathy  Skin:     General: Skin is warm  Neurological:      General: No focal deficit present  Mental Status: She is alert and oriented to person, place, and time  Psychiatric:         Mood and Affect: Mood normal          Behavior: Behavior normal            Results:    Imaging  No results found  I reviewed the above imaging data  Advance Care Planning/Advance Directives:  Discussed disease status, cancer treatment plans and/or cancer treatment goals with the patient

## 2023-06-19 NOTE — TELEPHONE ENCOUNTER
We need to find a cardiologist who will see her we had no luck last week not even [facility] could see her  call cardiology manage to find one other wise her procedure will need to be on hold

## 2023-06-19 NOTE — TELEPHONE ENCOUNTER
Spoke to [facility] and the first appt they had for cardiac clearance was for 7/11/2023 with Dr Jennifer Martínez. Pt advised to call Dr Hailey Fu to let them know her procedure will need to be rescheduled.  Appt is at the [address] [place] [address] office in [facility]

## 2023-06-20 ENCOUNTER — ANESTHESIA EVENT (OUTPATIENT)
Dept: PERIOP | Facility: HOSPITAL | Age: 58
End: 2023-06-20
Payer: COMMERCIAL

## 2023-06-22 DIAGNOSIS — N30.00 ACUTE CYSTITIS WITHOUT HEMATURIA: ICD-10-CM

## 2023-06-22 DIAGNOSIS — E11.65 TYPE 2 DIABETES MELLITUS WITH HYPERGLYCEMIA, WITHOUT LONG-TERM CURRENT USE OF INSULIN (HCC): ICD-10-CM

## 2023-06-22 RX ORDER — EMPAGLIFLOZIN 10 MG/1
TABLET, FILM COATED ORAL
Qty: 30 TABLET | Refills: 6 | Status: SHIPPED | OUTPATIENT
Start: 2023-06-22

## 2023-06-22 RX ORDER — NITROFURANTOIN 25; 75 MG/1; MG/1
CAPSULE ORAL
Qty: 14 CAPSULE | Refills: 0 | OUTPATIENT
Start: 2023-06-22

## 2023-06-30 ENCOUNTER — ANESTHESIA (OUTPATIENT)
Dept: PERIOP | Facility: HOSPITAL | Age: 58
End: 2023-06-30
Payer: COMMERCIAL

## 2023-07-11 ENCOUNTER — OFFICE VISIT (OUTPATIENT)
Dept: OBGYN CLINIC | Facility: CLINIC | Age: 58
End: 2023-07-11
Payer: COMMERCIAL

## 2023-07-11 ENCOUNTER — OFFICE VISIT (OUTPATIENT)
Dept: CARDIOLOGY CLINIC | Facility: CLINIC | Age: 58
End: 2023-07-11
Payer: COMMERCIAL

## 2023-07-11 VITALS
BODY MASS INDEX: 38.9 KG/M2 | HEART RATE: 66 BPM | WEIGHT: 275 LBS | SYSTOLIC BLOOD PRESSURE: 118 MMHG | OXYGEN SATURATION: 96 % | DIASTOLIC BLOOD PRESSURE: 78 MMHG

## 2023-07-11 VITALS — SYSTOLIC BLOOD PRESSURE: 126 MMHG | DIASTOLIC BLOOD PRESSURE: 80 MMHG | BODY MASS INDEX: 38.99 KG/M2 | WEIGHT: 275.6 LBS

## 2023-07-11 DIAGNOSIS — I10 ESSENTIAL HYPERTENSION: Primary | ICD-10-CM

## 2023-07-11 DIAGNOSIS — I49.3 PVC'S (PREMATURE VENTRICULAR CONTRACTIONS): ICD-10-CM

## 2023-07-11 DIAGNOSIS — R94.31 ABNORMAL EKG: ICD-10-CM

## 2023-07-11 DIAGNOSIS — N88.2 STENOTIC CERVICAL OS: ICD-10-CM

## 2023-07-11 DIAGNOSIS — Z01.810 PREOP CARDIOVASCULAR EXAM: ICD-10-CM

## 2023-07-11 DIAGNOSIS — E11.9 TYPE 2 DIABETES MELLITUS WITHOUT COMPLICATION, WITHOUT LONG-TERM CURRENT USE OF INSULIN (HCC): ICD-10-CM

## 2023-07-11 DIAGNOSIS — N95.2 VAGINAL ATROPHY: ICD-10-CM

## 2023-07-11 DIAGNOSIS — N95.0 POST-MENOPAUSAL BLEEDING: Primary | ICD-10-CM

## 2023-07-11 PROCEDURE — 99213 OFFICE O/P EST LOW 20 MIN: CPT | Performed by: OBSTETRICS & GYNECOLOGY

## 2023-07-11 PROCEDURE — 99244 OFF/OP CNSLTJ NEW/EST MOD 40: CPT | Performed by: INTERNAL MEDICINE

## 2023-07-11 NOTE — PROGRESS NOTES
Assessment/Plan:  Recurrent postmenopausal bleeding  Cervical stenosis  BMI 38     Hysteroscopy, D&C and all other necessary procedures   23  US reserved in case US guidance needed - short cervix, stenosis, large posterior nabothian cysts     Medical clearance obtained. Informed consent obtained on May 25 and reinforced 6/15/23 and today      I reviewed the ultrasound films and was struck by the size of the nabothian cysts posteriorly. AV uterus        EKG 3/5/23 -  Sinus rhythm with frequent Premature ventricular complexes  Possible Left atrial enlargement  When compared with ECG of 2022 09:42,  Premature ventricular complexes are now Present  Nonspecific T wave abnormality, improved in Anterior leads       Diagnoses and all orders for this visit:    Post-menopausal bleeding    Stenotic cervical os    Vaginal atrophy    BMI 38.0-38.9,adult              Subjective:        Patient ID: Jabier Latham is a 56 y.o. female. Nael Mendeznt returns for a preoperative evaluation. She is a 22-year-old  2 para 2 female whose LMP was in 2021. She then developed postmenopausal bleeding in 2022. An attempted endometrial biopsy failed due to cervical stenosis. A reassuring ultrasound was obtained with an endometrial stripe of 3. Adenomyosis was suspected along with nabothian cysts. She then went on to have a second episode of postmenopausal bleeding May 11 to  of this year. The need for a thorough evaluation in the form of a hysteroscopy with D&C was explained and her surgery was scheduled for  and then rescheduled for  due to the need for medical clearance. She has not experienced any additional bleeding.     A preoperative EKG was abnormal with frequent PVCs and a nonspecific T wave abnormality.     Informed consent was obtained on May 25. Her health has not changed at all since that visit.   An automated refill for antibiotics was sent to the office on  but the patient had no symptoms. An hour ago she saw Dr. Eriberto Stone, a cardiologist, for medical clearance. A stress test and echocardiogram has been ordered for this Friday and the surgery is scheduled for the following Friday. The following portions of the patient's history were reviewed and updated as appropriate: She  has a past medical history of Allergic, Diabetes mellitus ([address]), GERD (gastroesophageal reflux disease), Hepatitis C, and Hypertension.   Patient Active Problem List    Diagnosis Date Noted   • PVC's (premature ventricular contractions) 07/11/2023   • Preop cardiovascular exam 07/11/2023   • Abnormal EKG 06/15/2023   • Post-menopausal 05/24/2023   • Stenotic cervical os 05/24/2023   • Vaginal atrophy 05/24/2023   • Charcot arthropathy of midfoot 04/06/2023   • Plantar fasciitis of right foot 03/27/2023   • Dyslipidemia 03/27/2023   • Post-menopausal bleeding 11/13/2022   • Increased risk of breast cancer 10/21/2022   • Other proteinuria 07/28/2022   • Rash 07/06/2022   • Pre-op exam 06/13/2022   • Thigh numbness 06/13/2022   • Atypical ductal hyperplasia of right breast 04/26/2022   • History of iron deficiency anemia 11/12/2021   • Thrombocytopenia ([address]) 11/12/2021   • Chronic pain of both ankles 07/19/2021   • Class 2 obesity due to excess calories without serious comorbidity with body mass index (BMI) of 38.0 to 38.9 in adult 07/11/2021   • Foot pain, right 11/16/2020   • Type 2 diabetes mellitus without complication, without long-term current use of insulin ([address]) 07/13/2020   • Essential hypertension 07/13/2020   • Gastroesophageal reflux disease without esophagitis 07/13/2020   • History of hepatitis C 07/13/2020   • Atypical squamous cell changes of undetermined significance (ASCUS) on cervical cytology with negative high risk human papilloma virus (HPV) test result 02/24/2020   • BMI 38.0-38.9,adult 02/24/2020   • Vulvovaginal candidiasis 02/18/2019   PMH:  Menarche 15  G2, P2; SAVD Preeclampsia/GDM '92, SAVD '94 R Ankle Fx during pregnancy  HSV   Obesity  NIDDM ~ '03  Dyslipidemia   Hep C  Anemia  GERD   HTN  R Breast Bx '21  Menopause 8/21  R Plantar Fasciitis  Neuropathy 7/22  R ADH with lumpectomy and PAYTON  placement  6/22. UTI 4/23 -E. coli and group B strep  She  has a past surgical history that includes Tubal ligation; Ankle surgery; Mammo (historical) (02/2019); Colonoscopy (03/2019); Mammo stereotactic breast biopsy right (all inc) (Right, 4/11/2022); Breast biopsy (Right); Breast biopsy (Right, 04/11/2022); Mammo needle localization left (all inc) (Right, 6/9/2022); Mammo needle localization right (all inc) (Right, 6/30/2022); and Breast lumpectomy (Right, 6/30/2022). Her family history includes Arthritis in her paternal grandmother; Breast cancer (age of onset: 79) in her mother; Dementia in her father; Diabetes in her father, maternal grandmother, and mother; Heart attack in her maternal grandfather; Heart disease in her father; Heart failure in her father; Hyperlipidemia in her maternal grandmother; Hypertension in her father and mother; Liver cancer in her paternal grandfather; No Known Problems in her brother, brother, brother, daughter, sister, and son; Stroke in her maternal grandfather. FH:  M - DM,Breast Ca 79  F - DM, d Alzheimers 76 '11  MGM, PGM, PGF - DM  She  reports that she has never smoked. She has never used smokeless tobacco. She reports current alcohol use of about 1.0 standard drink of alcohol per week. She reports that she does not use drugs. SH:  Unemployed since June 2022, on disability for a neuropathy.  She lives with her mother.   Current Outpatient Medications   Medication Sig Dispense Refill   • Ascorbic Acid (vitamin C) 1000 MG tablet Take 1,000 mg by mouth daily     • atorvastatin (LIPITOR) 10 mg tablet Take one tablet daily at bedtime 30 tablet 6   • Blood Glucose Monitoring Suppl (OneTouch Verio) w/Device KIT Use 2 (two) times a day 1 kit 0 • Diclofenac Sodium (VOLTAREN) 1 % Apply 2 g topically 4 (four) times a day as needed     • ferrous sulfate 325 (65 Fe) mg tablet Take 325 mg by mouth daily     • glucose blood (OneTouch Verio) test strip Test BG 2x daily as directed 200 strip 3   • Jardiance 10 MG TABS tablet TAKE ONE TABLET BY MOUTH EVERY MORNING 30 tablet 6   • lidocaine (Lidoderm) 5 % Apply 1 patch topically over 12 hours daily Remove & Discard patch within 12 hours or as directed by MD 30 patch 1   • liraglutide (VICTOZA) injection Inject 0.1 mL (0.6 mg total) under the skin daily Inject 0.6mg daily for 4 weeks then increase to 1.2mg 54 mL 1   • metFORMIN (GLUCOPHAGE) 1000 MG tablet Take 1 tablet (1,000 mg total) by mouth 2 (two) times a day with meals 180 tablet 2   • omeprazole (PriLOSEC) 20 mg delayed release capsule TAKE 1 CAPSULE BY MOUTH DAILY 90 capsule 2   • valsartan (DIOVAN) 320 MG tablet TAKE 1 TABLET BY MOUTH DAILY 90 tablet 3     No current facility-administered medications for this visit.      Current Outpatient Medications on File Prior to Visit   Medication Sig   • Ascorbic Acid (vitamin C) 1000 MG tablet Take 1,000 mg by mouth daily   • atorvastatin (LIPITOR) 10 mg tablet Take one tablet daily at bedtime   • Blood Glucose Monitoring Suppl (OneTouch Verio) w/Device KIT Use 2 (two) times a day   • Diclofenac Sodium (VOLTAREN) 1 % Apply 2 g topically 4 (four) times a day as needed   • ferrous sulfate 325 (65 Fe) mg tablet Take 325 mg by mouth daily   • glucose blood (OneTouch Verio) test strip Test BG 2x daily as directed   • Jardiance 10 MG TABS tablet TAKE ONE TABLET BY MOUTH EVERY MORNING   • lidocaine (Lidoderm) 5 % Apply 1 patch topically over 12 hours daily Remove & Discard patch within 12 hours or as directed by MD   • liraglutide (VICTOZA) injection Inject 0.1 mL (0.6 mg total) under the skin daily Inject 0.6mg daily for 4 weeks then increase to 1.2mg   • metFORMIN (GLUCOPHAGE) 1000 MG tablet Take 1 tablet (1,000 mg total) by mouth 2 (two) times a day with meals   • omeprazole (PriLOSEC) 20 mg delayed release capsule TAKE 1 CAPSULE BY MOUTH DAILY   • valsartan (DIOVAN) 320 MG tablet TAKE 1 TABLET BY MOUTH DAILY   • [DISCONTINUED] Lancets (freestyle) lancets Use 2 (two) times a day   • [DISCONTINUED] Lancets (OneTouch Delica Plus [id number]) MISC Test BG 2x daily as directed     No current facility-administered medications on file prior to visit. She is allergic to lisinopril and steri strip [medical tape]. .    Review of Systems   Constitutional: Negative for activity change, appetite change, fatigue and unexpected weight change. Eyes: Negative for visual disturbance. Respiratory: Negative for cough, chest tightness, shortness of breath and wheezing. Cardiovascular: Negative for chest pain, palpitations and leg swelling. Breast: Patient denies tenderness, nipple discharge, masses, or erythema. Gastrointestinal: Negative for abdominal distention, abdominal pain, blood in stool, constipation, diarrhea, nausea and vomiting. Genitourinary: Negative for decreased urine volume, difficulty urinating, dysuria, frequency, hematuria, menstrual problem, pelvic pain, urgency, vaginal bleeding, vaginal discharge and vaginal pain. She is not sexually active   Musculoskeletal: Negative for arthralgias. Skin: Negative for rash. Neurological: Positive for numbness. Negative for weakness, light-headedness and headaches. Hematological: Does not bruise/bleed easily. Psychiatric/Behavioral: Negative for agitation, behavioral problems and sleep disturbance. The patient is not nervous/anxious. Objective:    Vitals:    07/11/23 1357   BP: 126/80   BP Location: Right arm   Patient Position: Sitting   Cuff Size: Large   Weight: 125 kg (275 lb 9.6 oz)            Physical Exam  Vitals and nursing note reviewed. Constitutional:       Appearance: Normal appearance. She is obese.    HENT:      Head: Normocephalic and atraumatic. Eyes:      General: No scleral icterus. Right eye: No discharge. Left eye: No discharge. Extraocular Movements: Extraocular movements intact. Cardiovascular:      Rate and Rhythm: Normal rate and regular rhythm. Heart sounds: Normal heart sounds. No murmur heard. Pulmonary:      Effort: Pulmonary effort is normal.      Breath sounds: Normal breath sounds. Chest:      Comments: deferred  Abdominal:      General: Abdomen is flat. There is no distension. Palpations: Abdomen is soft. There is no mass. Tenderness: There is no abdominal tenderness. There is no right CVA tenderness, left CVA tenderness, guarding or rebound. Hernia: No hernia is present. Genitourinary:     Comments: The labia majora is inflamed. The patient has no symptoms. There are no lesions. Physiologic vaginal atrophy. The pelvis is deep. The vaginal apex is narrow. Cervix is clean, closed, stenotic and short. Bimanual examination is suboptimal due to the patient's habitus. Uterus was anteverted on ultrasound and top normal size. The adnexa was normal.  Musculoskeletal:         General: No swelling or tenderness. Cervical back: Normal range of motion and neck supple. No rigidity or tenderness. Right lower leg: No edema. Left lower leg: No edema. Skin:     General: Skin is warm and dry. Findings: No bruising. Neurological:      Mental Status: She is alert and oriented to person, place, and time. Psychiatric:         Mood and Affect: Mood normal.         Behavior: Behavior normal.         Thought Content:  Thought content normal.         Judgment: Judgment normal.

## 2023-07-11 NOTE — H&P (VIEW-ONLY)
Assessment/Plan:  Recurrent postmenopausal bleeding  Cervical stenosis  BMI 38     Hysteroscopy, D&C and all other necessary procedures   23  US reserved in case US guidance needed - short cervix, stenosis, large posterior nabothian cysts     Medical clearance obtained. Informed consent obtained on May 25 and reinforced 6/15/23 and today      I reviewed the ultrasound films and was struck by the size of the nabothian cysts posteriorly. AV uterus        EKG 3/5/23 -  Sinus rhythm with frequent Premature ventricular complexes  Possible Left atrial enlargement  When compared with ECG of 2022 09:42,  Premature ventricular complexes are now Present  Nonspecific T wave abnormality, improved in Anterior leads       Diagnoses and all orders for this visit:    Post-menopausal bleeding    Stenotic cervical os    Vaginal atrophy    BMI 38.0-38.9,adult              Subjective:        Patient ID: Richie Brock is a 56 y.o. female. Cate Munoz returns for a preoperative evaluation. She is a 70-year-old  2 para 2 female whose LMP was in 2021. She then developed postmenopausal bleeding in 2022. An attempted endometrial biopsy failed due to cervical stenosis. A reassuring ultrasound was obtained with an endometrial stripe of 3. Adenomyosis was suspected along with nabothian cysts. She then went on to have a second episode of postmenopausal bleeding May 11 to  of this year. The need for a thorough evaluation in the form of a hysteroscopy with D&C was explained and her surgery was scheduled for  and then rescheduled for  due to the need for medical clearance. She has not experienced any additional bleeding.     A preoperative EKG was abnormal with frequent PVCs and a nonspecific T wave abnormality.     Informed consent was obtained on May 25. Her health has not changed at all since that visit.   An automated refill for antibiotics was sent to the office on  but the patient had no symptoms. An hour ago she saw Dr. Brynn Holman, a cardiologist, for medical clearance. A stress test and echocardiogram has been ordered for this Friday and the surgery is scheduled for the following Friday. The following portions of the patient's history were reviewed and updated as appropriate: She  has a past medical history of Allergic, Diabetes mellitus ([address]), GERD (gastroesophageal reflux disease), Hepatitis C, and Hypertension.   Patient Active Problem List    Diagnosis Date Noted   • PVC's (premature ventricular contractions) 07/11/2023   • Preop cardiovascular exam 07/11/2023   • Abnormal EKG 06/15/2023   • Post-menopausal 05/24/2023   • Stenotic cervical os 05/24/2023   • Vaginal atrophy 05/24/2023   • Charcot arthropathy of midfoot 04/06/2023   • Plantar fasciitis of right foot 03/27/2023   • Dyslipidemia 03/27/2023   • Post-menopausal bleeding 11/13/2022   • Increased risk of breast cancer 10/21/2022   • Other proteinuria 07/28/2022   • Rash 07/06/2022   • Pre-op exam 06/13/2022   • Thigh numbness 06/13/2022   • Atypical ductal hyperplasia of right breast 04/26/2022   • History of iron deficiency anemia 11/12/2021   • Thrombocytopenia ([address]) 11/12/2021   • Chronic pain of both ankles 07/19/2021   • Class 2 obesity due to excess calories without serious comorbidity with body mass index (BMI) of 38.0 to 38.9 in adult 07/11/2021   • Foot pain, right 11/16/2020   • Type 2 diabetes mellitus without complication, without long-term current use of insulin ([address]) 07/13/2020   • Essential hypertension 07/13/2020   • Gastroesophageal reflux disease without esophagitis 07/13/2020   • History of hepatitis C 07/13/2020   • Atypical squamous cell changes of undetermined significance (ASCUS) on cervical cytology with negative high risk human papilloma virus (HPV) test result 02/24/2020   • BMI 38.0-38.9,adult 02/24/2020   • Vulvovaginal candidiasis 02/18/2019   PMH:  Menarche 15  G2, P2; SAVD Preeclampsia/GDM '92, SAVD '94 R Ankle Fx during pregnancy  HSV   Obesity  NIDDM ~ '03  Dyslipidemia   Hep C  Anemia  GERD   HTN  R Breast Bx '21  Menopause 8/21  R Plantar Fasciitis  Neuropathy 7/22  R ADH with lumpectomy and PAYTON  placement  6/22. UTI 4/23 -E. coli and group B strep  She  has a past surgical history that includes Tubal ligation; Ankle surgery; Mammo (historical) (02/2019); Colonoscopy (03/2019); Mammo stereotactic breast biopsy right (all inc) (Right, 4/11/2022); Breast biopsy (Right); Breast biopsy (Right, 04/11/2022); Mammo needle localization left (all inc) (Right, 6/9/2022); Mammo needle localization right (all inc) (Right, 6/30/2022); and Breast lumpectomy (Right, 6/30/2022). Her family history includes Arthritis in her paternal grandmother; Breast cancer (age of onset: 79) in her mother; Dementia in her father; Diabetes in her father, maternal grandmother, and mother; Heart attack in her maternal grandfather; Heart disease in her father; Heart failure in her father; Hyperlipidemia in her maternal grandmother; Hypertension in her father and mother; Liver cancer in her paternal grandfather; No Known Problems in her brother, brother, brother, daughter, sister, and son; Stroke in her maternal grandfather. FH:  M - DM,Breast Ca 79  F - DM, d Alzheimers 76 '11  MGM, PGM, PGF - DM  She  reports that she has never smoked. She has never used smokeless tobacco. She reports current alcohol use of about 1.0 standard drink of alcohol per week. She reports that she does not use drugs. SH:  Unemployed since June 2022, on disability for a neuropathy.  She lives with her mother.   Current Outpatient Medications   Medication Sig Dispense Refill   • Ascorbic Acid (vitamin C) 1000 MG tablet Take 1,000 mg by mouth daily     • atorvastatin (LIPITOR) 10 mg tablet Take one tablet daily at bedtime 30 tablet 6   • Blood Glucose Monitoring Suppl (OneTouch Verio) w/Device KIT Use 2 (two) times a day 1 kit 0 • Diclofenac Sodium (VOLTAREN) 1 % Apply 2 g topically 4 (four) times a day as needed     • ferrous sulfate 325 (65 Fe) mg tablet Take 325 mg by mouth daily     • glucose blood (OneTouch Verio) test strip Test BG 2x daily as directed 200 strip 3   • Jardiance 10 MG TABS tablet TAKE ONE TABLET BY MOUTH EVERY MORNING 30 tablet 6   • lidocaine (Lidoderm) 5 % Apply 1 patch topically over 12 hours daily Remove & Discard patch within 12 hours or as directed by MD 30 patch 1   • liraglutide (VICTOZA) injection Inject 0.1 mL (0.6 mg total) under the skin daily Inject 0.6mg daily for 4 weeks then increase to 1.2mg 54 mL 1   • metFORMIN (GLUCOPHAGE) 1000 MG tablet Take 1 tablet (1,000 mg total) by mouth 2 (two) times a day with meals 180 tablet 2   • omeprazole (PriLOSEC) 20 mg delayed release capsule TAKE 1 CAPSULE BY MOUTH DAILY 90 capsule 2   • valsartan (DIOVAN) 320 MG tablet TAKE 1 TABLET BY MOUTH DAILY 90 tablet 3     No current facility-administered medications for this visit.      Current Outpatient Medications on File Prior to Visit   Medication Sig   • Ascorbic Acid (vitamin C) 1000 MG tablet Take 1,000 mg by mouth daily   • atorvastatin (LIPITOR) 10 mg tablet Take one tablet daily at bedtime   • Blood Glucose Monitoring Suppl (OneTouch Verio) w/Device KIT Use 2 (two) times a day   • Diclofenac Sodium (VOLTAREN) 1 % Apply 2 g topically 4 (four) times a day as needed   • ferrous sulfate 325 (65 Fe) mg tablet Take 325 mg by mouth daily   • glucose blood (OneTouch Verio) test strip Test BG 2x daily as directed   • Jardiance 10 MG TABS tablet TAKE ONE TABLET BY MOUTH EVERY MORNING   • lidocaine (Lidoderm) 5 % Apply 1 patch topically over 12 hours daily Remove & Discard patch within 12 hours or as directed by MD   • liraglutide (VICTOZA) injection Inject 0.1 mL (0.6 mg total) under the skin daily Inject 0.6mg daily for 4 weeks then increase to 1.2mg   • metFORMIN (GLUCOPHAGE) 1000 MG tablet Take 1 tablet (1,000 mg total) by mouth 2 (two) times a day with meals   • omeprazole (PriLOSEC) 20 mg delayed release capsule TAKE 1 CAPSULE BY MOUTH DAILY   • valsartan (DIOVAN) 320 MG tablet TAKE 1 TABLET BY MOUTH DAILY   • [DISCONTINUED] Lancets (freestyle) lancets Use 2 (two) times a day   • [DISCONTINUED] Lancets (OneTouch Delica Plus [id number]) MISC Test BG 2x daily as directed     No current facility-administered medications on file prior to visit. She is allergic to lisinopril and steri strip [medical tape]. .    Review of Systems   Constitutional: Negative for activity change, appetite change, fatigue and unexpected weight change. Eyes: Negative for visual disturbance. Respiratory: Negative for cough, chest tightness, shortness of breath and wheezing. Cardiovascular: Negative for chest pain, palpitations and leg swelling. Breast: Patient denies tenderness, nipple discharge, masses, or erythema. Gastrointestinal: Negative for abdominal distention, abdominal pain, blood in stool, constipation, diarrhea, nausea and vomiting. Genitourinary: Negative for decreased urine volume, difficulty urinating, dysuria, frequency, hematuria, menstrual problem, pelvic pain, urgency, vaginal bleeding, vaginal discharge and vaginal pain. She is not sexually active   Musculoskeletal: Negative for arthralgias. Skin: Negative for rash. Neurological: Positive for numbness. Negative for weakness, light-headedness and headaches. Hematological: Does not bruise/bleed easily. Psychiatric/Behavioral: Negative for agitation, behavioral problems and sleep disturbance. The patient is not nervous/anxious. Objective:    Vitals:    07/11/23 1357   BP: 126/80   BP Location: Right arm   Patient Position: Sitting   Cuff Size: Large   Weight: 125 kg (275 lb 9.6 oz)            Physical Exam  Vitals and nursing note reviewed. Constitutional:       Appearance: Normal appearance. She is obese.    HENT:      Head: Normocephalic and atraumatic. Eyes:      General: No scleral icterus. Right eye: No discharge. Left eye: No discharge. Extraocular Movements: Extraocular movements intact. Cardiovascular:      Rate and Rhythm: Normal rate and regular rhythm. Heart sounds: Normal heart sounds. No murmur heard. Pulmonary:      Effort: Pulmonary effort is normal.      Breath sounds: Normal breath sounds. Chest:      Comments: deferred  Abdominal:      General: Abdomen is flat. There is no distension. Palpations: Abdomen is soft. There is no mass. Tenderness: There is no abdominal tenderness. There is no right CVA tenderness, left CVA tenderness, guarding or rebound. Hernia: No hernia is present. Genitourinary:     Comments: The labia majora is inflamed. The patient has no symptoms. There are no lesions. Physiologic vaginal atrophy. The pelvis is deep. The vaginal apex is narrow. Cervix is clean, closed, stenotic and short. Bimanual examination is suboptimal due to the patient's habitus. Uterus was anteverted on ultrasound and top normal size. The adnexa was normal.  Musculoskeletal:         General: No swelling or tenderness. Cervical back: Normal range of motion and neck supple. No rigidity or tenderness. Right lower leg: No edema. Left lower leg: No edema. Skin:     General: Skin is warm and dry. Findings: No bruising. Neurological:      Mental Status: She is alert and oriented to person, place, and time. Psychiatric:         Mood and Affect: Mood normal.         Behavior: Behavior normal.         Thought Content:  Thought content normal.         Judgment: Judgment normal.

## 2023-07-11 NOTE — PROGRESS NOTES
Consultation - Cardiology   Miladys Mcdaniel 56 y.o. female MRN: [id number]  Unit/Bed#:  Encounter: [id number]  Physician Requesting Consult: No att. providers found  Reason for Consult / Principal Problem: PVCs, pre op cardiac evaluation    Assessment:  1. PVCs  2. HTN  3. DM.  4. FH of cardiomyopathy    Plan:  Clinically she is very active and has no cardiac symptoms. Exercise tolerance is very good. She does have risk factors for CAD. Her mother has a cardiomyopathy, likely non ischemic. I will order an ECHO and a regular EST. I have reviewed her medications and made no changes. Further recommendations will be based on the test results. History of Present Illness     HPI: Miladys Mcdaniel is a 56y.o. year old female who denies any past cardiac history. She has HTN, DM, hypercholesterolemia. She does not smoke. Her mother has a cardiomyopathy , likely non ischemic. EF was down to 15 %, now 45 %. She is active and denies exertional CP, SOB, palpitations, dizziness, syncope. She denies weight gain, LE edema. She drinks alcohol rarely. She can walk over 2 miles and up and down 2 flights of stairs without symptoms.     ECG      6/2022 - NSR, non specific ST abnl    6/2023 - NSR, frequent PVCs    Review of Systems:    Alert awake oriented, comfortable, denies any complaints  No fevers chills nausea vomiting  No weakness, dizziness, seizures  no cough, shortness of breath, or wheezing  Denies any palpitations, chest pain, diaphoresis  Denies leg edema, pain or paresthesias  Denies any skin rashes  Denies abdominal pain, bloody stools, masses  Denies any depression or suicidal ideations      Historical Information   Past Medical History:   Diagnosis Date   • Allergic    • Diabetes mellitus ([address] )    • GERD (gastroesophageal reflux disease)    • Hepatitis C     Treated   • Hypertension      Past Surgical History:   Procedure Laterality Date   • ANKLE SURGERY     • BREAST BIOPSY Right     core bx-benign • BREAST BIOPSY Right 04/11/2022   • BREAST LUMPECTOMY Right 6/30/2022    Procedure: BREAST PAYTON  DIRECTED LUMPECTOMY WITH Jen Kelly;  Surgeon: Rick Ayala MD;  Location: [facility];  Service: Surgical Oncology   • COLONOSCOPY  03/2019   • MAMMO (HISTORICAL)  02/2019   • MAMMO NEEDLE LOCALIZATION LEFT (ALL INC) Right 6/9/2022   • MAMMO NEEDLE LOCALIZATION RIGHT (ALL INC) Right 6/30/2022   • MAMMO STEREOTACTIC BREAST BIOPSY RIGHT (ALL INC) Right 4/11/2022   • TUBAL LIGATION       Social History     Substance and Sexual Activity   Alcohol Use Yes   • Alcohol/week: 1.0 standard drink of alcohol   • Types: 1 Glasses of wine per week    Comment: socially     Social History     Substance and Sexual Activity   Drug Use Never     Social History     Tobacco Use   Smoking Status Never   Smokeless Tobacco Never     Family History: non-contributory    Meds/Allergies   all current active meds have been reviewed  Allergies   Allergen Reactions   • Lisinopril Cough   • Steri Strip [Medical Tape] Rash       Objective   Vitals: Blood pressure 118/78, pulse 66, weight 125 kg (275 lb), last menstrual period 07/27/2021, SpO2 96 %. , Body mass index is 38.9 kg/m². ,   Weight (last 2 days)     Date/Time Weight    07/11/23 1256 125 (275)                    Physical Exam:  GEN: [address] appears well, alert and oriented x 3, pleasant and cooperative   HEENT: pupils equal, round, and reactive to light; extraocular muscles intact  NECK: supple, no carotid bruits   HEART: regular rhythm, normal S1 and S2, no murmurs, clicks, gallops or rubs   LUNGS: clear to auscultation bilaterally; no wheezes, rales, or rhonchi   ABDOMEN: normal bowel sounds, soft, no tenderness, no distention  EXTREMITIES: peripheral pulses normal; no clubbing, cyanosis, or edema  NEURO: no focal findings   SKIN: normal without suspicious lesions on exposed skin    Lab Results:   No visits with results within 1 Day(s) from this visit.    Latest known visit with results is:   Office Visit on 06/05/2023   Component Date Value Ref Range Status   • Ventricular Rate 06/05/2023 81  BPM Final   • Atrial Rate 06/05/2023 81  BPM Final   • NJ Interval 06/05/2023 146  ms Final   • QRSD Interval 06/05/2023 76  ms Final   • QT Interval 06/05/2023 408  ms Final   • QTC Interval 06/05/2023 473  ms Final   • P Axis 06/05/2023 58  degrees Final   • QRS Axis 06/05/2023 23  degrees Final   • T Wave Axis 06/05/2023 52  degrees Final                       Invalid input(s): "LABALBU"          Imaging: I have personally reviewed pertinent reports.

## 2023-07-12 ENCOUNTER — HOSPITAL ENCOUNTER (OUTPATIENT)
Dept: NON INVASIVE DIAGNOSTICS | Facility: HOSPITAL | Age: 58
Discharge: HOME | End: 2023-07-12
Payer: COMMERCIAL

## 2023-07-12 VITALS
HEART RATE: 66 BPM | HEIGHT: 70 IN | SYSTOLIC BLOOD PRESSURE: 126 MMHG | WEIGHT: 275 LBS | BODY MASS INDEX: 39.37 KG/M2 | DIASTOLIC BLOOD PRESSURE: 80 MMHG

## 2023-07-12 VITALS
SYSTOLIC BLOOD PRESSURE: 128 MMHG | HEIGHT: 71 IN | OXYGEN SATURATION: 95 % | BODY MASS INDEX: 38.5 KG/M2 | WEIGHT: 275 LBS | DIASTOLIC BLOOD PRESSURE: 80 MMHG | HEART RATE: 85 BPM | RESPIRATION RATE: 16 BRPM

## 2023-07-12 DIAGNOSIS — I10 ESSENTIAL HYPERTENSION: ICD-10-CM

## 2023-07-12 DIAGNOSIS — I49.3 PVC'S (PREMATURE VENTRICULAR CONTRACTIONS): ICD-10-CM

## 2023-07-12 DIAGNOSIS — R94.31 ABNORMAL EKG: ICD-10-CM

## 2023-07-12 LAB
AORTIC ROOT: 3.1 CM
APICAL FOUR CHAMBER EJECTION FRACTION: 65 %
ASCENDING AORTA: 3.5 CM
BASELINE ST DEPRESSION: 1 MM
E WAVE DECELERATION TIME: 129 MS
FRACTIONAL SHORTENING: 26 % (ref 28–44)
INTERVENTRICULAR SEPTUM IN DIASTOLE (PARASTERNAL SHORT AXIS VIEW): 1 CM
INTERVENTRICULAR SEPTUM: 1 CM (ref 0.6–1.1)
LAAS-AP2: 25.4 CM2
LAAS-AP4: 20.2 CM2
LEFT ATRIUM SIZE: 4.6 CM
LEFT ATRIUM VOLUME (MOD BIPLANE): 67 ML
LEFT INTERNAL DIMENSION IN SYSTOLE: 3.9 CM (ref 2.1–4)
LEFT VENTRICULAR INTERNAL DIMENSION IN DIASTOLE: 5.3 CM (ref 3.5–6)
LEFT VENTRICULAR POSTERIOR WALL IN END DIASTOLE: 0.9 CM
LEFT VENTRICULAR STROKE VOLUME: 102 ML
LVSV (TEICH): 102 ML
MAX HR PERCENT: 91 %
MAX HR: 151 BPM
MV E'TISSUE VEL-SEP: 12 CM/S
MV PEAK A VEL: 1.03 M/S
MV PEAK E VEL: 93 CM/S
MV STENOSIS PRESSURE HALF TIME: 38 MS
MV VALVE AREA P 1/2 METHOD: 5.79 CM2
RATE PRESSURE PRODUCT: NORMAL
RIGHT ATRIUM AREA SYSTOLE A4C: 14.3 CM2
RIGHT VENTRICLE ID DIMENSION: 3.6 CM
SL CV LEFT ATRIUM LENGTH A2C: 6.3 CM
SL CV LV EF: 60
SL CV PED ECHO LEFT VENTRICLE DIASTOLIC VOLUME (MOD BIPLANE) 2D: 166 ML
SL CV PED ECHO LEFT VENTRICLE SYSTOLIC VOLUME (MOD BIPLANE) 2D: 64 ML
SL CV STRESS RECOVERY BP: NORMAL MMHG
SL CV STRESS RECOVERY HR: 90 BPM
SL CV STRESS RECOVERY O2 SAT: 96 %
SL CV STRESS STAGE REACHED: 3
STRESS ANGINA INDEX: 0
STRESS BASELINE BP: NORMAL MMHG
STRESS BASELINE HR: 85 BPM
STRESS O2 SAT REST: 95 %
STRESS PEAK HR: 144 BPM
STRESS POST ESTIMATED WORKLOAD: 7 METS
STRESS POST EXERCISE DUR MIN: 6 MIN
STRESS POST EXERCISE DUR SEC: 0 SEC
STRESS POST O2 SAT PEAK: 98 %
STRESS POST PEAK BP: 162 MMHG
TR MAX PG: 35 MMHG
TR PEAK VELOCITY: 3 M/S
TRICUSPID ANNULAR PLANE SYSTOLIC EXCURSION: 2.6 CM
TRICUSPID VALVE PEAK REGURGITATION VELOCITY: 2.97 M/S

## 2023-07-12 PROCEDURE — 93017 CV STRESS TEST TRACING ONLY: CPT

## 2023-07-12 PROCEDURE — 93016 CV STRESS TEST SUPVJ ONLY: CPT | Performed by: INTERNAL MEDICINE

## 2023-07-12 PROCEDURE — 93018 CV STRESS TEST I&R ONLY: CPT | Performed by: INTERNAL MEDICINE

## 2023-07-12 PROCEDURE — 93306 TTE W/DOPPLER COMPLETE: CPT

## 2023-07-12 PROCEDURE — 93306 TTE W/DOPPLER COMPLETE: CPT | Performed by: INTERNAL MEDICINE

## 2023-07-13 DIAGNOSIS — M17.11 PRIMARY OSTEOARTHRITIS OF RIGHT KNEE: ICD-10-CM

## 2023-07-13 RX ORDER — LIDOCAINE 50 MG/G
PATCH TOPICAL
Qty: 30 PATCH | Refills: 1 | Status: SHIPPED | OUTPATIENT
Start: 2023-07-13

## 2023-07-17 ENCOUNTER — DOCUMENTATION (OUTPATIENT)
Dept: FAMILY MEDICINE CLINIC | Facility: CLINIC | Age: 58
End: 2023-07-17

## 2023-07-17 ENCOUNTER — TELEPHONE (OUTPATIENT)
Dept: FAMILY MEDICINE CLINIC | Facility: CLINIC | Age: 58
End: 2023-07-17

## 2023-07-17 NOTE — TELEPHONE ENCOUNTER
Patient was seen by cardiology and needs an update to our note for medical clearance for surgery 07/21/23

## 2023-07-19 DIAGNOSIS — E11.65 TYPE 2 DIABETES MELLITUS WITH HYPERGLYCEMIA, WITHOUT LONG-TERM CURRENT USE OF INSULIN (HCC): ICD-10-CM

## 2023-07-19 DIAGNOSIS — E11.9 TYPE 2 DIABETES MELLITUS WITHOUT COMPLICATION, WITHOUT LONG-TERM CURRENT USE OF INSULIN (HCC): ICD-10-CM

## 2023-07-19 RX ORDER — EMPAGLIFLOZIN 10 MG/1
TABLET, FILM COATED ORAL
Qty: 30 TABLET | Refills: 6 | Status: SHIPPED | OUTPATIENT
Start: 2023-07-19

## 2023-07-20 RX ORDER — ONDANSETRON 2 MG/ML
4 INJECTION INTRAMUSCULAR; INTRAVENOUS EVERY 6 HOURS PRN
Status: CANCELLED | OUTPATIENT
Start: 2023-07-20

## 2023-07-20 RX ORDER — DOCUSATE SODIUM 100 MG/1
100 CAPSULE, LIQUID FILLED ORAL 2 TIMES DAILY
Status: CANCELLED | OUTPATIENT
Start: 2023-07-20

## 2023-07-20 RX ORDER — IBUPROFEN 600 MG/1
600 TABLET ORAL EVERY 6 HOURS PRN
Status: CANCELLED | OUTPATIENT
Start: 2023-07-20

## 2023-07-20 RX ORDER — IBUPROFEN 200 MG
600 TABLET ORAL EVERY 6 HOURS PRN
Refills: 0
Start: 2023-07-20

## 2023-07-20 RX ORDER — ACETAMINOPHEN 325 MG/1
975 TABLET ORAL EVERY 6 HOURS PRN
Status: CANCELLED | OUTPATIENT
Start: 2023-07-20

## 2023-07-20 RX ORDER — ACETAMINOPHEN 325 MG/1
650 TABLET ORAL EVERY 6 HOURS PRN
Refills: 0
Start: 2023-07-20

## 2023-07-21 ENCOUNTER — HOSPITAL ENCOUNTER (OUTPATIENT)
Facility: HOSPITAL | Age: 58
Setting detail: OUTPATIENT SURGERY
Discharge: HOME | End: 2023-07-21
Attending: OBSTETRICS & GYNECOLOGY | Admitting: OBSTETRICS & GYNECOLOGY
Payer: COMMERCIAL

## 2023-07-21 ENCOUNTER — HOSPITAL ENCOUNTER (OUTPATIENT)
Dept: RADIOLOGY | Facility: HOSPITAL | Age: 58
Discharge: HOME | End: 2023-07-21
Attending: OBSTETRICS & GYNECOLOGY
Payer: COMMERCIAL

## 2023-07-21 VITALS
WEIGHT: 275 LBS | BODY MASS INDEX: 38.5 KG/M2 | SYSTOLIC BLOOD PRESSURE: 142 MMHG | HEART RATE: 83 BPM | RESPIRATION RATE: 20 BRPM | DIASTOLIC BLOOD PRESSURE: 74 MMHG | HEIGHT: 71 IN | TEMPERATURE: 97.2 F | OXYGEN SATURATION: 94 %

## 2023-07-21 DIAGNOSIS — N95.0 PMB (POSTMENOPAUSAL BLEEDING): ICD-10-CM

## 2023-07-21 DIAGNOSIS — N95.0 POSTMENOPAUSAL BLEEDING: ICD-10-CM

## 2023-07-21 DIAGNOSIS — N88.2 CERVICAL STENOSIS (UTERINE CERVIX): ICD-10-CM

## 2023-07-21 LAB
EXT PREGNANCY TEST URINE: NEGATIVE
EXT. CONTROL: NORMAL
GLUCOSE SERPL-MCNC: 171 MG/DL (ref 65–140)
GLUCOSE SERPL-MCNC: 173 MG/DL (ref 65–140)

## 2023-07-21 PROCEDURE — 88344 IMHCHEM/IMCYTCHM EA MLT ANTB: CPT | Performed by: STUDENT IN AN ORGANIZED HEALTH CARE EDUCATION/TRAINING PROGRAM

## 2023-07-21 PROCEDURE — 88305 TISSUE EXAM BY PATHOLOGIST: CPT | Performed by: STUDENT IN AN ORGANIZED HEALTH CARE EDUCATION/TRAINING PROGRAM

## 2023-07-21 PROCEDURE — 81025 URINE PREGNANCY TEST: CPT | Performed by: OBSTETRICS & GYNECOLOGY

## 2023-07-21 PROCEDURE — 58558 HYSTEROSCOPY BIOPSY: CPT | Performed by: OBSTETRICS & GYNECOLOGY

## 2023-07-21 PROCEDURE — 76942 ECHO GUIDE FOR BIOPSY: CPT

## 2023-07-21 PROCEDURE — 82948 REAGENT STRIP/BLOOD GLUCOSE: CPT

## 2023-07-21 RX ORDER — SODIUM CHLORIDE, SODIUM LACTATE, POTASSIUM CHLORIDE, CALCIUM CHLORIDE 600; 310; 30; 20 MG/100ML; MG/100ML; MG/100ML; MG/100ML
125 INJECTION, SOLUTION INTRAVENOUS CONTINUOUS
Status: DISCONTINUED | OUTPATIENT
Start: 2023-07-21 | End: 2023-07-21 | Stop reason: HOSPADM

## 2023-07-21 RX ORDER — SODIUM CHLORIDE, SODIUM LACTATE, POTASSIUM CHLORIDE, CALCIUM CHLORIDE 600; 310; 30; 20 MG/100ML; MG/100ML; MG/100ML; MG/100ML
100 INJECTION, SOLUTION INTRAVENOUS CONTINUOUS
Status: DISCONTINUED | OUTPATIENT
Start: 2023-07-21 | End: 2023-07-21 | Stop reason: HOSPADM

## 2023-07-21 RX ORDER — DIPHENHYDRAMINE HYDROCHLORIDE 50 MG/ML
INJECTION INTRAMUSCULAR; INTRAVENOUS AS NEEDED
Status: DISCONTINUED | OUTPATIENT
Start: 2023-07-21 | End: 2023-07-21

## 2023-07-21 RX ORDER — HYDROMORPHONE HCL IN WATER/PF 6 MG/30 ML
0.2 PATIENT CONTROLLED ANALGESIA SYRINGE INTRAVENOUS
Status: DISCONTINUED | OUTPATIENT
Start: 2023-07-21 | End: 2023-07-21 | Stop reason: HOSPADM

## 2023-07-21 RX ORDER — FENTANYL CITRATE 50 UG/ML
INJECTION, SOLUTION INTRAMUSCULAR; INTRAVENOUS AS NEEDED
Status: DISCONTINUED | OUTPATIENT
Start: 2023-07-21 | End: 2023-07-21

## 2023-07-21 RX ORDER — PROPOFOL 10 MG/ML
INJECTION, EMULSION INTRAVENOUS AS NEEDED
Status: DISCONTINUED | OUTPATIENT
Start: 2023-07-21 | End: 2023-07-21

## 2023-07-21 RX ORDER — DIPHENHYDRAMINE HYDROCHLORIDE 50 MG/ML
12.5 INJECTION INTRAMUSCULAR; INTRAVENOUS ONCE AS NEEDED
Status: DISCONTINUED | OUTPATIENT
Start: 2023-07-21 | End: 2023-07-21 | Stop reason: HOSPADM

## 2023-07-21 RX ORDER — FENTANYL CITRATE/PF 50 MCG/ML
50 SYRINGE (ML) INJECTION
Status: DISCONTINUED | OUTPATIENT
Start: 2023-07-21 | End: 2023-07-21 | Stop reason: HOSPADM

## 2023-07-21 RX ORDER — MIDAZOLAM HYDROCHLORIDE 2 MG/2ML
INJECTION, SOLUTION INTRAMUSCULAR; INTRAVENOUS AS NEEDED
Status: DISCONTINUED | OUTPATIENT
Start: 2023-07-21 | End: 2023-07-21

## 2023-07-21 RX ORDER — MAGNESIUM HYDROXIDE 1200 MG/15ML
LIQUID ORAL AS NEEDED
Status: DISCONTINUED | OUTPATIENT
Start: 2023-07-21 | End: 2023-07-21 | Stop reason: HOSPADM

## 2023-07-21 RX ORDER — KETOROLAC TROMETHAMINE 30 MG/ML
INJECTION, SOLUTION INTRAMUSCULAR; INTRAVENOUS AS NEEDED
Status: DISCONTINUED | OUTPATIENT
Start: 2023-07-21 | End: 2023-07-21

## 2023-07-21 RX ORDER — SODIUM CHLORIDE, SODIUM LACTATE, POTASSIUM CHLORIDE, CALCIUM CHLORIDE 600; 310; 30; 20 MG/100ML; MG/100ML; MG/100ML; MG/100ML
INJECTION, SOLUTION INTRAVENOUS CONTINUOUS PRN
Status: DISCONTINUED | OUTPATIENT
Start: 2023-07-21 | End: 2023-07-21

## 2023-07-21 RX ORDER — ALBUTEROL SULFATE 2.5 MG/3ML
2.5 SOLUTION RESPIRATORY (INHALATION) ONCE AS NEEDED
Status: DISCONTINUED | OUTPATIENT
Start: 2023-07-21 | End: 2023-07-21 | Stop reason: HOSPADM

## 2023-07-21 RX ORDER — ONDANSETRON 2 MG/ML
4 INJECTION INTRAMUSCULAR; INTRAVENOUS ONCE AS NEEDED
Status: DISCONTINUED | OUTPATIENT
Start: 2023-07-21 | End: 2023-07-21 | Stop reason: HOSPADM

## 2023-07-21 RX ORDER — ONDANSETRON 2 MG/ML
INJECTION INTRAMUSCULAR; INTRAVENOUS AS NEEDED
Status: DISCONTINUED | OUTPATIENT
Start: 2023-07-21 | End: 2023-07-21

## 2023-07-21 RX ORDER — LIDOCAINE HYDROCHLORIDE 10 MG/ML
INJECTION, SOLUTION EPIDURAL; INFILTRATION; INTRACAUDAL; PERINEURAL AS NEEDED
Status: DISCONTINUED | OUTPATIENT
Start: 2023-07-21 | End: 2023-07-21

## 2023-07-21 RX ADMIN — FENTANYL CITRATE 25 MCG: 50 INJECTION INTRAMUSCULAR; INTRAVENOUS at 10:36

## 2023-07-21 RX ADMIN — DIPHENHYDRAMINE HYDROCHLORIDE 12.5 MG: 50 INJECTION, SOLUTION INTRAMUSCULAR; INTRAVENOUS at 10:16

## 2023-07-21 RX ADMIN — KETOROLAC TROMETHAMINE 30 MG: 30 INJECTION, SOLUTION INTRAMUSCULAR; INTRAVENOUS at 10:36

## 2023-07-21 RX ADMIN — FENTANYL CITRATE 25 MCG: 50 INJECTION INTRAMUSCULAR; INTRAVENOUS at 10:03

## 2023-07-21 RX ADMIN — FENTANYL CITRATE 25 MCG: 50 INJECTION INTRAMUSCULAR; INTRAVENOUS at 10:31

## 2023-07-21 RX ADMIN — PROPOFOL 200 MG: 10 INJECTION, EMULSION INTRAVENOUS at 09:59

## 2023-07-21 RX ADMIN — MIDAZOLAM 2 MG: 1 INJECTION INTRAMUSCULAR; INTRAVENOUS at 09:53

## 2023-07-21 RX ADMIN — SODIUM CHLORIDE, SODIUM LACTATE, POTASSIUM CHLORIDE, AND CALCIUM CHLORIDE 100 ML/HR: .6; .31; .03; .02 INJECTION, SOLUTION INTRAVENOUS at 07:56

## 2023-07-21 RX ADMIN — LIDOCAINE HYDROCHLORIDE 50 MG: 10 INJECTION, SOLUTION EPIDURAL; INFILTRATION; INTRACAUDAL; PERINEURAL at 09:59

## 2023-07-21 RX ADMIN — SODIUM CHLORIDE, SODIUM LACTATE, POTASSIUM CHLORIDE, AND CALCIUM CHLORIDE: .6; .31; .03; .02 INJECTION, SOLUTION INTRAVENOUS at 09:53

## 2023-07-21 RX ADMIN — ONDANSETRON 4 MG: 2 INJECTION INTRAMUSCULAR; INTRAVENOUS at 10:18

## 2023-07-21 RX ADMIN — FENTANYL CITRATE 25 MCG: 50 INJECTION INTRAMUSCULAR; INTRAVENOUS at 09:59

## 2023-07-21 NOTE — ANESTHESIA PREPROCEDURE EVALUATION
Procedure:  DILATATION AND CURETTAGE (D&C) WITH HYSTEROSCOPY (Uterus)  POLYPECTOMY AND ANY NECESSARY PROCEDURES (Uterus)    Relevant Problems   CARDIO   (+) Essential hypertension   (+) PVC's (premature ventricular contractions)      ENDO   (+) Type 2 diabetes mellitus without complication, without long-term current use of insulin (HCC)      GI/HEPATIC   (+) Gastroesophageal reflux disease without esophagitis      HEMATOLOGY   (+) Thrombocytopenia (HCC)      MUSCULOSKELETAL   (+) Charcot arthropathy of midfoot      Digestive   (+) History of hepatitis C      Other   (+) Atypical ductal hyperplasia of right breast   (+) Atypical squamous cell changes of undetermined significance (ASCUS) on cervical cytology with negative high risk human papilloma virus (HPV) test result   (+) BMI 38.0-38.9,adult      Left Ventricle: Left ventricular cavity size is normal. Wall thickness is normal. There is no concentric hypertrophy. The left ventricular ejection fraction is 60%. Systolic function is normal. Wall motion is normal. Diastolic function is normal.  Left atrial filling pressure is normal.  •  Mitral Valve: There is mild annular calcification.     Frequent PVCs noted during study. Otherwise normal echocardiogram.    Resting ECG: ECG is abnormal. The ECG shows normal sinus rhythm. Resting ECG shows no ST-segment deviation. The ECG shows frequent premature ventricular contractions in a pattern of bigeminy. •  Stress ECG: No ST deviation is noted. Arrhythmias during recovery: frequent PVCs in a pattern of bigeminy. The ECG was negative for ischemia. •  Stress ECG: Overall, the patient's exercise capacity was normal for their age. The patient reached stage 3.0 of the protocol after exercising for 6 min and 0 sec and had a maximal HR of 151 bpm (91 % of MPHR) and 7.0 METS. Blood pressure demonstrated a normal response and heart rate demonstrated a normal response to stress.  The patient's heart rate recovery was normal.  Physical Exam    Airway    Mallampati score: I  TM Distance: >3 FB  Neck ROM: full     Dental   Comment: Upper and lower partials, removed., No notable dental hx     Cardiovascular  Cardiovascular exam normal    Pulmonary  Pulmonary exam normal     Other Findings        Anesthesia Plan  ASA Score- 3     Anesthesia Type- general with ASA Monitors. Additional Monitors:   Airway Plan: LMA. Plan Factors-Exercise tolerance (METS): >4 METS. Chart reviewed. EKG reviewed. Imaging results reviewed. Existing labs reviewed. Patient summary reviewed. Patient is not a current smoker. Induction- intravenous. Postoperative Plan-     Informed Consent- Anesthetic plan and risks discussed with patient. I personally reviewed this patient with the CRNA. Discussed and agreed on the Anesthesia Plan with the CRNA. Heriberto Smalls

## 2023-07-21 NOTE — ANESTHESIA POSTPROCEDURE EVALUATION
Post-Op Assessment Note    CV Status:  Stable  Pain Score: 0    Pain management: adequate     Mental Status:  Awake and somnolent   Hydration Status:  Stable   PONV Controlled:  None   Airway Patency:  Patent      Post Op Vitals Reviewed: Yes      Staff: CRNA   Comments: Patient in PACU, airway patent, VSS. No c/o pain or nausea. No notable events documented.     BP  150/91 (124)   Temp  97.8   Pulse  86   Resp  19   SpO2   96% on RA

## 2023-07-21 NOTE — OP NOTE
OPERATIVE REPORT  PATIENT NAME: Santosh Eddy    :  1965  MRN: [id number]  Pt Location: [facility] OR ROOM 10    SURGERY DATE: 2023    Surgeon(s) and Role:     * Ananth Campbell MD - Primary     * Tramaine Ramos DO - Assisting    Preop Diagnosis:  PMB (postmenopausal bleeding) [N95.0]  Cervical stenosis (uterine cervix) [N88.2]    Post-Op Diagnosis Codes:     * PMB (postmenopausal bleeding) [N95.0]     * Cervical stenosis (uterine cervix) [N88.2]    Procedure(s):  DILATATION AND CURETTAGE (D&C) WITH HYSTEROSCOPY  POLYPECTOMY    Specimen(s):  ID Type Source Tests Collected by Time Destination   1 : endocervical currettings Tissue Endocervical TISSUE EXAM Ananth Campbell MD 2023 1018    2 : endocervical currettings #2 Tissue Endocervical TISSUE EXAM Ananth Campbell MD 2023 1032    3 :  Tissue Endometrium TISSUE EXAM Ananth Campbell MD 2023 1036      Estimated Blood Loss:  Minimal    Drains:  * No LDAs found *    Anesthesia Type:  General LMA    Operative Indications:  PMB (postmenopausal bleeding) [N95.0]  Cervical stenosis (uterine cervix) [N88.2]    Operative Findings:   1. External genitalia grossly normal in appearance. No ulcerations, no lacerations, no lesions. Bimanual exam revealed midposition uterus with normal contours and freely mobile. No adnexal masses palpated bilaterally. Mild rectocele noted. 2.  Vagina was grossly normal in appearance without any lacerations or lesions. Cervical stenosis noted with midline adhesion located at external os. 3. Uterus sounded to 8 cm. 4. Hysteroscopic examination revealed atrophic endometrial lining. Polypoid tissue was noted in posterior aspect of cervical canal.  No endometrial cavity polyps noted. Bilateral ostia were  Visualized. See attached images. Complications:  None apparent    Procedure and Technique:    The patient was taken to the operating room. General  LMA anesthesia (LMA) was administered.   Sequential compression devices were placed, and the patient was positioned on the operating table in the dorsal lithotomy position. All pressure points were padded, and a kurt hugger was placed to maintain control of core body temperature. A bimanual exam was performed, and the uterus was mid position and normal in size and consistency with no palpable uterine or adnexal masses. The patient was prepped and draped in the usual sterile fashion using chlorhexidine. A time out was performed to confirm correct patient and procedure. A weighted speculum was inserted into the vagina, and a [place] retractor was used to visualize the anterior lip of the cervix. A single toothed tenaculum was used to grasp the anterior lip of the cervix. Cervical stenosis was noted at the external os with adhesion in the midline. External cervical os adhesion was cut with scissors which allowed for introduction of the dilators. Radiology technician was present with ultrasound at time of procedure for visualization with introduction of instruments. The uterus was sounded to 8 cm under ultrasound guidance. The cervix was serially dilated to 19 [place] using Erwin dilators for introduction of the hysteroscope. The [place] retractor was removed. Endocervical curettage was performed starting at the 12 o'clock position and rotating a total of 360 degrees to cover all surfaces. Specimen was marked and sent to pathology. Hysteroscope was introduced under direct visualization using normal saline solution as the distention media. The weighted speculum was removed from the vagina. The hysteroscope was advanced to the fundus of the uterus, and the entire uterine cavity was inspected in a systematic manner. There was noted to be atrophic endometrium with polyps as described in the operative findings.       After removal of the hysteroscope,  endocervical curetting was performed a second time to obtain further specimen, starting at the 12 o'clock position and rotating a total of 360 degrees to cover all surfaces until cry was noted in all aspects. Endocervical tissue was obtained, marked separately from initial ECC,  and sent for pathology. Next, the cervix was further dilated to 23 [place] using Erwin dilators for the introduction of the endometrial curette. Sharp, endometrial curetting was performed a second time to obtain further specimen, starting at the 12 o'clock position and rotating a total of 360 degrees to cover all surfaces until uterine cry was noted in all aspects. Endometrial tissue was obtained and sent for pathology. The single toothed tenaculum was removed from the anterior lip of the cervix. Two puncture sites from tenaculum were visualized and a minimal amount of bleeding was  noted. Pressure was applied with spongestick. The patient had excellent hemostasis at this time. The Kena retractor and the weighted speculum were removed from the vagina. She was cleansed and was awakened from anesthesia without incident and was taken to the recovery room in satisfactory condition. At the conclusion of the procedure, all needle, sponge, and instrument counts were correct x2. The fluid deficit was noted to 200 mL. Dr. Yari De La Cruz was present for the entirety of the procedure.      Patient Disposition:  PACU     SIGNATURE: Linda Zendejas DO  DATE: July 21, 2023  TIME: 10:50 AM

## 2023-07-21 NOTE — INTERVAL H&P NOTE
H&P reviewed. After examining the patient I find no changes in the patients condition since the H&P had been written. She has not experienced any further bleeding. Post op instr given. RTO 1 wk.     Vitals:    07/21/23 0645   BP: 147/82   Pulse: (!) 52   Resp: 16   Temp: (!) 97.4 °F (36.3 °C)   SpO2: 96%

## 2023-07-28 PROCEDURE — 88344 IMHCHEM/IMCYTCHM EA MLT ANTB: CPT | Performed by: STUDENT IN AN ORGANIZED HEALTH CARE EDUCATION/TRAINING PROGRAM

## 2023-07-28 PROCEDURE — 88305 TISSUE EXAM BY PATHOLOGIST: CPT | Performed by: STUDENT IN AN ORGANIZED HEALTH CARE EDUCATION/TRAINING PROGRAM

## 2023-07-29 DIAGNOSIS — E78.2 MIXED HYPERLIPIDEMIA: ICD-10-CM

## 2023-07-31 RX ORDER — ATORVASTATIN CALCIUM 10 MG/1
TABLET, FILM COATED ORAL
Qty: 30 TABLET | Refills: 6 | Status: SHIPPED | OUTPATIENT
Start: 2023-07-31

## 2023-08-03 NOTE — PROGRESS NOTES
Assessment/Plan:  Normal postoperative visit  No restrictions  Yearly with Bonnie 12/23        A Endocervical, endocervical currettings  B Endocervical, endocervical currettings #2 [ cystic structure on HSC]  C Endometrium  . Nick Colon Final Diagnosis  A-B. Endocervical, endocervical currettings:  - Benign squamous and endocervical tissue.  - Fragments of atrophic endometrium and crushed stromal elements, favored lower uterine segment sampling.  - Negative for malignancy and squamous intraepithelial lesion.  - p16/[id number] multiplex immunostain is negative for high grade dysplasia. C. Endometrium, biopsy:  - Strips of atrophic endometrium and crushed stromal elements. - Fragments of benign squamous and endocervical tissue.  - Negative for atypical hyperplasia and malignancy. - p16/[id number] multiplex immunostain is negative for high grade dysplasia. Diagnoses and all orders for this visit:    Postoperative visit    Post-menopausal bleeding    Stenotic cervical os    BMI 38.0-38.9,adult              Subjective:        Patient ID: Madalyn Hodgson is a 56 y.o. female. Daniel Evans returns for a postoperative visit. She spotted for a few days after surgery. She had some minor cramps which required Tylenol. The following portions of the patient's history were reviewed and updated as appropriate: She  has a past medical history of Allergic, Diabetes mellitus ([address]), GERD (gastroesophageal reflux disease), Hepatitis C, and Hypertension.   Patient Active Problem List    Diagnosis Date Noted   • PVC's (premature ventricular contractions) 07/11/2023   • Preop cardiovascular exam 07/11/2023   • Abnormal EKG 06/15/2023   • Post-menopausal 05/24/2023   • Stenotic cervical os 05/24/2023   • Vaginal atrophy 05/24/2023   • Charcot arthropathy of midfoot 04/06/2023   • Plantar fasciitis of right foot 03/27/2023   • Dyslipidemia 03/27/2023   • Post-menopausal bleeding 11/13/2022   • Increased risk of breast cancer 10/21/2022   • Other proteinuria 07/28/2022   • Rash 07/06/2022   • Pre-op exam 06/13/2022   • Thigh numbness 06/13/2022   • Atypical ductal hyperplasia of right breast 04/26/2022   • History of iron deficiency anemia 11/12/2021   • Thrombocytopenia ([address]) 11/12/2021   • Chronic pain of both ankles 07/19/2021   • Class 2 obesity due to excess calories without serious comorbidity with body mass index (BMI) of 38.0 to 38.9 in adult 07/11/2021   • Foot pain, right 11/16/2020   • Type 2 diabetes mellitus without complication, without long-term current use of insulin ([address]) 07/13/2020   • Essential hypertension 07/13/2020   • Gastroesophageal reflux disease without esophagitis 07/13/2020   • History of hepatitis C 07/13/2020   • Atypical squamous cell changes of undetermined significance (ASCUS) on cervical cytology with negative high risk human papilloma virus (HPV) test result 02/24/2020   • BMI 38.0-38.9,adult 02/24/2020   • Vulvovaginal candidiasis 02/18/2019     She  has a past surgical history that includes Tubal ligation; Ankle surgery; Mammo (historical) (02/2019); Colonoscopy (03/2019); Mammo stereotactic breast biopsy right (all inc) (Right, 4/11/2022); Breast biopsy (Right); Breast biopsy (Right, 04/11/2022); Mammo needle localization left (all inc) (Right, 6/9/2022); Mammo needle localization right (all inc) (Right, 6/30/2022); Breast lumpectomy (Right, 6/30/2022); pr hysteroscopy bx endometrium&/polypc w/wo d&c (N/A, 7/21/2023); pr hysteroscopy bx endometrium&/polypc w/wo d&c (N/A, 7/21/2023); and US guidance (7/21/2023).   Her family history includes Arthritis in her paternal grandmother; Breast cancer (age of onset: 79) in her mother; Dementia in her father; Diabetes in her father, maternal grandmother, and mother; Heart attack in her maternal grandfather; Heart disease in her father; Heart failure in her father; Hyperlipidemia in her maternal grandmother; Hypertension in her father and mother; Liver cancer in her paternal grandfather; No Known Problems in her brother, brother, brother, daughter, sister, and son; Stroke in her maternal grandfather. She  reports that she has never smoked. She has never used smokeless tobacco. She reports current alcohol use of about 1.0 standard drink of alcohol per week. She reports that she does not use drugs. Current Outpatient Medications   Medication Sig Dispense Refill   • Ascorbic Acid (vitamin C) 1000 MG tablet Take 1,000 mg by mouth daily     • atorvastatin (LIPITOR) 10 mg tablet TAKE 1 TABLET BY MOUTH DAILY AT BEDTIME 30 tablet 6   • Blood Glucose Monitoring Suppl (OneTouch Verio) w/Device KIT Use 2 (two) times a day 1 kit 0   • Diclofenac Sodium (VOLTAREN) 1 % Apply 2 g topically 4 (four) times a day as needed     • ferrous sulfate 325 (65 Fe) mg tablet Take 325 mg by mouth daily     • glucose blood (OneTouch Verio) test strip Test BG 2x daily as directed 200 strip 3   • Jardiance 10 MG TABS tablet TAKE ONE TABLET BY MOUTH EVERY MORNING 30 tablet 6   • lidocaine (LIDODERM) 5 % APPLY 1 PATCH TOPICALLY OVER 12 HOURS DAILY - REMOVE AND DISCARD PATCH WITHIN 12 HOUR OR AS DIRECTED BY DOCTOR 30 patch 1   • liraglutide (VICTOZA) injection Inject 0.1 mL (0.6 mg total) under the skin daily Inject 0.6mg daily for 4 weeks then increase to 1.2mg 54 mL 1   • metFORMIN (GLUCOPHAGE) 1000 MG tablet TAKE 1 TABLET BY MOUTH TWICE A DAY WITH MEALS 180 tablet 2   • omeprazole (PriLOSEC) 20 mg delayed release capsule TAKE 1 CAPSULE BY MOUTH DAILY 90 capsule 2   • valsartan (DIOVAN) 320 MG tablet TAKE 1 TABLET BY MOUTH DAILY 90 tablet 3     No current facility-administered medications for this visit.      Current Outpatient Medications on File Prior to Visit   Medication Sig   • Ascorbic Acid (vitamin C) 1000 MG tablet Take 1,000 mg by mouth daily   • atorvastatin (LIPITOR) 10 mg tablet TAKE 1 TABLET BY MOUTH DAILY AT BEDTIME   • Blood Glucose Monitoring Suppl (OneTouch Verio) w/Device KIT Use 2 (two) times a day • Diclofenac Sodium (VOLTAREN) 1 % Apply 2 g topically 4 (four) times a day as needed   • ferrous sulfate 325 (65 Fe) mg tablet Take 325 mg by mouth daily   • glucose blood (OneTouch Verio) test strip Test BG 2x daily as directed   • Jardiance 10 MG TABS tablet TAKE ONE TABLET BY MOUTH EVERY MORNING   • lidocaine (LIDODERM) 5 % APPLY 1 PATCH TOPICALLY OVER 12 HOURS DAILY - REMOVE AND DISCARD PATCH WITHIN 12 HOUR OR AS DIRECTED BY DOCTOR   • liraglutide (VICTOZA) injection Inject 0.1 mL (0.6 mg total) under the skin daily Inject 0.6mg daily for 4 weeks then increase to 1.2mg   • metFORMIN (GLUCOPHAGE) 1000 MG tablet TAKE 1 TABLET BY MOUTH TWICE A DAY WITH MEALS   • omeprazole (PriLOSEC) 20 mg delayed release capsule TAKE 1 CAPSULE BY MOUTH DAILY   • valsartan (DIOVAN) 320 MG tablet TAKE 1 TABLET BY MOUTH DAILY     No current facility-administered medications on file prior to visit. She is allergic to lisinopril and steri strip [medical tape]. .    Review of Systems   Constitutional: Negative. Negative for chills and fever. Respiratory: Negative for shortness of breath. Cardiovascular: Negative for chest pain. Gastrointestinal: Negative for abdominal pain. Genitourinary: Negative for difficulty urinating, dysuria, frequency, pelvic pain and vaginal pain. Objective:    Vitals:    08/04/23 1457   BP: 120/80   BP Location: Right arm   Patient Position: Sitting   Cuff Size: Large   Weight: 128 kg (281 lb 6.4 oz)            Physical Exam  Vitals and nursing note reviewed. Neurological:      Mental Status: She is oriented to person, place, and time. Psychiatric:         Mood and Affect: Mood normal.         Behavior: Behavior normal.         Thought Content:  Thought content normal.         Judgment: Judgment normal.

## 2023-08-04 ENCOUNTER — OFFICE VISIT (OUTPATIENT)
Dept: OBGYN CLINIC | Facility: CLINIC | Age: 58
End: 2023-08-04

## 2023-08-04 VITALS — SYSTOLIC BLOOD PRESSURE: 120 MMHG | WEIGHT: 281.4 LBS | DIASTOLIC BLOOD PRESSURE: 80 MMHG | BODY MASS INDEX: 39.81 KG/M2

## 2023-08-04 DIAGNOSIS — N95.0 POST-MENOPAUSAL BLEEDING: ICD-10-CM

## 2023-08-04 DIAGNOSIS — N88.2 STENOTIC CERVICAL OS: ICD-10-CM

## 2023-08-04 DIAGNOSIS — Z48.89 POSTOPERATIVE VISIT: Primary | ICD-10-CM

## 2023-08-04 PROCEDURE — 99024 POSTOP FOLLOW-UP VISIT: CPT | Performed by: OBSTETRICS & GYNECOLOGY

## 2023-08-07 ENCOUNTER — OFFICE VISIT (OUTPATIENT)
Dept: OBGYN CLINIC | Facility: CLINIC | Age: 58
End: 2023-08-07
Payer: COMMERCIAL

## 2023-08-07 ENCOUNTER — APPOINTMENT (OUTPATIENT)
Dept: RADIOLOGY | Facility: AMBULARY SURGERY CENTER | Age: 58
End: 2023-08-07
Attending: STUDENT IN AN ORGANIZED HEALTH CARE EDUCATION/TRAINING PROGRAM
Payer: COMMERCIAL

## 2023-08-07 VITALS — WEIGHT: 281.4 LBS | BODY MASS INDEX: 39.4 KG/M2 | HEIGHT: 71 IN

## 2023-08-07 DIAGNOSIS — M77.8 DELTOID TENDONITIS OF RIGHT SHOULDER: ICD-10-CM

## 2023-08-07 DIAGNOSIS — M79.601 RIGHT ARM PAIN: ICD-10-CM

## 2023-08-07 DIAGNOSIS — M25.811 IMPINGEMENT OF RIGHT SHOULDER: Primary | ICD-10-CM

## 2023-08-07 PROCEDURE — 73060 X-RAY EXAM OF HUMERUS: CPT

## 2023-08-07 PROCEDURE — 99214 OFFICE O/P EST MOD 30 MIN: CPT | Performed by: STUDENT IN AN ORGANIZED HEALTH CARE EDUCATION/TRAINING PROGRAM

## 2023-08-07 PROCEDURE — 96372 THER/PROPH/DIAG INJ SC/IM: CPT | Performed by: STUDENT IN AN ORGANIZED HEALTH CARE EDUCATION/TRAINING PROGRAM

## 2023-08-07 RX ADMIN — BUPIVACAINE HYDROCHLORIDE 2 ML: 2.5 INJECTION, SOLUTION INFILTRATION; PERINEURAL at 17:15

## 2023-08-07 RX ADMIN — METHYLPREDNISOLONE ACETATE 2 ML: 40 INJECTION, SUSPENSION INTRA-ARTICULAR; INTRALESIONAL; INTRAMUSCULAR; SOFT TISSUE at 17:15

## 2023-08-07 NOTE — PROGRESS NOTES
Orthopaedics Office Visit - Follow up Patient Visit    ASSESSMENT/PLAN:    Assessment: 1. Right shoulder impingement  2. Right deltoid tendonitis    Plan:  · Weightbearing as tolerated right upper extremity  · Patient received right deltoid steroid injection today. Patient tolerated the procedure well. Post injection instructions reviewed. Discussed with the patient that the corticosteroid injection can temporarily increase her blood sugar results and instructed to perform tight glycemic control over the next 72 hours. She was advised that deltoid injections cannot be repeated due to increased risk of tendon rupture. · May apply Voltaren gel to the painful area up to 4 times a day around the deltoid region   · Physical therapy script provided for shoulder ROM  · Tylenol PRN for pain  · Return to clinic PRN for the shoulder      _____________________________________________________  CHIEF COMPLAINT:  Chief Complaint   Patient presents with   • Right Upper Arm - Pain         SUBJECTIVE:  11/7/2022: Madalyn Hodgson is a 56 y.o. female who presents who presents today for right knee pain. She was referred by her PCP . She states she is been having right knee pain for the last 1-2 weeks. She denies any injuries. She states she is been having pain over the anterior ( deep) and medial aspect of the right knee. She states at that time she was having trouble weight-bearing and was icing  and elevate and taking Tylenol for pain relief. [facility] had x-rays taken of the right knee which showed no acute fracture dislocation but severe patellofemoral arthritis. She was  prescribed a Medrol Dosepak and she states as she is tapering down the pain is coming back ( last dosage today). Denies instability. She is having pain with flexion of the knee at rest. transitional positions and walking. She also notes her extension is limited.  She was previously walking 1-2 miles a day for exercise. SHe is currently taking Tylenol as needed for pain relief. She denies any numbness or tingling. She has no history of having any treatments on the right knee. Patient is a diabetic . She states she is currently working with her PCP on changing her diabetic medications for better control. Interval history 1/23/2023:  Vicki Green is a 56 y.o. female who presents for follow up evaluation of Right knee pain. She received a one-dose Duralane injection 12/19/2022 which provided her some relief of her symptoms. She reports three falls directly onto the right knee since her last visit, most recently in January. She states that the first fall was from tripping over a rock, second fall was from wet floor, and the third fall was over a step. Denies inability to bear weight after these falls. Her daughter, who presents on the phone for this visit, states she is concerned that her mother has increasing general instability causing these falls, unsure if due to diagnosed neuropathies or another cause. Notes that she has occasional episodes of swelling of the knee that worsens throughout the day. Her pain is aggravated with increased activity and relieved with rest. She also notes bilateral numbness of the thigh located laterally and anterior. Taking ice and Tylenol as needed for pain relief. Interval History 3/7/23:  Patient reports today with her mother. Patient states her knee pain has persistently worsened since her last visit. Patient received right knee steroid injection on 11/7/22 and reports some relief. She then has right knee single VS injection on 12/19/22 and reports some additional relief. Overall patient states that neither injection took her pain away completely. She notes persistent pain over the anteromedial aspect of the knee. Patient reports swelling as well. Pain is worsened with weight bearing. She notes crunching and clicking in the knee and feels her knee can be unstable.  Patient is taking meloxicam and applying lidocaine patches. Patient notes some improvement in her symptoms after application of ice. She has been doing aqua therapy but does not note any improvement in her symptoms. Patient states she has not had any falls since her last visit. Patient is hoping to proceed with right total knee replacement. She is not a smoker. Patient is diabetic, with most recent A1C 7.6. Interval history 6/12/23   Patient presents today follow up for chronic right knee pain due to osteoarthritis. Patient had a right knee steroid injection on 3/7/23 with relief. She states her right knee has been doing better since retiring from work. She notes occasional anterior right knee pain with weightbearing. She notes for the last month and a half she has been having pain over lateral biceps region. She notes pain with lifting over reaching backwards. She is using Voltaren gel and and lidocaine patches  for pain relief. She denies numbness or paresthesias. Interval history 8/7/23  Patent reports that her right knee pain is improved, but that she has continued to have right shoulder/arm pain for the past 1.5 months that have not improved with voltaren gel or NSAIDs. She has not had formal physical therapy. She reports she does not have any numbness or tingling of the right upper extremity. Pain is localized over the deltoid insertion. There is no pain surrounding the shoulder girdle. With abduction the pain is localized to the midshaft of the humerus right overlying the deltoid insertion.   Reproduced with palpation over the deltoid    PAST MEDICAL HISTORY:  Past Medical History:   Diagnosis Date   • Allergic    • Diabetes mellitus ([address])    • GERD (gastroesophageal reflux disease)    • Hepatitis C     Treated   • Hypertension        PAST SURGICAL HISTORY:  Past Surgical History:   Procedure Laterality Date   • ANKLE SURGERY     • BREAST BIOPSY Right     core bx-benign   • BREAST BIOPSY Right 04/11/2022 • BREAST LUMPECTOMY Right 6/30/2022    Procedure: BREAST PAYTON  DIRECTED LUMPECTOMY WITH Deangelo Mohan;  Surgeon: Gopal Torres MD;  Location[facility];  Service: Surgical Oncology   • COLONOSCOPY  03/2019   • MAMMO (HISTORICAL)  02/2019   • MAMMO NEEDLE LOCALIZATION LEFT (ALL INC) Right 6/9/2022   • MAMMO NEEDLE LOCALIZATION RIGHT (ALL INC) Right 6/30/2022   • MAMMO STEREOTACTIC BREAST BIOPSY RIGHT (ALL INC) Right 4/11/2022   • [place] HYSTEROSCOPY BX ENDOMETRIUM&/POLYPC W/WO D&C N/A 7/21/2023    Procedure: DILATATION AND CURETTAGE (D&C) WITH HYSTEROSCOPY;  Surgeon: Jesenia Phelps MD;  Location[facility];  Service: Gynecology   • [place] HYSTEROSCOPY BX ENDOMETRIUM&/POLYPC W/WO D&C N/A 7/21/2023    Procedure: POLYPECTOMY;  Surgeon: Jesenia Phelps MD;  [facility];  Service: Gynecology   • TUBAL LIGATION     • US GUIDANCE  7/21/2023       FAMILY HISTORY:  Family History   Problem Relation Age of Onset   • Breast cancer Mother 79   • Hypertension Mother    • Diabetes Mother    • Hypertension Father    • Diabetes Father    • Heart failure Father    • Dementia Father    • Heart disease Father    • No Known Problems Sister    • No Known Problems Daughter    • Hyperlipidemia Maternal Grandmother    • Diabetes Maternal Grandmother    • Heart attack Maternal Grandfather    • Stroke Maternal Grandfather    • Arthritis Paternal Grandmother    • Liver cancer Paternal Grandfather         unknown age   • No Known Problems Brother    • No Known Problems Brother    • No Known Problems Brother    • No Known Problems Son        SOCIAL HISTORY:  Social History     Tobacco Use   • Smoking status: Never   • Smokeless tobacco: Never   Vaping Use   • Vaping Use: Never used   Substance Use Topics   • Alcohol use:  Yes     Alcohol/week: 1.0 standard drink of alcohol     Types: 1 Glasses of wine per week     Comment: socially   • Drug use: Never       MEDICATIONS:    Current Outpatient Medications:   •  Ascorbic Acid (vitamin C) 1000 MG tablet, Take 1,000 mg by mouth daily, Disp: , Rfl:   •  atorvastatin (LIPITOR) 10 mg tablet, TAKE 1 TABLET BY MOUTH DAILY AT BEDTIME, Disp: 30 tablet, Rfl: 6  •  Blood Glucose Monitoring Suppl (OneTouch Verio) w/Device KIT, Use 2 (two) times a day, Disp: 1 kit, Rfl: 0  •  Diclofenac Sodium (VOLTAREN) 1 %, Apply 2 g topically 4 (four) times a day as needed, Disp: , Rfl:   •  ferrous sulfate 325 (65 Fe) mg tablet, Take 325 mg by mouth daily, Disp: , Rfl:   •  glucose blood (OneTouch Verio) test strip, Test BG 2x daily as directed, Disp: 200 strip, Rfl: 3  •  Jardiance 10 MG TABS tablet, TAKE ONE TABLET BY MOUTH EVERY MORNING, Disp: 30 tablet, Rfl: 6  •  lidocaine (LIDODERM) 5 %, APPLY 1 PATCH TOPICALLY OVER 12 HOURS DAILY - REMOVE AND DISCARD PATCH WITHIN 12 HOUR OR AS DIRECTED BY DOCTOR, Disp: 30 patch, Rfl: 1  •  liraglutide (VICTOZA) injection, Inject 0.1 mL (0.6 mg total) under the skin daily Inject 0.6mg daily for 4 weeks then increase to 1.2mg, Disp: 54 mL, Rfl: 1  •  metFORMIN (GLUCOPHAGE) 1000 MG tablet, TAKE 1 TABLET BY MOUTH TWICE A DAY WITH MEALS, Disp: 180 tablet, Rfl: 2  •  omeprazole (PriLOSEC) 20 mg delayed release capsule, TAKE 1 CAPSULE BY MOUTH DAILY, Disp: 90 capsule, Rfl: 2  •  valsartan (DIOVAN) 320 MG tablet, TAKE 1 TABLET BY MOUTH DAILY, Disp: 90 tablet, Rfl: 3    ALLERGIES:  Allergies   Allergen Reactions   • Lisinopril Cough   • Steri Strip [Medical Tape] Rash       REVIEW OF SYSTEMS:  MSK: right knee pain  Neuro: known peripheral neuropathies  Pertinent items are otherwise noted in HPI. A comprehensive review of systems was otherwise negative.     LABS:  HgA1c:   Lab Results   Component Value Date    HGBA1C 6.4 (H) 06/05/2023     BMP:   Lab Results   Component Value Date    CALCIUM 9.3 06/05/2023    K 4.4 06/05/2023    CO2 27 06/05/2023     06/05/2023    BUN 15 06/05/2023    CREATININE 0.49 (L) 06/05/2023     CBC: No components found for: "CBC"    _____________________________________________________  PHYSICAL EXAMINATION:  Vital signs: Ht 5' 10.5" (1.791 m)   Wt 128 kg (281 lb 6.4 oz)   LMP 07/27/2021 (Approximate)   BMI 39.81 kg/m²   General: No acute distress, awake and alert  Psychiatric: Mood and affect appear appropriate  HEENT: Trachea Midline, No torticollis, no apparent facial trauma  Cardiovascular: No audible murmurs; Extremities appear perfused  Pulmonary: No audible wheezing or stridor  Skin: No open lesions; see further details (if any) below    MUSCULOSKELETAL EXAMINATION:  Extremities: . The right upper extremity was exposed inspected. Patient skin intact overlying the right upper extremity. No ecchymosis no swelling. Patient can actively and passively forward flex to 90 deg, 80 deg abduction, 50 deg external rotation with elbow at the side equal to contralateral, and internal rotation to buttock. The patient has tenderness to palpation over the deltoid insertion point which reproduces the patient's symptoms. + dawkins, strength examination limited by pain. She has some pain with resisted abduction and internal rotation and extension at that same location. She denies any feelings of instability. Denies any numbness or paresthesias. Patient's sensation is intact to light touch in the axillary, median, radial, ulnar nerve distributions. AIN, PIN, ulnar, axillary nerves intact. +2 radial pulses distally    _____________________________________________________  STUDIES REVIEWED:  I personally reviewed the images and interpretation is as follows:    X-rays right humerus demonstrate no fractures, dislocations or lesions of the humeral shaft. PROCEDURES PERFORMED:   Universal Protocol:  Consent: Verbal consent obtained.   Risks and benefits: risks, benefits and alternatives were discussed  Consent given by: patient  Time out: Immediately prior to procedure a "time out" was called to verify the correct patient, procedure, equipment, support staff and site/side marked as required. Patient understanding: patient states understanding of the procedure being performed  Patient consent: the patient's understanding of the procedure matches consent given  Procedure consent: procedure consent matches procedure scheduled  Relevant documents: relevant documents present and verified  Test results: test results available and properly labeled  Site marked: the operative site was marked  Radiology Images displayed and confirmed.  If images not available, report reviewed: imaging studies available  Required items: required blood products, implants, devices, and special equipment available  Patient identity confirmed: verbally with patient    Supporting Documentation  Indications: pain    Injection site identified by: palpation    Procedure Details  Location(s):   Upper Limb: right deltoid  Prep: patient was prepped and draped in usual sterile fashion  Needle size: 22 G  Medications: 2 mL bupivacaine 0.25 %; 2 mL methylPREDNISolone acetate 40 mg/mL  Patient tolerance: patient tolerated the procedure well with no immediate complications                Scribe Attestation    I,:   am acting as a scribe while in the presence of the attending physician.:       I,:   personally performed the services described in this documentation    as scribed in my presence.:

## 2023-08-08 ENCOUNTER — APPOINTMENT (OUTPATIENT)
Dept: LAB | Facility: CLINIC | Age: 58
End: 2023-08-08
Payer: COMMERCIAL

## 2023-08-08 DIAGNOSIS — E11.9 TYPE 2 DIABETES MELLITUS WITHOUT COMPLICATION, WITHOUT LONG-TERM CURRENT USE OF INSULIN (HCC): ICD-10-CM

## 2023-08-08 LAB
ANION GAP SERPL CALCULATED.3IONS-SCNC: 11 MMOL/L
BUN SERPL-MCNC: 16 MG/DL (ref 5–25)
CALCIUM SERPL-MCNC: 9.8 MG/DL (ref 8.4–10.2)
CHLORIDE SERPL-SCNC: 102 MMOL/L (ref 96–108)
CO2 SERPL-SCNC: 23 MMOL/L (ref 21–32)
CREAT SERPL-MCNC: 0.46 MG/DL (ref 0.6–1.3)
EST. AVERAGE GLUCOSE BLD GHB EST-MCNC: 169 MG/DL
GFR SERPL CREATININE-BSD FRML MDRD: 112 ML/MIN/1.73SQ M
GLUCOSE P FAST SERPL-MCNC: 181 MG/DL (ref 65–99)
HBA1C MFR BLD: 7.5 %
POTASSIUM SERPL-SCNC: 4 MMOL/L (ref 3.5–5.3)
SODIUM SERPL-SCNC: 136 MMOL/L (ref 135–147)

## 2023-08-08 PROCEDURE — 36415 COLL VENOUS BLD VENIPUNCTURE: CPT

## 2023-08-08 PROCEDURE — 80048 BASIC METABOLIC PNL TOTAL CA: CPT

## 2023-08-08 PROCEDURE — 83036 HEMOGLOBIN GLYCOSYLATED A1C: CPT

## 2023-08-08 RX ORDER — METHYLPREDNISOLONE ACETATE 40 MG/ML
2 INJECTION, SUSPENSION INTRA-ARTICULAR; INTRALESIONAL; INTRAMUSCULAR; SOFT TISSUE
Status: COMPLETED | OUTPATIENT
Start: 2023-08-07 | End: 2023-08-07

## 2023-08-08 RX ORDER — BUPIVACAINE HYDROCHLORIDE 2.5 MG/ML
2 INJECTION, SOLUTION INFILTRATION; PERINEURAL
Status: COMPLETED | OUTPATIENT
Start: 2023-08-07 | End: 2023-08-07

## 2023-08-10 ENCOUNTER — OFFICE VISIT (OUTPATIENT)
Dept: FAMILY MEDICINE CLINIC | Facility: CLINIC | Age: 58
End: 2023-08-10
Payer: COMMERCIAL

## 2023-08-10 VITALS
DIASTOLIC BLOOD PRESSURE: 82 MMHG | HEIGHT: 71 IN | BODY MASS INDEX: 38.36 KG/M2 | TEMPERATURE: 97.3 F | WEIGHT: 274 LBS | OXYGEN SATURATION: 99 % | HEART RATE: 51 BPM | SYSTOLIC BLOOD PRESSURE: 122 MMHG | RESPIRATION RATE: 16 BRPM

## 2023-08-10 DIAGNOSIS — I10 ESSENTIAL HYPERTENSION: ICD-10-CM

## 2023-08-10 DIAGNOSIS — E78.5 DYSLIPIDEMIA: ICD-10-CM

## 2023-08-10 DIAGNOSIS — I49.3 PVC'S (PREMATURE VENTRICULAR CONTRACTIONS): ICD-10-CM

## 2023-08-10 DIAGNOSIS — K21.9 GASTROESOPHAGEAL REFLUX DISEASE WITHOUT ESOPHAGITIS: ICD-10-CM

## 2023-08-10 DIAGNOSIS — E11.65 TYPE 2 DIABETES MELLITUS WITH HYPERGLYCEMIA, WITHOUT LONG-TERM CURRENT USE OF INSULIN (HCC): ICD-10-CM

## 2023-08-10 DIAGNOSIS — D69.6 THROMBOCYTOPENIA (HCC): ICD-10-CM

## 2023-08-10 DIAGNOSIS — E11.9 TYPE 2 DIABETES MELLITUS WITHOUT COMPLICATION, WITHOUT LONG-TERM CURRENT USE OF INSULIN (HCC): Primary | ICD-10-CM

## 2023-08-10 PROCEDURE — 99214 OFFICE O/P EST MOD 30 MIN: CPT | Performed by: FAMILY MEDICINE

## 2023-08-10 NOTE — PROGRESS NOTES
Name: Layne Jackson      : 1965      MRN: [id number]  Encounter Provider: Elida Dillon MD  Encounter Date: 8/10/2023   Encounter department: [facility] [address]    Assessment & Plan     1. Type 2 diabetes mellitus without complication, without long-term current use of insulin [facility])  Assessment & Plan:    Lab Results   Component Value Date    HGBA1C 7.5 (H) 2023   pt had steroid will wait for next HGa1c no change     Orders:  -     Hemoglobin A1C; Future; Expected date: 11/10/2023  -     Comprehensive metabolic panel; Future; Expected date: 11/10/2023  -     Albumin / creatinine urine ratio; Future; Expected date: 11/10/2023  -     Lipid Panel with Direct LDL reflex; Future; Expected date: 11/10/2023    2. Type 2 diabetes mellitus with hyperglycemia, without long-term current use of insulin (HCC)  -     liraglutide (VICTOZA) injection; Inject 0.1 mL (0.6 mg total) under the skin daily Inject 0.6mg daily for 4 weeks then increase to 1.2mg    3. Gastroesophageal reflux disease without esophagitis  Assessment & Plan:  Ok on meds      4. Essential hypertension  Assessment & Plan:  Well controlled on current therapy continue with current medications and will reassess next visit        5. PVC's (premature ventricular contractions)  Assessment & Plan:  Reviewed cardiology note  asymptomatic      6. Thrombocytopenia (HCC)  Assessment & Plan:  Chronic  stable      7.  Dyslipidemia  Assessment & Plan:  Sugar Grade on meds             Subjective      HPI  Review of Systems    Current Outpatient Medications on File Prior to Visit   Medication Sig   • Ascorbic Acid (vitamin C) 1000 MG tablet Take 1,000 mg by mouth daily   • atorvastatin (LIPITOR) 10 mg tablet TAKE 1 TABLET BY MOUTH DAILY AT BEDTIME   • Blood Glucose Monitoring Suppl (OneTouch Verio) w/Device KIT Use 2 (two) times a day   • Diclofenac Sodium (VOLTAREN) 1 % Apply 2 g topically 4 (four) times a day as needed   • ferrous sulfate 325 (65 Fe) mg tablet Take 325 mg by mouth daily   • glucose blood (OneTouch Verio) test strip Test BG 2x daily as directed   • Jardiance 10 MG TABS tablet TAKE ONE TABLET BY MOUTH EVERY MORNING   • lidocaine (LIDODERM) 5 % APPLY 1 PATCH TOPICALLY OVER 12 HOURS DAILY - REMOVE AND DISCARD PATCH WITHIN 12 HOUR OR AS DIRECTED BY DOCTOR   • metFORMIN (GLUCOPHAGE) 1000 MG tablet TAKE 1 TABLET BY MOUTH TWICE A DAY WITH MEALS   • omeprazole (PriLOSEC) 20 mg delayed release capsule TAKE 1 CAPSULE BY MOUTH DAILY   • valsartan (DIOVAN) 320 MG tablet TAKE 1 TABLET BY MOUTH DAILY   • [DISCONTINUED] liraglutide (VICTOZA) injection Inject 0.1 mL (0.6 mg total) under the skin daily Inject 0.6mg daily for 4 weeks then increase to 1.2mg       Objective     /82 (BP Location: Left arm, Patient Position: Sitting, Cuff Size: Large)   Pulse (!) 51   Temp (!) 97.3 °F (36.3 °C) (Tympanic)   Resp 16   Ht 5' 10.5" (1.791 m)   Wt 124 kg (274 lb)   LMP 07/27/2021 (Approximate)   SpO2 99%   BMI 38.76 kg/m²     Physical Exam  Benjamin Love MD

## 2023-08-10 NOTE — ASSESSMENT & PLAN NOTE
Lab Results   Component Value Date    HGBA1C 7.5 (H) 08/08/2023   pt had steroid will wait for next HGa1c no change

## 2023-09-05 ENCOUNTER — OFFICE VISIT (OUTPATIENT)
Dept: DIABETES SERVICES | Facility: CLINIC | Age: 58
End: 2023-09-05
Payer: COMMERCIAL

## 2023-09-05 VITALS — WEIGHT: 278.2 LBS | BODY MASS INDEX: 39.35 KG/M2

## 2023-09-05 DIAGNOSIS — E11.65 TYPE 2 DIABETES MELLITUS WITH HYPERGLYCEMIA, WITHOUT LONG-TERM CURRENT USE OF INSULIN (HCC): Primary | ICD-10-CM

## 2023-09-05 PROCEDURE — 97803 MED NUTRITION INDIV SUBSEQ: CPT

## 2023-09-05 NOTE — PATIENT INSTRUCTIONS
Eat 3 regular meals ~4-5 hours apart with 1-2 snacks per day as needed. Keep carbohydrate intake consistent. Aim for 30-45 grams of carbohydrate per meal and 0-15 grams of carbohydrate per snack. Add a good source of protein with all meals and snacks. 4.   When having Life, limit portion to 1 cup cereal and 1 cup milk. 5.   Try Mor[employer] yogurt bars as an evening snack.

## 2023-09-05 NOTE — PROGRESS NOTES
Medical Nutrition Therapy     Assessment     Visit Type: Follow-up visit  Chief complaint/Medical Diagnosis/reason for visit: E11.65 (ICD-10-CM) - Type 2 diabetes mellitus with hyperglycemia, without long-term current use of insulin ([address])      LOVE Maurer was seen today unaccompanied regarding type 2 diabetes. This is a follow-up visit. She was last seen here on 4/14/2023. Diabetes medication regimen remains the same since last visit; 10 mg of Jardiance daily, 1,000 mg of metformin twice daily and 1.2 mg of Victoza once weekly. HbA1c increased from 6.2% in March 2023 to 7.5% in August 2023. Linda Bundy reports that she has not been as diligent with checking her blood sugars lately. Reviewed OneTouch yuko with a few readings. Blood sugars ranged from 149-209 mg/dL. She also reports that she has not been as diligent with following her meal plan as well. States her mom was diagnosed with bone cancer so she has been busy taking care of her. She has not been able to be as active due to issues with her right shoulder and left knee. Plan is to start physical therapy soon. Reviewed carbohydrate range goals for meals and snacks; 30-45 grams of carbohydrate per meal and 0-15 grams of carbohydrate per snack. Reviewed updated dietary recall and discussed how each meal and snack could be adjusted to meet carbohydrate range. Discussed adding a good source of protein with all meals and snacks as well. Plan is to follow-up in December 2023.          Ht Readings from Last 1 Encounters:   12/19/22 5' 10" (1.778 m)          Wt Readings from Last 3 Encounters:   12/30/22 128 kg (283 lb)   12/21/22 130 kg (287 lb)   12/19/22 129 kg (285 lb)     Barriers to Learning: no barriers     Do you follow any special diet presently?: No  Who shops: patient   Who cooks: patient     Food Log: Completed via the method of usual daily food recall     Breakfast: 10 AM - 3 slices of italian bread wit butter, sometimes has fried potatoes as well  Lunch/Dinner: 3 PM - meatballs, cucumbers with spinach dip, salads (3 days per week) with won ton noodles, cheese, olives and sweet onion dressing (sometimes has chicken on salads), chicken thighs in air fryer marinated with Memo dressing, corn, mashed potatoes  Evening Snack: 10-12 PM - 2 cups of Life cereal with whole milk or 1-2 fudge bars ([employer]), mitch, cheese stick  Beverages: water, diet [place] [employer] iced tea   Eating out: sometimes goes to [employer] and gets a foot long sub  Exercise: no regular physical activity at present - limited due to issues with right shoulder and left knee - starting physical therapy soon    Estimated calorie needs: 1,800 kcals/day     Carbohydrate: 30-45 g/meal, 0-15 g/snack        Fat: 5 servings/day      Protein: 8 ounces/day     Nutrition Diagnosis:  Inconsistent carbohydrate intake related to food and nutrition related knowledge deficit concerning appropriate amount and timing of carbohydrate intake as evidenced by estimated carbohydrate intake that is different from recommended types or ingested on an irregular basis.     Intervention: label reading, behavior modification strategies, carbohydrate counting, meal timing, meal planning, monitoring portion control and exercise guidelines      Treatment Goals: Patient understands education and recommendations, Patient will consume 3 meals a day, Patient will monitor portion control, Patient will count carbohydrates, Patient will exercise and Patient will monitor blood glucose     Monitoring and evaluation:    Term code indicator  FH 4.4 Mealtime Behavior Criteria: Eat 3 regular meals ~4-5 hours apart and 1-2 snacks per day as needed.   Term code indicator  FH 1.6.3 Carbohydrate Intake Criteria: Aim for 30-45 grams of carbohydrate per meal and 0-15 grams of carbohydrate per snack.     Materials Provided: None at this visit     Patient’s Response to Instruction:  Comprehension: good  Motivation: good  Expected Compliance: good     Start- Stop: 2:15-3:00  Total Minutes: 45 Minutes  Group or Individual Instruction: MNT-I  Other: Chris Cordova MD    Thank you for coming to the [address] [place] [address] for education today.  Please feel free to call with any questions or concerns.     Tracy Simmons, MS, RD, LDN  [id number]  [facility] [address]  [place] [phone]

## 2023-09-12 ENCOUNTER — EVALUATION (OUTPATIENT)
Dept: PHYSICAL THERAPY | Facility: CLINIC | Age: 58
End: 2023-09-12
Payer: COMMERCIAL

## 2023-09-12 ENCOUNTER — OFFICE VISIT (OUTPATIENT)
Dept: OBGYN CLINIC | Facility: CLINIC | Age: 58
End: 2023-09-12
Payer: COMMERCIAL

## 2023-09-12 VITALS — WEIGHT: 278 LBS | BODY MASS INDEX: 38.92 KG/M2 | HEIGHT: 71 IN

## 2023-09-12 DIAGNOSIS — M25.811 IMPINGEMENT OF RIGHT SHOULDER: ICD-10-CM

## 2023-09-12 DIAGNOSIS — M17.11 PRIMARY OSTEOARTHRITIS OF RIGHT KNEE: Primary | ICD-10-CM

## 2023-09-12 DIAGNOSIS — M77.8 DELTOID TENDONITIS OF RIGHT SHOULDER: ICD-10-CM

## 2023-09-12 PROCEDURE — 99214 OFFICE O/P EST MOD 30 MIN: CPT | Performed by: STUDENT IN AN ORGANIZED HEALTH CARE EDUCATION/TRAINING PROGRAM

## 2023-09-12 PROCEDURE — 20610 DRAIN/INJ JOINT/BURSA W/O US: CPT | Performed by: STUDENT IN AN ORGANIZED HEALTH CARE EDUCATION/TRAINING PROGRAM

## 2023-09-12 PROCEDURE — 97110 THERAPEUTIC EXERCISES: CPT | Performed by: PHYSICAL THERAPIST

## 2023-09-12 PROCEDURE — 97162 PT EVAL MOD COMPLEX 30 MIN: CPT | Performed by: PHYSICAL THERAPIST

## 2023-09-12 RX ORDER — METHYLPREDNISOLONE ACETATE 40 MG/ML
1 INJECTION, SUSPENSION INTRA-ARTICULAR; INTRALESIONAL; INTRAMUSCULAR; SOFT TISSUE
Status: COMPLETED | OUTPATIENT
Start: 2023-09-12 | End: 2023-09-12

## 2023-09-12 RX ORDER — BUPIVACAINE HYDROCHLORIDE 2.5 MG/ML
4 INJECTION, SOLUTION INFILTRATION; PERINEURAL
Status: COMPLETED | OUTPATIENT
Start: 2023-09-12 | End: 2023-09-12

## 2023-09-12 RX ADMIN — BUPIVACAINE HYDROCHLORIDE 4 ML: 2.5 INJECTION, SOLUTION INFILTRATION; PERINEURAL at 10:45

## 2023-09-12 RX ADMIN — METHYLPREDNISOLONE ACETATE 1 ML: 40 INJECTION, SUSPENSION INTRA-ARTICULAR; INTRALESIONAL; INTRAMUSCULAR; SOFT TISSUE at 10:45

## 2023-09-12 NOTE — PROGRESS NOTES
Orthopaedics Office Visit - Follow up Patient Visit    ASSESSMENT/PLAN:    Assessment:   3.  57 yo female with right knee osteoarthritis, severe patellofemoral osteoarthritis  2. Right shoulder impingement  3. Right deltoid tendonitis    Plan:  • Weightbearing as tolerated right upper and lower extremities   • Patient received right knee steroid injection today. Patient tolerated the procedure well. Post injection instructions reviewed. Discussed with the patient that the corticosteroid injection can temporarily increase her blood sugar results and instructed to perform tight glycemic control over the next 72 hours. • May apply Voltaren gel to the painful area up to 4 times a day around the knee and deltoid region   • May wear Lidocaine patch as needed for pain to the right knee  • Patient can continue to be weightbearing as tolerated activity as tolerated       _____________________________________________________  CHIEF COMPLAINT:  Chief Complaint   Patient presents with   • Right Knee - Follow-up         SUBJECTIVE:  11/7/2022: Martín Vazquez is a 56 y.o. female who presents who presents today for right knee pain. She was referred by her PCP . She states she is been having right knee pain for the last 1-2 weeks. She denies any injuries. She states she is been having pain over the anterior ( deep) and medial aspect of the right knee. She states at that time she was having trouble weight-bearing and was icing  and elevate and taking Tylenol for pain relief. [facility] had x-rays taken of the right knee which showed no acute fracture dislocation but severe patellofemoral arthritis. She was  prescribed a Medrol Dosepak and she states as she is tapering down the pain is coming back ( last dosage today). Denies instability. She is having pain with flexion of the knee at rest. transitional positions and walking. She also notes her extension is limited.  She was previously walking 1-2 miles a day for exercise. SHe is currently taking Tylenol as needed for pain relief. She denies any numbness or tingling. She has no history of having any treatments on the right knee. Patient is a diabetic . She states she is currently working with her PCP on changing her diabetic medications for better control. Interval history 1/23/2023:  Aydee Stinson is a 56 y.o. female who presents for follow up evaluation of Right knee pain. She received a one-dose Duralane injection 12/19/2022 which provided her some relief of her symptoms. She reports three falls directly onto the right knee since her last visit, most recently in January. She states that the first fall was from tripping over a rock, second fall was from wet floor, and the third fall was over a step. Denies inability to bear weight after these falls. Her daughter, who presents on the phone for this visit, states she is concerned that her mother has increasing general instability causing these falls, unsure if due to diagnosed neuropathies or another cause. Notes that she has occasional episodes of swelling of the knee that worsens throughout the day. Her pain is aggravated with increased activity and relieved with rest. She also notes bilateral numbness of the thigh located laterally and anterior. Taking ice and Tylenol as needed for pain relief. Interval History 3/7/23:  Patient reports today with her mother. Patient states her knee pain has persistently worsened since her last visit. Patient received right knee steroid injection on 11/7/22 and reports some relief. She then has right knee single VS injection on 12/19/22 and reports some additional relief. Overall patient states that neither injection took her pain away completely. She notes persistent pain over the anteromedial aspect of the knee. Patient reports swelling as well. Pain is worsened with weight bearing.  She notes crunching and clicking in the knee and feels her knee can be unstable. Patient is taking meloxicam and applying lidocaine patches. Patient notes some improvement in her symptoms after application of ice. She has been doing aqua therapy but does not note any improvement in her symptoms. Patient states she has not had any falls since her last visit. Patient is hoping to proceed with right total knee replacement. She is not a smoker. Patient is diabetic, with most recent A1C 7.6. Interval history 6/12/23   Patient presents today follow up for chronic right knee pain due to osteoarthritis. Patient had a right knee steroid injection on 3/7/23 with relief. She states her right knee has been doing better since retiring from work. She notes occasional anterior right knee pain with weightbearing. She notes for the last month and a half she has been having pain over lateral biceps region. She notes pain with lifting over reaching backwards. She is using Voltaren gel and and lidocaine patches  for pain relief. She denies numbness or paresthesias. Interval history 8/7/23  Patent reports that her right knee pain is improved, but that she has continued to have right shoulder/arm pain for the past 1.5 months that have not improved with voltaren gel or NSAIDs. She has not had formal physical therapy. She reports she does not have any numbness or tingling of the right upper extremity. Pain is localized over the deltoid insertion. There is no pain surrounding the shoulder girdle. With abduction the pain is localized to the midshaft of the humerus right overlying the deltoid insertion. Reproduced with palpation over the deltoid    Interval history 9/12/2023  Patient reports today for follow-up evaluation of her right knee severe osteoarthritis. She had a right knee steroid injection on 6/12/2023 with relief. She states she has been doing less activity as she is no longer working.  She reports her right knee pain is a long the anterior aspect of the knee today with weightbearing. She reports her shoulder ROM is improving as she goes to PT. She is interested in a CSI of her right knee today.        PAST MEDICAL HISTORY:  Past Medical History:   Diagnosis Date   • Allergic    • Diabetes mellitus ([address])    • GERD (gastroesophageal reflux disease)    • Hepatitis C     Treated   • Hypertension        PAST SURGICAL HISTORY:  Past Surgical History:   Procedure Laterality Date   • ANKLE SURGERY     • BREAST BIOPSY Right     core bx-benign   • BREAST BIOPSY Right 04/11/2022   • BREAST LUMPECTOMY Right 6/30/2022    Procedure: BREAST PAYTON  DIRECTED LUMPECTOMY WITH Sandi Harry;  Surgeon: Moncho Huizar MD;  Location: [facility];  Service: Surgical Oncology   • COLONOSCOPY  03/2019   • MAMMO (HISTORICAL)  02/2019   • MAMMO NEEDLE LOCALIZATION LEFT (ALL INC) Right 6/9/2022   • MAMMO NEEDLE LOCALIZATION RIGHT (ALL INC) Right 6/30/2022   • MAMMO STEREOTACTIC BREAST BIOPSY RIGHT (ALL INC) Right 4/11/2022   • AK HYSTEROSCOPY BX ENDOMETRIUM&/POLYPC W/WO D&C N/A 7/21/2023    Procedure: DILATATION AND CURETTAGE (D&C) WITH HYSTEROSCOPY;  Surgeon: Angella De Jesus MD;  Location: [facility];  Service: Gynecology   • AK HYSTEROSCOPY BX ENDOMETRIUM&/POLYPC W/WO D&C N/A 7/21/2023    Procedure: POLYPECTOMY;  Surgeon: Angella De Jesus MD;  Location: [facility];  Service: Gynecology   • TUBAL LIGATION     • US GUIDANCE  7/21/2023       FAMILY HISTORY:  Family History   Problem Relation Age of Onset   • Breast cancer Mother 79   • Hypertension Mother    • Diabetes Mother    • Hypertension Father    • Diabetes Father    • Heart failure Father    • Dementia Father    • Heart disease Father    • No Known Problems Sister    • No Known Problems Daughter    • Hyperlipidemia Maternal Grandmother    • Diabetes Maternal Grandmother    • Heart attack Maternal Grandfather    • Stroke Maternal Grandfather    • Arthritis Paternal Grandmother    • Liver cancer Paternal Grandfather         unknown age   • No Known Problems Brother    • No Known Problems Brother    • No Known Problems Brother    • No Known Problems Son        SOCIAL HISTORY:  Social History     Tobacco Use   • Smoking status: Never   • Smokeless tobacco: Never   Vaping Use   • Vaping Use: Never used   Substance Use Topics   • Alcohol use:  Yes     Alcohol/week: 1.0 standard drink of alcohol     Types: 1 Glasses of wine per week     Comment: socially   • Drug use: Never       MEDICATIONS:    Current Outpatient Medications:   •  Ascorbic Acid (vitamin C) 1000 MG tablet, Take 1,000 mg by mouth daily, Disp: , Rfl:   •  atorvastatin (LIPITOR) 10 mg tablet, TAKE 1 TABLET BY MOUTH DAILY AT BEDTIME, Disp: 30 tablet, Rfl: 6  •  Blood Glucose Monitoring Suppl (OneTouch Verio) w/Device KIT, Use 2 (two) times a day, Disp: 1 kit, Rfl: 0  •  Diclofenac Sodium (VOLTAREN) 1 %, Apply 2 g topically 4 (four) times a day as needed, Disp: , Rfl:   •  ferrous sulfate 325 (65 Fe) mg tablet, Take 325 mg by mouth daily, Disp: , Rfl:   •  glucose blood (OneTouch Verio) test strip, Test BG 2x daily as directed, Disp: 200 strip, Rfl: 3  •  Jardiance 10 MG TABS tablet, TAKE ONE TABLET BY MOUTH EVERY MORNING, Disp: 30 tablet, Rfl: 6  •  lidocaine (LIDODERM) 5 %, APPLY 1 PATCH TOPICALLY OVER 12 HOURS DAILY - REMOVE AND DISCARD PATCH WITHIN 12 HOUR OR AS DIRECTED BY DOCTOR, Disp: 30 patch, Rfl: 1  •  liraglutide (VICTOZA) injection, Inject 0.1 mL (0.6 mg total) under the skin daily Inject 0.6mg daily for 4 weeks then increase to 1.2mg, Disp: 54 mL, Rfl: 1  •  metFORMIN (GLUCOPHAGE) 1000 MG tablet, TAKE 1 TABLET BY MOUTH TWICE A DAY WITH MEALS, Disp: 180 tablet, Rfl: 2  •  omeprazole (PriLOSEC) 20 mg delayed release capsule, TAKE 1 CAPSULE BY MOUTH DAILY, Disp: 90 capsule, Rfl: 2  •  valsartan (DIOVAN) 320 MG tablet, TAKE 1 TABLET BY MOUTH DAILY, Disp: 90 tablet, Rfl: 3    ALLERGIES:  Allergies   Allergen Reactions   • Lisinopril Cough   • Steri Strip [Medical Tape] Rash       REVIEW OF SYSTEMS:  MSK: right knee pain  Neuro: known peripheral neuropathies  Pertinent items are otherwise noted in HPI. A comprehensive review of systems was otherwise negative. LABS:  HgA1c:   Lab Results   Component Value Date    HGBA1C 7.5 (H) 08/08/2023     BMP:   Lab Results   Component Value Date    CALCIUM 9.8 08/08/2023    K 4.0 08/08/2023    CO2 23 08/08/2023     08/08/2023    BUN 16 08/08/2023    CREATININE 0.46 (L) 08/08/2023     CBC: No components found for: "CBC"    _____________________________________________________  PHYSICAL EXAMINATION:  Vital signs: Ht 5' 10.5" (1.791 m)   Wt 126 kg (278 lb)   LMP 07/27/2021 (Approximate)   BMI 39.33 kg/m²   General: No acute distress, awake and alert  Psychiatric: Mood and affect appear appropriate  HEENT: Trachea Midline, No torticollis, no apparent facial trauma  Cardiovascular: No audible murmurs; Extremities appear perfused  Pulmonary: No audible wheezing or stridor  Skin: No open lesions; see further details (if any) below    MUSCULOSKELETAL EXAMINATION:  Extremities: . Patient's right knee was exposed inspected. Patient skin is intact overlying the right knee. No ecchymosis. Patient has range of motion of the knee from 0 to 115 degrees. Patient is able to perform a straight leg raise. Patient has lateral joint line tenderness Patient has no pain over the pes anserine tendons. Patient has no varus or valgus knee instability. Negative anterior and posterior drawer testing. Patient's sensation is intact to light touch in superficial peroneal, deep peroneal, sural, saphenous, plantar nerve distributions and comparable to the contralateral side. Patient's tibialis anterior, extensor hallux longus and gastrocnemius muscles intact. The limb is well-perfused. _____________________________________________________  STUDIES REVIEWED:  I personally reviewed the images and interpretation is as follows:     No new images were obtained today        PROCEDURES PERFORMED:  Large joint arthrocentesis: R knee  Universal Protocol:  Consent: Verbal consent obtained.   Risks and benefits: risks, benefits and alternatives were discussed  Consent given by: patient  Patient understanding: patient states understanding of the procedure being performed  Site marked: the operative site was marked  Patient identity confirmed: verbally with patient    Supporting Documentation  Indications: pain   Procedure Details  Location: knee - R knee  Preparation: Patient was prepped and draped in the usual sterile fashion  Needle size: 22 G  Ultrasound guidance: no  Approach: lateral  Medications administered: 4 mL bupivacaine 0.25 %; 1 mL methylPREDNISolone acetate 40 mg/mL    Patient tolerance: patient tolerated the procedure well with no immediate complications  Dressing:  Sterile dressing applied              Scribe Attestation    I,:  Rafi Gooden am acting as a scribe while in the presence of the attending physician.:       I,:  Cal Lewis DO personally performed the services described in this documentation    as scribed in my presence.:

## 2023-09-12 NOTE — PROGRESS NOTES
PT Evaluation     Today's date: 2023  Patient name: Kevin Delgado  : 1965  MRN: [id number]  Referring provider: Shalom Cullen MD  Dx:   Encounter Diagnosis     ICD-10-CM    1. Impingement of right shoulder  M25.811 Ambulatory Referral to Physical Therapy      2. Deltoid tendonitis of right shoulder  M77.8 Ambulatory Referral to Physical Therapy                     Assessment  Assessment details: Kevin Delgado is a 56 y.o. female with s/s consistent with adhesive capsulitis. She presents with pain, decreased strength, decreased ROM,  and postural  dysfunction. Due to these impairments, Patient has difficulty performing a/iadls, recreational activities and engaging in social activities. Patient's clinical presentation is consistent with their referring diagnosis. Patient would benefit from skilled physical therapy to address their aforementioned impairments, improve their level of function and to improve their overall quality of life.  has been given a home exercise program and is in agreement with the plan of care. Thank you for your referral.  Impairments: abnormal muscle firing, abnormal or restricted ROM, abnormal movement, activity intolerance, impaired physical strength, lacks appropriate home exercise program and pain with function    Symptom irritability: moderateUnderstanding of Dx/Px/POC: excellent  Goals  ST Goals - 2-4 weeks  1. Patient will report decreased pain with activity by at least 2 points within 4 weeks  2. Patient will improve ROM 5-10 degrees within 4 weeks  3. Patient will demonstrate ability to actively correct posture without cueing within 4 weeks  4. Patient will perform IADLs without pain in 2 weeks  5. Patient will increase strength by 25% in 4 weeks    LT Goals - Discharge  1. Patient will improve FOTO score to maximum stated or greater by discharge  2. Patient will return to preferred recreational activity without significant pain increase by discharge   3. Patient will return to all house work related activities without pain by discharge     Plan  Patient would benefit from: skilled physical therapy  Referral necessary: No  Planned therapy interventions: manual therapy, strengthening, stretching, therapeutic activities, therapeutic exercise and home exercise program  Frequency: 2x week  Duration in visits: 20  Duration in weeks: 20  Plan of Care beginning date: 2023  Plan of Care expiration date: 2023  Treatment plan discussed with: patient        Subjective Evaluation    History of Present Illness  Mechanism of injury: Patient reports an approx 6-8 week history of R  Insidious onset shoulder pain. She is LHD. She reports that she has pain in the shoulder but the pain is not excruciating. She reports that it is achy. She does sometime have stabbing pain. She did have a deltoid injection which gave no relief. CC:  Pain in the deltoid region with specific movements, particularly overhead or with internal rotation. Function:  She is not working. She notes any activity over shoulder height is painful. Pain may wake her up at night if she rolls onto that shoulder. She reports that reaching behind her body she cannot do. Recurrent probem    Quality of life: excellent    Patient Goals  Patient goals for therapy: decreased pain, increased motion and increased strength    Pain  Current pain ratin  At best pain ratin  At worst pain rating: 10  Location: deltoid   Quality: sharp  Relieving factors: medications  Aggravating factors: overhead activity    Social Support  Lives in: multiple-level home  Lives with: parents    Employment status: not working  Hand dominance: left      Diagnostic Tests  X-ray: normal        Objective     Palpation     Right   Tenderness of the biceps and middle trapezius. Tenderness     Right Shoulder  Tenderness in the biceps tendon (proximal), bicipital groove and supraspinatus tendon.      Active Range of Motion   Left Shoulder   Flexion: 140 degrees   Abduction: 135 degrees   External rotation BTH: T1   Internal rotation BTB: L4     Right Shoulder   Flexion: 110 degrees with pain  Abduction: 93 degrees with pain  External rotation BTH: C2 with pain  Internal rotation BTB: sacrum with pain    Passive Range of Motion     Right Shoulder   Flexion: 133 degrees   Abduction: 80 degrees   External rotation 45°: 20 degrees   Internal rotation 45°: 64 degrees     Strength/Myotome Testing     Left Shoulder   Normal muscle strength    Right Shoulder     Planes of Motion   Flexion: 4-   Abduction: 4-   External rotation at 0°: 4-   External rotation at 90°: 4-     Tests     Right Shoulder   Positive empty can, full can and Hawkin's. Negative Speed's.               Precautions: diabetes, htn      Manuals 9/12            PROM                                                    Neuro Re-Ed                                                                                                        Ther Ex             pulley             Table slides flex/abd             Wall climbs             Cane flexion             Cane ER haircut                                                    Ther Activity                                       Gait Training                                       Modalities

## 2023-09-14 ENCOUNTER — OFFICE VISIT (OUTPATIENT)
Dept: PHYSICAL THERAPY | Facility: CLINIC | Age: 58
End: 2023-09-14
Payer: COMMERCIAL

## 2023-09-14 DIAGNOSIS — M25.811 IMPINGEMENT OF RIGHT SHOULDER: Primary | ICD-10-CM

## 2023-09-14 DIAGNOSIS — M77.8 DELTOID TENDONITIS OF RIGHT SHOULDER: ICD-10-CM

## 2023-09-14 PROCEDURE — 97140 MANUAL THERAPY 1/> REGIONS: CPT

## 2023-09-14 PROCEDURE — 97110 THERAPEUTIC EXERCISES: CPT

## 2023-09-19 ENCOUNTER — OFFICE VISIT (OUTPATIENT)
Dept: PHYSICAL THERAPY | Facility: CLINIC | Age: 58
End: 2023-09-19
Payer: COMMERCIAL

## 2023-09-19 DIAGNOSIS — M25.811 IMPINGEMENT OF RIGHT SHOULDER: Primary | ICD-10-CM

## 2023-09-19 PROCEDURE — 97140 MANUAL THERAPY 1/> REGIONS: CPT

## 2023-09-19 PROCEDURE — 97110 THERAPEUTIC EXERCISES: CPT

## 2023-09-19 NOTE — PROGRESS NOTES
Daily Note     Today's date: 2023  Patient name: Gilford Rana  : 1965  MRN: [id number]  Referring provider: Rebecca Epperson MD  Dx:   Encounter Diagnosis     ICD-10-CM    1. Impingement of right shoulder  M25.811                      Subjective: Mild soreness following last treatment session       Objective: See treatment diary below      Assessment: Tolerated treatment well. Patient exhibited good technique with therapeutic exercises      Plan: Continue per plan of care.       Precautions: diabetes, htn      Manuals           PROM  8 8                                                 Neuro Re-Ed                                                                                                        Ther Ex             pulley  10" x 5 min  10" x 5 min           Table slides flex/abd  10"x  10  10" x 10           Wall climbs flexion  10"x 10  10" x 10           Wall climbs abduction   NV          Cane flexion  10" x 10  10" x 10           Cane ER haircut  10" x 10  10" x 10           Supine wand ER   10" x 5          IR strap stretch   NV                       Ther Activity                                       Gait Training                                       Modalities

## 2023-09-21 ENCOUNTER — OFFICE VISIT (OUTPATIENT)
Dept: PHYSICAL THERAPY | Facility: CLINIC | Age: 58
End: 2023-09-21
Payer: COMMERCIAL

## 2023-09-21 DIAGNOSIS — M25.811 IMPINGEMENT OF RIGHT SHOULDER: Primary | ICD-10-CM

## 2023-09-21 DIAGNOSIS — M77.8 DELTOID TENDONITIS OF RIGHT SHOULDER: ICD-10-CM

## 2023-09-21 PROCEDURE — 97110 THERAPEUTIC EXERCISES: CPT | Performed by: PHYSICAL THERAPIST

## 2023-09-21 PROCEDURE — 97140 MANUAL THERAPY 1/> REGIONS: CPT | Performed by: PHYSICAL THERAPIST

## 2023-09-21 NOTE — PROGRESS NOTES
Daily Note     Today's date: 2023  Patient name: Augustine Scott  : 1965  MRN: [id number]  Referring provider: Scarlett Delgado MD  Dx: No diagnosis found. Subjective:       Objective: See treatment diary below      Assessment: Tolerated treatment well. Patient would benefit from continued PT  Patient with a good increase in ROM since IE      Plan: Continue per plan of care.       Precautions: diabetes, htn      Manuals          PROM  8 8 8'         FOTO    nv                                   Neuro Re-Ed                                                                                                        Ther Ex             pulley  10" x 5 min  10" x 5 min  5'         Table slides flex/abd/ER  10"x  10  10" x 10  10 x :10         Wall climbs flexion  10"x 10  10" x 10  10 x :10         Wall climbs abduction   NV 10 x :10         Cane flexion  10" x 10  10" x 10  10 x :10         Cane ER haircut  10" x 10  10" x 10  10 x :10         Supine wand ER   10" x 5          IR strap stretch   NV                       Ther Activity                                       Gait Training                                       Modalities

## 2023-09-26 ENCOUNTER — OFFICE VISIT (OUTPATIENT)
Dept: PHYSICAL THERAPY | Facility: CLINIC | Age: 58
End: 2023-09-26
Payer: COMMERCIAL

## 2023-09-26 DIAGNOSIS — M25.811 IMPINGEMENT OF RIGHT SHOULDER: Primary | ICD-10-CM

## 2023-09-26 DIAGNOSIS — M77.8 DELTOID TENDONITIS OF RIGHT SHOULDER: ICD-10-CM

## 2023-09-26 PROCEDURE — 97140 MANUAL THERAPY 1/> REGIONS: CPT

## 2023-09-26 PROCEDURE — 97110 THERAPEUTIC EXERCISES: CPT

## 2023-09-26 NOTE — PROGRESS NOTES
Daily Note     Today's date: 2023  Patient name: Nasir Mederos  : 1965  MRN: [id number]  Referring provider: Letty Giraldo MD  Dx: No diagnosis found. Subjective: Patient noticing a small improvement with OH motion. Objective: See treatment diary below      Assessment: Tolerated treatment well. Patient exhibited good technique with therapeutic exercises      Plan: Continue per plan of care.       Precautions: diabetes, htn      Manuals         PROM  8 8 8' 8        FOTO    nv                                   Neuro Re-Ed                                                                                                        Ther Ex             pulley  10" x 5 min  10" x 5 min  5' 5        Table slides flex/abd/ER  10"x  10  10" x 10  10 x :10 10" x 10         Wall climbs flexion  10"x 10  10" x 10  10 x :10 10" x 10         Wall climbs abduction   NV 10 x :10 10" x 10         Cane flexion  10" x 10  10" x 10  10 x :10 10" x 10        Cane ER haircut  10" x 10  10" x 10  10 x :10 10" X 10         Supine wand ER   10" x 5  10" X 5        IR strap stretch   NV  10" X 6                     Ther Activity                                       Gait Training                                       Modalities

## 2023-09-28 ENCOUNTER — OFFICE VISIT (OUTPATIENT)
Dept: PHYSICAL THERAPY | Facility: CLINIC | Age: 58
End: 2023-09-28
Payer: COMMERCIAL

## 2023-09-28 DIAGNOSIS — M25.811 IMPINGEMENT OF RIGHT SHOULDER: Primary | ICD-10-CM

## 2023-09-28 PROCEDURE — 97140 MANUAL THERAPY 1/> REGIONS: CPT

## 2023-09-28 PROCEDURE — 97110 THERAPEUTIC EXERCISES: CPT

## 2023-09-28 NOTE — PROGRESS NOTES
Daily Note     Today's date: 2023  Patient name: Whit Uribe  : 1965  MRN: [id number]  Referring provider: Justa Hassan MD  Dx:   Encounter Diagnosis     ICD-10-CM    1. Impingement of right shoulder  M25.811                      Subjective: Soreness radiating along RUE last night. Objective: See treatment diary below      Assessment: Tolerated treatment well. Patient exhibited good technique with therapeutic exercises      Plan: Continue per plan of care.       Precautions: diabetes, htn      Manuals        PROM  8 8 8' 8 8       FOTO    nv                                   Neuro Re-Ed                                                                                                        Ther Ex             pulley  10" x 5 min  10" x 5 min  5' 5 5       Table slides flex/abd/ER  10"x  10  10" x 10  10 x :10 10" x 10  10" x 10        Wall climbs flexion  10"x 10  10" x 10  10 x :10 10" x 10  10" x 10        Wall climbs abduction   NV 10 x :10 10" x 10  10" x 10        Cane flexion  10" x 10  10" x 10  10 x :10 10" x 10 10" x 10       Cane ER haircut  10" x 10  10" x 10  10 x :10 10" X 10  10" x 5       Supine wand ER   10" x 5  10" X 5 10" x 5       IR strap stretch   NV  10" X 6 10" x 5        Pec stretch      15"x 4        Ther Activity                                       Gait Training                                       Modalities

## 2023-10-03 ENCOUNTER — OFFICE VISIT (OUTPATIENT)
Dept: PHYSICAL THERAPY | Facility: CLINIC | Age: 58
End: 2023-10-03
Payer: COMMERCIAL

## 2023-10-03 DIAGNOSIS — M77.8 DELTOID TENDONITIS OF RIGHT SHOULDER: ICD-10-CM

## 2023-10-03 DIAGNOSIS — M25.811 IMPINGEMENT OF RIGHT SHOULDER: Primary | ICD-10-CM

## 2023-10-03 PROCEDURE — 97110 THERAPEUTIC EXERCISES: CPT

## 2023-10-03 NOTE — PROGRESS NOTES
Daily Note     Today's date: 10/3/2023  Patient name: Reyna London  : 1965  MRN: [id number]  Referring provider: Haroon Schaefer MD  Dx:   Encounter Diagnosis     ICD-10-CM    1. Impingement of right shoulder  M25.811       2. Deltoid tendonitis of right shoulder  M77.8           Start Time: 0900  Stop Time: 0955  Total time in clinic (min): 55 minutes    Subjective: Patient reports her shoulder is very achy today and these sx. wake her up. Objective: See treatment diary below      Assessment: Tolerated treatment well. Patient exhibited good technique with therapeutic exercises with minimal correctional cuing needed. Levava Stairs would benefit from continued physical therapy to improve ROM, mechanics, pain and strength as tolerated to meet functional goals. Plan: Continue per plan of care.       Precautions: diabetes, htn      Manuals 9/12 9/14 9/19 9/21 9/26 9/28 10/3      PROM  8 8 8' 8 8 KL      FOTO    nv                                   Neuro Re-Ed                                                                                                        Ther Ex             pulley  10" x 5 min  10" x 5 min  5' 5 5 5 min      Table slides flex/abd/ER  10"x  10  10" x 10  10 x :10 10" x 10  10" x 10  10" x 10       Wall climbs flexion  10"x 10  10" x 10  10 x :10 10" x 10  10" x 10  10" x 10       Wall climbs abduction   NV 10 x :10 10" x 10  10" x 10  10" x 10       Cane flexion  10" x 10  10" x 10  10 x :10 10" x 10 10" x 10 10" x 10      Cane ER haircut  10" x 10  10" x 10  10 x :10 10" X 10  10" x 5 10" x 5      Supine wand ER   10" x 5  10" X 5 10" x 5 10" x 5      IR strap stretch   NV  10" X 6 10" x 5  10" x 5       Pec stretch      15"x 4  15"x 4      Ther Activity                                       Gait Training                                       Modalities

## 2023-10-05 ENCOUNTER — OFFICE VISIT (OUTPATIENT)
Dept: PHYSICAL THERAPY | Facility: CLINIC | Age: 58
End: 2023-10-05
Payer: COMMERCIAL

## 2023-10-05 DIAGNOSIS — M25.811 IMPINGEMENT OF RIGHT SHOULDER: Primary | ICD-10-CM

## 2023-10-05 DIAGNOSIS — M77.8 DELTOID TENDONITIS OF RIGHT SHOULDER: ICD-10-CM

## 2023-10-05 PROCEDURE — 97110 THERAPEUTIC EXERCISES: CPT | Performed by: PHYSICAL THERAPIST

## 2023-10-05 PROCEDURE — 97140 MANUAL THERAPY 1/> REGIONS: CPT | Performed by: PHYSICAL THERAPIST

## 2023-10-05 NOTE — PROGRESS NOTES
Daily Note     Today's date: 10/5/2023  Patient name: Lavinia Dominguez  : 1965  MRN: [id number]  Referring provider: Isabelle Whitlock MD  Dx:   Encounter Diagnosis     ICD-10-CM    1. Impingement of right shoulder  M25.811       2. Deltoid tendonitis of right shoulder  M77.8           Start Time: 1600  Stop Time: 0904  Total time in clinic (min): 53 minutes    Subjective: Patient reports increased pain following last session, which she attributes to the manuals that were performed. She was able to manage with ice when she got home. Objective: See treatment diary below      Assessment:  Patient tolerated treatment well and was able to complete all prescribed activities despite report of sx. End feel firm/capsular in all planes. Continue to address ROM, as able. Plan: Continue per plan of care.       Precautions: diabetes, htn      Manuals 9/12 9/14 9/19 9/21 9/26 9/28 10/3 10/5     PROM  8 8 8' 8 8 KL JAB     FOTO    nv                                   Neuro Re-Ed                                                                                                        Ther Ex             pulley  10" x 5 min  10" x 5 min  5' 5 5 5 min 5 min     Table slides flex/abd/ER  10"x  10  10" x 10  10 x :10 10" x 10  10" x 10  10" x 10  10"x10     Wall climbs flexion  10"x 10  10" x 10  10 x :10 10" x 10  10" x 10  10" x 10  10x10"     Wall climbs abduction   NV 10 x :10 10" x 10  10" x 10  10" x 10  10x10"     Cane flexion  10" x 10  10" x 10  10 x :10 10" x 10 10" x 10 10" x 10 10x10"     Cane ER haircut  10" x 10  10" x 10  10 x :10 10" X 10  10" x 5 10" x 5 10"x5     Supine wand ER   10" x 5  10" X 5 10" x 5 10" x 5 10"x5     IR strap stretch   NV  10" X 6 10" x 5  10" x 5  NV     Pec stretch      15"x 4  15"x 4 15"x4     Ther Activity                                       Gait Training                                       Modalities

## 2023-10-10 ENCOUNTER — APPOINTMENT (OUTPATIENT)
Dept: PHYSICAL THERAPY | Facility: CLINIC | Age: 58
End: 2023-10-10
Payer: COMMERCIAL

## 2023-10-12 ENCOUNTER — OFFICE VISIT (OUTPATIENT)
Dept: PHYSICAL THERAPY | Facility: CLINIC | Age: 58
End: 2023-10-12
Payer: COMMERCIAL

## 2023-10-12 DIAGNOSIS — M25.811 IMPINGEMENT OF RIGHT SHOULDER: Primary | ICD-10-CM

## 2023-10-12 PROCEDURE — 97140 MANUAL THERAPY 1/> REGIONS: CPT

## 2023-10-12 PROCEDURE — 97110 THERAPEUTIC EXERCISES: CPT

## 2023-10-12 NOTE — PROGRESS NOTES
Daily Note     Today's date: 10/12/2023  Patient name: Lucinda Weber  : 1965  MRN: [id number]  Referring provider: Mary Cobian MD  Dx:   Encounter Diagnosis     ICD-10-CM    1. Impingement of right shoulder  M25.811                      Subjective: Patient states she opened a package and moved RUE into a horizontal abduction position and experienced significant pain with motion. Mild soreness remains today       Objective: See treatment diary below      Assessment: Tolerated treatment well. Patient exhibited good technique with therapeutic exercises      Plan: Continue per plan of care.       Precautions: diabetes, htn      Manuals 9/12 9/14 9/19 9/21 9/26 9/28 10/3 10/5 10/12    PROM  8 8 8' 8 8 KL JAB KD    FOTO    nv                                   Neuro Re-Ed                                                                                                        Ther Ex             pulley  10" x 5 min  10" x 5 min  5' 5 5 5 min 5 min 5 min    Table slides flex/abd/ER  10"x  10  10" x 10  10 x :10 10" x 10  10" x 10  10" x 10  10"x10 10"x 10     Wall climbs flexion  10"x 10  10" x 10  10 x :10 10" x 10  10" x 10  10" x 10  10x10" 10" x 10     Wall climbs abduction   NV 10 x :10 10" x 10  10" x 10  10" x 10  10x10" 10" x 10     Cane flexion  10" x 10  10" x 10  10 x :10 10" x 10 10" x 10 10" x 10 10x10" 10" x 10     Cane ER haircut  10" x 10  10" x 10  10 x :10 10" X 10  10" x 5 10" x 5 10"x5 10" x 10     Supine wand ER   10" x 5  10" X 5 10" x 5 10" x 5 10"x5 10" x 5     IR strap stretch   NV  10" X 6 10" x 5  10" x 5  NV 10" x 5     Pec stretch      15"x 4  15"x 4 15"x4 15"x 4     Ther Activity                                       Gait Training                                       Modalities

## 2023-10-16 ENCOUNTER — TELEPHONE (OUTPATIENT)
Dept: PALLIATIVE MEDICINE | Facility: CLINIC | Age: 58
End: 2023-10-16

## 2023-10-17 ENCOUNTER — OFFICE VISIT (OUTPATIENT)
Dept: PHYSICAL THERAPY | Facility: CLINIC | Age: 58
End: 2023-10-17
Payer: COMMERCIAL

## 2023-10-17 DIAGNOSIS — M25.811 IMPINGEMENT OF RIGHT SHOULDER: Primary | ICD-10-CM

## 2023-10-17 PROCEDURE — 97110 THERAPEUTIC EXERCISES: CPT

## 2023-10-17 PROCEDURE — 97140 MANUAL THERAPY 1/> REGIONS: CPT

## 2023-10-17 NOTE — PROGRESS NOTES
Daily Note     Today's date: 10/17/2023  Patient name: Pedro Pablo Ramirez  : 1965  MRN: [id number]  Referring provider: Fabiana Hand MD  Dx: No diagnosis found. Subjective: Improved symptoms from last treatment session. Objective: See treatment diary below      Assessment: Tolerated treatment well. Patient exhibited good technique with therapeutic exercises      Plan: Continue per plan of care.       Precautions: diabetes, htn      Manuals 9/12 9/14 9/19 9/21 9/26 9/28 10/3 10/5 10/12 10/17   PROM  8 8 8' 8 8 KL JAB KD KD   Dorthea Pall    nv                                   Neuro Re-Ed                                                                                                        Ther Ex             pulley  10" x 5 min  10" x 5 min  5' 5 5 5 min 5 min 5 min 5 min    Table slides flex/abd/ER  10"x  10  10" x 10  10 x :10 10" x 10  10" x 10  10" x 10  10"x10 10"x 10  10"x 10    Wall climbs flexion  10"x 10  10" x 10  10 x :10 10" x 10  10" x 10  10" x 10  10x10" 10" x 10  10" x 10    Wall climbs abduction   NV 10 x :10 10" x 10  10" x 10  10" x 10  10x10" 10" x 10  10" x 10    Cane flexion  10" x 10  10" x 10  10 x :10 10" x 10 10" x 10 10" x 10 10x10" 10" x 10  10" x 10    Cane ER haircut  10" x 10  10" x 10  10 x :10 10" X 10  10" x 5 10" x 5 10"x5 10" x 10  10" x 10    Supine wand ER   10" x 5  10" X 5 10" x 5 10" x 5 10"x5 10" x 5  10"x 5    IR strap stretch   NV  10" X 6 10" x 5  10" x 5  NV 10" x 5  10" x 5    Pec stretch      15"x 4  15"x 4 15"x4 15"x 4     Ther Activity                                       Gait Training                                       Modalities

## 2023-10-19 ENCOUNTER — APPOINTMENT (OUTPATIENT)
Dept: PHYSICAL THERAPY | Facility: CLINIC | Age: 58
End: 2023-10-19
Payer: COMMERCIAL

## 2023-10-19 ENCOUNTER — TELEPHONE (OUTPATIENT)
Dept: PALLIATIVE MEDICINE | Facility: CLINIC | Age: 58
End: 2023-10-19

## 2023-10-24 ENCOUNTER — APPOINTMENT (OUTPATIENT)
Dept: PHYSICAL THERAPY | Facility: CLINIC | Age: 58
End: 2023-10-24
Payer: COMMERCIAL

## 2023-10-26 ENCOUNTER — APPOINTMENT (OUTPATIENT)
Dept: PHYSICAL THERAPY | Facility: CLINIC | Age: 58
End: 2023-10-26
Payer: COMMERCIAL

## 2023-10-31 ENCOUNTER — APPOINTMENT (OUTPATIENT)
Dept: PHYSICAL THERAPY | Facility: CLINIC | Age: 58
End: 2023-10-31
Payer: COMMERCIAL

## 2023-11-01 DIAGNOSIS — I10 ESSENTIAL HYPERTENSION: ICD-10-CM

## 2023-11-01 DIAGNOSIS — K21.9 GASTROESOPHAGEAL REFLUX DISEASE WITHOUT ESOPHAGITIS: ICD-10-CM

## 2023-11-01 RX ORDER — OMEPRAZOLE 20 MG/1
CAPSULE, DELAYED RELEASE ORAL
Qty: 90 CAPSULE | Refills: 1 | Status: SHIPPED | OUTPATIENT
Start: 2023-11-01

## 2023-11-01 RX ORDER — VALSARTAN 320 MG/1
TABLET ORAL
Qty: 90 TABLET | Refills: 1 | Status: SHIPPED | OUTPATIENT
Start: 2023-11-01

## 2023-11-15 ENCOUNTER — APPOINTMENT (OUTPATIENT)
Dept: LAB | Facility: CLINIC | Age: 58
End: 2023-11-15
Payer: COMMERCIAL

## 2023-11-15 ENCOUNTER — TELEPHONE (OUTPATIENT)
Age: 58
End: 2023-11-15

## 2023-11-15 ENCOUNTER — OFFICE VISIT (OUTPATIENT)
Dept: FAMILY MEDICINE CLINIC | Facility: CLINIC | Age: 58
End: 2023-11-15
Payer: COMMERCIAL

## 2023-11-15 VITALS
HEART RATE: 91 BPM | DIASTOLIC BLOOD PRESSURE: 84 MMHG | SYSTOLIC BLOOD PRESSURE: 124 MMHG | TEMPERATURE: 97 F | WEIGHT: 275 LBS | HEIGHT: 71 IN | BODY MASS INDEX: 38.5 KG/M2 | RESPIRATION RATE: 14 BRPM | OXYGEN SATURATION: 96 %

## 2023-11-15 DIAGNOSIS — I10 ESSENTIAL HYPERTENSION: ICD-10-CM

## 2023-11-15 DIAGNOSIS — E11.9 TYPE 2 DIABETES MELLITUS WITHOUT COMPLICATION, WITHOUT LONG-TERM CURRENT USE OF INSULIN (HCC): ICD-10-CM

## 2023-11-15 DIAGNOSIS — F43.21 GRIEVING: ICD-10-CM

## 2023-11-15 DIAGNOSIS — F41.1 ANXIETY STATE: Primary | ICD-10-CM

## 2023-11-15 DIAGNOSIS — E11.65 TYPE 2 DIABETES MELLITUS WITH HYPERGLYCEMIA, WITHOUT LONG-TERM CURRENT USE OF INSULIN (HCC): ICD-10-CM

## 2023-11-15 DIAGNOSIS — E78.2 MIXED HYPERLIPIDEMIA: ICD-10-CM

## 2023-11-15 DIAGNOSIS — K21.9 GASTROESOPHAGEAL REFLUX DISEASE WITHOUT ESOPHAGITIS: ICD-10-CM

## 2023-11-15 LAB
ALBUMIN SERPL BCP-MCNC: 4.4 G/DL (ref 3.5–5)
ALP SERPL-CCNC: 69 U/L (ref 34–104)
ALT SERPL W P-5'-P-CCNC: 16 U/L (ref 7–52)
ANION GAP SERPL CALCULATED.3IONS-SCNC: 7 MMOL/L
AST SERPL W P-5'-P-CCNC: 25 U/L (ref 13–39)
BILIRUB SERPL-MCNC: 0.5 MG/DL (ref 0.2–1)
BUN SERPL-MCNC: 8 MG/DL (ref 5–25)
CALCIUM SERPL-MCNC: 9.4 MG/DL (ref 8.4–10.2)
CHLORIDE SERPL-SCNC: 104 MMOL/L (ref 96–108)
CHOLEST SERPL-MCNC: 113 MG/DL
CO2 SERPL-SCNC: 29 MMOL/L (ref 21–32)
CREAT SERPL-MCNC: 0.49 MG/DL (ref 0.6–1.3)
CREAT UR-MCNC: 50.6 MG/DL
EST. AVERAGE GLUCOSE BLD GHB EST-MCNC: 169 MG/DL
GFR SERPL CREATININE-BSD FRML MDRD: 109 ML/MIN/1.73SQ M
GLUCOSE P FAST SERPL-MCNC: 165 MG/DL (ref 65–99)
HBA1C MFR BLD: 7.5 %
HDLC SERPL-MCNC: 51 MG/DL
LDLC SERPL CALC-MCNC: 44 MG/DL (ref 0–100)
MICROALBUMIN UR-MCNC: 22.9 MG/L
MICROALBUMIN/CREAT 24H UR: 45 MG/G CREATININE (ref 0–30)
POTASSIUM SERPL-SCNC: 4.2 MMOL/L (ref 3.5–5.3)
PROT SERPL-MCNC: 7.3 G/DL (ref 6.4–8.4)
SODIUM SERPL-SCNC: 140 MMOL/L (ref 135–147)
TRIGL SERPL-MCNC: 90 MG/DL

## 2023-11-15 PROCEDURE — 82043 UR ALBUMIN QUANTITATIVE: CPT

## 2023-11-15 PROCEDURE — 80061 LIPID PANEL: CPT

## 2023-11-15 PROCEDURE — 83036 HEMOGLOBIN GLYCOSYLATED A1C: CPT

## 2023-11-15 PROCEDURE — 99213 OFFICE O/P EST LOW 20 MIN: CPT | Performed by: FAMILY MEDICINE

## 2023-11-15 PROCEDURE — 82570 ASSAY OF URINE CREATININE: CPT

## 2023-11-15 PROCEDURE — 36415 COLL VENOUS BLD VENIPUNCTURE: CPT

## 2023-11-15 PROCEDURE — 80053 COMPREHEN METABOLIC PANEL: CPT

## 2023-11-15 RX ORDER — OMEPRAZOLE 20 MG/1
20 CAPSULE, DELAYED RELEASE ORAL DAILY
Qty: 90 CAPSULE | Refills: 1 | Status: SHIPPED | OUTPATIENT
Start: 2023-11-15

## 2023-11-15 RX ORDER — VALSARTAN 320 MG/1
320 TABLET ORAL DAILY
Qty: 90 TABLET | Refills: 1 | Status: SHIPPED | OUTPATIENT
Start: 2023-11-15

## 2023-11-15 RX ORDER — ALPRAZOLAM 0.25 MG/1
0.25 TABLET ORAL
Qty: 15 TABLET | Refills: 0 | Status: SHIPPED | OUTPATIENT
Start: 2023-11-15

## 2023-11-15 RX ORDER — ATORVASTATIN CALCIUM 10 MG/1
TABLET, FILM COATED ORAL
Qty: 90 TABLET | Refills: 1 | Status: SHIPPED | OUTPATIENT
Start: 2023-11-15
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
## 2023-11-15 NOTE — TELEPHONE ENCOUNTER
The patient called she has an appointment with Dr Lawyer Lazcano on 12/11/23   she would like to see her sooner if possible   ---she will be moving out of the area on 12/15/23    she would like to see Dr Lawyer Lazcano only     ---she would like the office to call her concerning this matter   thank you
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
## 2023-11-27 DIAGNOSIS — F41.1 ANXIETY STATE: ICD-10-CM

## 2023-11-28 DIAGNOSIS — F41.1 ANXIETY STATE: ICD-10-CM

## 2023-11-28 RX ORDER — ALPRAZOLAM 0.25 MG/1
0.25 TABLET ORAL
Qty: 15 TABLET | Refills: 0 | OUTPATIENT
Start: 2023-11-28

## 2023-11-28 RX ORDER — ALPRAZOLAM 0.25 MG/1
TABLET ORAL
Qty: 15 TABLET | Refills: 0 | Status: SHIPPED | OUTPATIENT
Start: 2023-11-28

## 2023-11-28 NOTE — TELEPHONE ENCOUNTER
Reason for call: patient requesting 90 day supply  [x] Refill   [] Prior Auth  [] Other:     Office:   [x] PCP/Provider - Jose Angel Ray MD  [] Specialty/Provider -         Does the patient have enough for 3 days?    [] Yes   [x] No - Send as HP to POD         ALPRAZolam (XANAX) 0.25 mg tablet [[id number]]  Order Details  Dose: 0.25 mg Route: Oral Frequency: Daily at bedtime PRN for anxiety  Dispense Quantity: 15 tablet Refills: 0        Sig: Take 1 tablet (0.25 mg total) by mouth daily at bedtime as needed for anxiety      Pharmacy  [facility] [address] [place] [address]  Phone: [phone]  Fax: [phone]

## 2023-11-29 ENCOUNTER — TELEPHONE (OUTPATIENT)
Age: 58
End: 2023-11-29

## 2023-11-29 NOTE — TELEPHONE ENCOUNTER
Pt called requesting a call back from a nurse in the office.   She said there is a Qty issue with her Alprazolam  She is requesting a call back by the end of today

## 2023-11-30 ENCOUNTER — TELEPHONE (OUTPATIENT)
Dept: DIABETES SERVICES | Facility: CLINIC | Age: 58
End: 2023-11-30

## 2023-11-30 NOTE — TELEPHONE ENCOUNTER
Patient would like a refill for a 30 day supply of ALPRAZolam (XANAX) 0.25 mg tablet   States it's working well    [facility] [address]

## 2023-11-30 NOTE — TELEPHONE ENCOUNTER
Pt cx'd 12/5 appt via [employer].  Pt said she does not want to resched as she is moving out of the area

## 2023-12-07 DIAGNOSIS — F41.1 ANXIETY STATE: ICD-10-CM

## 2023-12-07 RX ORDER — ALPRAZOLAM 0.25 MG/1
0.25 TABLET ORAL
Qty: 30 TABLET | Refills: 0 | OUTPATIENT
Start: 2023-12-07

## 2023-12-07 NOTE — TELEPHONE ENCOUNTER
Reason for call:   [x] Refill   [] Prior Auth  [] Other:     Office:   [x] PCP/Provider - Dr Gisselle Olmedo  [] Specialty/Provider -     Medication: xanax     Dose/Frequency: 0.25     Quantity: pt requesting 30D    Pharmacy: [facility]    Does the patient have enough for 3 days?    [] Yes   [x] No - Send as HP to POD

## 2023-12-10 NOTE — PROGRESS NOTES
Assessment/Plan:  Use nystatin+ triamcinolone ointment to affected area twice daily as directed. Calcium 4451-2115 mg (in divided doses-max 600 mg at one time) + 600-1000 IU Vit D daily. Exercise 150-300 minutes per week minimum including weight bearing exercises. Pap with high risk HPV Q 5 years, if normal. Due    Call your insurance company to verify coverage prior to completing any ordered tests. Annual mammogram ordered by breast surgeon. Monthly breast self exam recommended. Colonoscopy-  Due         Kegels 20 times twice daily. Vaginal moisturizers twice weekly as needed. Return to office in one year or sooner, if needed. 1. Encntr for gyn exam (general) (routine) w abnormal findings    2. Vulvar irritation  -     nystatin-triamcinolone (MYCOLOG-II) ointment; Apply topically 2 (two) times a day  -     POCT wet mount    3. Encounter for screening mammogram for breast cancer               Subjective:      Patient ID: Madhu Ross is a 66 y.o. female. HPI    Madhu Ross is a 66 y.o. 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 No gynecologic health concerns. Medically stable. Mom Curt Chery  23. Rodolfo Shah is moving to [place] to be near her son. Her brother and sister have not been helpful with her living situation. Hysteroscopy, D&C done  23 Benign findings. Postmenopausal with no vaginal bleeding. Exercise- not regularly   Disability from her neuropathy. Madhu Ross is not sexually active or dating. She is not interested in STD screening today. Hx of HSV. No recent outbreak. She denies vaginal discharge, itching or pelvic pain. She has no urinary concerns, does not have incontinence. No bowel concerns. No breast concerns.      Last pap: 2020 normal with negative HR HPV  DEXA scan: Not on file  Mammogram: 04/08/2023 normal with LTC 22.26%   Hx of right ADH with lumpectomy and PAYTON  placement  6/22. Mom had breast cancer at age 79. Colonoscopy: 2/14/22 10 year recall. Family history of cancer:   Cancer-related family history includes Breast cancer (age of onset: 79) in her mother; Liver cancer in her paternal grandfather. The following portions of the patient's history were reviewed and updated as appropriate: allergies, current medications, past family history, past medical history, past social history, past surgical history, and problem list.    Review of Systems   Constitutional: Negative. Negative for activity change, appetite change, chills, diaphoresis, fatigue, fever and unexpected weight change. HENT:  Negative for congestion, dental problem, sneezing, sore throat and trouble swallowing. Eyes:  Negative for visual disturbance. Respiratory:  Negative for chest tightness and shortness of breath. Cardiovascular:  Negative for chest pain and leg swelling. Gastrointestinal:  Negative for abdominal pain, constipation, diarrhea, nausea and vomiting. Genitourinary:  Negative for difficulty urinating, dysuria, frequency, hematuria, pelvic pain, urgency, vaginal bleeding, vaginal discharge and vaginal pain. Musculoskeletal:  Negative for back pain and neck pain. Skin: Negative. Allergic/Immunologic: Negative. Neurological:  Negative for weakness and headaches. Hematological:  Negative for adenopathy. Psychiatric/Behavioral: Negative. Objective:      /90 (BP Location: Left arm, Patient Position: Sitting, Cuff Size: Large)   Ht 5' 9.5" (1.765 m)   Wt 122 kg (269 lb 3.2 oz)   LMP 07/27/2021 (Approximate)   BMI 39.18 kg/m²          Physical Exam  Vitals and nursing note reviewed. Constitutional:       Appearance: Normal appearance. She is well-developed. She is obese. HENT:      Head: Normocephalic. Neck:      Thyroid: No thyromegaly. Cardiovascular:      Rate and Rhythm: Normal rate and regular rhythm. Heart sounds: Normal heart sounds. Pulmonary:      Effort: Pulmonary effort is normal.      Breath sounds: Normal breath sounds. Chest:   Breasts:     Breasts are symmetrical.      Right: Normal. No inverted nipple, mass, nipple discharge, skin change or tenderness. Left: Normal. No inverted nipple, mass, nipple discharge, skin change or tenderness. Abdominal:      Palpations: Abdomen is soft. Genitourinary:     Exam position: Lithotomy position. Labia:         Right: Tenderness present. No rash, lesion or injury. Left: Tenderness present. No rash, lesion or injury. Urethra: No prolapse, urethral pain, urethral swelling or urethral lesion. Vagina: No signs of injury and foreign body. No vaginal discharge, erythema, tenderness, bleeding, lesions or prolapsed vaginal walls. Cervix: Normal.      Uterus: Normal.       Adnexa: Right adnexa normal and left adnexa normal.        Right: No mass, tenderness or fullness. Left: No mass, tenderness or fullness. Rectum: No external hemorrhoid. Comments: Vulvar erythema and swelling as  noted on diagram  Musculoskeletal:         General: Normal range of motion. Cervical back: Normal range of motion. Lymphadenopathy:      Head:      Right side of head: No submental, submandibular, tonsillar or occipital adenopathy. Left side of head: No submental, submandibular, tonsillar or occipital adenopathy. Upper Body:      Right upper body: No supraclavicular or axillary adenopathy. Left upper body: No supraclavicular or axillary adenopathy. Lower Body: No right inguinal adenopathy. No left inguinal adenopathy. Skin:     General: Skin is warm and dry. Neurological:      Mental Status: She is alert and oriented to person, place, and time.    Psychiatric:         Mood and Affect: Mood normal.         Behavior: Behavior normal. Behavior is cooperative.

## 2023-12-11 ENCOUNTER — ANNUAL EXAM (OUTPATIENT)
Dept: OBGYN CLINIC | Facility: CLINIC | Age: 58
End: 2023-12-11
Payer: COMMERCIAL

## 2023-12-11 VITALS
SYSTOLIC BLOOD PRESSURE: 120 MMHG | BODY MASS INDEX: 38.54 KG/M2 | DIASTOLIC BLOOD PRESSURE: 90 MMHG | WEIGHT: 269.2 LBS | HEIGHT: 70 IN

## 2023-12-11 DIAGNOSIS — Z01.411 ENCNTR FOR GYN EXAM (GENERAL) (ROUTINE) W ABNORMAL FINDINGS: Primary | ICD-10-CM

## 2023-12-11 DIAGNOSIS — Z12.31 ENCOUNTER FOR SCREENING MAMMOGRAM FOR BREAST CANCER: ICD-10-CM

## 2023-12-11 DIAGNOSIS — N90.89 VULVAR IRRITATION: ICD-10-CM

## 2023-12-11 DIAGNOSIS — F41.1 ANXIETY STATE: ICD-10-CM

## 2023-12-11 LAB
BV WHIFF TEST VAG QL: NORMAL
CLUE CELLS SPEC QL WET PREP: NORMAL
PH SMN: NORMAL [PH]
SL AMB POCT WET MOUNT: NORMAL
T VAGINALIS VAG QL WET PREP: NORMAL
YEAST VAG QL WET PREP: NORMAL

## 2023-12-11 PROCEDURE — 87210 SMEAR WET MOUNT SALINE/INK: CPT | Performed by: NURSE PRACTITIONER

## 2023-12-11 PROCEDURE — 99396 PREV VISIT EST AGE 40-64: CPT | Performed by: NURSE PRACTITIONER

## 2023-12-11 RX ORDER — ALPRAZOLAM 0.25 MG/1
0.25 TABLET ORAL DAILY
Qty: 30 TABLET | Refills: 0 | Status: SHIPPED | OUTPATIENT
Start: 2023-12-11

## 2023-12-11 RX ORDER — NYSTATIN AND TRIAMCINOLONE ACETONIDE 100000; 1 [USP'U]/G; MG/G
OINTMENT TOPICAL 2 TIMES DAILY
Qty: 30 G | Refills: 0 | Status: SHIPPED | OUTPATIENT
Start: 2023-12-11

## 2023-12-11 NOTE — PATIENT INSTRUCTIONS
Use nystatin+ triamcinolone ointment to affected area twice daily as directed. Calcium 0125-2483 mg (in divided doses-max 600 mg at one time) + 600-1000 IU Vit D daily. Exercise 150-300 minutes per week minimum including weight bearing exercises. Pap with high risk HPV Q 5 years, if normal. Due 2025   Call your insurance company to verify coverage prior to completing any ordered tests. Annual mammogram ordered by breast surgeon. Monthly breast self exam recommended. Colonoscopy-  Due 2032        Kegels 20 times twice daily. Vaginal moisturizers twice weekly as needed. Return to office in one year or sooner, if needed.

## 2023-12-13 NOTE — PROGRESS NOTES
Daily Note     Today's date: 2023  Patient name: Linnea Monsivais  : 1965  MRN: [id number]  Referring provider: Elvie Acosta MD  Dx: No diagnosis found. Subjective: Patient noticing an improvement with HEP. Objective: See treatment diary below      Assessment: Tolerated treatment well. Patient exhibited good technique with therapeutic exercises      Plan: Continue per plan of care.       Precautions: diabetes, htn      Manuals            PROM  8                                                  Neuro Re-Ed                                                                                                        Ther Ex             pulley  10" x 5 min            Table slides flex/abd  10"x  10            Wall climbs  10"x 10            Cane flexion  10" x 10            Cane ER haircut  10" x 10                                                   Ther Activity                                       Gait Training                                       Modalities
No

## 2024-04-03 ENCOUNTER — OFFICE VISIT (OUTPATIENT)
Dept: FAMILY MEDICINE CLINIC | Facility: CLINIC | Age: 59
End: 2024-04-03
Payer: COMMERCIAL

## 2024-04-03 VITALS
RESPIRATION RATE: 16 BRPM | SYSTOLIC BLOOD PRESSURE: 138 MMHG | TEMPERATURE: 97.3 F | BODY MASS INDEX: 37.8 KG/M2 | DIASTOLIC BLOOD PRESSURE: 82 MMHG | OXYGEN SATURATION: 99 % | WEIGHT: 264 LBS | HEART RATE: 81 BPM | HEIGHT: 70 IN

## 2024-04-03 DIAGNOSIS — Z23 ENCOUNTER FOR IMMUNIZATION: Primary | ICD-10-CM

## 2024-04-03 DIAGNOSIS — E11.65 TYPE 2 DIABETES MELLITUS WITH HYPERGLYCEMIA, WITHOUT LONG-TERM CURRENT USE OF INSULIN (HCC): ICD-10-CM

## 2024-04-03 DIAGNOSIS — K21.9 GASTROESOPHAGEAL REFLUX DISEASE WITHOUT ESOPHAGITIS: ICD-10-CM

## 2024-04-03 DIAGNOSIS — Z78.0 POSTMENOPAUSAL: ICD-10-CM

## 2024-04-03 DIAGNOSIS — Z00.00 ANNUAL PHYSICAL EXAM: ICD-10-CM

## 2024-04-03 DIAGNOSIS — Z12.31 ENCOUNTER FOR SCREENING MAMMOGRAM FOR MALIGNANT NEOPLASM OF BREAST: ICD-10-CM

## 2024-04-03 DIAGNOSIS — D50.9 IRON DEFICIENCY ANEMIA, UNSPECIFIED IRON DEFICIENCY ANEMIA TYPE: ICD-10-CM

## 2024-04-03 DIAGNOSIS — F41.1 ANXIETY STATE: ICD-10-CM

## 2024-04-03 DIAGNOSIS — E78.2 MIXED HYPERLIPIDEMIA: ICD-10-CM

## 2024-04-03 DIAGNOSIS — I10 ESSENTIAL HYPERTENSION: ICD-10-CM

## 2024-04-03 DIAGNOSIS — E11.9 TYPE 2 DIABETES MELLITUS WITHOUT COMPLICATION, WITHOUT LONG-TERM CURRENT USE OF INSULIN (HCC): ICD-10-CM

## 2024-04-03 PROBLEM — Z01.810 PREOP CARDIOVASCULAR EXAM: Status: RESOLVED | Noted: 2023-07-11 | Resolved: 2024-04-03

## 2024-04-03 PROBLEM — Z01.818 PRE-OP EXAM: Status: RESOLVED | Noted: 2022-06-13 | Resolved: 2024-04-03

## 2024-04-03 LAB — SL AMB POCT HEMOGLOBIN AIC: 11.1 (ref ?–6.5)

## 2024-04-03 PROCEDURE — 99214 OFFICE O/P EST MOD 30 MIN: CPT | Performed by: FAMILY MEDICINE

## 2024-04-03 PROCEDURE — 99396 PREV VISIT EST AGE 40-64: CPT | Performed by: FAMILY MEDICINE

## 2024-04-03 PROCEDURE — 83036 HEMOGLOBIN GLYCOSYLATED A1C: CPT | Performed by: FAMILY MEDICINE

## 2024-04-03 RX ORDER — ALPRAZOLAM 0.25 MG/1
0.25 TABLET ORAL DAILY
Qty: 30 TABLET | Refills: 0 | Status: SHIPPED | OUTPATIENT
Start: 2024-04-03

## 2024-04-03 RX ORDER — ATORVASTATIN CALCIUM 10 MG/1
TABLET, FILM COATED ORAL
Qty: 90 TABLET | Refills: 2 | Status: SHIPPED | OUTPATIENT
Start: 2024-04-03

## 2024-04-03 RX ORDER — VALSARTAN 320 MG/1
320 TABLET ORAL DAILY
Qty: 90 TABLET | Refills: 2 | Status: SHIPPED | OUTPATIENT
Start: 2024-04-03

## 2024-04-03 RX ORDER — OMEPRAZOLE 20 MG/1
20 CAPSULE, DELAYED RELEASE ORAL DAILY
Qty: 90 CAPSULE | Refills: 2 | Status: SHIPPED | OUTPATIENT
Start: 2024-04-03

## 2024-04-03 RX ORDER — FERROUS SULFATE 325(65) MG
325 TABLET ORAL DAILY
Qty: 90 TABLET | Refills: 2 | Status: SHIPPED | OUTPATIENT
Start: 2024-04-03

## 2024-04-03 NOTE — PROGRESS NOTES
Name: Angelique Aldridge      : 1965      MRN: [id number]  Encounter Provider: Hannah Garcia MD  Encounter Date: 4/3/2024   Encounter department: [facility] MEDICINE    Assessment & Plan     1. Encounter for immunization    2. Essential hypertension  Assessment & Plan:  Well controlled on current therapy continue with current medications and will reassess next visit      Orders:  -     valsartan (DIOVAN) 320 MG tablet; Take 1 tablet (320 mg total) by mouth daily    3. Gastroesophageal reflux disease without esophagitis  Assessment & Plan:  Refill meds     Orders:  -     omeprazole (PriLOSEC) 20 mg delayed release capsule; Take 1 capsule (20 mg total) by mouth daily    4. Type 2 diabetes mellitus without complication, without long-term current use of insulin ([facility])  Assessment & Plan:    Lab Results   Component Value Date    HGBA1C 7.5 (H) 11/15/2023   Pt had run out of meds for several months hga1c today is high restart all meds and check again in 4mo    Orders:  -     metFORMIN (GLUCOPHAGE) 1000 MG tablet; Take 1 tablet (1,000 mg total) by mouth 2 (two) times a day with meals  -     Basic metabolic panel; Future; Expected date: 2024  -     Hemoglobin A1C; Future; Expected date: 2024  -     Lipid Panel with Direct LDL reflex; Future; Expected date: 2024  -     POCT hemoglobin A1c    5. Type 2 diabetes mellitus with hyperglycemia, without long-term current use of insulin ([facility])  -     liraglutide (VICTOZA) injection; Inject 0.1 mL (0.6 mg total) under the skin daily Inject 0.6mg daily for 4 weeks then increase to 1.2mg  -     Empagliflozin (Jardiance) 10 MG TABS tablet; Take 1 tablet (10 mg total) by mouth daily    6. Mixed hyperlipidemia  -     atorvastatin (LIPITOR) 10 mg tablet; TAKE 1 TABLET BY MOUTH DAILY AT BEDTIME  -     Lipid Panel with Direct LDL reflex; Future; Expected date: 2024    7. Anxiety state  -     ALPRAZolam (XANAX) 0.25 mg tablet; Take 1 tablet  (0.25 mg total) by mouth in the morning    8. Iron deficiency anemia, unspecified iron deficiency anemia type  -     ferrous sulfate 325 (65 Fe) mg tablet; Take 1 tablet (325 mg total) by mouth daily  -     Iron Panel (Includes Ferritin, Iron Sat%, Iron, and TIBC); Future    9. Annual physical exam  Assessment & Plan:  Check routine labs      Orders:  -     CBC and differential; Future    10. Encounter for screening mammogram for malignant neoplasm of breast  -     Mammo screening bilateral w 3d & cad; Future    11. Postmenopausal  -     DXA bone density spine hip and pelvis; Future; Expected date: 04/03/2024           Subjective      Cough  Pertinent negatives include no chest pain, chills, fever, headaches, rash or shortness of breath.     Review of Systems   Constitutional:  Negative for appetite change, chills, fatigue and fever.   Respiratory:  Positive for cough. Negative for chest tightness and shortness of breath.    Cardiovascular:  Negative for chest pain, palpitations and leg swelling.   Gastrointestinal:  Negative for abdominal pain, constipation, diarrhea, nausea and vomiting.   Genitourinary:  Negative for difficulty urinating and frequency.   Musculoskeletal:  Negative for arthralgias, back pain, gait problem and neck pain.   Skin:  Negative for rash.   Neurological:  Negative for dizziness, weakness, light-headedness, numbness and headaches.   Hematological:  Does not bruise/bleed easily.   Psychiatric/Behavioral:  Negative for dysphoric mood and sleep disturbance. The patient is not nervous/anxious.        Current Outpatient Medications on File Prior to Visit   Medication Sig   • Ascorbic Acid (vitamin C) 1000 MG tablet Take 1,000 mg by mouth daily   • Blood Glucose Monitoring Suppl (OneTouch Verio) w/Device KIT Use 2 (two) times a day   • Diclofenac Sodium (VOLTAREN) 1 % Apply 2 g topically 4 (four) times a day as needed   • glucose blood (OneTouch Verio) test strip Test BG 2x daily as directed   •  "[DISCONTINUED] ALPRAZolam (XANAX) 0.25 mg tablet Take 1 tablet (0.25 mg total) by mouth in the morning   • [DISCONTINUED] atorvastatin (LIPITOR) 10 mg tablet TAKE 1 TABLET BY MOUTH DAILY AT BEDTIME   • [DISCONTINUED] Empagliflozin (Jardiance) 10 MG TABS tablet Take 1 tablet (10 mg total) by mouth daily   • [DISCONTINUED] ferrous sulfate 325 (65 Fe) mg tablet Take 325 mg by mouth daily   • [DISCONTINUED] liraglutide (VICTOZA) injection Inject 0.1 mL (0.6 mg total) under the skin daily Inject 0.6mg daily for 4 weeks then increase to 1.2mg   • [DISCONTINUED] metFORMIN (GLUCOPHAGE) 1000 MG tablet Take 1 tablet (1,000 mg total) by mouth 2 (two) times a day with meals   • [DISCONTINUED] omeprazole (PriLOSEC) 20 mg delayed release capsule Take 1 capsule (20 mg total) by mouth daily   • [DISCONTINUED] valsartan (DIOVAN) 320 MG tablet Take 1 tablet (320 mg total) by mouth daily   • lidocaine (LIDODERM) 5 % APPLY 1 PATCH TOPICALLY OVER 12 HOURS DAILY - REMOVE AND DISCARD PATCH WITHIN 12 HOUR OR AS DIRECTED BY DOCTOR   • nystatin-triamcinolone (MYCOLOG-II) ointment Apply topically 2 (two) times a day       Objective     /82 (BP Location: Left arm, Patient Position: Sitting, Cuff Size: Large)   Pulse 81   Temp (!) 97.3 °F (36.3 °C) (Tympanic)   Resp 16   Ht 5' 9.5\" (1.765 m)   Wt 120 kg (264 lb)   LMP 07/27/2021 (Approximate)   SpO2 99%   BMI 38.43 kg/m²     Physical Exam  Vitals reviewed.   Constitutional:       General: She is not in acute distress.     Appearance: Normal appearance. She is well-developed. She is obese.   HENT:      Head: Normocephalic.      Right Ear: Tympanic membrane, ear canal and external ear normal.      Left Ear: Tympanic membrane, ear canal and external ear normal.      Nose: Nose normal.      Mouth/Throat:      Pharynx: No oropharyngeal exudate.   Eyes:      General: Lids are normal.      Extraocular Movements: Extraocular movements intact.      Conjunctiva/sclera: Conjunctivae normal. "      Pupils: Pupils are equal, round, and reactive to light.   Neck:      Thyroid: No thyromegaly.      Vascular: No carotid bruit.   Cardiovascular:      Rate and Rhythm: Normal rate and regular rhythm.      Pulses: Normal pulses. no weak pulses.           Dorsalis pedis pulses are 2+ on the right side and 2+ on the left side.      Heart sounds: Normal heart sounds. No murmur heard.     No friction rub.   Pulmonary:      Effort: Pulmonary effort is normal. No respiratory distress.      Breath sounds: Normal breath sounds. No stridor. No wheezing or rales.   Chest:   Breasts:     Breasts are symmetrical.      Right: Normal. No swelling, bleeding, inverted nipple, mass, nipple discharge, skin change or tenderness.      Left: Normal. No swelling, bleeding, inverted nipple, mass, nipple discharge, skin change or tenderness.   Abdominal:      General: Bowel sounds are normal. There is no distension.      Palpations: Abdomen is soft. There is no mass.      Tenderness: There is no abdominal tenderness. There is no guarding.      Hernia: No hernia is present.   Musculoskeletal:         General: Normal range of motion.      Cervical back: Full passive range of motion without pain, normal range of motion and neck supple.   Feet:      Right foot:      Skin integrity: Dry skin present. No ulcer, skin breakdown, erythema, warmth or callus.      Left foot:      Skin integrity: Dry skin present. No ulcer, skin breakdown, erythema, warmth or callus.   Lymphadenopathy:      Cervical: No cervical adenopathy.   Skin:     General: Skin is warm and dry.      Findings: No rash.      Comments: Nl appearing moles   Neurological:      General: No focal deficit present.      Mental Status: She is alert and oriented to person, place, and time. Mental status is at baseline.      Cranial Nerves: No cranial nerve deficit.      Sensory: No sensory deficit.      Motor: No abnormal muscle tone.      Coordination: Coordination normal.      Gait:  Gait normal.      Deep Tendon Reflexes: Reflexes normal. Babinski sign absent on the right side.   Psychiatric:         Mood and Affect: Mood normal.         Speech: Speech normal.         Behavior: Behavior normal.         Thought Content: Thought content normal.         Judgment: Judgment normal.     Patient's shoes and socks removed.    Right Foot/Ankle   Right Foot Inspection  Skin Exam: skin normal, skin intact and dry skin. No warmth, no callus, no erythema, no maceration, no abnormal color, no pre-ulcer, no ulcer and no callus.     Toe Exam: No swelling, no tenderness, erythema and  no right toe deformity    Sensory   Vibration: diminished  Proprioception: intact  Monofilament testing: intact    Vascular  The right DP pulse is 2+.     Right Toe  - Comprehensive Exam  Ecchymosis: none  Swelling: none   Tenderness: none       Left Foot/Ankle  Left Foot Inspection  Skin Exam: skin normal, skin intact and dry skin. No warmth, no erythema, no maceration, normal color, no pre-ulcer, no ulcer and no callus.     Sensory   Vibration: intact  Proprioception: intact  Monofilament testing: intact    Vascular  The left DP pulse is 2+.     Left Toe  - Comprehensive Exam  Ecchymosis: none  Swelling: none   Tenderness: none       Assign Risk Category  No deformity present  No loss of protective sensation  No weak pulses  Risk: 0      Hannah Garcia MD

## 2024-04-03 NOTE — ASSESSMENT & PLAN NOTE
Lab Results   Component Value Date    HGBA1C 7.5 (H) 11/15/2023   Pt had run out of meds for several months hga1c today is high restart all meds and check again in 4mo

## 2024-04-04 DIAGNOSIS — K21.9 GASTROESOPHAGEAL REFLUX DISEASE WITHOUT ESOPHAGITIS: ICD-10-CM

## 2024-04-04 RX ORDER — OMEPRAZOLE 20 MG/1
20 CAPSULE, DELAYED RELEASE ORAL DAILY
Qty: 90 CAPSULE | Refills: 0 | OUTPATIENT
Start: 2024-04-04

## 2024-04-11 ENCOUNTER — NURSE TRIAGE (OUTPATIENT)
Age: 59
End: 2024-04-11

## 2024-04-11 NOTE — TELEPHONE ENCOUNTER
Please thank her for calling and schedule an appt for evaluation.  She has seen me or Dr Perez in past.

## 2024-04-11 NOTE — TELEPHONE ENCOUNTER
----- Message from Charline Kam sent at 4/11/2024  2:11 PM EDT -----  Patient called stating that she is spotting and has pressure.

## 2024-04-12 NOTE — TELEPHONE ENCOUNTER
Per charles, patient needs appointment for evaluation regarding bleeding  Patient only wants to see charles, and 1st choice is [place], but will go to [facility] if noting in [place]

## 2024-04-14 PROBLEM — Z98.890 HISTORY OF D&C: Status: ACTIVE | Noted: 2024-04-14

## 2024-04-14 PROBLEM — Z98.890 HISTORY OF HYSTEROSCOPY: Status: ACTIVE | Noted: 2024-04-14

## 2024-04-14 NOTE — PROGRESS NOTES
Assessment/Plan:  Single episode of vaginal spotting  Symptomatology favors urinary tract over genital track as the source  No current evidence of bleeding except dipstick with hematuria and leukocytes.  Has a history of nephrolithiasis  Vulvar moniliasis possibly due to inadequately controlled diabetes due to lapse of metformin    Recommendation:  Mycolog-II  Urine analysis with culture.  If culture negative and urinalysis positive will refer to urology  If urine culture positive will treat and repeat urinalysis.  If blood persists we will again refer to urology.  If culture negative and urine analysis negative will recommend transvaginal ultrasound to evaluate the endometrium.      7/23 : Hysteroscopic examination revealed atrophic endometrial lining. Polypoid tissue was noted in posterior aspect of cervical canal.  No endometrial cavity polyps noted. Bilateral ostia were  Visualized.     Path:  Strips of atrophic endometrium and crushed stromal elements.  - Fragments of benign squamous and endocervical tissue      Diagnoses and all orders for this visit:    Pelvic pressure in female  -     Urinalysis with microscopic  -     Urine culture  -     POCT urine dip    Urinary frequency  -     Urinalysis with microscopic  -     Urine culture  -     POCT urine dip    Urge incontinence  -     Urinalysis with microscopic  -     Urine culture  -     POCT urine dip    Other microscopic hematuria  -     Urinalysis with microscopic  -     Urine culture  -     POCT urine dip    PMB (postmenopausal bleeding)    Stenotic cervical os    History of hysteroscopy    History of D&C    Monilial vulvovaginitis  -     nystatin-triamcinolone (MYCOLOG-II) ointment; Apply topically 2 (two) times a day    History of nephrolithiasis              Subjective:        Patient ID: Angelique Aldridge is a 58 y.o. female.    Angelique presents with a 5-day history of problems that began on Thursday, April 11.  She noticed a tinge of blood on toilet paper  after urinating.  At that time there were no other symptoms.  The following day she noted intermittent pelvic pain which seem to be worse after voiding.  No analgesics were needed.  Yesterday an episode of urge incontinence occurred and a large amount of urine was lost.  This has been associated with urinary frequency but no actual dysuria.  Again the pain is worse after she voids.  There is no stress urinary incontinence.    She went to live in [place] with her son and forgot to take her metformin.  She had not used metformin for approximately a month until she was able to restart it 3 weeks ago.    She does have a history of nephrolithiasis and postmenopausal bleeding which was thoroughly evaluated in July of last year with a hysteroscopy and fractional D&C because it was a recurrence.  At the time only atrophy was seen and present histologically.  At the time her for first episode of postmenopausal bleeding a endometrial biopsy failed due to cervical stenosis.  This led to an ultrasound and the endometrial stripe was 3.  She was reassured and told the second episode occurred.            The following portions of the patient's history were reviewed and updated as appropriate: She  has a past medical history of Allergic, Diabetes mellitus (HCC), GERD (gastroesophageal reflux disease), Hepatitis C, and Hypertension.  Patient Active Problem List    Diagnosis Date Noted    History of hysteroscopy 04/14/2024    History of D&C 04/14/2024    Grieving 11/15/2023    Anxiety state 11/15/2023    PVC's (premature ventricular contractions) 07/11/2023    Abnormal EKG 06/15/2023    Post-menopausal 05/24/2023    Stenotic cervical os 05/24/2023    Vaginal atrophy 05/24/2023    Charcot arthropathy of midfoot 04/06/2023    Plantar fasciitis of right foot 03/27/2023    Dyslipidemia 03/27/2023    Post-menopausal bleeding 11/13/2022    Increased risk of breast cancer 10/21/2022    Other proteinuria 07/28/2022    Rash 07/06/2022     Thigh numbness 06/13/2022    Atypical ductal hyperplasia of right breast 04/26/2022    History of iron deficiency anemia 11/12/2021    Thrombocytopenia (HCC) 11/12/2021    Annual physical exam 07/19/2021    Chronic pain of both ankles 07/19/2021    Class 2 obesity due to excess calories without serious comorbidity with body mass index (BMI) of 38.0 to 38.9 in adult 07/11/2021    Foot pain, right 11/16/2020    Type 2 diabetes mellitus without complication, without long-term current use of insulin (HCC) 07/13/2020    Essential hypertension 07/13/2020    Gastroesophageal reflux disease without esophagitis 07/13/2020    History of hepatitis C 07/13/2020    Atypical squamous cell changes of undetermined significance (ASCUS) on cervical cytology with negative high risk human papilloma virus (HPV) test result 02/24/2020    BMI 38.0-38.9,adult 02/24/2020    Vulvovaginal candidiasis 02/18/2019   PMH:  Menarche 15  G2, P2; SAVD Preeclampsia/GDM '92, SAVD '94 R Ankle Fx during pregnancy  HSV   Obesity  NIDDM ~ '03  Dyslipidemia   Hep C  Anemia  GERD   HTN  R Breast Bx '21  Menopause 8/21  R Plantar Fasciitis  Neuropathy 7/22  R ADH with lumpectomy and PAYTON  placement  6/22.  UTI 4/23 -E. coli and group B strep  Recurrent PMB '23 - HSC, D&C 7/23 - atrophic changes    She  has a past surgical history that includes Tubal ligation; Ankle surgery; Mammo (historical) (02/2019); Colonoscopy (03/2019); Mammo stereotactic breast biopsy right (all inc) (Right, 4/11/2022); Breast biopsy (Right); Breast biopsy (Right, 04/11/2022); Mammo needle localization left (all inc) (Right, 6/9/2022); Mammo needle localization right (all inc) (Right, 6/30/2022); Breast lumpectomy (Right, 6/30/2022); pr hysteroscopy bx endometrium&/polypc w/wo d&c (N/A, 7/21/2023); pr hysteroscopy bx endometrium&/polypc w/wo d&c (N/A, 7/21/2023); and US guidance (7/21/2023).  Her family history includes Arthritis in her paternal grandmother; Breast cancer (age  of onset: 70) in her mother; Dementia in her father; Diabetes in her father, maternal grandmother, and mother; Heart attack in her maternal grandfather; Heart disease in her father; Heart failure in her father; Hyperlipidemia in her maternal grandmother; Hypertension in her father and mother; Liver cancer in her paternal grandfather; No Known Problems in her brother, brother, brother, daughter, sister, and son; Stroke in her maternal grandfather.  She  reports that she has never smoked. She has never used smokeless tobacco. She reports current alcohol use of about 1.0 standard drink of alcohol per week. She reports that she does not use drugs.  Current Outpatient Medications   Medication Sig Dispense Refill    ALPRAZolam (XANAX) 0.25 mg tablet Take 1 tablet (0.25 mg total) by mouth in the morning 30 tablet 0    Ascorbic Acid (vitamin C) 1000 MG tablet Take 1,000 mg by mouth daily      atorvastatin (LIPITOR) 10 mg tablet TAKE 1 TABLET BY MOUTH DAILY AT BEDTIME 90 tablet 2    Blood Glucose Monitoring Suppl (OneTouch Verio) w/Device KIT Use 2 (two) times a day 1 kit 0    Diclofenac Sodium (VOLTAREN) 1 % Apply 2 g topically 4 (four) times a day as needed      Empagliflozin (Jardiance) 10 MG TABS tablet Take 1 tablet (10 mg total) by mouth daily 90 tablet 2    ferrous sulfate 325 (65 Fe) mg tablet Take 1 tablet (325 mg total) by mouth daily 90 tablet 2    glucose blood (OneTouch Verio) test strip Test BG 2x daily as directed 200 strip 3    liraglutide (VICTOZA) injection Inject 0.1 mL (0.6 mg total) under the skin daily Inject 0.6mg daily for 4 weeks then increase to 1.2mg 54 mL 2    metFORMIN (GLUCOPHAGE) 1000 MG tablet Take 1 tablet (1,000 mg total) by mouth 2 (two) times a day with meals 180 tablet 2    nystatin-triamcinolone (MYCOLOG-II) ointment Apply topically 2 (two) times a day 30 g 0    omeprazole (PriLOSEC) 20 mg delayed release capsule Take 1 capsule (20 mg total) by mouth daily 90 capsule 2    valsartan  (DIOVAN) 320 MG tablet Take 1 tablet (320 mg total) by mouth daily 90 tablet 2    lidocaine (LIDODERM) 5 % APPLY 1 PATCH TOPICALLY OVER 12 HOURS DAILY - REMOVE AND DISCARD PATCH WITHIN 12 HOUR OR AS DIRECTED BY DOCTOR 30 patch 1    nystatin-triamcinolone (MYCOLOG-II) ointment Apply topically 2 (two) times a day 30 g 0     No current facility-administered medications for this visit.     Current Outpatient Medications on File Prior to Visit   Medication Sig    ALPRAZolam (XANAX) 0.25 mg tablet Take 1 tablet (0.25 mg total) by mouth in the morning    Ascorbic Acid (vitamin C) 1000 MG tablet Take 1,000 mg by mouth daily    atorvastatin (LIPITOR) 10 mg tablet TAKE 1 TABLET BY MOUTH DAILY AT BEDTIME    Blood Glucose Monitoring Suppl (OneTouch Verio) w/Device KIT Use 2 (two) times a day    Diclofenac Sodium (VOLTAREN) 1 % Apply 2 g topically 4 (four) times a day as needed    Empagliflozin (Jardiance) 10 MG TABS tablet Take 1 tablet (10 mg total) by mouth daily    ferrous sulfate 325 (65 Fe) mg tablet Take 1 tablet (325 mg total) by mouth daily    glucose blood (OneTouch Verio) test strip Test BG 2x daily as directed    liraglutide (VICTOZA) injection Inject 0.1 mL (0.6 mg total) under the skin daily Inject 0.6mg daily for 4 weeks then increase to 1.2mg    metFORMIN (GLUCOPHAGE) 1000 MG tablet Take 1 tablet (1,000 mg total) by mouth 2 (two) times a day with meals    omeprazole (PriLOSEC) 20 mg delayed release capsule Take 1 capsule (20 mg total) by mouth daily    valsartan (DIOVAN) 320 MG tablet Take 1 tablet (320 mg total) by mouth daily    lidocaine (LIDODERM) 5 % APPLY 1 PATCH TOPICALLY OVER 12 HOURS DAILY - REMOVE AND DISCARD PATCH WITHIN 12 HOUR OR AS DIRECTED BY DOCTOR    nystatin-triamcinolone (MYCOLOG-II) ointment Apply topically 2 (two) times a day     No current facility-administered medications on file prior to visit.     She is allergic to lisinopril and steri strip [medical tape]..    Review of Systems    Constitutional:  Negative for activity change, appetite change, chills, fatigue and fever.   Respiratory:  Negative for chest tightness and shortness of breath.    Cardiovascular:  Negative for chest pain and leg swelling.   Gastrointestinal:  Negative for abdominal pain, blood in stool, constipation, nausea, rectal pain and vomiting.   Genitourinary:  Positive for frequency, pelvic pain, urgency, vaginal bleeding and vaginal pain (Actually vulvar). Negative for difficulty urinating, dysuria, flank pain and vaginal discharge.   Skin:  Negative for rash.         Objective:    Vitals:    04/15/24 1601   BP: 114/78   BP Location: Right arm   Patient Position: Sitting   Cuff Size: Large   Weight: 118 kg (261 lb)            Physical Exam  Vitals and nursing note reviewed. Exam conducted with a chaperone present.   Constitutional:       Appearance: Normal appearance. She is obese.   Eyes:      General: No scleral icterus.  Abdominal:      General: Abdomen is flat. There is no distension.      Palpations: Abdomen is soft.      Tenderness: There is no abdominal tenderness. There is no right CVA tenderness, left CVA tenderness or guarding.   Genitourinary:     Vagina: No vaginal discharge.      Comments: The vulva is erythematous and mildly excoriated.  There is a thin white discharge involving the upper labia and frenulum.  There is physiologic vaginal atrophy.  There are no lesions or discharge within the vagina.  The cervix is difficult to see and stenotic.  The uterus is difficult to fully palpate due to the patient's habitus.  It was nontender.   Musculoskeletal:      Right lower leg: No edema.      Left lower leg: No edema.   Skin:     General: Skin is warm and dry.   Neurological:      Mental Status: She is alert and oriented to person, place, and time.   Psychiatric:         Mood and Affect: Mood normal.         Behavior: Behavior normal.         Thought Content: Thought content normal.         Judgment: Judgment  normal.

## 2024-04-15 ENCOUNTER — OFFICE VISIT (OUTPATIENT)
Dept: OBGYN CLINIC | Facility: CLINIC | Age: 59
End: 2024-04-15
Payer: COMMERCIAL

## 2024-04-15 VITALS — DIASTOLIC BLOOD PRESSURE: 78 MMHG | WEIGHT: 261 LBS | SYSTOLIC BLOOD PRESSURE: 114 MMHG | BODY MASS INDEX: 37.99 KG/M2

## 2024-04-15 DIAGNOSIS — N39.41 URGE INCONTINENCE: ICD-10-CM

## 2024-04-15 DIAGNOSIS — R31.29 OTHER MICROSCOPIC HEMATURIA: ICD-10-CM

## 2024-04-15 DIAGNOSIS — R35.0 URINARY FREQUENCY: ICD-10-CM

## 2024-04-15 DIAGNOSIS — B37.31 MONILIAL VULVOVAGINITIS: ICD-10-CM

## 2024-04-15 DIAGNOSIS — Z98.890 HISTORY OF D&C: ICD-10-CM

## 2024-04-15 DIAGNOSIS — R10.2 PELVIC PRESSURE IN FEMALE: Primary | ICD-10-CM

## 2024-04-15 DIAGNOSIS — N88.2 STENOTIC CERVICAL OS: ICD-10-CM

## 2024-04-15 DIAGNOSIS — Z98.890 HISTORY OF HYSTEROSCOPY: ICD-10-CM

## 2024-04-15 DIAGNOSIS — Z87.442 HISTORY OF NEPHROLITHIASIS: ICD-10-CM

## 2024-04-15 DIAGNOSIS — N95.0 PMB (POSTMENOPAUSAL BLEEDING): ICD-10-CM

## 2024-04-15 LAB
BACTERIA UR QL AUTO: ABNORMAL /HPF
BILIRUB UR QL STRIP: NEGATIVE
CLARITY UR: ABNORMAL
COLOR UR: ABNORMAL
GLUCOSE UR STRIP-MCNC: NEGATIVE MG/DL
HGB UR QL STRIP.AUTO: ABNORMAL
KETONES UR STRIP-MCNC: NEGATIVE MG/DL
LEUKOCYTE ESTERASE UR QL STRIP: ABNORMAL
NITRITE UR QL STRIP: NEGATIVE
NON-SQ EPI CELLS URNS QL MICRO: ABNORMAL /HPF
PH UR STRIP.AUTO: 6 [PH]
PROT UR STRIP-MCNC: ABNORMAL MG/DL
RBC #/AREA URNS AUTO: ABNORMAL /HPF
SL AMB  POCT GLUCOSE, UA: ABNORMAL
SL AMB LEUKOCYTE ESTERASE,UA: ABNORMAL
SL AMB POCT BILIRUBIN,UA: ABNORMAL
SL AMB POCT BLOOD,UA: ABNORMAL
SL AMB POCT CLARITY,UA: CLEAR
SL AMB POCT COLOR,UA: ABNORMAL
SL AMB POCT KETONES,UA: ABNORMAL
SL AMB POCT NITRITE,UA: ABNORMAL
SL AMB POCT PH,UA: NORMAL
SL AMB POCT SPECIFIC GRAVITY,UA: 1.02
SL AMB POCT URINE PROTEIN: ABNORMAL
SL AMB POCT UROBILINOGEN: 4
SP GR UR STRIP.AUTO: 1.01 (ref 1–1.03)
UROBILINOGEN UR STRIP-ACNC: <2 MG/DL
WBC #/AREA URNS AUTO: ABNORMAL /HPF
WBC CLUMPS # UR AUTO: PRESENT /UL

## 2024-04-15 PROCEDURE — 87147 CULTURE TYPE IMMUNOLOGIC: CPT | Performed by: OBSTETRICS & GYNECOLOGY

## 2024-04-15 PROCEDURE — 81001 URINALYSIS AUTO W/SCOPE: CPT | Performed by: OBSTETRICS & GYNECOLOGY

## 2024-04-15 PROCEDURE — 81002 URINALYSIS NONAUTO W/O SCOPE: CPT | Performed by: OBSTETRICS & GYNECOLOGY

## 2024-04-15 PROCEDURE — 99214 OFFICE O/P EST MOD 30 MIN: CPT | Performed by: OBSTETRICS & GYNECOLOGY

## 2024-04-15 PROCEDURE — 87086 URINE CULTURE/COLONY COUNT: CPT | Performed by: OBSTETRICS & GYNECOLOGY

## 2024-04-15 RX ORDER — NYSTATIN AND TRIAMCINOLONE ACETONIDE 100000; 1 [USP'U]/G; MG/G
OINTMENT TOPICAL 2 TIMES DAILY
Qty: 30 G | Refills: 0 | Status: SHIPPED | OUTPATIENT
Start: 2024-04-15

## 2024-04-16 LAB
LEFT EYE DIABETIC RETINOPATHY: NORMAL
RIGHT EYE DIABETIC RETINOPATHY: NORMAL

## 2024-04-17 DIAGNOSIS — N30.01 ACUTE CYSTITIS WITH HEMATURIA: ICD-10-CM

## 2024-04-17 DIAGNOSIS — A49.1 GBS (GROUP B STREPTOCOCCUS) INFECTION: Primary | ICD-10-CM

## 2024-04-17 LAB
BACTERIA UR CULT: ABNORMAL
BACTERIA UR CULT: ABNORMAL

## 2024-04-17 RX ORDER — AMPICILLIN 500 MG/1
500 CAPSULE ORAL 4 TIMES DAILY
Qty: 20 CAPSULE | Refills: 0 | Status: SHIPPED | OUTPATIENT
Start: 2024-04-17 | End: 2024-04-22

## 2024-04-30 ENCOUNTER — OFFICE VISIT (OUTPATIENT)
Dept: OBGYN CLINIC | Facility: CLINIC | Age: 59
End: 2024-04-30
Payer: COMMERCIAL

## 2024-04-30 VITALS — HEIGHT: 70 IN | WEIGHT: 261 LBS | BODY MASS INDEX: 37.37 KG/M2

## 2024-04-30 DIAGNOSIS — M17.11 PRIMARY OSTEOARTHRITIS OF RIGHT KNEE: Primary | ICD-10-CM

## 2024-04-30 PROCEDURE — 99214 OFFICE O/P EST MOD 30 MIN: CPT | Performed by: STUDENT IN AN ORGANIZED HEALTH CARE EDUCATION/TRAINING PROGRAM

## 2024-04-30 PROCEDURE — 20610 DRAIN/INJ JOINT/BURSA W/O US: CPT

## 2024-04-30 RX ORDER — METHYLPREDNISOLONE ACETATE 40 MG/ML
1 INJECTION, SUSPENSION INTRA-ARTICULAR; INTRALESIONAL; INTRAMUSCULAR; SOFT TISSUE
Status: COMPLETED | OUTPATIENT
Start: 2024-04-30 | End: 2024-04-30

## 2024-04-30 RX ORDER — BUPIVACAINE HYDROCHLORIDE 5 MG/ML
4 INJECTION, SOLUTION EPIDURAL; INTRACAUDAL
Status: COMPLETED | OUTPATIENT
Start: 2024-04-30 | End: 2024-04-30

## 2024-04-30 RX ADMIN — BUPIVACAINE HYDROCHLORIDE 4 ML: 5 INJECTION, SOLUTION EPIDURAL; INTRACAUDAL at 09:45

## 2024-04-30 RX ADMIN — METHYLPREDNISOLONE ACETATE 1 ML: 40 INJECTION, SUSPENSION INTRA-ARTICULAR; INTRALESIONAL; INTRAMUSCULAR; SOFT TISSUE at 09:45

## 2024-04-30 NOTE — PROGRESS NOTES
Orthopaedics Office Visit - Follow up Patient Visit    ASSESSMENT/PLAN:    Assessment:   1.  58 yo female with right knee osteoarthritis, severe patellofemoral osteoarthritis  2. Right shoulder impingement  3. Right deltoid tendonitis    Plan:  Weightbearing as tolerated right upper and lower extremities   Range of motion as tolerated to right lower extremities  Activity as tolerated right lower extremity  Patient received right knee steroid injection today. Patient tolerated the procedure well. Post injection instructions reviewed.  Discussed with the patient that the corticosteroid injection can temporarily increase her blood sugar results and instructed to perform tight glycemic control over the next 72 hours.  May apply Voltaren gel to the painful area up to 4 times a day around the knee and deltoid region   May wear Lidocaine patch as needed for pain to the right knee  Patient can continue to be weightbearing as tolerated activity as tolerated       _____________________________________________________  CHIEF COMPLAINT:  Chief Complaint   Patient presents with   • Right Knee - Follow-up         SUBJECTIVE:  11/7/2022: Angelique Aldridge is a 57 y.o. female who presents who presents today for right knee pain.  She was referred by her PCP .  She states she is been having right knee pain for the last 1-2 weeks.  She denies any injuries.  She states she is been having pain over the anterior ( deep) and medial aspect of the right knee.  She states at that time she was having trouble weight-bearing and was icing  and elevate and taking Tylenol for pain relief.  [facility] had x-rays taken of the right knee which showed no acute fracture dislocation but severe patellofemoral arthritis.  She was  prescribed a Medrol Dosepak and she states as she is tapering down the pain is coming back ( last dosage today). Denies instability.  She is having pain with flexion of the knee at  rest. transitional positions and walking. She also notes her extension is limited. She was previously walking 1-2 miles a day for exercise.  SHe is currently taking Tylenol as needed for pain relief.  She denies any numbness or tingling. She has no history of having any treatments on the right knee.  Patient is a diabetic .  She states she is currently working with her PCP on changing her diabetic medications for better control.    Interval history 1/23/2023:  Angelique Aldridge is a 58 y.o. female who presents for follow up evaluation of Right knee pain. She received a one-dose Duralane injection 12/19/2022 which provided her some relief of her symptoms. She reports three falls directly onto the right knee since her last visit, most recently in January.  She states that the first fall was from tripping over a rock, second fall was from wet floor, and the third fall was over a step.  Denies inability to bear weight after these falls. Her daughter, who presents on the phone for this visit, states she is concerned that her mother has increasing general instability causing these falls, unsure if due to diagnosed neuropathies or another cause. Notes that she has occasional episodes of swelling of the knee that worsens throughout the day. Her pain is aggravated with increased activity and relieved with rest. She also notes bilateral numbness of the thigh located laterally and anterior. Taking ice and Tylenol as needed for pain relief.     Interval History 3/7/23:  Patient reports today with her mother. Patient states her knee pain has persistently worsened since her last visit. Patient received right knee steroid injection on 11/7/22 and reports some relief. She then has right knee single VS injection on 12/19/22 and reports some additional relief. Overall patient states that neither injection took her pain away completely. She notes persistent pain over the anteromedial aspect of the knee. Patient reports swelling as  well. Pain is worsened with weight bearing. She notes crunching and clicking in the knee and feels her knee can be unstable. Patient is taking meloxicam and applying lidocaine patches. Patient notes some improvement in her symptoms after application of ice. She has been doing aqua therapy but does not note any improvement in her symptoms. Patient states she has not had any falls since her last visit. Patient is hoping to proceed with right total knee replacement. She is not a smoker. Patient is diabetic, with most recent A1C 7.6.     Interval history 6/12/23   Patient presents today follow up for chronic right knee pain due to osteoarthritis. Patient had a right knee steroid injection on 3/7/23 with relief. She states her right knee has been doing better since retiring from work. She notes occasional anterior right knee pain with weightbearing. She notes for the last month and a half she has been having pain over lateral biceps region. She notes pain with lifting over reaching backwards. She is using Voltaren gel and and lidocaine patches  for pain relief. She denies numbness or paresthesias.      Interval history 8/7/23  Patent reports that her right knee pain is improved, but that she has continued to have right shoulder/arm pain for the past 1.5 months that have not improved with voltaren gel or NSAIDs.  She has not had formal physical therapy. She reports she does not have any numbness or tingling of the right upper extremity.  Pain is localized over the deltoid insertion.  There is no pain surrounding the shoulder girdle.  With abduction the pain is localized to the midshaft of the humerus right overlying the deltoid insertion.  Reproduced with palpation over the deltoid    Interval history 9/12/2023  Patient reports today for follow-up evaluation of her right knee severe osteoarthritis.  She had a right knee steroid injection on 6/12/2023 with relief. She states she has been doing less activity as she is no  longer working. She reports her right knee pain is a long the anterior aspect of the knee today with weightbearing. She reports her shoulder ROM is improving as she goes to PT. She is interested in a CSI of her right knee today.     Interval history 4/30/24  Pt presents or follow-up evaluation of her right knee severe osteoarthritis. She states her knee pain began to flare up about a week ago. She had a right knee steroid injection on 9/12/2023 with relief. She states her pain has returned and is located along the anterior aspect of the knee exacerbated with weightbearing and relieved with rest.  She denies any numbness or paresthesias in the right lower extremity.  She is interested in a repeat corticosteroid injection of the right knee today.      PAST MEDICAL HISTORY:  Past Medical History:   Diagnosis Date   • Allergic    • Diabetes mellitus (HCC)    • GERD (gastroesophageal reflux disease)    • Hepatitis C     Treated   • Hypertension        PAST SURGICAL HISTORY:  Past Surgical History:   Procedure Laterality Date   • ANKLE SURGERY     • BREAST BIOPSY Right     core bx-benign   • BREAST BIOPSY Right 04/11/2022   • BREAST LUMPECTOMY Right 6/30/2022    Procedure: BREAST PAYTON  DIRECTED LUMPECTOMY WITH BRAKETED WIRE;  Surgeon: Paul Hurley MD;  Location: [facility];  Service: Surgical Oncology   • COLONOSCOPY  03/2019   • MAMMO (HISTORICAL)  02/2019   • MAMMO NEEDLE LOCALIZATION LEFT (ALL INC) Right 6/9/2022   • MAMMO NEEDLE LOCALIZATION RIGHT (ALL INC) Right 6/30/2022   • MAMMO STEREOTACTIC BREAST BIOPSY RIGHT (ALL INC) Right 4/11/2022   • VA HYSTEROSCOPY BX ENDOMETRIUM&/POLYPC W/WO D&C N/A 7/21/2023    Procedure: DILATATION AND CURETTAGE (D&C) WITH HYSTEROSCOPY;  Surgeon: Micah Perez MD;  Location: [facility];  Service: Gynecology   • VA HYSTEROSCOPY BX ENDOMETRIUM&/POLYPC W/WO D&C N/A 7/21/2023    Procedure: POLYPECTOMY;  Surgeon: Micah Perez MD;  Location: [facility];  Service: Gynecology   • TUBAL  LIGATION     • US GUIDANCE  7/21/2023       FAMILY HISTORY:  Family History   Problem Relation Age of Onset   • Breast cancer Mother 70   • Hypertension Mother    • Diabetes Mother    • Hypertension Father    • Diabetes Father    • Heart failure Father    • Dementia Father    • Heart disease Father    • No Known Problems Sister    • No Known Problems Brother    • No Known Problems Brother    • No Known Problems Brother    • No Known Problems Daughter    • No Known Problems Son    • Hyperlipidemia Maternal Grandmother    • Diabetes Maternal Grandmother    • Heart attack Maternal Grandfather    • Stroke Maternal Grandfather    • Arthritis Paternal Grandmother    • Liver cancer Paternal Grandfather         unknown age       SOCIAL HISTORY:  Social History     Tobacco Use   • Smoking status: Never   • Smokeless tobacco: Never   Vaping Use   • Vaping status: Never Used   Substance Use Topics   • Alcohol use: Yes     Alcohol/week: 1.0 standard drink of alcohol     Types: 1 Glasses of wine per week     Comment: socially   • Drug use: Never       MEDICATIONS:    Current Outpatient Medications:   •  ALPRAZolam (XANAX) 0.25 mg tablet, Take 1 tablet (0.25 mg total) by mouth in the morning, Disp: 30 tablet, Rfl: 0  •  Ascorbic Acid (vitamin C) 1000 MG tablet, Take 1,000 mg by mouth daily, Disp: , Rfl:   •  atorvastatin (LIPITOR) 10 mg tablet, TAKE 1 TABLET BY MOUTH DAILY AT BEDTIME, Disp: 90 tablet, Rfl: 2  •  Blood Glucose Monitoring Suppl (OneTouch Verio) w/Device KIT, Use 2 (two) times a day, Disp: 1 kit, Rfl: 0  •  Diclofenac Sodium (VOLTAREN) 1 %, Apply 2 g topically 4 (four) times a day as needed, Disp: , Rfl:   •  Empagliflozin (Jardiance) 10 MG TABS tablet, Take 1 tablet (10 mg total) by mouth daily, Disp: 90 tablet, Rfl: 2  •  ferrous sulfate 325 (65 Fe) mg tablet, Take 1 tablet (325 mg total) by mouth daily, Disp: 90 tablet, Rfl: 2  •  glucose blood (OneTouch Verio) test strip, Test BG 2x daily as directed, Disp: 200  "strip, Rfl: 3  •  lidocaine (LIDODERM) 5 %, APPLY 1 PATCH TOPICALLY OVER 12 HOURS DAILY - REMOVE AND DISCARD PATCH WITHIN 12 HOUR OR AS DIRECTED BY DOCTOR, Disp: 30 patch, Rfl: 1  •  liraglutide (VICTOZA) injection, Inject 0.1 mL (0.6 mg total) under the skin daily Inject 0.6mg daily for 4 weeks then increase to 1.2mg, Disp: 54 mL, Rfl: 2  •  metFORMIN (GLUCOPHAGE) 1000 MG tablet, Take 1 tablet (1,000 mg total) by mouth 2 (two) times a day with meals, Disp: 180 tablet, Rfl: 2  •  nystatin-triamcinolone (MYCOLOG-II) ointment, Apply topically 2 (two) times a day, Disp: 30 g, Rfl: 0  •  nystatin-triamcinolone (MYCOLOG-II) ointment, Apply topically 2 (two) times a day, Disp: 30 g, Rfl: 0  •  omeprazole (PriLOSEC) 20 mg delayed release capsule, Take 1 capsule (20 mg total) by mouth daily, Disp: 90 capsule, Rfl: 2  •  valsartan (DIOVAN) 320 MG tablet, Take 1 tablet (320 mg total) by mouth daily, Disp: 90 tablet, Rfl: 2    ALLERGIES:  Allergies   Allergen Reactions   • Lisinopril Cough   • Steri Strip [Medical Tape] Rash       REVIEW OF SYSTEMS:  MSK: right knee pain  Neuro: known peripheral neuropathies  Pertinent items are otherwise noted in HPI.  A comprehensive review of systems was otherwise negative.    LABS:  HgA1c:   Lab Results   Component Value Date    HGBA1C 11.1 (A) 04/03/2024     BMP:   Lab Results   Component Value Date    CALCIUM 9.4 11/15/2023    K 4.2 11/15/2023    CO2 29 11/15/2023     11/15/2023    BUN 8 11/15/2023    CREATININE 0.49 (L) 11/15/2023     CBC: No components found for: \"CBC\"    _____________________________________________________  PHYSICAL EXAMINATION:  Vital signs: LMP 07/27/2021 (Approximate)   General: No acute distress, awake and alert  Psychiatric: Mood and affect appear appropriate  HEENT: Trachea Midline, No torticollis, no apparent facial trauma  Cardiovascular: No audible murmurs; Extremities appear perfused  Pulmonary: No audible wheezing or stridor  Skin: No open lesions; " see further details (if any) below    MUSCULOSKELETAL EXAMINATION:  Extremities: .    Patient's right knee was exposed inspected.  Patient skin is intact overlying the right knee.  No ecchymosis. Patient has range of motion of the knee from 0 to 115 degrees.   Patient is able to perform a straight leg raise.  Patient has lateral joint line tenderness Patient has no pain over the pes anserine tendons.  Patient has no varus or valgus knee instability.  Negative anterior and posterior drawer testing.  Patient's sensation is intact to light touch in superficial peroneal, deep peroneal, sural, saphenous, plantar nerve distributions and comparable to the contralateral side.  Patient's tibialis anterior, extensor hallux longus and gastrocnemius muscles intact.  The limb is well-perfused.    _____________________________________________________  STUDIES REVIEWED:  I personally reviewed the images and interpretation is as follows:    No new images were obtained today        PROCEDURES PERFORMED:  Large joint arthrocentesis: R knee  [facility] Protocol:  Procedure performed by: (Dr. Salcido)  Consent: Verbal consent obtained.  Risks and benefits: risks, benefits and alternatives were discussed  Consent given by: patient  Timeout called at: 4/30/2024 10:16 AM.  Patient understanding: patient states understanding of the procedure being performed  Patient consent: the patient's understanding of the procedure matches consent given  Site marked: the operative site was marked  Radiology Images displayed and confirmed. If images not available, report reviewed: imaging studies available  Patient identity confirmed: verbally with patient  Supporting Documentation  Indications: pain   Procedure Details  Location: knee - R knee  Needle size: 22 G  Ultrasound guidance: no  Approach: anterolateral  Medications administered: 1 mL methylPREDNISolone acetate 40 mg/mL; 4 mL bupivacaine (PF) 0.5 %    Patient tolerance: patient tolerated the  procedure well with no immediate complications  Dressing:  Sterile dressing applied            Scribe Attestation      I,:   am acting as a scribe while in the presence of the attending physician.:       I,:   personally performed the services described in this documentation    as scribed in my presence.:

## 2024-06-11 ENCOUNTER — TELEPHONE (OUTPATIENT)
Dept: SURGICAL ONCOLOGY | Facility: CLINIC | Age: 59
End: 2024-06-11

## 2024-06-11 NOTE — TELEPHONE ENCOUNTER
Left message that I was calling to reschedule office visit with RAGHU Hernandez [phone] to reschedule that appointment

## 2024-06-13 ENCOUNTER — TELEPHONE (OUTPATIENT)
Dept: SURGICAL ONCOLOGY | Facility: CLINIC | Age: 59
End: 2024-06-13

## 2024-06-14 ENCOUNTER — TELEPHONE (OUTPATIENT)
Dept: SURGICAL ONCOLOGY | Facility: CLINIC | Age: 59
End: 2024-06-14

## 2024-06-14 NOTE — TELEPHONE ENCOUNTER
Left message for patient to call and we can assist in setting up follow up with Lori Hernandez and patient was in need of updating her breast imaging and those appointments need to be set up as well. [phone] for assistance     This was the 3rd phone call.

## 2024-06-19 ENCOUNTER — TELEPHONE (OUTPATIENT)
Dept: FAMILY MEDICINE CLINIC | Facility: CLINIC | Age: 59
End: 2024-06-19

## 2024-07-03 ENCOUNTER — VBI (OUTPATIENT)
Dept: ADMINISTRATIVE | Facility: OTHER | Age: 59
End: 2024-07-03

## 2024-07-03 NOTE — TELEPHONE ENCOUNTER
07/03/24 8:19 AM     Chart reviewed for Pap Smear (HPV) aka Cervical Cancer Screening ; nothing is submitted to the patient's insurance at this time.     Xin Nelson   [facility]

## 2024-07-26 ENCOUNTER — TELEPHONE (OUTPATIENT)
Dept: FAMILY MEDICINE CLINIC | Facility: CLINIC | Age: 59
End: 2024-07-26

## 2024-07-26 DIAGNOSIS — N90.89 VULVAR IRRITATION: ICD-10-CM

## 2024-07-26 RX ORDER — NYSTATIN AND TRIAMCINOLONE ACETONIDE 100000; 1 [USP'U]/G; MG/G
OINTMENT TOPICAL 2 TIMES DAILY
Qty: 30 G | Refills: 0 | Status: SHIPPED | OUTPATIENT
Start: 2024-07-26

## 2024-07-26 NOTE — TELEPHONE ENCOUNTER
Patient has poison on both arms, she wondered if we could call in Triamcinolone acetonide    [facility]

## 2024-07-31 ENCOUNTER — LAB (OUTPATIENT)
Dept: LAB | Facility: CLINIC | Age: 59
End: 2024-07-31
Payer: COMMERCIAL

## 2024-07-31 ENCOUNTER — TELEPHONE (OUTPATIENT)
Dept: OBGYN CLINIC | Facility: CLINIC | Age: 59
End: 2024-07-31

## 2024-07-31 DIAGNOSIS — A49.1 GBS (GROUP B STREPTOCOCCUS) INFECTION: ICD-10-CM

## 2024-07-31 DIAGNOSIS — D50.9 IRON DEFICIENCY ANEMIA, UNSPECIFIED IRON DEFICIENCY ANEMIA TYPE: ICD-10-CM

## 2024-07-31 DIAGNOSIS — N30.01 ACUTE CYSTITIS WITH HEMATURIA: ICD-10-CM

## 2024-07-31 DIAGNOSIS — R31.9 HEMATURIA, UNSPECIFIED TYPE: Primary | ICD-10-CM

## 2024-07-31 DIAGNOSIS — Z00.00 ANNUAL PHYSICAL EXAM: ICD-10-CM

## 2024-07-31 DIAGNOSIS — E78.2 MIXED HYPERLIPIDEMIA: ICD-10-CM

## 2024-07-31 DIAGNOSIS — E11.9 TYPE 2 DIABETES MELLITUS WITHOUT COMPLICATION, WITHOUT LONG-TERM CURRENT USE OF INSULIN (HCC): ICD-10-CM

## 2024-07-31 LAB
ANION GAP SERPL CALCULATED.3IONS-SCNC: 9 MMOL/L (ref 4–13)
BACTERIA UR QL AUTO: ABNORMAL /HPF
BASOPHILS # BLD AUTO: 0.03 THOUSANDS/ÂΜL (ref 0–0.1)
BASOPHILS NFR BLD AUTO: 1 % (ref 0–1)
BILIRUB UR QL STRIP: NEGATIVE
BUN SERPL-MCNC: 14 MG/DL (ref 5–25)
CALCIUM SERPL-MCNC: 9 MG/DL (ref 8.4–10.2)
CHLORIDE SERPL-SCNC: 104 MMOL/L (ref 96–108)
CHOLEST SERPL-MCNC: 110 MG/DL
CLARITY UR: CLEAR
CO2 SERPL-SCNC: 26 MMOL/L (ref 21–32)
COLOR UR: ABNORMAL
CREAT SERPL-MCNC: 0.45 MG/DL (ref 0.6–1.3)
EOSINOPHIL # BLD AUTO: 0.08 THOUSAND/ÂΜL (ref 0–0.61)
EOSINOPHIL NFR BLD AUTO: 2 % (ref 0–6)
ERYTHROCYTE [DISTWIDTH] IN BLOOD BY AUTOMATED COUNT: 13.6 % (ref 11.6–15.1)
EST. AVERAGE GLUCOSE BLD GHB EST-MCNC: 151 MG/DL
FERRITIN SERPL-MCNC: 62 NG/ML (ref 11–307)
GFR SERPL CREATININE-BSD FRML MDRD: 112 ML/MIN/1.73SQ M
GLUCOSE P FAST SERPL-MCNC: 169 MG/DL (ref 65–99)
GLUCOSE UR STRIP-MCNC: ABNORMAL MG/DL
HBA1C MFR BLD: 6.9 %
HCT VFR BLD AUTO: 42.2 % (ref 34.8–46.1)
HDLC SERPL-MCNC: 55 MG/DL
HGB BLD-MCNC: 13.8 G/DL (ref 11.5–15.4)
HGB UR QL STRIP.AUTO: ABNORMAL
IMM GRANULOCYTES # BLD AUTO: 0.02 THOUSAND/UL (ref 0–0.2)
IMM GRANULOCYTES NFR BLD AUTO: 0 % (ref 0–2)
IRON SATN MFR SERPL: 26 % (ref 15–50)
IRON SERPL-MCNC: 88 UG/DL (ref 50–212)
KETONES UR STRIP-MCNC: NEGATIVE MG/DL
LDLC SERPL CALC-MCNC: 43 MG/DL (ref 0–100)
LEUKOCYTE ESTERASE UR QL STRIP: ABNORMAL
LYMPHOCYTES # BLD AUTO: 1.02 THOUSANDS/ÂΜL (ref 0.6–4.47)
LYMPHOCYTES NFR BLD AUTO: 20 % (ref 14–44)
MCH RBC QN AUTO: 29.2 PG (ref 26.8–34.3)
MCHC RBC AUTO-ENTMCNC: 32.7 G/DL (ref 31.4–37.4)
MCV RBC AUTO: 89 FL (ref 82–98)
MONOCYTES # BLD AUTO: 0.38 THOUSAND/ÂΜL (ref 0.17–1.22)
MONOCYTES NFR BLD AUTO: 7 % (ref 4–12)
MUCOUS THREADS UR QL AUTO: ABNORMAL
NEUTROPHILS # BLD AUTO: 3.65 THOUSANDS/ÂΜL (ref 1.85–7.62)
NEUTS SEG NFR BLD AUTO: 70 % (ref 43–75)
NITRITE UR QL STRIP: NEGATIVE
NON-SQ EPI CELLS URNS QL MICRO: ABNORMAL /HPF
NRBC BLD AUTO-RTO: 0 /100 WBCS
PH UR STRIP.AUTO: 6 [PH]
PLATELET # BLD AUTO: 85 THOUSANDS/UL (ref 149–390)
PLATELET BLD QL SMEAR: ABNORMAL
POTASSIUM SERPL-SCNC: 3.5 MMOL/L (ref 3.5–5.3)
PROT UR STRIP-MCNC: NEGATIVE MG/DL
RBC # BLD AUTO: 4.72 MILLION/UL (ref 3.81–5.12)
RBC #/AREA URNS AUTO: ABNORMAL /HPF
RBC MORPH BLD: NORMAL
SODIUM SERPL-SCNC: 139 MMOL/L (ref 135–147)
SP GR UR STRIP.AUTO: 1.04 (ref 1–1.03)
TIBC SERPL-MCNC: 337 UG/DL (ref 250–450)
TRIGL SERPL-MCNC: 62 MG/DL
UIBC SERPL-MCNC: 249 UG/DL (ref 155–355)
UROBILINOGEN UR STRIP-ACNC: <2 MG/DL
WBC # BLD AUTO: 5.18 THOUSAND/UL (ref 4.31–10.16)
WBC #/AREA URNS AUTO: ABNORMAL /HPF

## 2024-07-31 PROCEDURE — 85025 COMPLETE CBC W/AUTO DIFF WBC: CPT

## 2024-07-31 PROCEDURE — 83036 HEMOGLOBIN GLYCOSYLATED A1C: CPT

## 2024-07-31 PROCEDURE — 36415 COLL VENOUS BLD VENIPUNCTURE: CPT

## 2024-07-31 PROCEDURE — 83540 ASSAY OF IRON: CPT

## 2024-07-31 PROCEDURE — 81001 URINALYSIS AUTO W/SCOPE: CPT

## 2024-07-31 PROCEDURE — 83550 IRON BINDING TEST: CPT

## 2024-07-31 PROCEDURE — 82728 ASSAY OF FERRITIN: CPT

## 2024-07-31 PROCEDURE — 80061 LIPID PANEL: CPT

## 2024-07-31 PROCEDURE — 80048 BASIC METABOLIC PNL TOTAL CA: CPT

## 2024-07-31 NOTE — TELEPHONE ENCOUNTER
Called and spoke with pt (name and  verified by pt) to inform her that the UA still showed presence of blood and that a referral to Urology was placed. She will call urology to schedule an appointment.

## 2024-07-31 NOTE — TELEPHONE ENCOUNTER
----- Message from Micah Perez MD sent at 7/31/2024  9:59 AM EDT -----  Urine analysis continues to reveal the presence of blood.  A referral was placed for urology.

## 2024-08-01 DIAGNOSIS — F41.1 ANXIETY STATE: ICD-10-CM

## 2024-08-01 RX ORDER — ALPRAZOLAM 0.25 MG/1
0.25 TABLET ORAL EVERY MORNING
Qty: 30 TABLET | Refills: 0 | Status: SHIPPED | OUTPATIENT
Start: 2024-08-01

## 2024-08-05 ENCOUNTER — OFFICE VISIT (OUTPATIENT)
Dept: FAMILY MEDICINE CLINIC | Facility: CLINIC | Age: 59
End: 2024-08-05
Payer: COMMERCIAL

## 2024-08-05 VITALS
HEIGHT: 70 IN | WEIGHT: 249 LBS | OXYGEN SATURATION: 98 % | BODY MASS INDEX: 35.65 KG/M2 | SYSTOLIC BLOOD PRESSURE: 122 MMHG | RESPIRATION RATE: 16 BRPM | HEART RATE: 94 BPM | TEMPERATURE: 98.3 F | DIASTOLIC BLOOD PRESSURE: 78 MMHG

## 2024-08-05 DIAGNOSIS — E78.5 DYSLIPIDEMIA: ICD-10-CM

## 2024-08-05 DIAGNOSIS — I10 ESSENTIAL HYPERTENSION: Primary | ICD-10-CM

## 2024-08-05 DIAGNOSIS — D69.6 THROMBOCYTOPENIA (HCC): ICD-10-CM

## 2024-08-05 DIAGNOSIS — E66.09 CLASS 2 OBESITY DUE TO EXCESS CALORIES WITHOUT SERIOUS COMORBIDITY WITH BODY MASS INDEX (BMI) OF 36.0 TO 36.9 IN ADULT: ICD-10-CM

## 2024-08-05 DIAGNOSIS — K21.9 GASTROESOPHAGEAL REFLUX DISEASE WITHOUT ESOPHAGITIS: ICD-10-CM

## 2024-08-05 PROBLEM — F43.21 GRIEVING: Status: RESOLVED | Noted: 2023-11-15 | Resolved: 2024-08-05

## 2024-08-05 PROBLEM — Z98.890 HISTORY OF D&C: Status: RESOLVED | Noted: 2024-04-14 | Resolved: 2024-08-05

## 2024-08-05 PROBLEM — M72.2 PLANTAR FASCIITIS OF RIGHT FOOT: Status: RESOLVED | Noted: 2023-03-27 | Resolved: 2024-08-05

## 2024-08-05 PROBLEM — R21 RASH: Status: RESOLVED | Noted: 2022-07-06 | Resolved: 2024-08-05

## 2024-08-05 PROBLEM — Z98.890 HISTORY OF HYSTEROSCOPY: Status: RESOLVED | Noted: 2024-04-14 | Resolved: 2024-08-05

## 2024-08-05 PROBLEM — M79.671 FOOT PAIN, RIGHT: Status: RESOLVED | Noted: 2020-11-16 | Resolved: 2024-08-05

## 2024-08-05 PROCEDURE — 99214 OFFICE O/P EST MOD 30 MIN: CPT | Performed by: FAMILY MEDICINE

## 2024-08-05 NOTE — PROGRESS NOTES
Ambulatory Visit  Name: Angelique Aldridge      : 1965      MRN: [id number]  Encounter Provider: Hannah Garcia MD  Encounter Date: 2024   Encounter department: [facility]    Assessment & Plan   1. Essential hypertension  Assessment & Plan:  Well controlled on current therapy continue with current medications and will reassess next visit    2. Class 2 obesity due to excess calories without serious comorbidity with body mass index (BMI) of 36.0 to 36.9 in adult  Assessment & Plan:  Discussion on diet and exercise guidelines for weight loss and  health reviewed with pt     3. Gastroesophageal reflux disease without esophagitis  Assessment & Plan:  Ok on meds  4. Thrombocytopenia (HCC)  Assessment & Plan:  Chronic and stable  although a little lower than usual recheck 3 m  Orders:  -     CBC and differential; Future  5. Dyslipidemia  Assessment & Plan:  Labs ok        History of Present Illness     Pt is here for interval visit and evaluation of multiple medical problems, review of medications, labs, Health Maintenance and any recent specialty consults gyn           Review of Systems   Constitutional:  Negative for appetite change, chills, fatigue and fever.   Respiratory:  Negative for cough, chest tightness and shortness of breath.    Cardiovascular:  Negative for chest pain, palpitations and leg swelling.   Gastrointestinal:  Negative for abdominal pain, constipation, diarrhea, nausea and vomiting.   Genitourinary:  Negative for difficulty urinating and frequency.   Musculoskeletal:  Negative for arthralgias, back pain, gait problem and neck pain.   Skin:  Negative for rash.   Neurological:  Negative for dizziness, weakness, light-headedness, numbness and headaches.   Hematological:  Does not bruise/bleed easily.   Psychiatric/Behavioral:  Negative for dysphoric mood and sleep disturbance. The patient is not nervous/anxious.        Objective     /78 (BP Location: Left  "arm, Patient Position: Sitting, Cuff Size: Large)   Pulse 94   Temp 98.3 °F (36.8 °C) (Tympanic)   Resp 16   Ht 5' 9.5\" (1.765 m)   Wt 113 kg (249 lb)   LMP 07/27/2021 (Approximate)   SpO2 98%   BMI 36.24 kg/m²     Physical Exam  Constitutional:       General: She is not in acute distress.     Appearance: Normal appearance. She is obese.   Neck:      Thyroid: No thyromegaly.      Vascular: No carotid bruit.   Cardiovascular:      Rate and Rhythm: Normal rate and regular rhythm.      Heart sounds: Normal heart sounds. No murmur heard.  Pulmonary:      Effort: Pulmonary effort is normal. No respiratory distress.      Breath sounds: Normal breath sounds. No wheezing, rhonchi or rales.   Abdominal:      Palpations: Abdomen is soft.      Tenderness: There is no abdominal tenderness.   Musculoskeletal:      Cervical back: Normal range of motion and neck supple.   Skin:     Findings: No rash.   Neurological:      General: No focal deficit present.      Mental Status: She is alert and oriented to person, place, and time. Mental status is at baseline.      Gait: Gait normal.   Psychiatric:         Mood and Affect: Mood normal.         Behavior: Behavior normal.       Administrative Statements           "

## 2024-09-19 ENCOUNTER — OFFICE VISIT (OUTPATIENT)
Dept: OBGYN CLINIC | Facility: CLINIC | Age: 59
End: 2024-09-19
Payer: COMMERCIAL

## 2024-09-19 VITALS — HEIGHT: 70 IN | BODY MASS INDEX: 35.65 KG/M2 | WEIGHT: 249 LBS

## 2024-09-19 DIAGNOSIS — M17.11 PRIMARY OSTEOARTHRITIS OF RIGHT KNEE: Primary | ICD-10-CM

## 2024-09-19 PROCEDURE — 20610 DRAIN/INJ JOINT/BURSA W/O US: CPT | Performed by: STUDENT IN AN ORGANIZED HEALTH CARE EDUCATION/TRAINING PROGRAM

## 2024-09-19 PROCEDURE — 99214 OFFICE O/P EST MOD 30 MIN: CPT | Performed by: STUDENT IN AN ORGANIZED HEALTH CARE EDUCATION/TRAINING PROGRAM

## 2024-09-19 PROCEDURE — 20610 DRAIN/INJ JOINT/BURSA W/O US: CPT

## 2024-09-19 RX ORDER — BUPIVACAINE HYDROCHLORIDE 5 MG/ML
4 INJECTION, SOLUTION EPIDURAL; INTRACAUDAL
Status: COMPLETED | OUTPATIENT
Start: 2024-09-19 | End: 2024-09-19

## 2024-09-19 RX ORDER — METHYLPREDNISOLONE ACETATE 40 MG/ML
1 INJECTION, SUSPENSION INTRA-ARTICULAR; INTRALESIONAL; INTRAMUSCULAR; SOFT TISSUE
Status: COMPLETED | OUTPATIENT
Start: 2024-09-19 | End: 2024-09-19

## 2024-09-19 RX ADMIN — METHYLPREDNISOLONE ACETATE 1 ML: 40 INJECTION, SUSPENSION INTRA-ARTICULAR; INTRALESIONAL; INTRAMUSCULAR; SOFT TISSUE at 08:45

## 2024-09-19 RX ADMIN — BUPIVACAINE HYDROCHLORIDE 4 ML: 5 INJECTION, SOLUTION EPIDURAL; INTRACAUDAL at 08:45

## 2024-09-19 NOTE — PROGRESS NOTES
Orthopaedics Office Visit - Follow up Patient Visit    ASSESSMENT/PLAN:    Assessment:   1.  58 yo female with right knee osteoarthritis, severe patellofemoral osteoarthritis  2. Right shoulder impingement  3. Right deltoid tendonitis    Plan:  Weightbearing as tolerated right upper and lower extremities   Range of motion as tolerated to right lower extremities  Activity as tolerated right lower extremity  Patient received right knee steroid injection today. Patient tolerated the procedure well. Post injection instructions reviewed.  Discussed with the patient that the corticosteroid injection can temporarily increase her blood sugar results and instructed to perform tight glycemic control over the next 72 hours.  May apply Voltaren gel to the painful area up to 4 times a day around the knee and deltoid region   May wear Lidocaine patch as needed for pain to the right knee  Patient can continue to be weightbearing as tolerated activity as tolerated       _____________________________________________________  CHIEF COMPLAINT:  Chief Complaint   Patient presents with    Right Knee - Follow-up         SUBJECTIVE:  11/7/2022: Angelique Aldridge is a 57 y.o. female who presents who presents today for right knee pain.  She was referred by her PCP .  She states she is been having right knee pain for the last 1-2 weeks.  She denies any injuries.  She states she is been having pain over the anterior ( deep) and medial aspect of the right knee.  She states at that time she was having trouble weight-bearing and was icing  and elevate and taking Tylenol for pain relief.  [facility] had x-rays taken of the right knee which showed no acute fracture dislocation but severe patellofemoral arthritis.  She was  prescribed a Medrol Dosepak and she states as she is tapering down the pain is coming back ( last dosage today). Denies instability.  She is having pain with flexion of the knee at rest.  transitional positions and walking. She also notes her extension is limited. She was previously walking 1-2 miles a day for exercise.  SHe is currently taking Tylenol as needed for pain relief.  She denies any numbness or tingling. She has no history of having any treatments on the right knee.  Patient is a diabetic .  She states she is currently working with her PCP on changing her diabetic medications for better control.    Interval history 1/23/2023:  Angelique Aldridge is a 58 y.o. female who presents for follow up evaluation of Right knee pain. She received a one-dose Duralane injection 12/19/2022 which provided her some relief of her symptoms. She reports three falls directly onto the right knee since her last visit, most recently in January.  She states that the first fall was from tripping over a rock, second fall was from wet floor, and the third fall was over a step.  Denies inability to bear weight after these falls. Her daughter, who presents on the phone for this visit, states she is concerned that her mother has increasing general instability causing these falls, unsure if due to diagnosed neuropathies or another cause. Notes that she has occasional episodes of swelling of the knee that worsens throughout the day. Her pain is aggravated with increased activity and relieved with rest. She also notes bilateral numbness of the thigh located laterally and anterior. Taking ice and Tylenol as needed for pain relief.     Interval History 3/7/23:  Patient reports today with her mother. Patient states her knee pain has persistently worsened since her last visit. Patient received right knee steroid injection on 11/7/22 and reports some relief. She then has right knee single VS injection on 12/19/22 and reports some additional relief. Overall patient states that neither injection took her pain away completely. She notes persistent pain over the anteromedial aspect of the knee. Patient reports swelling as well. Pain  is worsened with weight bearing. She notes crunching and clicking in the knee and feels her knee can be unstable. Patient is taking meloxicam and applying lidocaine patches. Patient notes some improvement in her symptoms after application of ice. She has been doing aqua therapy but does not note any improvement in her symptoms. Patient states she has not had any falls since her last visit. Patient is hoping to proceed with right total knee replacement. She is not a smoker. Patient is diabetic, with most recent A1C 7.6.     Interval history 6/12/23   Patient presents today follow up for chronic right knee pain due to osteoarthritis. Patient had a right knee steroid injection on 3/7/23 with relief. She states her right knee has been doing better since retiring from work. She notes occasional anterior right knee pain with weightbearing. She notes for the last month and a half she has been having pain over lateral biceps region. She notes pain with lifting over reaching backwards. She is using Voltaren gel and and lidocaine patches  for pain relief. She denies numbness or paresthesias.      Interval history 8/7/23  Patent reports that her right knee pain is improved, but that she has continued to have right shoulder/arm pain for the past 1.5 months that have not improved with voltaren gel or NSAIDs.  She has not had formal physical therapy. She reports she does not have any numbness or tingling of the right upper extremity.  Pain is localized over the deltoid insertion.  There is no pain surrounding the shoulder girdle.  With abduction the pain is localized to the midshaft of the humerus right overlying the deltoid insertion.  Reproduced with palpation over the deltoid    Interval history 9/12/2023  Patient reports today for follow-up evaluation of her right knee severe osteoarthritis.  She had a right knee steroid injection on 6/12/2023 with relief. She states she has been doing less activity as she is no longer  working. She reports her right knee pain is a long the anterior aspect of the knee today with weightbearing. She reports her shoulder ROM is improving as she goes to PT. She is interested in a CSI of her right knee today.     Interval history 4/30/24  Pt presents or follow-up evaluation of her right knee severe osteoarthritis. She states her knee pain began to flare up about a week ago. She had a right knee steroid injection on 9/12/2023 with relief. She states her pain has returned and is located along the anterior aspect of the knee exacerbated with weightbearing and relieved with rest.  She denies any numbness or paresthesias in the right lower extremity.  She is interested in a repeat corticosteroid injection of the right knee today.    Interval history 9/19/2024  Patient presents for follow-up evaluation of her right knee severe osteoarthritis.  She states her pain began to flareup about couple weeks ago.  Her last right knee corticosteroid injection was performed on 4/30/2024 which offered her approximately 4 months relief of her symptoms.  She states most of her pain is over the anterior medial aspect of the knee exacerbated with weightbearing and movement and relieved with rest.  She denies any numbness or paresthesias of the right lower extremity and she would like to discuss repeat corticosteroid injection today.      PAST MEDICAL HISTORY:  Past Medical History:   Diagnosis Date    Allergic     Diabetes mellitus (HCC)     GERD (gastroesophageal reflux disease)     Hepatitis C     Treated    Hypertension        PAST SURGICAL HISTORY:  Past Surgical History:   Procedure Laterality Date    ANKLE SURGERY      BREAST BIOPSY Right     core bx-benign    BREAST BIOPSY Right 04/11/2022    BREAST LUMPECTOMY Right 6/30/2022    Procedure: BREAST PAYTON  DIRECTED LUMPECTOMY WITH BRAKETED WIRE;  Surgeon: Paul Hurley MD;  [facility];  Service: Surgical Oncology    COLONOSCOPY  03/2019    MAMMO (HISTORICAL)   02/2019    MAMMO NEEDLE LOCALIZATION LEFT (ALL INC) Right 6/9/2022    MAMMO NEEDLE LOCALIZATION RIGHT (ALL INC) Right 6/30/2022    MAMMO STEREOTACTIC BREAST BIOPSY RIGHT (ALL INC) Right 4/11/2022    [place] HYSTEROSCOPY BX ENDOMETRIUM&/POLYPC W/WO D&C N/A 7/21/2023    Procedure: DILATATION AND CURETTAGE (D&C) WITH HYSTEROSCOPY;  Surgeon: Micah Perez MD;  [facility];  Service: Gynecology    [place] HYSTEROSCOPY BX ENDOMETRIUM&/POLYPC W/WO D&C N/A 7/21/2023    Procedure: POLYPECTOMY;  Surgeon: Micah Perez MD;  [facility];  Service: Gynecology    TUBAL LIGATION      US GUIDANCE  7/21/2023       FAMILY HISTORY:  Family History   Problem Relation Age of Onset    Breast cancer Mother 70    Hypertension Mother     Diabetes Mother     Hypertension Father     Diabetes Father     Heart failure Father     Dementia Father     Heart disease Father     No Known Problems Sister     No Known Problems Brother     No Known Problems Brother     No Known Problems Brother     No Known Problems Daughter     No Known Problems Son     Hyperlipidemia Maternal Grandmother     Diabetes Maternal Grandmother     Heart attack Maternal Grandfather     Stroke Maternal Grandfather     Arthritis Paternal Grandmother     Liver cancer Paternal Grandfather         unknown age       SOCIAL HISTORY:  Social History     Tobacco Use    Smoking status: Never    Smokeless tobacco: Never   Vaping Use    Vaping status: Never Used   Substance Use Topics    Alcohol use: Yes     Alcohol/week: 1.0 standard drink of alcohol     Types: 1 Glasses of wine per week     Comment: socially    Drug use: Never       MEDICATIONS:    Current Outpatient Medications:     ALPRAZolam (XANAX) 0.25 mg tablet, TAKE ONE TABLET BY MOUTH IN THE MORNING, Disp: 30 tablet, Rfl: 0    Ascorbic Acid (vitamin C) 1000 MG tablet, Take 1,000 mg by mouth daily, Disp: , Rfl:     atorvastatin (LIPITOR) 10 mg tablet, TAKE 1 TABLET BY MOUTH DAILY AT BEDTIME, Disp: 90 tablet, Rfl: 2     "Blood Glucose Monitoring Suppl (OneTouch Verio) w/Device KIT, Use 2 (two) times a day, Disp: 1 kit, Rfl: 0    Diclofenac Sodium (VOLTAREN) 1 %, Apply 2 g topically 4 (four) times a day as needed, Disp: , Rfl:     Empagliflozin (Jardiance) 10 MG TABS tablet, Take 1 tablet (10 mg total) by mouth daily, Disp: 90 tablet, Rfl: 2    ferrous sulfate 325 (65 Fe) mg tablet, Take 1 tablet (325 mg total) by mouth daily, Disp: 90 tablet, Rfl: 2    glucose blood (OneTouch Verio) test strip, Test BG 2x daily as directed, Disp: 200 strip, Rfl: 3    liraglutide (VICTOZA) injection, Inject 0.1 mL (0.6 mg total) under the skin daily Inject 0.6mg daily for 4 weeks then increase to 1.2mg, Disp: 54 mL, Rfl: 2    metFORMIN (GLUCOPHAGE) 1000 MG tablet, Take 1 tablet (1,000 mg total) by mouth 2 (two) times a day with meals, Disp: 180 tablet, Rfl: 2    omeprazole (PriLOSEC) 20 mg delayed release capsule, Take 1 capsule (20 mg total) by mouth daily, Disp: 90 capsule, Rfl: 2    valsartan (DIOVAN) 320 MG tablet, Take 1 tablet (320 mg total) by mouth daily, Disp: 90 tablet, Rfl: 2    ALLERGIES:  Allergies   Allergen Reactions    Lisinopril Cough    Steri Strip [Medical Tape] Rash       REVIEW OF SYSTEMS:  MSK: right knee pain  Neuro: known peripheral neuropathies  Pertinent items are otherwise noted in HPI.  A comprehensive review of systems was otherwise negative.    LABS:  HgA1c:   Lab Results   Component Value Date    HGBA1C 6.9 (H) 07/31/2024     BMP:   Lab Results   Component Value Date    CALCIUM 9.0 07/31/2024    K 3.5 07/31/2024    CO2 26 07/31/2024     07/31/2024    BUN 14 07/31/2024    CREATININE 0.45 (L) 07/31/2024     CBC: No components found for: \"CBC\"    _____________________________________________________  PHYSICAL EXAMINATION:  Vital signs: Ht 5' 9.5\" (1.765 m)   Wt 113 kg (249 lb)   LMP 07/27/2021 (Approximate)   BMI 36.24 kg/m²   General: No acute distress, awake and alert  Psychiatric: Mood and affect appear " appropriate  HEENT: Trachea Midline, No torticollis, no apparent facial trauma  Cardiovascular: No audible murmurs; Extremities appear perfused  Pulmonary: No audible wheezing or stridor  Skin: No open lesions; see further details (if any) below    MUSCULOSKELETAL EXAMINATION:  Extremities: .    Patient's right knee was exposed inspected.  Patient skin is intact overlying the right knee.  No ecchymosis. Patient has range of motion of the knee from 0 to 115 degrees.   Patient is able to perform a straight leg raise.  Patient has lateral joint line tenderness Patient has no pain over the pes anserine tendons.  Patient has no varus or valgus knee instability.  Negative anterior and posterior drawer testing.  Patient's sensation is intact to light touch in superficial peroneal, deep peroneal, sural, saphenous, plantar nerve distributions and comparable to the contralateral side.  Patient's tibialis anterior, extensor hallux longus and gastrocnemius muscles intact.  The limb is well-perfused.    _____________________________________________________  STUDIES REVIEWED:  I personally reviewed the images and interpretation is as follows:    No new images were obtained today        PROCEDURES PERFORMED:  Large joint arthrocentesis: R knee  Snyder Protocol:  procedure performed by consultantConsent: Verbal consent obtained.  Risks and benefits: risks, benefits and alternatives were discussed  Consent given by: patient  Timeout called at: 9/19/2024 8:42 AM.  Patient understanding: patient states understanding of the procedure being performed  Patient consent: the patient's understanding of the procedure matches consent given  Site marked: the operative site was marked  Radiology Images displayed and confirmed. If images not available, report reviewed: imaging studies available  Patient identity confirmed: verbally with patient  Supporting Documentation  Indications: pain   Procedure Details  Location: knee - R knee  Needle  size: 22 G  Ultrasound guidance: no  Approach: anterolateral  Medications administered: 1 mL methylPREDNISolone acetate 40 mg/mL; 4 mL bupivacaine (PF) 0.5 %    Patient tolerance: patient tolerated the procedure well with no immediate complications  Dressing:  Sterile dressing applied              Scribe Attestation      I,:   am acting as a scribe while in the presence of the attending physician.:       I,:   personally performed the services described in this documentation    as scribed in my presence.:

## 2024-09-29 PROBLEM — R31.29 MICROSCOPIC HEMATURIA: Status: ACTIVE | Noted: 2024-09-29

## 2024-09-29 PROBLEM — N20.0 NEPHROLITHIASIS: Status: ACTIVE | Noted: 2024-09-29

## 2024-09-29 NOTE — PROGRESS NOTES
Referring Physician: Hannah Garcia MD  A copy of this note was sent to the referring physician.       Diagnoses and all orders for this visit:    Microscopic hematuria  -     POCT urine dip  -     Urinalysis with microscopic  -     CT abdomen pelvis w wo contrast; Future  -     Creatinine, serum; Future    Nephrolithiasis  -     POCT urine dip  -     Urinalysis with microscopic  -     CT abdomen pelvis w wo contrast; Future  -     Creatinine, serum; Future    Hematuria, unspecified type  -     POCT urine dip  -     Ambulatory Referral to Urology  -     Urinalysis with microscopic            Assessment and plan:       Microscopic hematuria  -Trace blood is present on urinalysis today  - Prior microscopic evaluation demonstrates 4-10 red blood cells per high-powered field dating back to April 2023  - No recent imaging has been obtained  -    2.History of nephrolithiasis  -Treated with prior ureteroscopy more than 10 years ago currently asymptomatic      Today, we discussed the multifactorial etiology of hematuria. I reviewed images to familiarize the patient with basic anatomy of the urinary tract, and we discussed how hematuria can be a herald sign of multiple genitourinary pathologies. As such, I recommended preceding with a full hematuria workup, including a Urogram protocol CT, a cystoscopy, as well as a urinalysis, urine culture and urine cytology. The patient understands the logic of this approach. The patient will be scheduled for these in the near future. All questions were answered today and there were no perceived barriers to education.    cystoscopy will be scheduled in the near future after completing a CT urogram          Clark Paredes MD      Chief Complaint       Microscopic materia    History of Present Illness     Angelique Aldridge is a 58 y.o. referred in consultation for microscopic materia    Detailed Urologic History     - please refer to HPI    Review of Systems     Review of Systems    Constitutional: Negative for activity change and fatigue.   HENT: Negative for congestion.    Eyes: Negative for visual disturbance.   Respiratory: Negative for shortness of breath and wheezing.    Cardiovascular: Negative for chest pain and leg swelling.   Gastrointestinal: Negative for abdominal pain.   Endocrine: Negative for polyuria.   Genitourinary: Negative for dysuria, flank pain, hematuria and urgency.   Musculoskeletal: Negative for back pain.   Allergic/Immunologic: Negative for immunocompromised state.   Neurological: Negative for dizziness and numbness.   Psychiatric/Behavioral: Negative for dysphoric mood.   All other systems reviewed and are negative.          Allergies     Allergies   Allergen Reactions    Lisinopril Cough    Steri Strip [Medical Tape] Rash       Physical Exam       Physical Exam  Constitutional:       General: He is not in acute distress.     Appearance: He is well-developed.   HENT:      Head: Normocephalic and atraumatic.   Cardiovascular:      Comments: Negative lower extremity edema  Pulmonary:      Effort: Pulmonary effort is normal.      Breath sounds: Normal breath sounds.   Abdominal:      Palpations: Abdomen is soft.   Musculoskeletal:         General: Normal range of motion.      Cervical back: Normal range of motion.   Skin:     General: Skin is warm.   Neurological:      Mental Status: He is alert and oriented to person, place, and time.   Psychiatric:         Behavior: Behavior normal.           Vital Signs  There were no vitals filed for this visit.      Current Medications       Current Outpatient Medications:     ALPRAZolam (XANAX) 0.25 mg tablet, TAKE ONE TABLET BY MOUTH IN THE MORNING, Disp: 30 tablet, Rfl: 0    Ascorbic Acid (vitamin C) 1000 MG tablet, Take 1,000 mg by mouth daily, Disp: , Rfl:     atorvastatin (LIPITOR) 10 mg tablet, TAKE 1 TABLET BY MOUTH DAILY AT BEDTIME, Disp: 90 tablet, Rfl: 2    Blood Glucose Monitoring Suppl (OneTouch Verio) w/Device KIT,  Use 2 (two) times a day, Disp: 1 kit, Rfl: 0    Diclofenac Sodium (VOLTAREN) 1 %, Apply 2 g topically 4 (four) times a day as needed, Disp: , Rfl:     Empagliflozin (Jardiance) 10 MG TABS tablet, Take 1 tablet (10 mg total) by mouth daily, Disp: 90 tablet, Rfl: 2    ferrous sulfate 325 (65 Fe) mg tablet, Take 1 tablet (325 mg total) by mouth daily, Disp: 90 tablet, Rfl: 2    glucose blood (OneTouch Verio) test strip, Test BG 2x daily as directed, Disp: 200 strip, Rfl: 3    liraglutide (VICTOZA) injection, Inject 0.1 mL (0.6 mg total) under the skin daily Inject 0.6mg daily for 4 weeks then increase to 1.2mg, Disp: 54 mL, Rfl: 2    metFORMIN (GLUCOPHAGE) 1000 MG tablet, Take 1 tablet (1,000 mg total) by mouth 2 (two) times a day with meals, Disp: 180 tablet, Rfl: 2    omeprazole (PriLOSEC) 20 mg delayed release capsule, Take 1 capsule (20 mg total) by mouth daily, Disp: 90 capsule, Rfl: 2    valsartan (DIOVAN) 320 MG tablet, Take 1 tablet (320 mg total) by mouth daily, Disp: 90 tablet, Rfl: 2      Active Problems     Patient Active Problem List   Diagnosis    Vulvovaginal candidiasis    Atypical squamous cell changes of undetermined significance (ASCUS) on cervical cytology with negative high risk human papilloma virus (HPV) test result    Type 2 diabetes mellitus without complication, without long-term current use of insulin (HCC)    Essential hypertension    Gastroesophageal reflux disease without esophagitis    History of hepatitis C    Class 2 obesity due to excess calories without serious comorbidity with body mass index (BMI) of 36.0 to 36.9 in adult    Annual physical exam    Chronic pain of both ankles    History of iron deficiency anemia    Thrombocytopenia (HCC)    Atypical ductal hyperplasia of right breast    Thigh numbness    Other proteinuria    Increased risk of breast cancer    Post-menopausal bleeding    Dyslipidemia    Charcot arthropathy of midfoot    Post-menopausal    Stenotic cervical os     Vaginal atrophy    Abnormal EKG    PVC's (premature ventricular contractions)    Anxiety state    Microscopic hematuria    Nephrolithiasis         Past Medical History     Past Medical History:   Diagnosis Date    Allergic     Diabetes mellitus (HCC)     GERD (gastroesophageal reflux disease)     Hepatitis C     Treated    Hypertension          Surgical History     Past Surgical History:   Procedure Laterality Date    ANKLE SURGERY      BREAST BIOPSY Right     core bx-benign    BREAST BIOPSY Right 04/11/2022    BREAST LUMPECTOMY Right 6/30/2022    Procedure: BREAST PAYTON  DIRECTED LUMPECTOMY WITH BRAKETED WIRE;  Surgeon: Paul Hurley MD;  [facility];  Service: Surgical Oncology    COLONOSCOPY  03/2019    MAMMO (HISTORICAL)  02/2019    MAMMO NEEDLE LOCALIZATION LEFT (ALL INC) Right 6/9/2022    MAMMO NEEDLE LOCALIZATION RIGHT (ALL INC) Right 6/30/2022    MAMMO STEREOTACTIC BREAST BIOPSY RIGHT (ALL INC) Right 4/11/2022    [place] HYSTEROSCOPY BX ENDOMETRIUM&/POLYPC W/WO D&C N/A 7/21/2023    Procedure: DILATATION AND CURETTAGE (D&C) WITH HYSTEROSCOPY;  Surgeon: Micah Perez MD;  [facility];  Service: Gynecology    [place] HYSTEROSCOPY BX ENDOMETRIUM&/POLYPC W/WO D&C N/A 7/21/2023    Procedure: POLYPECTOMY;  Surgeon: Micah Perez MD;  [facility];  Service: Gynecology    TUBAL LIGATION      US GUIDANCE  7/21/2023         Family History     Family History   Problem Relation Age of Onset    Breast cancer Mother 70    Hypertension Mother     Diabetes Mother     Hypertension Father     Diabetes Father     Heart failure Father     Dementia Father     Heart disease Father     No Known Problems Sister     No Known Problems Brother     No Known Problems Brother     No Known Problems Brother     No Known Problems Daughter     No Known Problems Son     Hyperlipidemia Maternal Grandmother     Diabetes Maternal Grandmother     Heart attack Maternal Grandfather     Stroke Maternal Grandfather     Arthritis  "Paternal Grandmother     Liver cancer Paternal Grandfather         unknown age         Social History     Social History     Social History     Tobacco Use   Smoking Status Never   Smokeless Tobacco Never         Pertinent Lab Values     Lab Results   Component Value Date    CREATININE 0.45 (L) 07/31/2024       No results found for: \"PSA\"    @RESULTRCNT(1H])@      Pertinent Imaging       No results found.      Portions of the record may have been created with voice recognition software.  Occasional wrong word or \"sound a like\" substitutions may have occurred due to the inherent limitations of voice recognition software.  In addition some of the content generated from this outpatient encounter includes information designed for patient education and/or communication back to the referring provider.  Read the chart carefully and recognize, using context, where substitutions have occurred.    " [Negative] : Heme/Lymph

## 2024-09-30 ENCOUNTER — CONSULT (OUTPATIENT)
Dept: UROLOGY | Facility: CLINIC | Age: 59
End: 2024-09-30
Payer: COMMERCIAL

## 2024-09-30 ENCOUNTER — VBI (OUTPATIENT)
Dept: ADMINISTRATIVE | Facility: OTHER | Age: 59
End: 2024-09-30

## 2024-09-30 VITALS
OXYGEN SATURATION: 93 % | SYSTOLIC BLOOD PRESSURE: 124 MMHG | DIASTOLIC BLOOD PRESSURE: 78 MMHG | WEIGHT: 223.6 LBS | HEIGHT: 70 IN | HEART RATE: 86 BPM | BODY MASS INDEX: 32.01 KG/M2

## 2024-09-30 DIAGNOSIS — R31.29 MICROSCOPIC HEMATURIA: Primary | ICD-10-CM

## 2024-09-30 DIAGNOSIS — R31.9 HEMATURIA, UNSPECIFIED TYPE: ICD-10-CM

## 2024-09-30 DIAGNOSIS — N20.0 NEPHROLITHIASIS: ICD-10-CM

## 2024-09-30 LAB
BACTERIA UR QL AUTO: ABNORMAL /HPF
BILIRUB UR QL STRIP: NEGATIVE
CLARITY UR: CLEAR
COLOR UR: YELLOW
GLUCOSE UR STRIP-MCNC: ABNORMAL MG/DL
HGB UR QL STRIP.AUTO: NEGATIVE
KETONES UR STRIP-MCNC: ABNORMAL MG/DL
LEUKOCYTE ESTERASE UR QL STRIP: ABNORMAL
NITRITE UR QL STRIP: NEGATIVE
NON-SQ EPI CELLS URNS QL MICRO: ABNORMAL /HPF
PH UR STRIP.AUTO: 6.5 [PH]
PROT UR STRIP-MCNC: ABNORMAL MG/DL
RBC #/AREA URNS AUTO: ABNORMAL /HPF
SL AMB  POCT GLUCOSE, UA: 2000
SL AMB LEUKOCYTE ESTERASE,UA: NORMAL
SL AMB POCT BILIRUBIN,UA: NORMAL
SL AMB POCT BLOOD,UA: NORMAL
SL AMB POCT CLARITY,UA: YELLOW
SL AMB POCT COLOR,UA: CLEAR
SL AMB POCT KETONES,UA: NORMAL
SL AMB POCT NITRITE,UA: NORMAL
SL AMB POCT PH,UA: 5
SL AMB POCT SPECIFIC GRAVITY,UA: 1.01
SL AMB POCT URINE PROTEIN: NORMAL
SL AMB POCT UROBILINOGEN: 2
SP GR UR STRIP.AUTO: 1.04 (ref 1–1.03)
UROBILINOGEN UR STRIP-ACNC: 2 MG/DL
WBC #/AREA URNS AUTO: ABNORMAL /HPF

## 2024-09-30 PROCEDURE — 81001 URINALYSIS AUTO W/SCOPE: CPT | Performed by: UROLOGY

## 2024-09-30 PROCEDURE — 99204 OFFICE O/P NEW MOD 45 MIN: CPT | Performed by: UROLOGY

## 2024-09-30 PROCEDURE — 81002 URINALYSIS NONAUTO W/O SCOPE: CPT | Performed by: UROLOGY

## 2024-09-30 NOTE — TELEPHONE ENCOUNTER
09/30/24 1:20 PM     Chart reviewed for Hemoglobin A1c was/were submitted to the patient's insurance.     Agustin Izaguirre MA   [facility]

## 2024-10-15 ENCOUNTER — HOSPITAL ENCOUNTER (OUTPATIENT)
Dept: RADIOLOGY | Age: 59
Discharge: HOME | End: 2024-10-15
Payer: COMMERCIAL

## 2024-10-15 DIAGNOSIS — R31.29 MICROSCOPIC HEMATURIA: ICD-10-CM

## 2024-10-15 DIAGNOSIS — N20.0 NEPHROLITHIASIS: ICD-10-CM

## 2024-10-15 PROCEDURE — 74178 CT ABD&PLV WO CNTR FLWD CNTR: CPT

## 2024-10-15 RX ADMIN — IOHEXOL 100 ML: 350 INJECTION, SOLUTION INTRAVENOUS at 09:12

## 2024-10-21 NOTE — RESULT ENCOUNTER NOTE
Please let the patient know the good news that the CT is negative.  KIdneys and upper urinary tract look good.  SHould followup as scheduled.  Thank you,    ELMER

## 2024-11-29 ENCOUNTER — TELEPHONE (OUTPATIENT)
Dept: FAMILY MEDICINE CLINIC | Facility: CLINIC | Age: 59
End: 2024-11-29

## 2024-11-29 NOTE — TELEPHONE ENCOUNTER
BK     Spoke with ronald - advised to change PCP back to [facility].  She said she will call on Monday - and will let us know.

## 2024-12-04 ENCOUNTER — TELEPHONE (OUTPATIENT)
Age: 59
End: 2024-12-04

## 2024-12-04 DIAGNOSIS — E11.9 TYPE 2 DIABETES MELLITUS WITHOUT COMPLICATION, WITHOUT LONG-TERM CURRENT USE OF INSULIN (HCC): Primary | ICD-10-CM

## 2024-12-04 RX ORDER — DULAGLUTIDE 1.5 MG/.5ML
1.5 INJECTION, SOLUTION SUBCUTANEOUS WEEKLY
Qty: 6 ML | Refills: 3 | Status: SHIPPED | OUTPATIENT
Start: 2024-12-04

## 2024-12-04 NOTE — TELEPHONE ENCOUNTER
Patient called stated she missed a call, advised patient Trulicity was sent to pharmacy. Call got disconnected

## 2024-12-04 NOTE — TELEPHONE ENCOUNTER
Spoke with patient, patient would like to speak with some one in regards to medication question. Please advise.

## 2024-12-04 NOTE — TELEPHONE ENCOUNTER
Spoke to pt and insurance will no longer pay for victoza. They will pay for trulicity. She will need a script sent to the [facility]

## 2025-01-02 DIAGNOSIS — E11.65 TYPE 2 DIABETES MELLITUS WITH HYPERGLYCEMIA, WITHOUT LONG-TERM CURRENT USE OF INSULIN (HCC): ICD-10-CM

## 2025-01-03 RX ORDER — EMPAGLIFLOZIN 10 MG/1
10 TABLET, FILM COATED ORAL DAILY
Qty: 90 TABLET | Refills: 1 | Status: SHIPPED | OUTPATIENT
Start: 2025-01-03

## 2025-01-05 NOTE — ASSESSMENT & PLAN NOTE
Well controlled on current therapy continue with current medications and will reassess next visit    Orders:  •  Comprehensive metabolic panel; Future

## 2025-01-05 NOTE — PROGRESS NOTES
Name: Angelique Aldridge      : 1965      MRN: [id number]  Encounter Provider: Hannah Garcia MD  Encounter Date: 2025   Encounter department: [facility]  :  Assessment & Plan  Essential hypertension  Well controlled on current therapy continue with current medications and will reassess next visit    Orders:  •  Comprehensive metabolic panel; Future    Gastroesophageal reflux disease without esophagitis  Ok on meds       Class 2 obesity due to excess calories without serious comorbidity with body mass index (BMI) of 36.0 to 36.9 in adult  Discussion on diet and exercise guidelines for weight loss and  health reviewed with pt          Thrombocytopenia (HCC)  Chronic and stable       Dyslipidemia  Check lipids       Type 2 diabetes mellitus without complication, without long-term current use of insulin (HCC)  Very well controlled will hold metformin for now and recheck hga1c 4 mo  Lab Results   Component Value Date    HGBA1C 5.9 2025     Orders:  •  Albumin / creatinine urine ratio; Future  •  POCT hemoglobin A1c  •  Hemoglobin A1C; Future    Breast cancer screening by mammogram    Orders:  •  Mammo screening bilateral w 3d and cad; Future    Acquired hypothyroidism                History of Present Illness     Pt is here for interval visit and evaluation of multiple medical problems, review of medications, labs, Health Maintenance and any recent specialty consults        Review of Systems   Constitutional:  Negative for appetite change, chills, fatigue and fever.   Respiratory:  Negative for cough, chest tightness and shortness of breath.    Cardiovascular:  Negative for chest pain, palpitations and leg swelling.   Gastrointestinal:  Negative for abdominal pain, constipation, diarrhea, nausea and vomiting.   Genitourinary:  Negative for difficulty urinating and frequency.   Musculoskeletal:  Negative for arthralgias, back pain, gait problem and neck pain.   Skin:   "Negative for rash.   Neurological:  Negative for dizziness, weakness, light-headedness, numbness and headaches.   Hematological:  Does not bruise/bleed easily.   Psychiatric/Behavioral:  Negative for dysphoric mood and sleep disturbance. The patient is not nervous/anxious.        Objective   /78 (BP Location: Left arm, Patient Position: Sitting, Cuff Size: Standard)   Pulse (!) 47   Temp 98 °F (36.7 °C) (Tympanic)   Resp 16   Ht 5' 9.5\" (1.765 m)   Wt 95.7 kg (211 lb)   LMP 07/27/2021 (Approximate)   SpO2 98%   BMI 30.71 kg/m²      Physical Exam  Constitutional:       General: She is not in acute distress.     Appearance: Normal appearance. She is obese.   Neck:      Thyroid: No thyromegaly.      Vascular: No carotid bruit.   Cardiovascular:      Rate and Rhythm: Normal rate and regular rhythm.      Pulses: no weak pulses.           Dorsalis pedis pulses are 2+ on the right side and 2+ on the left side.      Heart sounds: Normal heart sounds. No murmur heard.  Pulmonary:      Effort: Pulmonary effort is normal. No respiratory distress.      Breath sounds: Normal breath sounds. No wheezing, rhonchi or rales.   Chest:   Breasts:     Right: Normal.      Left: Normal.   Abdominal:      Palpations: Abdomen is soft.      Tenderness: There is no abdominal tenderness.   Musculoskeletal:      Cervical back: Normal range of motion and neck supple.      Right lower leg: No edema.      Left lower leg: No edema.   Feet:      Right foot:      Skin integrity: No ulcer, skin breakdown, erythema, warmth, callus or dry skin.      Left foot:      Skin integrity: No ulcer, skin breakdown, erythema, warmth, callus or dry skin.   Skin:     Findings: No rash.   Neurological:      General: No focal deficit present.      Mental Status: She is alert and oriented to person, place, and time. Mental status is at baseline.      Gait: Gait normal.   Psychiatric:         Mood and Affect: Mood normal.         Behavior: Behavior " normal.     Patient's shoes and socks removed.    Right Foot/Ankle   Right Foot Inspection  Skin Exam: skin normal. Skin not intact, no dry skin, no warmth, no callus, no erythema, no maceration, no abnormal color, no pre-ulcer, no ulcer and no callus.     Toe Exam: ROM and strength within normal limits. No swelling, no tenderness, erythema and  no right toe deformity    Sensory   Vibration: intact  Proprioception: intact  Monofilament testing: intact    Vascular  The right DP pulse is 2+.     Right Toe  - Comprehensive Exam  Ecchymosis: none  Swelling: none   Tenderness: none       Left Foot/Ankle  Left Foot Inspection  Skin Exam: skin normal. Skin not intact, no dry skin, no warmth, no erythema, no maceration, normal color, no pre-ulcer, no ulcer and no callus.     Toe Exam: No swelling, no tenderness, no erythema and no left toe deformity.     Sensory   Vibration: intact  Proprioception: intact  Monofilament testing: intact    Vascular  The left DP pulse is 2+.     Left Toe  - Comprehensive Exam  Ecchymosis: none  Swelling: none   Tenderness: none       Assign Risk Category  No deformity present  No loss of protective sensation  No weak pulses  Risk: 0

## 2025-01-05 NOTE — ASSESSMENT & PLAN NOTE
Very well controlled will hold metformin for now and recheck hga1c 4 mo  Lab Results   Component Value Date    HGBA1C 5.9 01/08/2025     Orders:  •  Albumin / creatinine urine ratio; Future  •  POCT hemoglobin A1c  •  Hemoglobin A1C; Future

## 2025-01-08 ENCOUNTER — APPOINTMENT (OUTPATIENT)
Dept: LAB | Facility: CLINIC | Age: 60
End: 2025-01-08
Payer: COMMERCIAL

## 2025-01-08 ENCOUNTER — OFFICE VISIT (OUTPATIENT)
Dept: FAMILY MEDICINE CLINIC | Facility: CLINIC | Age: 60
End: 2025-01-08
Payer: COMMERCIAL

## 2025-01-08 VITALS
DIASTOLIC BLOOD PRESSURE: 78 MMHG | WEIGHT: 211 LBS | HEIGHT: 70 IN | RESPIRATION RATE: 16 BRPM | HEART RATE: 47 BPM | SYSTOLIC BLOOD PRESSURE: 118 MMHG | TEMPERATURE: 98 F | OXYGEN SATURATION: 98 % | BODY MASS INDEX: 30.21 KG/M2

## 2025-01-08 DIAGNOSIS — I10 ESSENTIAL HYPERTENSION: Primary | ICD-10-CM

## 2025-01-08 DIAGNOSIS — D69.6 THROMBOCYTOPENIA (HCC): ICD-10-CM

## 2025-01-08 DIAGNOSIS — E66.09 CLASS 2 OBESITY DUE TO EXCESS CALORIES WITHOUT SERIOUS COMORBIDITY WITH BODY MASS INDEX (BMI) OF 36.0 TO 36.9 IN ADULT: ICD-10-CM

## 2025-01-08 DIAGNOSIS — E66.812 CLASS 2 OBESITY DUE TO EXCESS CALORIES WITHOUT SERIOUS COMORBIDITY WITH BODY MASS INDEX (BMI) OF 36.0 TO 36.9 IN ADULT: ICD-10-CM

## 2025-01-08 DIAGNOSIS — Z12.31 BREAST CANCER SCREENING BY MAMMOGRAM: ICD-10-CM

## 2025-01-08 DIAGNOSIS — E11.9 TYPE 2 DIABETES MELLITUS WITHOUT COMPLICATION, WITHOUT LONG-TERM CURRENT USE OF INSULIN (HCC): ICD-10-CM

## 2025-01-08 DIAGNOSIS — E03.9 ACQUIRED HYPOTHYROIDISM: ICD-10-CM

## 2025-01-08 DIAGNOSIS — R31.29 MICROSCOPIC HEMATURIA: ICD-10-CM

## 2025-01-08 DIAGNOSIS — N20.0 NEPHROLITHIASIS: ICD-10-CM

## 2025-01-08 DIAGNOSIS — K21.9 GASTROESOPHAGEAL REFLUX DISEASE WITHOUT ESOPHAGITIS: ICD-10-CM

## 2025-01-08 DIAGNOSIS — E78.5 DYSLIPIDEMIA: ICD-10-CM

## 2025-01-08 LAB
BASOPHILS # BLD AUTO: 0.03 THOUSANDS/ΜL (ref 0–0.1)
BASOPHILS NFR BLD AUTO: 1 % (ref 0–1)
CREAT SERPL-MCNC: 0.47 MG/DL (ref 0.6–1.3)
CREAT UR-MCNC: 102.1 MG/DL
EOSINOPHIL # BLD AUTO: 0.04 THOUSAND/ΜL (ref 0–0.61)
EOSINOPHIL NFR BLD AUTO: 1 % (ref 0–6)
ERYTHROCYTE [DISTWIDTH] IN BLOOD BY AUTOMATED COUNT: 13.2 % (ref 11.6–15.1)
GFR SERPL CREATININE-BSD FRML MDRD: 109 ML/MIN/1.73SQ M
HCT VFR BLD AUTO: 41.6 % (ref 34.8–46.1)
HGB BLD-MCNC: 13.7 G/DL (ref 11.5–15.4)
IMM GRANULOCYTES # BLD AUTO: 0.02 THOUSAND/UL (ref 0–0.2)
IMM GRANULOCYTES NFR BLD AUTO: 0 % (ref 0–2)
LYMPHOCYTES # BLD AUTO: 0.9 THOUSANDS/ΜL (ref 0.6–4.47)
LYMPHOCYTES NFR BLD AUTO: 17 % (ref 14–44)
MCH RBC QN AUTO: 30.6 PG (ref 26.8–34.3)
MCHC RBC AUTO-ENTMCNC: 32.9 G/DL (ref 31.4–37.4)
MCV RBC AUTO: 93 FL (ref 82–98)
MICROALBUMIN UR-MCNC: 12 MG/L
MICROALBUMIN/CREAT 24H UR: 12 MG/G CREATININE (ref 0–30)
MONOCYTES # BLD AUTO: 0.41 THOUSAND/ΜL (ref 0.17–1.22)
MONOCYTES NFR BLD AUTO: 8 % (ref 4–12)
NEUTROPHILS # BLD AUTO: 3.91 THOUSANDS/ΜL (ref 1.85–7.62)
NEUTS SEG NFR BLD AUTO: 73 % (ref 43–75)
NRBC BLD AUTO-RTO: 0 /100 WBCS
PLATELET # BLD AUTO: 113 THOUSANDS/UL (ref 149–390)
PLATELET BLD QL SMEAR: ABNORMAL
PMV BLD AUTO: 15 FL (ref 8.9–12.7)
RBC # BLD AUTO: 4.48 MILLION/UL (ref 3.81–5.12)
RBC MORPH BLD: NORMAL
SL AMB POCT HEMOGLOBIN AIC: 5.9 (ref ?–6.5)
WBC # BLD AUTO: 5.31 THOUSAND/UL (ref 4.31–10.16)

## 2025-01-08 PROCEDURE — 85025 COMPLETE CBC W/AUTO DIFF WBC: CPT

## 2025-01-08 PROCEDURE — 36415 COLL VENOUS BLD VENIPUNCTURE: CPT

## 2025-01-08 PROCEDURE — 83036 HEMOGLOBIN GLYCOSYLATED A1C: CPT | Performed by: FAMILY MEDICINE

## 2025-01-08 PROCEDURE — 82043 UR ALBUMIN QUANTITATIVE: CPT

## 2025-01-08 PROCEDURE — 99214 OFFICE O/P EST MOD 30 MIN: CPT | Performed by: FAMILY MEDICINE

## 2025-01-08 PROCEDURE — 82570 ASSAY OF URINE CREATININE: CPT

## 2025-01-08 PROCEDURE — 82565 ASSAY OF CREATININE: CPT

## 2025-01-15 ENCOUNTER — HOSPITAL ENCOUNTER (OUTPATIENT)
Dept: MAMMOGRAPHY | Facility: HOSPITAL | Age: 60
Discharge: HOME | End: 2025-01-15
Attending: FAMILY MEDICINE
Payer: MEDICARE

## 2025-01-15 VITALS — WEIGHT: 211 LBS | HEIGHT: 70 IN | BODY MASS INDEX: 30.21 KG/M2

## 2025-01-15 DIAGNOSIS — Z12.31 BREAST CANCER SCREENING BY MAMMOGRAM: ICD-10-CM

## 2025-01-15 PROCEDURE — 77067 SCR MAMMO BI INCL CAD: CPT

## 2025-01-15 PROCEDURE — 77063 BREAST TOMOSYNTHESIS BI: CPT

## 2025-02-01 DIAGNOSIS — I10 ESSENTIAL HYPERTENSION: ICD-10-CM

## 2025-02-01 DIAGNOSIS — K21.9 GASTROESOPHAGEAL REFLUX DISEASE WITHOUT ESOPHAGITIS: ICD-10-CM

## 2025-02-01 RX ORDER — VALSARTAN 320 MG/1
320 TABLET ORAL DAILY
Qty: 90 TABLET | Refills: 2 | Status: SHIPPED | OUTPATIENT
Start: 2025-02-01

## 2025-02-07 ENCOUNTER — PROCEDURE VISIT (OUTPATIENT)
Dept: UROLOGY | Facility: CLINIC | Age: 60
End: 2025-02-07
Payer: MEDICARE

## 2025-02-07 VITALS
BODY MASS INDEX: 30.51 KG/M2 | HEART RATE: 77 BPM | SYSTOLIC BLOOD PRESSURE: 138 MMHG | OXYGEN SATURATION: 98 % | DIASTOLIC BLOOD PRESSURE: 88 MMHG | WEIGHT: 206 LBS | HEIGHT: 69 IN

## 2025-02-07 DIAGNOSIS — R31.29 MICROSCOPIC HEMATURIA: Primary | ICD-10-CM

## 2025-02-07 LAB
SL AMB  POCT GLUCOSE, UA: NORMAL
SL AMB LEUKOCYTE ESTERASE,UA: NORMAL
SL AMB POCT BILIRUBIN,UA: NORMAL
SL AMB POCT BLOOD,UA: NORMAL
SL AMB POCT KETONES,UA: NORMAL
SL AMB POCT NITRITE,UA: NORMAL
SL AMB POCT PH,UA: 6
SL AMB POCT SPECIFIC GRAVITY,UA: 1.01
SL AMB POCT URINE PROTEIN: NORMAL
SL AMB POCT UROBILINOGEN: NORMAL

## 2025-02-07 PROCEDURE — 81002 URINALYSIS NONAUTO W/O SCOPE: CPT | Performed by: UROLOGY

## 2025-02-07 PROCEDURE — 52000 CYSTOURETHROSCOPY: CPT | Performed by: UROLOGY

## 2025-02-07 NOTE — PROGRESS NOTES
Cystoscopy     Date/Time  2/7/2025 10:30 AM     Performed by  Clark Paredes MD   Authorized by  Clark Paredes MD          Office Cystoscopy Procedure Note    Indication:    Hematuria    Urogram October 2024: Normal    Informed consent   The risks, benefits, complications, treatment options, and expected outcomes were discussed with the patient. The patient concurred with the proposed plan and provided informed consent.    Anesthesia  Lidocaine jelly 2%      Procedure  In the presence of a female nurse, the patient was placed in the supine frog-legged position, was prepped and draped in the usual manner using sterile technique, and 2% lidocaine jelly instilled into the urethra.  A 17 F flexible cystoscope was then inserted into the urethra and the urethra and bladder carefully examined.  The following findings were noted:    Findings:  Urethra:  Normal  Bladder:  Normal  Ureteral orifices:  Normal  Other findings:  None     Specimens: None                 Complications:    None; patient tolerated the procedure well           Disposition: To home after 30 minute observation.           Condition: Stable    Plan:   The patient has now completed a full hematuria evaluation including cystoscopy an appropriate upper tract imaging.  Thankfully there are no signs of genitourinary pathology nor certainly any signs of malignancy.  At this point the patient will follow up with Urology on an as-needed basis.  I do recommend annual urinalysis to be obtained by the patient's primary care physician.  If the patient were to have persistent hematuria over a 5 year duration, they should be referred back for repeat evaluation.  Happy to see the patient back in sooner should the need arise.

## 2025-03-20 ENCOUNTER — OFFICE VISIT (OUTPATIENT)
Dept: OBGYN CLINIC | Facility: CLINIC | Age: 60
End: 2025-03-20
Payer: MEDICARE

## 2025-03-20 VITALS — WEIGHT: 206 LBS | BODY MASS INDEX: 30.51 KG/M2 | HEIGHT: 69 IN

## 2025-03-20 DIAGNOSIS — M17.11 PRIMARY OSTEOARTHRITIS OF RIGHT KNEE: Primary | ICD-10-CM

## 2025-03-20 PROCEDURE — 20610 DRAIN/INJ JOINT/BURSA W/O US: CPT | Performed by: STUDENT IN AN ORGANIZED HEALTH CARE EDUCATION/TRAINING PROGRAM

## 2025-03-20 PROCEDURE — 99214 OFFICE O/P EST MOD 30 MIN: CPT | Performed by: STUDENT IN AN ORGANIZED HEALTH CARE EDUCATION/TRAINING PROGRAM

## 2025-03-20 RX ORDER — BUPIVACAINE HYDROCHLORIDE 2.5 MG/ML
4 INJECTION, SOLUTION INFILTRATION; PERINEURAL
Status: COMPLETED | OUTPATIENT
Start: 2025-03-20 | End: 2025-03-20

## 2025-03-20 RX ORDER — METHYLPREDNISOLONE ACETATE 40 MG/ML
1 INJECTION, SUSPENSION INTRA-ARTICULAR; INTRALESIONAL; INTRAMUSCULAR; SOFT TISSUE
Status: COMPLETED | OUTPATIENT
Start: 2025-03-20 | End: 2025-03-20

## 2025-03-20 RX ADMIN — METHYLPREDNISOLONE ACETATE 1 ML: 40 INJECTION, SUSPENSION INTRA-ARTICULAR; INTRALESIONAL; INTRAMUSCULAR; SOFT TISSUE at 16:00

## 2025-03-20 RX ADMIN — BUPIVACAINE HYDROCHLORIDE 4 ML: 2.5 INJECTION, SOLUTION INFILTRATION; PERINEURAL at 16:00

## 2025-03-20 NOTE — PROGRESS NOTES
Orthopaedics Office Visit - Follow up Patient Visit    ASSESSMENT/PLAN:    Assessment:   1.  56 yo female with right knee osteoarthritis, severe patellofemoral osteoarthritis  2. Right shoulder impingement  3. Right deltoid tendonitis    Plan:  Weightbearing as tolerated right upper and lower extremities   Range of motion as tolerated to right lower extremities  Activity as tolerated right lower extremity  Patient received right knee steroid injection today. Patient tolerated the procedure well. Post injection instructions reviewed.  Discussed with the patient that the corticosteroid injection can temporarily increase her blood sugar results and instructed to perform tight glycemic control over the next 72 hours.  Discussion was had with the patient about nonoperative versus operative management for knee osteoarthritis.  We discussed nonoperative management in the forms of anti-inflammatory medications, activity modifications, corticosteroid, viscosupplementation.  Also discussed that if these methods do not successful the surgical option would be a total knee arthroplasty.  The patient would like to continue with conservative management options at this time     May apply Voltaren gel to the painful area up to 4 times a day around the knee and deltoid region   May wear Lidocaine patch as needed for pain to the right knee  Patient can continue to be weightbearing as tolerated activity as tolerated       _____________________________________________________  CHIEF COMPLAINT:  Chief Complaint   Patient presents with    Right Knee - Follow-up         SUBJECTIVE:  11/7/2022: Angelique Aldridge is a 57 y.o. female who presents who presents today for right knee pain.  She was referred by her PCP .  She states she is been having right knee pain for the last 1-2 weeks.  She denies any injuries.  She states she is been having pain over the anterior ( deep) and medial aspect of the right knee.  She states at that time  she was having trouble weight-bearing and was icing  and elevate and taking Tylenol for pain relief.  [facility] had x-rays taken of the right knee which showed no acute fracture dislocation but severe patellofemoral arthritis.  She was  prescribed a Medrol Dosepak and she states as she is tapering down the pain is coming back ( last dosage today). Denies instability.  She is having pain with flexion of the knee at rest. transitional positions and walking. She also notes her extension is limited. She was previously walking 1-2 miles a day for exercise.  SHe is currently taking Tylenol as needed for pain relief.  She denies any numbness or tingling. She has no history of having any treatments on the right knee.  Patient is a diabetic .  She states she is currently working with her PCP on changing her diabetic medications for better control.    Interval history 1/23/2023:  Angelique Aldridge is a 59 y.o. female who presents for follow up evaluation of Right knee pain. She received a one-dose Duralane injection 12/19/2022 which provided her some relief of her symptoms. She reports three falls directly onto the right knee since her last visit, most recently in January.  She states that the first fall was from tripping over a rock, second fall was from wet floor, and the third fall was over a step.  Denies inability to bear weight after these falls. Her daughter, who presents on the phone for this visit, states she is concerned that her mother has increasing general instability causing these falls, unsure if due to diagnosed neuropathies or another cause. Notes that she has occasional episodes of swelling of the knee that worsens throughout the day. Her pain is aggravated with increased activity and relieved with rest. She also notes bilateral numbness of the thigh located laterally and anterior. Taking ice and Tylenol as needed for pain relief.     Interval History 3/7/23:  Patient reports  today with her mother. Patient states her knee pain has persistently worsened since her last visit. Patient received right knee steroid injection on 11/7/22 and reports some relief. She then has right knee single VS injection on 12/19/22 and reports some additional relief. Overall patient states that neither injection took her pain away completely. She notes persistent pain over the anteromedial aspect of the knee. Patient reports swelling as well. Pain is worsened with weight bearing. She notes crunching and clicking in the knee and feels her knee can be unstable. Patient is taking meloxicam and applying lidocaine patches. Patient notes some improvement in her symptoms after application of ice. She has been doing aqua therapy but does not note any improvement in her symptoms. Patient states she has not had any falls since her last visit. Patient is hoping to proceed with right total knee replacement. She is not a smoker. Patient is diabetic, with most recent A1C 7.6.     Interval history 6/12/23   Patient presents today follow up for chronic right knee pain due to osteoarthritis. Patient had a right knee steroid injection on 3/7/23 with relief. She states her right knee has been doing better since retiring from work. She notes occasional anterior right knee pain with weightbearing. She notes for the last month and a half she has been having pain over lateral biceps region. She notes pain with lifting over reaching backwards. She is using Voltaren gel and and lidocaine patches  for pain relief. She denies numbness or paresthesias.      Interval history 8/7/23  Patent reports that her right knee pain is improved, but that she has continued to have right shoulder/arm pain for the past 1.5 months that have not improved with voltaren gel or NSAIDs.  She has not had formal physical therapy. She reports she does not have any numbness or tingling of the right upper extremity.  Pain is localized over the deltoid insertion.   There is no pain surrounding the shoulder girdle.  With abduction the pain is localized to the midshaft of the humerus right overlying the deltoid insertion.  Reproduced with palpation over the deltoid    Interval history 9/12/2023  Patient reports today for follow-up evaluation of her right knee severe osteoarthritis.  She had a right knee steroid injection on 6/12/2023 with relief. She states she has been doing less activity as she is no longer working. She reports her right knee pain is a long the anterior aspect of the knee today with weightbearing. She reports her shoulder ROM is improving as she goes to PT. She is interested in a CSI of her right knee today.     Interval history 4/30/24  Pt presents or follow-up evaluation of her right knee severe osteoarthritis. She states her knee pain began to flare up about a week ago. She had a right knee steroid injection on 9/12/2023 with relief. She states her pain has returned and is located along the anterior aspect of the knee exacerbated with weightbearing and relieved with rest.  She denies any numbness or paresthesias in the right lower extremity.  She is interested in a repeat corticosteroid injection of the right knee today.    Interval history 9/19/2024  Patient presents for follow-up evaluation of her right knee severe osteoarthritis.  She states her pain began to flareup about couple weeks ago.  Her last right knee corticosteroid injection was performed on 4/30/2024 which offered her approximately 4 months relief of her symptoms.  She states most of her pain is over the anterior medial aspect of the knee exacerbated with weightbearing and movement and relieved with rest.  She denies any numbness or paresthesias of the right lower extremity and she would like to discuss repeat corticosteroid injection today.      History 3/20/2025    Patient is a 59-year-old female has been treated for right knee osteoarthritis.  The patient had a previous steroid injection  performed approximately 6 months ago.  She had significant relief from the corticosteroid injection and then over the past several weeks has had a slight uptake in the amount of pain.  She has stiffness after periods of immobilization.  Pain at the end of the day with some soreness.  No new injuries no new traumas.  No numbness or paresthesias in the lower extremity.    PAST MEDICAL HISTORY:  Past Medical History:   Diagnosis Date    Allergic     Diabetes mellitus (HCC)     GERD (gastroesophageal reflux disease)     Hepatitis C     Treated    Hypertension        PAST SURGICAL HISTORY:  Past Surgical History:   Procedure Laterality Date    ANKLE SURGERY      BREAST BIOPSY Right     core bx-benign    BREAST BIOPSY Right 04/11/2022    BREAST LUMPECTOMY Right 06/30/2022    Procedure: BREAST PAYTON  DIRECTED LUMPECTOMY WITH BRAKETED WIRE;  Surgeon: Paul Hurley MD;  Location: [facility];  Service: Surgical Oncology    COLONOSCOPY  03/2019    MAMMO (HISTORICAL)  02/2019    MAMMO NEEDLE LOCALIZATION LEFT (ALL INC) Right 06/09/2022    MAMMO NEEDLE LOCALIZATION RIGHT (ALL INC) Right 06/30/2022    MAMMO STEREOTACTIC BREAST BIOPSY RIGHT (ALL INC) Right 04/11/2022    DC HYSTEROSCOPY BX ENDOMETRIUM&/POLYPC W/WO D&C N/A 07/21/2023    Procedure: DILATATION AND CURETTAGE (D&C) WITH HYSTEROSCOPY;  Surgeon: Micah Perez MD;  Location: [facility];  Service: Gynecology    DC HYSTEROSCOPY BX ENDOMETRIUM&/POLYPC W/WO D&C N/A 07/21/2023    Procedure: POLYPECTOMY;  Surgeon: Micah Perez MD;  Location: [facility];  Service: Gynecology    TUBAL LIGATION      US GUIDANCE  07/21/2023       FAMILY HISTORY:  Family History   Problem Relation Age of Onset    Breast cancer Mother 70        kidney and bone    Hypertension Mother     Diabetes Mother     Hypertension Father     Diabetes Father     Heart failure Father     Dementia Father     Heart disease Father     No Known Problems Sister     No Known Problems Daughter     Hyperlipidemia  Maternal Grandmother     Diabetes Maternal Grandmother     Heart attack Maternal Grandfather     Stroke Maternal Grandfather     Arthritis Paternal Grandmother     Liver cancer Paternal Grandfather         unknown age    No Known Problems Brother     No Known Problems Brother     No Known Problems Brother     No Known Problems Son     No Known Problems Maternal Aunt        SOCIAL HISTORY:  Social History     Tobacco Use    Smoking status: Never    Smokeless tobacco: Never   Vaping Use    Vaping status: Never Used   Substance Use Topics    Alcohol use: Yes     Alcohol/week: 1.0 standard drink of alcohol     Types: 1 Glasses of wine per week     Comment: socially    Drug use: Never       MEDICATIONS:    Current Outpatient Medications:     ALPRAZolam (XANAX) 0.25 mg tablet, TAKE ONE TABLET BY MOUTH IN THE MORNING, Disp: 30 tablet, Rfl: 0    Ascorbic Acid (vitamin C) 1000 MG tablet, Take 1,000 mg by mouth daily, Disp: , Rfl:     atorvastatin (LIPITOR) 10 mg tablet, TAKE 1 TABLET BY MOUTH DAILY AT BEDTIME, Disp: 90 tablet, Rfl: 2    Blood Glucose Monitoring Suppl (OneTouch Verio) w/Device KIT, Use 2 (two) times a day, Disp: 1 kit, Rfl: 0    Diclofenac Sodium (VOLTAREN) 1 %, Apply 2 g topically 4 (four) times a day as needed, Disp: , Rfl:     Dulaglutide (Trulicity) 1.5 MG/0.5ML SOAJ, Inject 1.5 mg under the skin once a week, Disp: 6 mL, Rfl: 3    Empagliflozin (Jardiance) 10 MG TABS tablet, TAKE ONE TABLET BY MOUTH EVERY DAY, Disp: 90 tablet, Rfl: 1    ferrous sulfate 325 (65 Fe) mg tablet, Take 1 tablet (325 mg total) by mouth daily, Disp: 90 tablet, Rfl: 2    glucose blood (OneTouch Verio) test strip, Test BG 2x daily as directed, Disp: 200 strip, Rfl: 3    omeprazole (PriLOSEC) 20 mg delayed release capsule, TAKE ONE CAPSULE BY MOUTH EVERY DAY, Disp: 90 capsule, Rfl: 2    valsartan (DIOVAN) 320 MG tablet, TAKE ONE TABLET BY MOUTH EVERY DAY, Disp: 90 tablet, Rfl: 2    ALLERGIES:  Allergies   Allergen Reactions     "Lisinopril Cough    Steri Strip [Medical Tape] Rash       REVIEW OF SYSTEMS:  MSK: right knee pain  Neuro: known peripheral neuropathies  Pertinent items are otherwise noted in HPI.  A comprehensive review of systems was otherwise negative.    LABS:  HgA1c:   Lab Results   Component Value Date    HGBA1C 5.9 01/08/2025     BMP:   Lab Results   Component Value Date    CALCIUM 9.0 07/31/2024    K 3.5 07/31/2024    CO2 26 07/31/2024     07/31/2024    BUN 14 07/31/2024    CREATININE 0.47 (L) 01/08/2025     CBC: No components found for: \"CBC\"    _____________________________________________________  PHYSICAL EXAMINATION:  Vital signs: Ht 5' 9\" (1.753 m)   Wt 93.4 kg (206 lb)   LMP 07/27/2021 (Approximate)   BMI 30.42 kg/m²   General: No acute distress, awake and alert  Psychiatric: Mood and affect appear appropriate  HEENT: Trachea Midline, No torticollis, no apparent facial trauma  Cardiovascular: No audible murmurs; Extremities appear perfused  Pulmonary: No audible wheezing or stridor  Skin: No open lesions; see further details (if any) below    MUSCULOSKELETAL EXAMINATION:  Extremities: .    Patient's right knee was exposed inspected.  Patient skin is intact overlying the right knee.  No ecchymosis. Patient has range of motion of the knee from 0 to 115 degrees.   Patient is able to perform a straight leg raise.  Patient has lateral joint line tenderness Patient has no pain over the pes anserine tendons.  Patient has no varus or valgus knee instability.  Negative anterior and posterior drawer testing.  Patient's sensation is intact to light touch in superficial peroneal, deep peroneal, sural, saphenous, plantar nerve distributions and comparable to the contralateral side.  Patient's tibialis anterior, extensor hallux longus and gastrocnemius muscles intact.  The limb is well-perfused.    _____________________________________________________  STUDIES REVIEWED:  I personally reviewed the images and " interpretation is as follows:    No new images were obtained today        PROCEDURES PERFORMED:    Large joint arthrocentesis: R knee  [facility] Protocol:  procedure performed by consultantConsent: Verbal consent obtained.  Risks and benefits: risks, benefits and alternatives were discussed  Consent given by: patient  Patient understanding: patient states understanding of the procedure being performed  Patient consent: the patient's understanding of the procedure matches consent given  Site marked: the operative site was marked  Radiology Images displayed and confirmed. If images not available, report reviewed: imaging studies available  Patient identity confirmed: verbally with patient  Supporting Documentation  Indications: pain   Procedure Details  Location: knee - R knee  Preparation: Patient was prepped and draped in the usual sterile fashion  Needle size: 22 G  Ultrasound guidance: no  Approach: anterolateral  Medications administered: 4 mL bupivacaine 0.25 %; 1 mL methylPREDNISolone acetate 40 mg/mL    Patient tolerance: patient tolerated the procedure well with no immediate complications  Dressing:  Sterile dressing applied                Scribe Attestation      I,:   am acting as a scribe while in the presence of the attending physician.:       I,:   personally performed the services described in this documentation    as scribed in my presence.:              none

## 2025-04-02 ENCOUNTER — RA CDI HCC (OUTPATIENT)
Dept: OTHER | Facility: HOSPITAL | Age: 60
End: 2025-04-02

## 2025-04-02 PROBLEM — Z00.00 MEDICARE ANNUAL WELLNESS VISIT, INITIAL: Status: ACTIVE | Noted: 2025-04-02

## 2025-04-02 PROBLEM — B37.31 VULVOVAGINAL CANDIDIASIS: Status: RESOLVED | Noted: 2019-02-18 | Resolved: 2025-04-02

## 2025-04-02 PROBLEM — R20.0 THIGH NUMBNESS: Status: RESOLVED | Noted: 2022-06-13 | Resolved: 2025-04-02

## 2025-04-02 NOTE — PROGRESS NOTES
Name: Angelique Aldridge      : 1965      MRN: [id number]  Encounter Provider: Hannah Garcia MD  Encounter Date: 2025   Encounter department: [facility] MEDICINE  :  Assessment & Plan  Medicare annual wellness visit, initial  Reviewed  and diagnostics ordered   Orders:  •  POCT ECG    Thrombocytopenia (HCC)  Chronic and stable        Essential hypertension  Well controlled on current therapy continue with current medications and will reassess next visit         Gastroesophageal reflux disease without esophagitis  Ok  on meds        Diabetic polyneuropathy associated with type 2 diabetes mellitus (HCC)    Lab Results   Component Value Date    HGBA1C 5.6 2025            Type 2 diabetes mellitus with diabetic neuropathy, with long-term current use of insulin (HCC)    Lab Results   Component Value Date    HGBA1C 5.6 2025   Pt able to exercise                History of Present Illness   Patient here for medicare wellness Pt is here for interval visit and evaluation of multiple medical problems, review of medications, labs, Health Maintenance and any recent specialty consults        Review of Systems   Constitutional:  Negative for appetite change, chills, fatigue and fever.   Respiratory:  Negative for cough, chest tightness and shortness of breath.    Cardiovascular:  Negative for chest pain, palpitations and leg swelling.   Gastrointestinal:  Negative for abdominal pain, constipation, diarrhea, nausea and vomiting.   Genitourinary:  Negative for difficulty urinating and frequency.   Musculoskeletal:  Negative for arthralgias, back pain, gait problem and neck pain.   Skin:  Negative for rash.   Neurological:  Negative for dizziness, weakness, light-headedness, numbness and headaches.   Hematological:  Does not bruise/bleed easily.   Psychiatric/Behavioral:  Negative for dysphoric mood and sleep disturbance. The patient is not nervous/anxious.        Objective   /78 (BP  "Location: Left arm, Patient Position: Sitting, Cuff Size: Standard)   Pulse 88   Temp 98.3 °F (36.8 °C) (Tympanic)   Resp 16   Ht 5' 9\" (1.753 m)   Wt 97.5 kg (215 lb)   LMP 07/27/2021 (Approximate)   SpO2 97%   BMI 31.75 kg/m²      Physical Exam  Constitutional:       General: She is not in acute distress.     Appearance: Normal appearance. She is normal weight.   Neck:      Thyroid: No thyromegaly.      Vascular: No carotid bruit.   Cardiovascular:      Rate and Rhythm: Normal rate and regular rhythm.      Heart sounds: Normal heart sounds. No murmur heard.  Pulmonary:      Effort: Pulmonary effort is normal. No respiratory distress.      Breath sounds: Normal breath sounds. No wheezing, rhonchi or rales.   Abdominal:      Palpations: Abdomen is soft.      Tenderness: There is no abdominal tenderness.   Musculoskeletal:      Cervical back: Normal range of motion and neck supple.      Right lower leg: No edema.      Left lower leg: No edema.   Skin:     Findings: No rash.   Neurological:      General: No focal deficit present.      Mental Status: She is alert and oriented to person, place, and time. Mental status is at baseline.      Gait: Gait normal.   Psychiatric:         Mood and Affect: Mood normal.         Behavior: Behavior normal.         Answers submitted by the patient for this visit:  Annual Physical (Submitted on 4/8/2025)  Sleep choices: sleeps well  Hearing choices: normal hearing right ear, normal hearing left ear, normal hearing bilateral ears  Vision choices: no vision problems  Dental choices: brushes teeth twice daily  Do you currently have an OB/GYN provider that you routinely follow with?: No  Menopausal status: postmenopausal  Any history of sexual transmitted disease/infection?: No  Do you have an advance directive/living will?: No  Do you have a durable power of  (POA)?: No    "

## 2025-04-07 ENCOUNTER — APPOINTMENT (OUTPATIENT)
Dept: LAB | Facility: CLINIC | Age: 60
End: 2025-04-07
Payer: MEDICARE

## 2025-04-07 DIAGNOSIS — E11.9 TYPE 2 DIABETES MELLITUS WITHOUT COMPLICATION, WITHOUT LONG-TERM CURRENT USE OF INSULIN (HCC): ICD-10-CM

## 2025-04-07 DIAGNOSIS — I10 ESSENTIAL HYPERTENSION: ICD-10-CM

## 2025-04-07 LAB
ALBUMIN SERPL BCG-MCNC: 4.2 G/DL (ref 3.5–5)
ALP SERPL-CCNC: 77 U/L (ref 34–104)
ALT SERPL W P-5'-P-CCNC: 7 U/L (ref 7–52)
ANION GAP SERPL CALCULATED.3IONS-SCNC: 6 MMOL/L (ref 4–13)
AST SERPL W P-5'-P-CCNC: 16 U/L (ref 13–39)
BILIRUB SERPL-MCNC: 0.48 MG/DL (ref 0.2–1)
BUN SERPL-MCNC: 17 MG/DL (ref 5–25)
CALCIUM SERPL-MCNC: 9.2 MG/DL (ref 8.4–10.2)
CHLORIDE SERPL-SCNC: 102 MMOL/L (ref 96–108)
CO2 SERPL-SCNC: 30 MMOL/L (ref 21–32)
CREAT SERPL-MCNC: 0.42 MG/DL (ref 0.6–1.3)
EST. AVERAGE GLUCOSE BLD GHB EST-MCNC: 114 MG/DL
GFR SERPL CREATININE-BSD FRML MDRD: 114 ML/MIN/1.73SQ M
GLUCOSE P FAST SERPL-MCNC: 108 MG/DL (ref 65–99)
HBA1C MFR BLD: 5.6 %
POTASSIUM SERPL-SCNC: 3.9 MMOL/L (ref 3.5–5.3)
PROT SERPL-MCNC: 6.9 G/DL (ref 6.4–8.4)
SODIUM SERPL-SCNC: 138 MMOL/L (ref 135–147)

## 2025-04-07 PROCEDURE — 36415 COLL VENOUS BLD VENIPUNCTURE: CPT

## 2025-04-07 PROCEDURE — 80053 COMPREHEN METABOLIC PANEL: CPT

## 2025-04-07 PROCEDURE — 83036 HEMOGLOBIN GLYCOSYLATED A1C: CPT

## 2025-04-09 ENCOUNTER — OFFICE VISIT (OUTPATIENT)
Dept: FAMILY MEDICINE CLINIC | Facility: CLINIC | Age: 60
End: 2025-04-09
Payer: MEDICARE

## 2025-04-09 VITALS
WEIGHT: 215 LBS | SYSTOLIC BLOOD PRESSURE: 118 MMHG | TEMPERATURE: 98.3 F | OXYGEN SATURATION: 97 % | DIASTOLIC BLOOD PRESSURE: 78 MMHG | HEART RATE: 88 BPM | BODY MASS INDEX: 31.84 KG/M2 | HEIGHT: 69 IN | RESPIRATION RATE: 16 BRPM

## 2025-04-09 DIAGNOSIS — D69.6 THROMBOCYTOPENIA (HCC): ICD-10-CM

## 2025-04-09 DIAGNOSIS — Z79.4 TYPE 2 DIABETES MELLITUS WITH DIABETIC NEUROPATHY, WITH LONG-TERM CURRENT USE OF INSULIN (HCC): ICD-10-CM

## 2025-04-09 DIAGNOSIS — Z00.00 MEDICARE ANNUAL WELLNESS VISIT, INITIAL: Primary | ICD-10-CM

## 2025-04-09 DIAGNOSIS — K21.9 GASTROESOPHAGEAL REFLUX DISEASE WITHOUT ESOPHAGITIS: ICD-10-CM

## 2025-04-09 DIAGNOSIS — E11.40 TYPE 2 DIABETES MELLITUS WITH DIABETIC NEUROPATHY, WITH LONG-TERM CURRENT USE OF INSULIN (HCC): ICD-10-CM

## 2025-04-09 DIAGNOSIS — I10 ESSENTIAL HYPERTENSION: ICD-10-CM

## 2025-04-09 DIAGNOSIS — E11.42 DIABETIC POLYNEUROPATHY ASSOCIATED WITH TYPE 2 DIABETES MELLITUS (HCC): ICD-10-CM

## 2025-04-09 PROCEDURE — G0438 PPPS, INITIAL VISIT: HCPCS | Performed by: FAMILY MEDICINE

## 2025-04-09 PROCEDURE — 99214 OFFICE O/P EST MOD 30 MIN: CPT | Performed by: FAMILY MEDICINE

## 2025-04-09 PROCEDURE — 93000 ELECTROCARDIOGRAM COMPLETE: CPT | Performed by: FAMILY MEDICINE

## 2025-04-09 PROCEDURE — G2211 COMPLEX E/M VISIT ADD ON: HCPCS | Performed by: FAMILY MEDICINE

## 2025-04-09 NOTE — PROGRESS NOTES
Name: Angelique Aldridge      : 1965      MRN: [id number]  Encounter Provider: Hannah Garcia MD  Encounter Date: 2025   Encounter department: [facility] FAMILY MEDICINE  :  Assessment & Plan  Medicare annual wellness visit, initial  Reviewed  and diagnostics ordered   Orders:  •  POCT ECG    Thrombocytopenia (HCC)  Chronic and stable        Essential hypertension  Well controlled on current therapy continue with current medications and will reassess next visit         Gastroesophageal reflux disease without esophagitis  Ok  on meds        Diabetic polyneuropathy associated with type 2 diabetes mellitus (HCC)    Lab Results   Component Value Date    HGBA1C 5.6 2025            Type 2 diabetes mellitus with diabetic neuropathy, with long-term current use of insulin (HCC)    Lab Results   Component Value Date    HGBA1C 5.6 2025               Preventive health issues were discussed with patient, and age appropriate screening tests were ordered as noted in patient's After Visit Summary. Personalized health advice and appropriate referrals for health education or preventive services given if needed, as noted in patient's After Visit Summary.    History of Present Illness     HPI   Patient Care Team:  Hannah Garcia MD as PCP - General (Family Medicine)  Micah Perez MD as PCP - OBGYN (Obstetrics and Gynecology)  Kinza Corcoran MD as PCP - PCP-[employer] (RTE)  MD Nina Yu CRNP (Obstetrics and Gynecology)  Paul Hurley MD (Inactive) as Surgeon (Surgical Oncology)  Aislinn Sprague RD as Diabetes Educator (Diabetes Services)  ILDEFONSO Alan as Nurse Practitioner (Surgical Oncology)    Review of Systems  Medical History Reviewed by provider this encounter:       Annual Wellness Visit Questionnaire   Angelique is here for her Welcome to Medicare visit.     Health Risk Assessment:   Patient rates overall health as good. Patient  feels that their physical health rating is much better. Patient is very satisfied with their life. Eyesight was rated as same. Hearing was rated as same. Patient feels that their emotional and mental health rating is much better. Patients states they are never, rarely angry. Patient states they are never, rarely unusually tired/fatigued. Pain experienced in the last 7 days has been none. Patient states that she has experienced no weight loss or gain in last 6 months.     Depression Screening:   PHQ-2 Score: 0      Fall Risk Screening:   In the past year, patient has experienced: no history of falling in past year      Urinary Incontinence Screening:   Patient has leaked urine accidently in the last six months.     Home Safety:  Patient does not have trouble with stairs inside or outside of their home. Patient has working smoke alarms and has working carbon monoxide detector. Home safety hazards include: none.     Nutrition:   Current diet is Regular.     Medications:   Patient is currently taking over-the-counter supplements. OTC medications include: see medication list. Patient is able to manage medications.     Activities of Daily Living (ADLs)/Instrumental Activities of Daily Living (IADLs):   Walk and transfer into and out of bed and chair?: Yes  Dress and groom yourself?: Yes    Bathe or shower yourself?: Yes    Feed yourself? Yes  Do your laundry/housekeeping?: Yes  Manage your money, pay your bills and track your expenses?: Yes  Make your own meals?: Yes    Do your own shopping?: Yes    Previous Hospitalizations:   Any hospitalizations or ED visits within the last 12 months?: No      Advance Care Planning:   Living will: No    Durable POA for healthcare: No    Advanced directive: No      Cognitive Screening:   Provider or family/friend/caregiver concerned regarding cognition?: No    Preventive Screenings      Cardiovascular Screening:    General: History Lipid Disorder and Screening Current      Diabetes  Screening:     General: Screening Not Indicated, History Diabetes and Screening Current      Colorectal Cancer Screening:     General: Screening Current      Breast Cancer Screening:     General: Screening Current      Cervical Cancer Screening:    General: Screening Current      Osteoporosis Screening:    General: Screening Not Indicated      Abdominal Aortic Aneurysm (AAA) Screening:        General: Screening Not Indicated      Lung Cancer Screening:     General: Screening Not Indicated      Hepatitis C Screening:    General: Screening Not Indicated and History Hepatitis C    Immunizations:  - Immunizations due: Prevnar 20 and Zoster (Shingrix)    Screening, Brief Intervention, and Referral to Treatment (SBIRT)     Screening      AUDIT-C Screenin) How often did you have a drink containing alcohol in the past year? never  2) How many drinks did you have on a typical day when you were drinking in the past year? 0  3) How often did you have 6 or more drinks on one occasion in the past year? never    AUDIT-C Score: 0  Interpretation: Score 0-2 (female): Negative screen for alcohol misuse    Single Item Drug Screening:  How often have you used an illegal drug (including marijuana) or a prescription medication for non-medical reasons in the past year? never    Single Item Drug Screen Score: 0  Interpretation: Negative screen for possible drug use disorder    Social Drivers of Health     Food Insecurity: Patient Declined (2025)    Hunger Vital Sign    • Worried About Running Out of Food in the Last Year: Patient declined    • Ran Out of Food in the Last Year: Patient declined   Transportation Needs: Patient Declined (2025)    PRAPARE - Transportation    • Lack of Transportation (Medical): Patient declined    • Lack of Transportation (Non-Medical): Patient declined   Housing Stability: Patient Declined (2025)    Housing Stability Vital Sign    • Unable to Pay for Housing in the Last Year: Patient declined  "   • Homeless in the Last Year: Patient declined   Utilities: Patient Declined (4/8/2025)    [facility] Utilities    • Threatened with loss of utilities: Patient declined     No results found.    Objective   /78 (BP Location: Left arm, Patient Position: Sitting, Cuff Size: Standard)   Pulse 88   Temp 98.3 °F (36.8 °C) (Tympanic)   Resp 16   Ht 5' 9\" (1.753 m)   Wt 97.5 kg (215 lb)   LMP 07/27/2021 (Approximate)   SpO2 97%   BMI 31.75 kg/m²     Physical Exam    Answers submitted by the patient for this visit:  Annual Physical (Submitted on 4/8/2025)  Sleep choices: sleeps well  Hearing choices: normal hearing right ear, normal hearing left ear, normal hearing bilateral ears  Vision choices: no vision problems  Dental choices: brushes teeth twice daily  Do you currently have an OB/GYN provider that you routinely follow with?: No  Menopausal status: postmenopausal  Any history of sexual transmitted disease/infection?: No  Do you have an advance directive/living will?: No  Do you have a durable power of  (POA)?: No    "

## 2025-04-09 NOTE — PATIENT INSTRUCTIONS
Medicare Preventive Visit Patient Instructions  Thank you for completing your Welcome to Medicare Visit or Medicare Annual Wellness Visit today. Your next wellness visit will be due in one year (4/10/2026).  The screening/preventive services that you may require over the next 5-10 years are detailed below. Some tests may not apply to you based off risk factors and/or age. Screening tests ordered at today's visit but not completed yet may show as past due. Also, please note that scanned in results may not display below.  Preventive Screenings:  Service Recommendations Previous Testing/Comments   Colorectal Cancer Screening  * Colonoscopy    * Fecal Occult Blood Test (FOBT)/Fecal Immunochemical Test (FIT)  * Fecal DNA/Cologuard Test  * Flexible Sigmoidoscopy Age: 45-75 years old   Colonoscopy: every 10 years (may be performed more frequently if at higher risk)  OR  FOBT/FIT: every 1 year  OR  Cologuard: every 3 years  OR  Sigmoidoscopy: every 5 years  Screening may be recommended earlier than age 45 if at higher risk for colorectal cancer. Also, an individualized decision between you and your healthcare provider will decide whether screening between the ages of 76-85 would be appropriate. Colonoscopy: 02/14/2022  FOBT/FIT: Not on file  Cologuard: Not on file  Sigmoidoscopy: Not on file          Breast Cancer Screening Age: 40+ years old  Frequency: every 1-2 years  Not required if history of left and right mastectomy Mammogram: 01/15/2025        Cervical Cancer Screening Between the ages of 21-29, pap smear recommended once every 3 years.   Between the ages of 30-65, can perform pap smear with HPV co-testing every 5 years.   Recommendations may differ for women with a history of total hysterectomy, cervical cancer, or abnormal pap smears in past. Pap Smear: 02/24/2020        Hepatitis C Screening Once for adults born between 1945 and 1965  More frequently in patients at high risk for Hepatitis C Hep C Antibody: Not on  file        Diabetes Screening 1-2 times per year if you're at risk for diabetes or have pre-diabetes Fasting glucose: 108 mg/dL (4/7/2025)  A1C: 5.6 % (4/7/2025)      Cholesterol Screening Once every 5 years if you don't have a lipid disorder. May order more often based on risk factors. Lipid panel: 07/31/2024          Other Preventive Screenings Covered by Medicare:  Abdominal Aortic Aneurysm (AAA) Screening: covered once if your at risk. You're considered to be at risk if you have a family history of AAA.  Lung Cancer Screening: covers low dose CT scan once per year if you meet all of the following conditions: (1) Age 55-77; (2) No signs or symptoms of lung cancer; (3) Current smoker or have quit smoking within the last 15 years; (4) You have a tobacco smoking history of at least 20 pack years (packs per day multiplied by number of years you smoked); (5) You get a written order from a healthcare provider.  Glaucoma Screening: covered annually if you're considered high risk: (1) You have diabetes OR (2) Family history of glaucoma OR (3)  aged 50 and older OR (4)  American aged 65 and older  Osteoporosis Screening: covered every 2 years if you meet one of the following conditions: (1) You're estrogen deficient and at risk for osteoporosis based off medical history and other findings; (2) Have a vertebral abnormality; (3) On glucocorticoid therapy for more than 3 months; (4) Have primary hyperparathyroidism; (5) On osteoporosis medications and need to assess response to drug therapy.   Last bone density test (DXA Scan): Not on file.  HIV Screening: covered annually if you're between the age of 15-65. Also covered annually if you are younger than 15 and older than 65 with risk factors for HIV infection. For pregnant patients, it is covered up to 3 times per pregnancy.    Immunizations:  Immunization Recommendations   Influenza Vaccine Annual influenza vaccination during flu season is  recommended for all persons aged >= 6 months who do not have contraindications   Pneumococcal Vaccine   * Pneumococcal conjugate vaccine = PCV13 (Prevnar 13), PCV15 (Vaxneuvance), PCV20 (Prevnar 20)  * Pneumococcal polysaccharide vaccine = PPSV23 (Pneumovax) Adults 19-63 yo with certain risk factors or if 65+ yo  If never received any pneumonia vaccine: recommend Prevnar 20 (PCV20)  Give PCV20 if previously received 1 dose of PCV13 or PPSV23   Hepatitis B Vaccine 3 dose series if at intermediate or high risk (ex: diabetes, end stage renal disease, liver disease)   Respiratory syncytial virus (RSV) Vaccine - COVERED BY MEDICARE PART D  * RSVPreF3 (Arexvy) CDC recommends that adults 60 years of age and older may receive a single dose of RSV vaccine using shared clinical decision-making (SCDM)   Tetanus (Td) Vaccine - COST NOT COVERED BY MEDICARE PART B Following completion of primary series, a booster dose should be given every 10 years to maintain immunity against tetanus. Td may also be given as tetanus wound prophylaxis.   Tdap Vaccine - COST NOT COVERED BY MEDICARE PART B Recommended at least once for all adults. For pregnant patients, recommended with each pregnancy.   Shingles Vaccine (Shingrix) - COST NOT COVERED BY MEDICARE PART B  2 shot series recommended in those 19 years and older who have or will have weakened immune systems or those 50 years and older     Health Maintenance Due:      Topic Date Due   • HIV Screening  Never done   • Cervical Cancer Screening  02/24/2025   • Breast Cancer Screening: Mammogram  01/15/2026   • Colorectal Cancer Screening  02/12/2032   • Hepatitis C Screening  Discontinued     Immunizations Due:      Topic Date Due   • Pneumococcal Vaccine: Pediatrics (0 to 5 Years) and At-Risk Patients (6 to 64 Years) (1 of 2 - PCV) Never done   • COVID-19 Vaccine (3 - 2024-25 season) 09/01/2024     Advance Directives   What are advance directives?  Advance directives are legal documents  that state your wishes and plans for medical care. These plans are made ahead of time in case you lose your ability to make decisions for yourself. Advance directives can apply to any medical decision, such as the treatments you want, and if you want to donate organs.   What are the types of advance directives?  There are many types of advance directives, and each state has rules about how to use them. You may choose a combination of any of the following:  Living will:  This is a written record of the treatment you want. You can also choose which treatments you do not want, which to limit, and which to stop at a certain time. This includes surgery, medicine, IV fluid, and tube feedings.   Durable power of  for healthcare (DPAHC):  This is a written record that states who you want to make healthcare choices for you when you are unable to make them for yourself. This person, called a proxy, is usually a family member or a friend. You may choose more than 1 proxy.  Do not resuscitate (DNR) order:  A DNR order is used in case your heart stops beating or you stop breathing. It is a request not to have certain forms of treatment, such as CPR. A DNR order may be included in other types of advance directives.  Medical directive:  This covers the care that you want if you are in a coma, near death, or unable to make decisions for yourself. You can list the treatments you want for each condition. Treatment may include pain medicine, surgery, blood transfusions, dialysis, IV or tube feedings, and a ventilator (breathing machine).  Values history:  This document has questions about your views, beliefs, and how you feel and think about life. This information can help others choose the care that you would choose.  Why are advance directives important?  An advance directive helps you control your care. Although spoken wishes may be used, it is better to have your wishes written down. Spoken wishes can be misunderstood, or  not followed. Treatments may be given even if you do not want them. An advance directive may make it easier for your family to make difficult choices about your care.   Weight Management   Why it is important to manage your weight:  Being overweight increases your risk of health conditions such as heart disease, high blood pressure, type 2 diabetes, and certain types of cancer. It can also increase your risk for osteoarthritis, sleep apnea, and other respiratory problems. Aim for a slow, steady weight loss. Even a small amount of weight loss can lower your risk of health problems.  How to lose weight safely:  A safe and healthy way to lose weight is to eat fewer calories and get regular exercise. You can lose up about 1 pound a week by decreasing the number of calories you eat by 500 calories each day.   Healthy meal plan for weight management:  A healthy meal plan includes a variety of foods, contains fewer calories, and helps you stay healthy. A healthy meal plan includes the following:  Eat whole-grain foods more often.  A healthy meal plan should contain fiber. Fiber is the part of grains, fruits, and vegetables that is not broken down by your body. Whole-grain foods are healthy and provide extra fiber in your diet. Some examples of whole-grain foods are whole-wheat breads and pastas, oatmeal, brown rice, and bulgur.  Eat a variety of vegetables every day.  Include dark, leafy greens such as spinach, kale, maddy greens, and mustard greens. Eat yellow and orange vegetables such as carrots, sweet potatoes, and winter squash.   Eat a variety of fruits every day.  Choose fresh or canned fruit (canned in its own juice or light syrup) instead of juice. Fruit juice has very little or no fiber.  Eat low-fat dairy foods.  Drink fat-free (skim) milk or 1% milk. Eat fat-free yogurt and low-fat cottage cheese. Try low-fat cheeses such as mozzarella and other reduced-fat cheeses.  Choose meat and other protein foods that  are low in fat.  Choose beans or other legumes such as split peas or lentils. Choose fish, skinless poultry (chicken or turkey), or lean cuts of red meat (beef or pork). Before you cook meat or poultry, cut off any visible fat.   Use less fat and oil.  Try baking foods instead of frying them. Add less fat, such as margarine, sour cream, regular salad dressing and mayonnaise to foods. Eat fewer high-fat foods. Some examples of high-fat foods include french fries, doughnuts, ice cream, and cakes.  Eat fewer sweets.  Limit foods and drinks that are high in sugar. This includes candy, cookies, regular soda, and sweetened drinks.  Exercise:  Exercise at least 30 minutes per day on most days of the week. Some examples of exercise include walking, biking, dancing, and swimming. You can also fit in more physical activity by taking the stairs instead of the elevator or parking farther away from stores. Ask your healthcare provider about the best exercise plan for you.      © Copyright [employer] 2018 Information is for End User's use only and may not be sold, redistributed or otherwise used for commercial purposes. All illustrations and images included in CareNotes® are the copyrighted property of A.D.A[employer]M., Inc. or [employer]

## 2025-05-02 PROBLEM — Z00.00 MEDICARE ANNUAL WELLNESS VISIT, INITIAL: Status: RESOLVED | Noted: 2025-04-02 | Resolved: 2025-05-02

## 2025-07-02 ENCOUNTER — TELEPHONE (OUTPATIENT)
Dept: FAMILY MEDICINE CLINIC | Facility: CLINIC | Age: 60
End: 2025-07-02

## 2025-07-02 DIAGNOSIS — E11.9 TYPE 2 DIABETES MELLITUS WITHOUT COMPLICATION, WITHOUT LONG-TERM CURRENT USE OF INSULIN (HCC): ICD-10-CM

## 2025-07-02 RX ORDER — DULAGLUTIDE 1.5 MG/.5ML
1.5 INJECTION, SOLUTION SUBCUTANEOUS WEEKLY
Qty: 6 ML | Refills: 3 | Status: SHIPPED | OUTPATIENT
Start: 2025-07-02

## 2025-07-02 RX ORDER — DULAGLUTIDE 1.5 MG/.5ML
1.5 INJECTION, SOLUTION SUBCUTANEOUS WEEKLY
Qty: 6 ML | Refills: 3 | Status: SHIPPED | OUTPATIENT
Start: 2025-07-02 | End: 2025-07-02 | Stop reason: SDUPTHER

## 2025-07-02 NOTE — TELEPHONE ENCOUNTER
Fax from [facility] requesting a 90 day refill (as it's easier for patient to remain compliant) for Dulaglutide Soln Aut #90 with 3 refills.  Replaced by [facility] [address]

## 2025-08-08 PROBLEM — Z79.4 TYPE 2 DIABETES MELLITUS WITH DIABETIC NEUROPATHY, WITH LONG-TERM CURRENT USE OF INSULIN (HCC): Status: RESOLVED | Noted: 2025-04-09 | Resolved: 2025-08-08

## 2025-08-08 PROBLEM — Z00.00 ANNUAL PHYSICAL EXAM: Status: RESOLVED | Noted: 2021-07-19 | Resolved: 2025-08-08

## 2025-08-08 PROBLEM — E11.40 TYPE 2 DIABETES MELLITUS WITH DIABETIC NEUROPATHY, WITH LONG-TERM CURRENT USE OF INSULIN (HCC): Status: RESOLVED | Noted: 2025-04-09 | Resolved: 2025-08-08

## 2025-08-12 ENCOUNTER — OFFICE VISIT (OUTPATIENT)
Dept: FAMILY MEDICINE CLINIC | Facility: CLINIC | Age: 60
End: 2025-08-12
Payer: MEDICARE

## 2025-08-12 PROBLEM — G89.29 CHRONIC PAIN OF LEFT KNEE: Status: ACTIVE | Noted: 2025-08-12

## 2025-08-12 PROBLEM — M25.562 CHRONIC PAIN OF LEFT KNEE: Status: ACTIVE | Noted: 2025-08-12

## 2025-08-13 ENCOUNTER — TELEPHONE (OUTPATIENT)
Age: 60
End: 2025-08-13

## 2025-08-17 DIAGNOSIS — E11.65 TYPE 2 DIABETES MELLITUS WITH HYPERGLYCEMIA, WITHOUT LONG-TERM CURRENT USE OF INSULIN (HCC): ICD-10-CM

## 2025-08-19 RX ORDER — EMPAGLIFLOZIN 10 MG/1
10 TABLET, FILM COATED ORAL DAILY
Qty: 90 TABLET | Refills: 1 | Status: SHIPPED | OUTPATIENT
Start: 2025-08-19

## (undated) DEVICE — BETHLEHEM UNIVERSAL MINOR GEN: Brand: CARDINAL HEALTH

## (undated) DEVICE — VIAL DECANTER

## (undated) DEVICE — SMOKE EVACUATION TUBING WITH 8 IN INTEGRAL WAND AND SPONGE GUARD: Brand: BUFFALO FILTER

## (undated) DEVICE — MARGIN MARKER SET

## (undated) DEVICE — SUT VICRYL 3-0 SH 27 IN J416H

## (undated) DEVICE — PENCIL ELECTROSURG E-Z CLEAN -0035H

## (undated) DEVICE — DRAPE EQUIPMENT RF WAND

## (undated) DEVICE — TRAY FOLEY 16FR URIMETER SURESTEP

## (undated) DEVICE — GLOVE PI ULTRA TOUCH SZ.7.0

## (undated) DEVICE — 3M™ STERI-STRIP™ REINFORCED ADHESIVE SKIN CLOSURES, R1547, 1/2 IN X 4 IN (12 MM X 100 MM), 6 STRIPS/ENVELOPE: Brand: 3M™ STERI-STRIP™

## (undated) DEVICE — NEEDLE 25G X 1 1/2

## (undated) DEVICE — SCD SEQUENTIAL COMPRESSION COMFORT SLEEVE MEDIUM KNEE LENGTH: Brand: KENDALL SCD

## (undated) DEVICE — DRAPE PROBE NEO-PROBE/ULTRASOUND

## (undated) DEVICE — TUBING SUCTION 5MM X 12 FT

## (undated) DEVICE — MEDI-VAC YANK SUCT HNDL W/TPRD BULBOUS TIP: Brand: CARDINAL HEALTH

## (undated) DEVICE — BETHLEHEM UNIVERSAL MINOR VAG: Brand: CARDINAL HEALTH

## (undated) DEVICE — SYRINGE 1ML TB 26G X 3/8 SAFETY

## (undated) DEVICE — GLOVE SRG BIOGEL 7

## (undated) DEVICE — UNDER BUTTOCKS DRAPE W/FLUID CONTROL POUCH: Brand: CONVERTORS

## (undated) DEVICE — CYSTO TUBING SINGLE IRRIGATION

## (undated) DEVICE — GLOVE INDICATOR PI UNDERGLOVE SZ 7.5 BLUE

## (undated) DEVICE — CHLORAPREP HI-LITE 26ML ORANGE

## (undated) DEVICE — SUT MONOCRYL 4-0 PS-2 18 IN Y496G